# Patient Record
Sex: FEMALE | Race: WHITE | NOT HISPANIC OR LATINO
[De-identification: names, ages, dates, MRNs, and addresses within clinical notes are randomized per-mention and may not be internally consistent; named-entity substitution may affect disease eponyms.]

---

## 2017-08-16 ENCOUNTER — APPOINTMENT (OUTPATIENT)
Dept: MAMMOGRAPHY | Facility: HOSPITAL | Age: 72
End: 2017-08-16

## 2017-10-13 ENCOUNTER — APPOINTMENT (OUTPATIENT)
Dept: MAMMOGRAPHY | Facility: HOSPITAL | Age: 72
End: 2017-10-13

## 2018-06-08 ENCOUNTER — APPOINTMENT (OUTPATIENT)
Dept: SURGERY | Facility: CLINIC | Age: 73
End: 2018-06-08
Payer: MEDICARE

## 2018-06-08 VITALS — BODY MASS INDEX: 27.32 KG/M2 | HEIGHT: 66 IN | WEIGHT: 170 LBS

## 2018-06-08 DIAGNOSIS — Z80.9 FAMILY HISTORY OF MALIGNANT NEOPLASM, UNSPECIFIED: ICD-10-CM

## 2018-06-08 DIAGNOSIS — R22.2 LOCALIZED SWELLING, MASS AND LUMP, TRUNK: ICD-10-CM

## 2018-06-08 PROCEDURE — 99202 OFFICE O/P NEW SF 15 MIN: CPT

## 2018-06-08 RX ORDER — PREDNISONE 20 MG/1
20 TABLET ORAL
Qty: 10 | Refills: 0 | Status: ACTIVE | COMMUNITY
Start: 2018-03-26

## 2018-06-08 RX ORDER — OXYCODONE AND ACETAMINOPHEN 7.5; 325 MG/1; MG/1
7.5-325 TABLET ORAL
Qty: 90 | Refills: 0 | Status: ACTIVE | COMMUNITY
Start: 2018-06-01

## 2018-06-08 RX ORDER — LEVOFLOXACIN 500 MG/1
500 TABLET, FILM COATED ORAL
Qty: 7 | Refills: 0 | Status: ACTIVE | COMMUNITY
Start: 2018-03-26

## 2018-06-08 RX ORDER — FLUTICASONE FUROATE AND VILANTEROL TRIFENATATE 200; 25 UG/1; UG/1
200-25 POWDER RESPIRATORY (INHALATION)
Qty: 60 | Refills: 0 | Status: ACTIVE | COMMUNITY
Start: 2018-05-31

## 2018-06-15 ENCOUNTER — APPOINTMENT (OUTPATIENT)
Dept: SURGERY | Facility: CLINIC | Age: 73
End: 2018-06-15

## 2018-09-21 ENCOUNTER — OUTPATIENT (OUTPATIENT)
Dept: OUTPATIENT SERVICES | Facility: HOSPITAL | Age: 73
LOS: 1 days | End: 2018-09-21
Payer: MEDICARE

## 2018-09-21 ENCOUNTER — APPOINTMENT (OUTPATIENT)
Dept: RADIOLOGY | Facility: HOSPITAL | Age: 73
End: 2018-09-21

## 2018-09-21 DIAGNOSIS — Z00.8 ENCOUNTER FOR OTHER GENERAL EXAMINATION: ICD-10-CM

## 2018-09-21 PROCEDURE — 71046 X-RAY EXAM CHEST 2 VIEWS: CPT

## 2018-09-21 PROCEDURE — 71046 X-RAY EXAM CHEST 2 VIEWS: CPT | Mod: 26

## 2018-09-28 ENCOUNTER — OUTPATIENT (OUTPATIENT)
Dept: OUTPATIENT SERVICES | Facility: HOSPITAL | Age: 73
LOS: 1 days | End: 2018-09-28
Payer: MEDICARE

## 2018-09-28 DIAGNOSIS — R91.8 OTHER NONSPECIFIC ABNORMAL FINDING OF LUNG FIELD: ICD-10-CM

## 2018-09-28 PROCEDURE — 71250 CT THORAX DX C-: CPT

## 2018-09-28 PROCEDURE — 71250 CT THORAX DX C-: CPT | Mod: 26

## 2018-10-08 ENCOUNTER — OUTPATIENT (OUTPATIENT)
Dept: OUTPATIENT SERVICES | Facility: HOSPITAL | Age: 73
LOS: 1 days | End: 2018-10-08
Payer: MEDICARE

## 2018-10-08 DIAGNOSIS — J44.9 CHRONIC OBSTRUCTIVE PULMONARY DISEASE, UNSPECIFIED: ICD-10-CM

## 2018-10-08 PROCEDURE — 94729 DIFFUSING CAPACITY: CPT

## 2018-10-08 PROCEDURE — 94726 PLETHYSMOGRAPHY LUNG VOLUMES: CPT

## 2018-10-08 PROCEDURE — 94060 EVALUATION OF WHEEZING: CPT | Mod: 26

## 2018-10-08 PROCEDURE — 94729 DIFFUSING CAPACITY: CPT | Mod: 26

## 2018-10-08 PROCEDURE — 94726 PLETHYSMOGRAPHY LUNG VOLUMES: CPT | Mod: 26

## 2018-10-08 PROCEDURE — 94060 EVALUATION OF WHEEZING: CPT

## 2018-10-16 ENCOUNTER — APPOINTMENT (OUTPATIENT)
Dept: THORACIC SURGERY | Facility: CLINIC | Age: 73
End: 2018-10-16
Payer: MEDICARE

## 2018-10-16 VITALS
HEIGHT: 65 IN | RESPIRATION RATE: 16 BRPM | SYSTOLIC BLOOD PRESSURE: 141 MMHG | HEART RATE: 87 BPM | BODY MASS INDEX: 27.49 KG/M2 | TEMPERATURE: 98.1 F | OXYGEN SATURATION: 97 % | WEIGHT: 165 LBS | DIASTOLIC BLOOD PRESSURE: 83 MMHG

## 2018-10-16 DIAGNOSIS — R91.1 SOLITARY PULMONARY NODULE: ICD-10-CM

## 2018-10-16 DIAGNOSIS — Z82.49 FAMILY HISTORY OF ISCHEMIC HEART DISEASE AND OTHER DISEASES OF THE CIRCULATORY SYSTEM: ICD-10-CM

## 2018-10-16 PROCEDURE — 99205 OFFICE O/P NEW HI 60 MIN: CPT

## 2018-10-19 ENCOUNTER — EMERGENCY (EMERGENCY)
Facility: HOSPITAL | Age: 73
LOS: 1 days | Discharge: HOME | End: 2018-10-19
Attending: INTERNAL MEDICINE | Admitting: INTERNAL MEDICINE
Payer: MEDICARE

## 2018-10-19 ENCOUNTER — OUTPATIENT (OUTPATIENT)
Dept: OUTPATIENT SERVICES | Facility: HOSPITAL | Age: 73
LOS: 1 days | End: 2018-10-19
Payer: MEDICARE

## 2018-10-19 VITALS
HEART RATE: 77 BPM | SYSTOLIC BLOOD PRESSURE: 142 MMHG | OXYGEN SATURATION: 98 % | TEMPERATURE: 98 F | DIASTOLIC BLOOD PRESSURE: 89 MMHG | RESPIRATION RATE: 18 BRPM

## 2018-10-19 VITALS
OXYGEN SATURATION: 98 % | RESPIRATION RATE: 16 BRPM | DIASTOLIC BLOOD PRESSURE: 86 MMHG | HEIGHT: 64.5 IN | TEMPERATURE: 98 F | HEART RATE: 71 BPM | WEIGHT: 158.95 LBS | SYSTOLIC BLOOD PRESSURE: 150 MMHG

## 2018-10-19 VITALS
WEIGHT: 160.94 LBS | SYSTOLIC BLOOD PRESSURE: 116 MMHG | RESPIRATION RATE: 16 BRPM | OXYGEN SATURATION: 97 % | DIASTOLIC BLOOD PRESSURE: 77 MMHG | TEMPERATURE: 98 F | HEIGHT: 65 IN | HEART RATE: 93 BPM

## 2018-10-19 DIAGNOSIS — J44.9 CHRONIC OBSTRUCTIVE PULMONARY DISEASE, UNSPECIFIED: ICD-10-CM

## 2018-10-19 DIAGNOSIS — R91.1 SOLITARY PULMONARY NODULE: ICD-10-CM

## 2018-10-19 DIAGNOSIS — R59.0 LOCALIZED ENLARGED LYMPH NODES: ICD-10-CM

## 2018-10-19 DIAGNOSIS — N81.10 CYSTOCELE, UNSPECIFIED: Chronic | ICD-10-CM

## 2018-10-19 DIAGNOSIS — I10 ESSENTIAL (PRIMARY) HYPERTENSION: ICD-10-CM

## 2018-10-19 LAB
ALBUMIN SERPL ELPH-MCNC: 3.4 G/DL — SIGNIFICANT CHANGE UP (ref 3.3–5)
ALP SERPL-CCNC: 88 U/L — SIGNIFICANT CHANGE UP (ref 40–120)
ALT FLD-CCNC: 18 U/L DA — SIGNIFICANT CHANGE UP (ref 10–45)
ANION GAP SERPL CALC-SCNC: 10 MMOL/L — SIGNIFICANT CHANGE UP (ref 5–17)
ANION GAP SERPL CALC-SCNC: 9 MMOL/L — SIGNIFICANT CHANGE UP (ref 5–17)
APTT BLD: 28.9 SEC — SIGNIFICANT CHANGE UP (ref 27.5–37.4)
AST SERPL-CCNC: 19 U/L — SIGNIFICANT CHANGE UP (ref 10–40)
BASOPHILS # BLD AUTO: 0.1 K/UL — SIGNIFICANT CHANGE UP (ref 0–0.2)
BASOPHILS NFR BLD AUTO: 0.8 % — SIGNIFICANT CHANGE UP (ref 0–2)
BILIRUB SERPL-MCNC: 0.3 MG/DL — SIGNIFICANT CHANGE UP (ref 0.2–1.2)
BUN SERPL-MCNC: 17 MG/DL — SIGNIFICANT CHANGE UP (ref 7–23)
BUN SERPL-MCNC: 18 MG/DL — SIGNIFICANT CHANGE UP (ref 7–23)
BUN SERPL-MCNC: 19 MG/DL — SIGNIFICANT CHANGE UP (ref 7–23)
CALCIUM SERPL-MCNC: 10 MG/DL — SIGNIFICANT CHANGE UP (ref 8.4–10.5)
CALCIUM SERPL-MCNC: 9.3 MG/DL — SIGNIFICANT CHANGE UP (ref 8.4–10.5)
CALCIUM SERPL-MCNC: 9.9 MG/DL — SIGNIFICANT CHANGE UP (ref 8.4–10.5)
CHLORIDE SERPL-SCNC: 94 MMOL/L — LOW (ref 98–107)
CHLORIDE SERPL-SCNC: 96 MMOL/L — SIGNIFICANT CHANGE UP (ref 96–108)
CHLORIDE SERPL-SCNC: 96 MMOL/L — SIGNIFICANT CHANGE UP (ref 96–108)
CO2 SERPL-SCNC: 27 MMOL/L — SIGNIFICANT CHANGE UP (ref 22–31)
CO2 SERPL-SCNC: 30 MMOL/L — SIGNIFICANT CHANGE UP (ref 22–31)
CO2 SERPL-SCNC: 32 MMOL/L — HIGH (ref 22–31)
CREAT SERPL-MCNC: 1.21 MG/DL — SIGNIFICANT CHANGE UP (ref 0.5–1.3)
CREAT SERPL-MCNC: 1.25 MG/DL — SIGNIFICANT CHANGE UP (ref 0.5–1.3)
CREAT SERPL-MCNC: 1.27 MG/DL — SIGNIFICANT CHANGE UP (ref 0.5–1.3)
EOSINOPHIL # BLD AUTO: 0.4 K/UL — SIGNIFICANT CHANGE UP (ref 0–0.5)
EOSINOPHIL NFR BLD AUTO: 5.3 % — SIGNIFICANT CHANGE UP (ref 0–6)
GLUCOSE SERPL-MCNC: 104 MG/DL — HIGH (ref 70–99)
GLUCOSE SERPL-MCNC: 80 MG/DL — SIGNIFICANT CHANGE UP (ref 70–99)
GLUCOSE SERPL-MCNC: 83 MG/DL — SIGNIFICANT CHANGE UP (ref 70–99)
HCT VFR BLD CALC: 34.6 % — SIGNIFICANT CHANGE UP (ref 34.5–45)
HCT VFR BLD CALC: 35 % — SIGNIFICANT CHANGE UP (ref 34.5–45)
HGB BLD-MCNC: 11.7 G/DL — SIGNIFICANT CHANGE UP (ref 11.5–15.5)
HGB BLD-MCNC: 12 G/DL — SIGNIFICANT CHANGE UP (ref 11.5–15.5)
INR BLD: 1.03 RATIO — SIGNIFICANT CHANGE UP (ref 0.88–1.16)
LYMPHOCYTES # BLD AUTO: 1.7 K/UL — SIGNIFICANT CHANGE UP (ref 1–3.3)
LYMPHOCYTES # BLD AUTO: 20.4 % — SIGNIFICANT CHANGE UP (ref 13–44)
MAGNESIUM SERPL-MCNC: 1.6 MG/DL — SIGNIFICANT CHANGE UP (ref 1.6–2.6)
MCHC RBC-ENTMCNC: 29.3 PG — SIGNIFICANT CHANGE UP (ref 27–34)
MCHC RBC-ENTMCNC: 30.6 PG — SIGNIFICANT CHANGE UP (ref 27–34)
MCHC RBC-ENTMCNC: 33.4 % — SIGNIFICANT CHANGE UP (ref 32–36)
MCHC RBC-ENTMCNC: 34.6 GM/DL — SIGNIFICANT CHANGE UP (ref 32–36)
MCV RBC AUTO: 87.5 FL — SIGNIFICANT CHANGE UP (ref 80–100)
MCV RBC AUTO: 88.5 FL — SIGNIFICANT CHANGE UP (ref 80–100)
MONOCYTES # BLD AUTO: 0.5 K/UL — SIGNIFICANT CHANGE UP (ref 0–0.9)
MONOCYTES NFR BLD AUTO: 5.5 % — SIGNIFICANT CHANGE UP (ref 1–9)
NEUTROPHILS # BLD AUTO: 5.6 K/UL — SIGNIFICANT CHANGE UP (ref 1.8–7.4)
NEUTROPHILS NFR BLD AUTO: 68 % — SIGNIFICANT CHANGE UP (ref 43–77)
NRBC # FLD: 0 — SIGNIFICANT CHANGE UP
PLATELET # BLD AUTO: 300 K/UL — SIGNIFICANT CHANGE UP (ref 150–400)
PLATELET # BLD AUTO: 318 K/UL — SIGNIFICANT CHANGE UP (ref 150–400)
PMV BLD: 10.7 FL — SIGNIFICANT CHANGE UP (ref 7–13)
POTASSIUM SERPL-MCNC: 2.5 MMOL/L — CRITICAL LOW (ref 3.5–5.3)
POTASSIUM SERPL-MCNC: 2.8 MMOL/L — CRITICAL LOW (ref 3.5–5.3)
POTASSIUM SERPL-MCNC: 3.1 MMOL/L — LOW (ref 3.5–5.3)
POTASSIUM SERPL-SCNC: 2.5 MMOL/L — CRITICAL LOW (ref 3.5–5.3)
POTASSIUM SERPL-SCNC: 2.8 MMOL/L — CRITICAL LOW (ref 3.5–5.3)
POTASSIUM SERPL-SCNC: 3.1 MMOL/L — LOW (ref 3.5–5.3)
PROT SERPL-MCNC: 7.1 G/DL — SIGNIFICANT CHANGE UP (ref 6–8.3)
PROTHROM AB SERPL-ACNC: 11.4 SEC — SIGNIFICANT CHANGE UP (ref 9.8–12.7)
RBC # BLD: 3.91 M/UL — SIGNIFICANT CHANGE UP (ref 3.8–5.2)
RBC # BLD: 4 M/UL — SIGNIFICANT CHANGE UP (ref 3.8–5.2)
RBC # FLD: 12.3 % — SIGNIFICANT CHANGE UP (ref 10.3–14.5)
RBC # FLD: 13.3 % — SIGNIFICANT CHANGE UP (ref 10.3–14.5)
SODIUM SERPL-SCNC: 136 MMOL/L — SIGNIFICANT CHANGE UP (ref 135–145)
SODIUM SERPL-SCNC: 136 MMOL/L — SIGNIFICANT CHANGE UP (ref 135–145)
SODIUM SERPL-SCNC: 137 MMOL/L — SIGNIFICANT CHANGE UP (ref 135–145)
WBC # BLD: 8.2 K/UL — SIGNIFICANT CHANGE UP (ref 3.8–10.5)
WBC # BLD: 8.47 K/UL — SIGNIFICANT CHANGE UP (ref 3.8–10.5)
WBC # FLD AUTO: 8.2 K/UL — SIGNIFICANT CHANGE UP (ref 3.8–10.5)
WBC # FLD AUTO: 8.47 K/UL — SIGNIFICANT CHANGE UP (ref 3.8–10.5)

## 2018-10-19 PROCEDURE — 93010 ELECTROCARDIOGRAM REPORT: CPT

## 2018-10-19 PROCEDURE — 85730 THROMBOPLASTIN TIME PARTIAL: CPT

## 2018-10-19 PROCEDURE — 96365 THER/PROPH/DIAG IV INF INIT: CPT

## 2018-10-19 PROCEDURE — 93005 ELECTROCARDIOGRAM TRACING: CPT

## 2018-10-19 PROCEDURE — 99284 EMERGENCY DEPT VISIT MOD MDM: CPT | Mod: 25

## 2018-10-19 PROCEDURE — 96376 TX/PRO/DX INJ SAME DRUG ADON: CPT

## 2018-10-19 PROCEDURE — 85027 COMPLETE CBC AUTOMATED: CPT

## 2018-10-19 PROCEDURE — 80048 BASIC METABOLIC PNL TOTAL CA: CPT

## 2018-10-19 PROCEDURE — 83735 ASSAY OF MAGNESIUM: CPT

## 2018-10-19 PROCEDURE — 80053 COMPREHEN METABOLIC PANEL: CPT

## 2018-10-19 PROCEDURE — 85610 PROTHROMBIN TIME: CPT

## 2018-10-19 PROCEDURE — 99284 EMERGENCY DEPT VISIT MOD MDM: CPT

## 2018-10-19 RX ORDER — POTASSIUM CHLORIDE 20 MEQ
10 PACKET (EA) ORAL
Qty: 0 | Refills: 0 | Status: DISCONTINUED | OUTPATIENT
Start: 2018-10-19 | End: 2018-10-19

## 2018-10-19 RX ORDER — SODIUM CHLORIDE 9 MG/ML
1000 INJECTION, SOLUTION INTRAVENOUS
Qty: 0 | Refills: 0 | Status: DISCONTINUED | OUTPATIENT
Start: 2018-10-25 | End: 2018-11-09

## 2018-10-19 RX ORDER — POTASSIUM CHLORIDE 20 MEQ
40 PACKET (EA) ORAL ONCE
Qty: 0 | Refills: 0 | Status: COMPLETED | OUTPATIENT
Start: 2018-10-19 | End: 2018-10-19

## 2018-10-19 RX ORDER — POTASSIUM CHLORIDE 20 MEQ
10 PACKET (EA) ORAL
Qty: 0 | Refills: 0 | Status: COMPLETED | OUTPATIENT
Start: 2018-10-19 | End: 2018-10-19

## 2018-10-19 RX ORDER — SODIUM CHLORIDE 9 MG/ML
3 INJECTION INTRAMUSCULAR; INTRAVENOUS; SUBCUTANEOUS ONCE
Qty: 0 | Refills: 0 | Status: COMPLETED | OUTPATIENT
Start: 2018-10-19 | End: 2018-10-19

## 2018-10-19 RX ORDER — ACETAMINOPHEN 500 MG
650 TABLET ORAL ONCE
Qty: 0 | Refills: 0 | Status: COMPLETED | OUTPATIENT
Start: 2018-10-19 | End: 2018-10-19

## 2018-10-19 RX ADMIN — Medication 100 MILLIEQUIVALENT(S): at 20:35

## 2018-10-19 RX ADMIN — Medication 650 MILLIGRAM(S): at 18:33

## 2018-10-19 RX ADMIN — SODIUM CHLORIDE 3 MILLILITER(S): 9 INJECTION INTRAMUSCULAR; INTRAVENOUS; SUBCUTANEOUS at 18:32

## 2018-10-19 RX ADMIN — Medication 100 MILLIEQUIVALENT(S): at 19:30

## 2018-10-19 RX ADMIN — Medication 10 MILLIEQUIVALENT(S): at 20:30

## 2018-10-19 RX ADMIN — Medication 40 MILLIEQUIVALENT(S): at 19:05

## 2018-10-19 RX ADMIN — Medication 650 MILLIGRAM(S): at 20:05

## 2018-10-19 RX ADMIN — Medication 100 MILLIEQUIVALENT(S): at 21:35

## 2018-10-19 RX ADMIN — Medication 40 MILLIEQUIVALENT(S): at 23:44

## 2018-10-19 NOTE — H&P PST ADULT - REASON FOR ADMISSION
"I was sick about a month ago and it never got better so they sent me for a cxr and it showed something"

## 2018-10-19 NOTE — H&P PST ADULT - HISTORY OF PRESENT ILLNESS
73yr old female with medical hx htn, depression COPD/Asthma and osteoarthritis presents for preop evaluation with c/o coughing and malaise x 1 month.  Pt was evaluated by pmd and was txted with Levaquin x 7 days and steroids x 5 days with no improvement.  Pt was subsequently txted with Biaxin x 7 days with steroids x 3 days and then sent for cxr which was abnormal then followed up with ct scan.  Pt is now scheduled for Flexible Bronchoscopy 73yr old female with medical hx htn, depression COPD/Asthma and osteoarthritis presents for preop evaluation with c/o coughing and malaise x 1 month.  Pt was evaluated by pmd and was txted with Levaquin x 7 days and steroids x 5 days with no improvement.  Pt was subsequently txted with Biaxin x 7 days with steroids x 3 days and then sent for cxr which was abnormal then followed up with ct scan.  Pt is now scheduled for Flexible Bronchoscopy.

## 2018-10-19 NOTE — ED ADULT NURSE NOTE - NSIMPLEMENTINTERV_GEN_ALL_ED
Implemented All Universal Safety Interventions:  [place] to call system. Call bell, personal items and telephone within reach. Instruct patient to call for assistance. Room bathroom lighting operational. Non-slip footwear when patient is off stretcher. Physically safe environment: no spills, clutter or unnecessary equipment. Stretcher in lowest position, wheels locked, appropriate side rails in place.

## 2018-10-19 NOTE — H&P PST ADULT - PROBLEM SELECTOR PLAN 1
Scheduled for Flexible Bronchoscopy, Endobronchial Ultrasound and Biopsy on 10/25/18.  Lab results pending.  Preop and famotidine instructions provided and questions addressed.  Pt has appt for cardiac evaluation with Dr. Castañeda on 10/23/18 will call for results.

## 2018-10-19 NOTE — ED PROVIDER NOTE - OBJECTIVE STATEMENT
73 yr old female with hx of COPD, HTN, depression presents from surgical pre op due to low potassium. Pt has scheduled lung biopsy for next week, pt went for  pre op and potassium was 2.7. Pt reports generalized fatigue and mild headache. Denies any sob, chest pain, dizziness or any other symptoms at this time. Hx of hypokalemia 73 yr old female with hx of COPD, HTN, depression presents from surgical pre op due to low potassium. Pt has scheduled lung biopsy for next week, pt went for  pre op and potassium was 2.8. Pt reports generalized fatigue and mild headache. Denies any sob, chest pain, dizziness or any other symptoms at this time. Hx of hypokalemia

## 2018-10-19 NOTE — ED PROVIDER NOTE - PSH
Female bladder prolapse  bladder sling - 2013  S/P appendectomy  in   S/P breast biopsy  bilateral  breast  - benign  S/P D&C  for missed  about 25 years ago

## 2018-10-19 NOTE — ED PROVIDER NOTE - PROGRESS NOTE DETAILS
pt asymptomatic in ED. well appearing.  K improved p supplementation.  pt states she is prescribed KCl 10meq bid but did not take it for last 2 weeks about.  told pt to restart as directed.  Dr Kiran

## 2018-10-19 NOTE — H&P PST ADULT - PROBLEM/PLAN-2
Medication: norvasc  normodyne  Last visit: 10/04/17  Next visit: 10/27/2017  Last refill: 02/01/17  w/refills  [facility]
DISPLAY PLAN FREE TEXT

## 2018-10-19 NOTE — ED PROVIDER NOTE - MEDICAL DECISION MAKING DETAILS
73 yr old female with hx of COPD, HTN, depression presents from surgical pre op due to low potassium. Pt has scheduled lung biopsy for next week, pt went for  pre op and potassium was 2.7. Pt reports generalized fatigue and mild headache. Denies any sob, chest pain, dizziness or any other symptoms at this time. Hx of hypokalemia: will repeat labs, EKG, and likely 3 runs KCL and PO kcl - repeat K

## 2018-10-19 NOTE — ED PROVIDER NOTE - PMH
Arthritis  left knee recently, right knee arthritis for several years  Asthma    Bronchitis  last episode 1 year ago  COPD (chronic obstructive pulmonary disease)    Depression    HTN (hypertension)  controlled with meds for several years  Kidney calculi  spontaneously passed 10 years ago  Localized enlarged lymph nodes    Migraine  headaches, resolved for several years  Osteoarthritis of multiple joints, unspecified osteoarthritis type    Regurgitation  tricuspid valve  Solitary pulmonary nodule    Spinal stenosis, unspecified spinal region

## 2018-10-19 NOTE — PROVIDER CONTACT NOTE (CRITICAL VALUE NOTIFICATION) - ACTION/TREATMENT ORDERED:
Unable to reach Dr. Cavazos (message left to emergency service line, waited over an hr), unable to reach Dr. Bang Castañeda (cardiologist) - office is closed.   Discuss with Dr. Christensen (anesthesiologist)- Advised to send patient to ER for an evaluation & intervention. Patient was called - pt agreed to go to [facility] for further evaluation & treatment. Serum potassium ordered stat on admit.

## 2018-10-19 NOTE — H&P PST ADULT - PMH
Kidney calculi  spontaneously passed 10 years ago  Migraine  headaches, resolved for several years  Bronchitis  last episode 1 year ago  Regurgitation  tricuspid valve  Depression    HTN (hypertension)  controlled with meds for several years  Arthritis  left knee recently, right knee arthritis for several years Arthritis  left knee recently, right knee arthritis for several years  Asthma    Bronchitis  last episode 1 year ago  COPD (chronic obstructive pulmonary disease)    Depression    HTN (hypertension)  controlled with meds for several years  Kidney calculi  spontaneously passed 10 years ago  Migraine  headaches, resolved for several years  Osteoarthritis of multiple joints, unspecified osteoarthritis type    Regurgitation  tricuspid valve  Spinal stenosis, unspecified spinal region Arthritis  left knee recently, right knee arthritis for several years  Asthma    Bronchitis  last episode 1 year ago  COPD (chronic obstructive pulmonary disease)    Depression    HTN (hypertension)  controlled with meds for several years  Kidney calculi  spontaneously passed 10 years ago  Localized enlarged lymph nodes    Migraine  headaches, resolved for several years  Osteoarthritis of multiple joints, unspecified osteoarthritis type    Regurgitation  tricuspid valve  Solitary pulmonary nodule    Spinal stenosis, unspecified spinal region

## 2018-10-19 NOTE — PROVIDER CONTACT NOTE (CRITICAL VALUE NOTIFICATION) - BACKGROUND
74 yo female with hx of HTN, depression, COPD/asthma and OA presents to have PST for flexible bronchoscopy on 10/25/2018.

## 2018-10-19 NOTE — H&P PST ADULT - FAMILY HISTORY
Father  Still living? No  Family history of pancreatic cancer, Age at diagnosis: Age Unknown     Mother  Still living? No  Family history of COPD (chronic obstructive pulmonary disease), Age at diagnosis: Age Unknown  Family history of heart disease, Age at diagnosis: Age Unknown     Sibling  Still living? No  Family history of leukemia, Age at diagnosis: Age Unknown  Family history of hepatitis C, Age at diagnosis: Age Unknown  Family history of rheumatic fever, Age at diagnosis: Age Unknown

## 2018-10-19 NOTE — ED PROVIDER NOTE - ATTENDING CONTRIBUTION TO CARE
73 y f cc hypokalemia sent from preop   pe General:     NAD, well-nourished, well-appearing  Head:     NC/AT, EOMI, oral mucosa moist  Neck:     trachea midline  Lungs:     CTA b/l, no w/r/r  CVS:     S1S2, RRR, no m/g/r  Abd:     +BS, s/nt/nd, no organomegaly  Ext:    2+ radial and pedal pulses, no c/c/e  Neuro: AAOx3, no sensory/motor deficits  Dr. Saha:  I have reviewed and discussed with the PA/ resident the case specifics, including the history, physical assessment, evaluation, conclusion, laboratory results, and medical plan. I agree with the contents, and conclusions. I have personally examined, and interviewed the patient.

## 2018-10-19 NOTE — H&P PST ADULT - GASTROINTESTINAL DETAILS
no guarding/no bruit/no rebound tenderness/no rigidity/no distention/no masses palpable/bowel sounds normal/soft/nontender/no organomegaly

## 2018-10-19 NOTE — ED PROVIDER NOTE - NS ED ATTENDING STATEMENT MOD
[facility] Brief Operative Note    Pre-operative diagnosis: LUNG MASS   Post-operative diagnosis right lung mass     Procedure: Procedure(s):  FLEXIBLE BRONCHOSCOPY WITH BIOPSIES. - Wound Class: II-Clean Contaminated   Surgeon(s): Surgeon(s) and Role:     * Robbie Linda MD - Primary     * Ana Sun PA-C - Assisting   Estimated blood loss: 2 cc   Specimens:   ID Type Source Tests Collected by Time Destination   A : RIGHT LOWER LOBE BRONCHUS BIOPSY Tissue Bronchial SURGICAL PATHOLOGY EXAM Robbie Linda MD 11/14/2017 11:19 AM       Findings: Await final pathology      I have personally performed a face to face diagnostic evaluation on this patient. I have reviewed the ACP note and agree with the history, exam and plan of care, except as noted.

## 2018-10-23 ENCOUNTER — NON-APPOINTMENT (OUTPATIENT)
Age: 73
End: 2018-10-23

## 2018-10-23 ENCOUNTER — APPOINTMENT (OUTPATIENT)
Dept: CARDIOLOGY | Facility: CLINIC | Age: 73
End: 2018-10-23
Payer: MEDICARE

## 2018-10-23 VITALS
BODY MASS INDEX: 27.16 KG/M2 | HEART RATE: 83 BPM | SYSTOLIC BLOOD PRESSURE: 128 MMHG | DIASTOLIC BLOOD PRESSURE: 71 MMHG | WEIGHT: 163 LBS | RESPIRATION RATE: 17 BRPM | HEIGHT: 65 IN | OXYGEN SATURATION: 96 %

## 2018-10-23 DIAGNOSIS — Z82.49 FAMILY HISTORY OF ISCHEMIC HEART DISEASE AND OTHER DISEASES OF THE CIRCULATORY SYSTEM: ICD-10-CM

## 2018-10-23 PROBLEM — M15.9 POLYOSTEOARTHRITIS, UNSPECIFIED: Chronic | Status: ACTIVE | Noted: 2018-10-19

## 2018-10-23 PROBLEM — J44.9 CHRONIC OBSTRUCTIVE PULMONARY DISEASE, UNSPECIFIED: Chronic | Status: ACTIVE | Noted: 2018-10-19

## 2018-10-23 PROCEDURE — 93306 TTE W/DOPPLER COMPLETE: CPT

## 2018-10-23 PROCEDURE — 99205 OFFICE O/P NEW HI 60 MIN: CPT

## 2018-10-23 PROCEDURE — 93000 ELECTROCARDIOGRAM COMPLETE: CPT

## 2018-10-25 ENCOUNTER — APPOINTMENT (OUTPATIENT)
Dept: THORACIC SURGERY | Facility: HOSPITAL | Age: 73
End: 2018-10-25
Payer: MEDICARE

## 2018-10-25 ENCOUNTER — RESULT REVIEW (OUTPATIENT)
Age: 73
End: 2018-10-25

## 2018-10-25 ENCOUNTER — OUTPATIENT (OUTPATIENT)
Dept: OUTPATIENT SERVICES | Facility: HOSPITAL | Age: 73
LOS: 1 days | Discharge: HOME | End: 2018-10-25
Payer: MEDICARE

## 2018-10-25 VITALS
HEART RATE: 71 BPM | DIASTOLIC BLOOD PRESSURE: 78 MMHG | RESPIRATION RATE: 16 BRPM | OXYGEN SATURATION: 97 % | HEIGHT: 64.5 IN | SYSTOLIC BLOOD PRESSURE: 133 MMHG | WEIGHT: 158.95 LBS | TEMPERATURE: 98 F

## 2018-10-25 VITALS
OXYGEN SATURATION: 98 % | SYSTOLIC BLOOD PRESSURE: 139 MMHG | DIASTOLIC BLOOD PRESSURE: 86 MMHG | RESPIRATION RATE: 16 BRPM | HEART RATE: 78 BPM

## 2018-10-25 DIAGNOSIS — R91.1 SOLITARY PULMONARY NODULE: ICD-10-CM

## 2018-10-25 DIAGNOSIS — N81.10 CYSTOCELE, UNSPECIFIED: Chronic | ICD-10-CM

## 2018-10-25 LAB — POTASSIUM BLDV-SCNC: 2.8 MMOL/L — CRITICAL LOW (ref 3.4–4.5)

## 2018-10-25 PROCEDURE — 88341 IMHCHEM/IMCYTCHM EA ADD ANTB: CPT | Mod: 26

## 2018-10-25 PROCEDURE — 71045 X-RAY EXAM CHEST 1 VIEW: CPT | Mod: 26

## 2018-10-25 PROCEDURE — 88172 CYTP DX EVAL FNA 1ST EA SITE: CPT | Mod: 26

## 2018-10-25 PROCEDURE — 88305 TISSUE EXAM BY PATHOLOGIST: CPT | Mod: 26

## 2018-10-25 PROCEDURE — 88173 CYTOPATH EVAL FNA REPORT: CPT | Mod: 26

## 2018-10-25 PROCEDURE — 31652 BRONCH EBUS SAMPLNG 1/2 NODE: CPT

## 2018-10-25 PROCEDURE — 88342 IMHCHEM/IMCYTCHM 1ST ANTB: CPT | Mod: 26

## 2018-10-25 RX ORDER — POTASSIUM CHLORIDE 20 MEQ
20 PACKET (EA) ORAL ONCE
Qty: 0 | Refills: 0 | Status: COMPLETED | OUTPATIENT
Start: 2018-10-25 | End: 2018-10-25

## 2018-10-25 RX ORDER — POTASSIUM CHLORIDE 20 MEQ
10 PACKET (EA) ORAL ONCE
Qty: 0 | Refills: 0 | Status: COMPLETED | OUTPATIENT
Start: 2018-10-25 | End: 2018-10-25

## 2018-10-25 RX ORDER — FENTANYL CITRATE 50 UG/ML
25 INJECTION INTRAVENOUS
Qty: 0 | Refills: 0 | Status: DISCONTINUED | OUTPATIENT
Start: 2018-10-25 | End: 2018-10-25

## 2018-10-25 RX ORDER — POTASSIUM CHLORIDE 20 MEQ
40 PACKET (EA) ORAL ONCE
Qty: 0 | Refills: 0 | Status: COMPLETED | OUTPATIENT
Start: 2018-10-25 | End: 2018-10-25

## 2018-10-25 RX ORDER — OXYCODONE HYDROCHLORIDE 5 MG/1
10 TABLET ORAL ONCE
Qty: 0 | Refills: 0 | Status: DISCONTINUED | OUTPATIENT
Start: 2018-10-25 | End: 2018-10-25

## 2018-10-25 RX ORDER — OXYCODONE HYDROCHLORIDE 5 MG/1
5 TABLET ORAL ONCE
Qty: 0 | Refills: 0 | Status: DISCONTINUED | OUTPATIENT
Start: 2018-10-25 | End: 2018-10-25

## 2018-10-25 RX ORDER — METOCLOPRAMIDE HCL 10 MG
10 TABLET ORAL ONCE
Qty: 0 | Refills: 0 | Status: DISCONTINUED | OUTPATIENT
Start: 2018-10-25 | End: 2018-10-25

## 2018-10-25 RX ORDER — ONDANSETRON 8 MG/1
4 TABLET, FILM COATED ORAL ONCE
Qty: 0 | Refills: 0 | Status: DISCONTINUED | OUTPATIENT
Start: 2018-10-25 | End: 2018-10-25

## 2018-10-25 RX ORDER — FENTANYL CITRATE 50 UG/ML
50 INJECTION INTRAVENOUS
Qty: 0 | Refills: 0 | Status: DISCONTINUED | OUTPATIENT
Start: 2018-10-25 | End: 2018-10-25

## 2018-10-25 RX ADMIN — Medication 50 MILLIEQUIVALENT(S): at 10:14

## 2018-10-25 NOTE — ASU DISCHARGE PLAN (ADULT/PEDIATRIC). - NOTIFY
Fever greater than 101/Bleeding that does not stop Fever greater than 101/Bleeding that does not stop/Increased Irritability or Sluggishness/Pain not relieved by Medications/Persistent Nausea and Vomiting/shortness of breat/Unable to Urinate/Inability to Tolerate Liquids or Foods

## 2018-10-25 NOTE — ASU DISCHARGE PLAN (ADULT/PEDIATRIC). - MEDICATION SUMMARY - MEDICATIONS TO TAKE
I will START or STAY ON the medications listed below when I get home from the hospital:    oxyCODONE-acetaminophen 7.5 mg-325 mg oral tablet  -- 1 tab(s) by mouth every 6 hours, As Needed  -- Indication: For pain    Zoloft 100 mg oral tablet  -- 1 tab(s) by mouth once a day (at bedtime)  -- Indication: For depression    ZyrTEC 10 mg oral tablet  -- 1 tab(s) by mouth 2 times a day  -- Indication: For allergy    valsartan-hydrochlorothiazide 160mg-25mg oral tablet  -- 1 tab(s) by mouth once a day  -- Indication: For htn    Breo Ellipta 200 mcg-25 mcg/inh inhalation powder  -- 1 puff(s) inhaled once a day  -- Indication: For copd    Ventolin HFA 90 mcg/inh inhalation aerosol  -- 2 puff(s) inhaled 4 times a day, As Needed  -- Indication: For copd    Klor-Con 10 mEq oral tablet, extended release  -- 1 tab(s) by mouth 2 times a day  -- Indication: For Supplement    Cod Liver Oil oral capsule  -- 1 cap(s) by mouth once a day - last dsoe 10/19/18  -- Indication: For Supplement    Vitamin B-12 100 mcg oral tablet  -- 1 tab(s) by mouth once a day  -- Indication: For Supplement    Vitamin D3 1000 intl units oral tablet  -- 1 tab(s) by mouth once a day  -- Indication: For Supplement

## 2018-10-25 NOTE — BRIEF OPERATIVE NOTE - PROCEDURE POST
How Severe Is Your Burn?: mild Is This A New Presentation, Or A Follow-Up?: Burn Additional History: Patient applied Cortisone 10. <<-----Click on this checkbox to enter Post-Op Dx

## 2018-10-25 NOTE — ASU DISCHARGE PLAN (ADULT/PEDIATRIC). - SPECIAL INSTRUCTIONS
Asper Dr Hale, call the office of Dr. Fredis Solomon (Oncologist) [phone] to schedule an appointment within 1 week

## 2018-10-25 NOTE — BRIEF OPERATIVE NOTE - PROCEDURE
<<-----Click on this checkbox to enter Procedure EBUS, with biopsy of mediastinal lymph node  10/25/2018    Active  LINDY

## 2018-10-25 NOTE — ASU DISCHARGE PLAN (ADULT/PEDIATRIC). - POST OP PHONE #
[phone] pt. granted permission to leave message /and or speak with whoever answers the phone.

## 2018-10-25 NOTE — ASU DISCHARGE PLAN (ADULT/PEDIATRIC). - ASU FOLLOWUP
911 or go to the nearest Emergency Room SHIV ASU (Adult):/or call 911 or go to the nearest emergency room

## 2018-10-25 NOTE — ASU PREOP CHECKLIST - 2.
k=2.8 anesthesia and  to be notified k=2.8 anesthesia and  to be notified, Dr. Spears giving 20 meq IV stat k=2.8 anesthesia and  to be notified, Dr. Spears giving 20 meq IV stat and taken to OR

## 2018-11-04 ENCOUNTER — OUTPATIENT (OUTPATIENT)
Dept: OUTPATIENT SERVICES | Facility: HOSPITAL | Age: 73
LOS: 1 days | Discharge: HOME | End: 2018-11-04

## 2018-11-04 DIAGNOSIS — C34.90 MALIGNANT NEOPLASM OF UNSPECIFIED PART OF UNSPECIFIED BRONCHUS OR LUNG: ICD-10-CM

## 2018-11-04 DIAGNOSIS — N81.10 CYSTOCELE, UNSPECIFIED: Chronic | ICD-10-CM

## 2018-11-05 LAB — NON-GYNECOLOGICAL CYTOLOGY STUDY: SIGNIFICANT CHANGE UP

## 2018-11-13 ENCOUNTER — APPOINTMENT (OUTPATIENT)
Dept: HEMATOLOGY ONCOLOGY | Facility: CLINIC | Age: 73
End: 2018-11-13
Payer: MEDICARE

## 2018-11-13 VITALS
DIASTOLIC BLOOD PRESSURE: 88 MMHG | OXYGEN SATURATION: 97 % | SYSTOLIC BLOOD PRESSURE: 146 MMHG | HEART RATE: 77 BPM | TEMPERATURE: 97.4 F | BODY MASS INDEX: 26.82 KG/M2 | HEIGHT: 65 IN | RESPIRATION RATE: 16 BRPM | WEIGHT: 161 LBS

## 2018-11-13 PROCEDURE — 99205 OFFICE O/P NEW HI 60 MIN: CPT

## 2018-11-13 NOTE — PHYSICAL EXAM
[Restricted in physically strenuous activity but ambulatory and able to carry out work of a light or sedentary nature] : Status 1- Restricted in physically strenuous activity but ambulatory and able to carry out work of a light or sedentary nature, e.g., light house work, office work [Normal] : affect appropriate [de-identified] : decreased BS b/l [de-identified] : left LE ecchymosis

## 2018-11-13 NOTE — REVIEW OF SYSTEMS
[Fatigue] : fatigue [Cough] : cough [Negative] : Allergic/Immunologic [FreeTextEntry2] : decreased appetite [FreeTextEntry9] : as above

## 2018-11-13 NOTE — ASSESSMENT
[FreeTextEntry1] : 72 yo w with recently diagnosed lung adeno ca. \par PET/CT and Brain MRI pending\par Molecular/PDL1 pending ETA 10/26\par Discussed stage based on available imaging and need for additional work up before tx decision. \par OV on 11/27.

## 2018-11-13 NOTE — HISTORY OF PRESENT ILLNESS
[Disease: _____________________] : Disease: [unfilled] [T: ___] : T[unfilled] [N: ___] : N[unfilled] [M: ___] : M[unfilled] [AJCC Stage: ____] : AJCC Stage: [unfilled] [de-identified] : Ms. Neely is a pleasant 73 year old female, current smoker with past medical history of HTN, Asthma, Osteoarthritis, was treated for URI in August 2018 with Levaquin and then Biaxin with Prednisone for what was thought to be COPD exacerbation with minimal improvement. She was then found to have an opacity on CXR and CT scan of the Chest (she never had a screening CT) which are detailed below. She was referred to Dr. Hale for further work up. She underwent EBUS and bx on 10/25/18 which was pos for adeno ca. She was referred to med onc. She is here for a consultation visit. \par CT Scan of the Chest 9/28/2018 revealed:\par -Subcarinal lymphadenopathy, 2.3 x 1.6 cm\par -Right upper paratracheal lymphadenopathy, 1.7 x 1.5 cm\par -Right lower paratracheal lymphadenopathy, 2.9 x 2.6 cm\par -Anterior mediastinal lymphadenopathy, 1.6 x 1.1.cm \par -Right hilar prominence is identified\par -2.3 x 2.2 cm spiculated mass with a small region of cavitation identified within the RLL lung parenchyma abutting the right major fissure. \par -Irregular soft tissue density tracks along the right major fissure medially\par -8 mm irregular mass medial to the index lesion\par -3 mm nodule is identified inferior/lateral to the index lesion\par -9 x 9 mm nodule identifier within the medial aspect of the RML\par -4 mm nodule is identified within the LLL\par -Faint 2-3 mm nodule identified within the FRANSISCA\par -Reticulonodular opacity within the bilateral upper lobes, right greater than left\par -1.6 x 1.5 cm left adrenal mass, unchanged\par -Multiple space occupying lesions identified within the bilateral renal parenchyma, up to 2.4 cm on the right and 2.3 cm on the left\par \par The patient has some persistent cough with yellow-white expectoration. She denies chest pain, shortness of breath, hemoptysis, fever, or weight loss. She has decreased appetite. She sustained a fall a few days ago after missing a step and hurt her left LE. She notes a bruise in the gluteal area. She has pain in the left gluteal area and left thigh. \par  [de-identified] : adeno ca

## 2018-11-13 NOTE — CONSULT LETTER
[Dear  ___] : Dear  [unfilled], [Consult Letter:] : I had the pleasure of evaluating your patient, [unfilled]. [Please see my note below.] : Please see my note below. [Consult Closing:] : Thank you very much for allowing me to participate in the care of this patient.  If you have any questions, please do not hesitate to contact me. [Sincerely,] : Sincerely, [FreeTextEntry2] : Dr. Junior Hale [FreeTextEntry3] : Art Land MD\par \par

## 2018-11-16 ENCOUNTER — OTHER (OUTPATIENT)
Age: 73
End: 2018-11-16

## 2018-11-19 ENCOUNTER — APPOINTMENT (OUTPATIENT)
Dept: NUCLEAR MEDICINE | Facility: CLINIC | Age: 73
End: 2018-11-19
Payer: MEDICARE

## 2018-11-19 ENCOUNTER — OUTPATIENT (OUTPATIENT)
Dept: OUTPATIENT SERVICES | Facility: HOSPITAL | Age: 73
LOS: 1 days | End: 2018-11-19
Payer: COMMERCIAL

## 2018-11-19 ENCOUNTER — APPOINTMENT (OUTPATIENT)
Dept: MRI IMAGING | Facility: CLINIC | Age: 73
End: 2018-11-19
Payer: MEDICARE

## 2018-11-19 DIAGNOSIS — R91.1 SOLITARY PULMONARY NODULE: ICD-10-CM

## 2018-11-19 DIAGNOSIS — N81.10 CYSTOCELE, UNSPECIFIED: Chronic | ICD-10-CM

## 2018-11-19 PROCEDURE — 78815 PET IMAGE W/CT SKULL-THIGH: CPT | Mod: 26,PI

## 2018-11-19 PROCEDURE — A9552: CPT

## 2018-11-19 PROCEDURE — 70553 MRI BRAIN STEM W/O & W/DYE: CPT | Mod: 26

## 2018-11-19 PROCEDURE — 82565 ASSAY OF CREATININE: CPT

## 2018-11-19 PROCEDURE — A9585: CPT

## 2018-11-19 PROCEDURE — 70553 MRI BRAIN STEM W/O & W/DYE: CPT

## 2018-11-19 PROCEDURE — 78815 PET IMAGE W/CT SKULL-THIGH: CPT

## 2018-11-28 ENCOUNTER — APPOINTMENT (OUTPATIENT)
Dept: HEMATOLOGY ONCOLOGY | Facility: CLINIC | Age: 73
End: 2018-11-28
Payer: MEDICARE

## 2018-11-28 VITALS
SYSTOLIC BLOOD PRESSURE: 167 MMHG | TEMPERATURE: 97.8 F | HEART RATE: 89 BPM | BODY MASS INDEX: 26.41 KG/M2 | DIASTOLIC BLOOD PRESSURE: 105 MMHG | RESPIRATION RATE: 16 BRPM | WEIGHT: 158.73 LBS | OXYGEN SATURATION: 99 %

## 2018-11-28 DIAGNOSIS — Z85.118 PERSONAL HISTORY OF OTHER MALIGNANT NEOPLASM OF BRONCHUS AND LUNG: ICD-10-CM

## 2018-11-28 DIAGNOSIS — M62.838 OTHER MUSCLE SPASM: ICD-10-CM

## 2018-11-28 PROCEDURE — 99215 OFFICE O/P EST HI 40 MIN: CPT

## 2018-11-28 RX ORDER — METOCLOPRAMIDE 10 MG/1
10 TABLET ORAL EVERY 6 HOURS
Qty: 40 | Refills: 3 | Status: ACTIVE | COMMUNITY
Start: 2018-11-28 | End: 1900-01-01

## 2018-11-28 RX ORDER — FOLIC ACID 1 MG/1
1 TABLET ORAL DAILY
Qty: 30 | Refills: 2 | Status: ACTIVE | COMMUNITY
Start: 2018-11-28 | End: 1900-01-01

## 2018-11-28 NOTE — PHYSICAL EXAM
[Restricted in physically strenuous activity but ambulatory and able to carry out work of a light or sedentary nature] : Status 1- Restricted in physically strenuous activity but ambulatory and able to carry out work of a light or sedentary nature, e.g., light house work, office work [Normal] : affect appropriate [de-identified] : decreased BS b/l

## 2018-11-28 NOTE — REVIEW OF SYSTEMS
[Fatigue] : fatigue [Recent Change In Weight] : ~T recent weight change [Cough] : cough [SOB on Exertion] : shortness of breath during exertion [Negative] : Allergic/Immunologic [FreeTextEntry2] : decreased appetite [FreeTextEntry9] : as above

## 2018-11-28 NOTE — ASSESSMENT
[FreeTextEntry1] : 74 yo w with recently diagnosed lung adeno ca. \par PET/CT and Brain MRI reviewed with pt. Mets to left axilla and BM. \par Discussed stage IV disease and incurability. \par Discussed PDL1 80% and its predictive value with immunotherapy\par Recommended chemo+keytruda based on Keynote-189 which showed improved outcomes even in pts with high PDL1. \par Discussed tx regimen in detail. Obtained consent. \par Pt takes Percocet for body pains. Has been on it for a couple of years. \par Pt would be a candidate for GAP 70 as well as Naloxigel study. Will provide pt with all the materials.

## 2018-11-28 NOTE — HISTORY OF PRESENT ILLNESS
[Disease: _____________________] : Disease: [unfilled] [T: ___] : T[unfilled] [N: ___] : N[unfilled] [M: ___] : M[unfilled] [AJCC Stage: ____] : AJCC Stage: [unfilled] [de-identified] : Ms. Neely is a pleasant 73 year old female, current smoker with past medical history of HTN, Asthma, Osteoarthritis, was treated for URI in August 2018 with Levaquin and then Biaxin with Prednisone for what was thought to be COPD exacerbation with minimal improvement. She was then found to have an opacity on CXR and CT scan of the Chest (she never had a screening CT) which showed extensive adenopathy in addition to rt lung mass. She was referred to Dr. Hale for further work up. She underwent EBUS and bx on 10/25/18 which was pos for adeno ca. LAst visit, she was referred for PET/CT which showed cavitary lung mass, mediastinal, hilar, b/l supraclav, left axillary and BM mets. Brain MRI showed microvascular changes but no mets. NGS revealed KRAS mut and PDL1 was 80%. \par Pt notes left lung lump and has generalized body aches (that is chronic for years). She was diagnosed with osteoarthritis but not RA.  [de-identified] : adeno ca [Date: ____________] : Patient's last distress assessment performed on [unfilled]. [8 - Distress Level] : Distress Level: 8 [Patient given social work contact information and resource sheet] : Patient was given social work contact information and resource sheet

## 2018-12-03 ENCOUNTER — OUTPATIENT (OUTPATIENT)
Dept: OUTPATIENT SERVICES | Facility: HOSPITAL | Age: 73
LOS: 1 days | Discharge: HOME | End: 2018-12-03

## 2018-12-03 DIAGNOSIS — C34.90 MALIGNANT NEOPLASM OF UNSPECIFIED PART OF UNSPECIFIED BRONCHUS OR LUNG: ICD-10-CM

## 2018-12-03 DIAGNOSIS — N81.10 CYSTOCELE, UNSPECIFIED: Chronic | ICD-10-CM

## 2018-12-04 ENCOUNTER — APPOINTMENT (OUTPATIENT)
Dept: HEMATOLOGY ONCOLOGY | Facility: CLINIC | Age: 73
End: 2018-12-04

## 2018-12-05 ENCOUNTER — LABORATORY RESULT (OUTPATIENT)
Age: 73
End: 2018-12-05

## 2018-12-05 ENCOUNTER — APPOINTMENT (OUTPATIENT)
Dept: INFUSION THERAPY | Facility: HOSPITAL | Age: 73
End: 2018-12-05

## 2018-12-05 ENCOUNTER — RESULT REVIEW (OUTPATIENT)
Age: 73
End: 2018-12-05

## 2018-12-05 ENCOUNTER — OTHER (OUTPATIENT)
Age: 73
End: 2018-12-05

## 2018-12-05 LAB
BASOPHILS # BLD AUTO: 0.1 K/UL — SIGNIFICANT CHANGE UP (ref 0–0.2)
BASOPHILS NFR BLD AUTO: 0.7 % — SIGNIFICANT CHANGE UP (ref 0–2)
EOSINOPHIL # BLD AUTO: 0.6 K/UL — HIGH (ref 0–0.5)
EOSINOPHIL NFR BLD AUTO: 6.1 % — HIGH (ref 0–6)
HCT VFR BLD CALC: 35.8 % — SIGNIFICANT CHANGE UP (ref 34.5–45)
HGB BLD-MCNC: 12.6 G/DL — SIGNIFICANT CHANGE UP (ref 11.5–15.5)
LYMPHOCYTES # BLD AUTO: 1.6 K/UL — SIGNIFICANT CHANGE UP (ref 1–3.3)
LYMPHOCYTES # BLD AUTO: 15.7 % — SIGNIFICANT CHANGE UP (ref 13–44)
MCHC RBC-ENTMCNC: 30.3 PG — SIGNIFICANT CHANGE UP (ref 27–34)
MCHC RBC-ENTMCNC: 35.2 G/DL — SIGNIFICANT CHANGE UP (ref 32–36)
MCV RBC AUTO: 86 FL — SIGNIFICANT CHANGE UP (ref 80–100)
MONOCYTES # BLD AUTO: 0.8 K/UL — SIGNIFICANT CHANGE UP (ref 0–0.9)
MONOCYTES NFR BLD AUTO: 7.5 % — SIGNIFICANT CHANGE UP (ref 2–14)
NEUTROPHILS # BLD AUTO: 7.4 K/UL — SIGNIFICANT CHANGE UP (ref 1.8–7.4)
NEUTROPHILS NFR BLD AUTO: 70.1 % — SIGNIFICANT CHANGE UP (ref 43–77)
PLATELET # BLD AUTO: 307 K/UL — SIGNIFICANT CHANGE UP (ref 150–400)
RBC # BLD: 4.16 M/UL — SIGNIFICANT CHANGE UP (ref 3.8–5.2)
RBC # FLD: 11.6 % — SIGNIFICANT CHANGE UP (ref 10.3–14.5)
WBC # BLD: 10.5 K/UL — SIGNIFICANT CHANGE UP (ref 3.8–10.5)
WBC # FLD AUTO: 10.5 K/UL — SIGNIFICANT CHANGE UP (ref 3.8–10.5)

## 2018-12-05 RX ORDER — SERTRALINE 25 MG/1
1 TABLET, FILM COATED ORAL
Qty: 0 | Refills: 0 | COMMUNITY

## 2018-12-06 DIAGNOSIS — R11.2 NAUSEA WITH VOMITING, UNSPECIFIED: ICD-10-CM

## 2018-12-06 DIAGNOSIS — E86.0 DEHYDRATION: ICD-10-CM

## 2018-12-06 DIAGNOSIS — Z51.11 ENCOUNTER FOR ANTINEOPLASTIC CHEMOTHERAPY: ICD-10-CM

## 2018-12-13 ENCOUNTER — LABORATORY RESULT (OUTPATIENT)
Age: 73
End: 2018-12-13

## 2018-12-13 ENCOUNTER — APPOINTMENT (OUTPATIENT)
Dept: INFUSION THERAPY | Facility: HOSPITAL | Age: 73
End: 2018-12-13

## 2018-12-13 ENCOUNTER — RESULT REVIEW (OUTPATIENT)
Age: 73
End: 2018-12-13

## 2018-12-13 ENCOUNTER — APPOINTMENT (OUTPATIENT)
Dept: HEMATOLOGY ONCOLOGY | Facility: CLINIC | Age: 73
End: 2018-12-13
Payer: MEDICARE

## 2018-12-13 VITALS
SYSTOLIC BLOOD PRESSURE: 134 MMHG | DIASTOLIC BLOOD PRESSURE: 87 MMHG | HEART RATE: 89 BPM | RESPIRATION RATE: 16 BRPM | BODY MASS INDEX: 26.12 KG/M2 | WEIGHT: 156.97 LBS | TEMPERATURE: 97.9 F | OXYGEN SATURATION: 100 %

## 2018-12-13 DIAGNOSIS — C34.90 MALIGNANT NEOPLASM OF UNSPECIFIED PART OF UNSPECIFIED BRONCHUS OR LUNG: ICD-10-CM

## 2018-12-13 DIAGNOSIS — R59.1 GENERALIZED ENLARGED LYMPH NODES: ICD-10-CM

## 2018-12-13 DIAGNOSIS — R06.00 DYSPNEA, UNSPECIFIED: ICD-10-CM

## 2018-12-13 LAB
BASOPHILS # BLD AUTO: 0 K/UL — SIGNIFICANT CHANGE UP (ref 0–0.2)
BASOPHILS NFR BLD AUTO: 0.3 % — SIGNIFICANT CHANGE UP (ref 0–2)
BUN SERPL-MCNC: 21 MG/DL — SIGNIFICANT CHANGE UP (ref 7–23)
CA-I BLDA-SCNC: 1.2 MMOL/L — SIGNIFICANT CHANGE UP (ref 1.12–1.3)
CHLORIDE SERPL-SCNC: 98 MMOL/L — SIGNIFICANT CHANGE UP (ref 96–108)
CO2 SERPL-SCNC: 30 MMOL/L — SIGNIFICANT CHANGE UP (ref 22–31)
CREAT SERPL-MCNC: 1 MG/DL — SIGNIFICANT CHANGE UP (ref 0.5–1.3)
EOSINOPHIL # BLD AUTO: 0.3 K/UL — SIGNIFICANT CHANGE UP (ref 0–0.5)
EOSINOPHIL NFR BLD AUTO: 6.9 % — HIGH (ref 0–6)
GLUCOSE SERPL-MCNC: 79 MG/DL — SIGNIFICANT CHANGE UP (ref 70–99)
HCT VFR BLD CALC: 35.7 % — SIGNIFICANT CHANGE UP (ref 34.5–45)
HGB BLD-MCNC: 12.2 G/DL — SIGNIFICANT CHANGE UP (ref 11.5–15.5)
LYMPHOCYTES # BLD AUTO: 1.3 K/UL — SIGNIFICANT CHANGE UP (ref 1–3.3)
LYMPHOCYTES # BLD AUTO: 28.6 % — SIGNIFICANT CHANGE UP (ref 13–44)
MCHC RBC-ENTMCNC: 29.7 PG — SIGNIFICANT CHANGE UP (ref 27–34)
MCHC RBC-ENTMCNC: 34.2 G/DL — SIGNIFICANT CHANGE UP (ref 32–36)
MCV RBC AUTO: 86.7 FL — SIGNIFICANT CHANGE UP (ref 80–100)
MONOCYTES # BLD AUTO: 0.2 K/UL — SIGNIFICANT CHANGE UP (ref 0–0.9)
MONOCYTES NFR BLD AUTO: 3.9 % — SIGNIFICANT CHANGE UP (ref 2–14)
NEUTROPHILS # BLD AUTO: 2.7 K/UL — SIGNIFICANT CHANGE UP (ref 1.8–7.4)
NEUTROPHILS NFR BLD AUTO: 60.3 % — SIGNIFICANT CHANGE UP (ref 43–77)
PLATELET # BLD AUTO: 205 K/UL — SIGNIFICANT CHANGE UP (ref 150–400)
POTASSIUM SERPL-MCNC: 3.3 MMOL/L — LOW (ref 3.5–5.3)
POTASSIUM SERPL-SCNC: 3.3 MMOL/L — LOW (ref 3.5–5.3)
RBC # BLD: 4.12 M/UL — SIGNIFICANT CHANGE UP (ref 3.8–5.2)
RBC # FLD: 11.5 % — SIGNIFICANT CHANGE UP (ref 10.3–14.5)
SODIUM SERPL-SCNC: 137 MMOL/L — SIGNIFICANT CHANGE UP (ref 135–145)
WBC # BLD: 4.4 K/UL — SIGNIFICANT CHANGE UP (ref 3.8–10.5)
WBC # FLD AUTO: 4.4 K/UL — SIGNIFICANT CHANGE UP (ref 3.8–10.5)

## 2018-12-13 PROCEDURE — 99214 OFFICE O/P EST MOD 30 MIN: CPT

## 2018-12-13 NOTE — PHYSICAL EXAM
[Restricted in physically strenuous activity but ambulatory and able to carry out work of a light or sedentary nature] : Status 1- Restricted in physically strenuous activity but ambulatory and able to carry out work of a light or sedentary nature, e.g., light house work, office work [Normal] : affect appropriate [de-identified] : nasal congestion [de-identified] : decreased BS b/l

## 2018-12-13 NOTE — CONSULT LETTER
[Dear  ___] : Dear  [unfilled], [Consult Letter:] : I had the pleasure of evaluating your patient, [unfilled]. [Please see my note below.] : Please see my note below. [Consult Closing:] : Thank you very much for allowing me to participate in the care of this patient.  If you have any questions, please do not hesitate to contact me. [Sincerely,] : Sincerely, [FreeTextEntry2] : Dr. Junior Hale [FreeTextEntry3] : Atr Land MD\par \par

## 2018-12-13 NOTE — ASSESSMENT
[FreeTextEntry1] : 74 yo w with recently diagnosed lung adeno ca. \par PET/CT and Brain MRI reviewed with pt. Mets to left axilla and BM. \par PDL1 80%, KRAS mut\par Started chemo+keytruda based on Keynote-189 which showed improved outcomes even in pts with high PDL1. \par Pt signed signed consent for the [facility] naloxigel study but EGFR 55 and hence, ineligible. \par Labs today\par IV hydration'OTC cough meds\par NAsal swab\par OV in 2 weeks as scheduled

## 2018-12-13 NOTE — REVIEW OF SYSTEMS
[Fatigue] : fatigue [Recent Change In Weight] : ~T recent weight change [Cough] : cough [SOB on Exertion] : shortness of breath during exertion [Negative] : Allergic/Immunologic [FreeTextEntry2] : decreased appetite [FreeTextEntry4] : URI symptoms  [FreeTextEntry9] : as above

## 2018-12-13 NOTE — HISTORY OF PRESENT ILLNESS
[Disease: _____________________] : Disease: [unfilled] [T: ___] : T[unfilled] [N: ___] : N[unfilled] [M: ___] : M[unfilled] [AJCC Stage: ____] : AJCC Stage: [unfilled] [de-identified] : Ms. Neely is a pleasant 73 year old female, current smoker with past medical history of HTN, Asthma, Osteoarthritis, was treated for URI in August 2018 with Levaquin and then Biaxin with Prednisone for what was thought to be COPD exacerbation with minimal improvement. She was then found to have an opacity on CXR and CT scan of the Chest (she never had a screening CT) which showed extensive adenopathy in addition to rt lung mass. She was referred to Dr. Hale for further work up. She underwent EBUS and bx on 10/25/18 which was pos for adeno ca. LAst visit, she was referred for PET/CT which showed cavitary lung mass, mediastinal, hilar, b/l supraclav, left axillary and BM mets. Brain MRI showed microvascular changes but no mets. NGS revealed KRAS mut and PDL1 was 80%. \par Last visit, She was started on carbo/alimta/keytruda last week. She tolerated this well but unfortunately, has a URI today and feels sick with sore throat, runny nose, congested sinuses. She denies any fever, diarrhea, chills.  [de-identified] : adeno ca [Date: ____________] : Patient's last distress assessment performed on [unfilled]. [8 - Distress Level] : Distress Level: 8 [Patient given social work contact information and resource sheet] : Patient was given social work contact information and resource sheet

## 2018-12-14 PROBLEM — J18.9 PNEUMONIA: Status: ACTIVE | Noted: 2018-12-14

## 2018-12-20 ENCOUNTER — APPOINTMENT (OUTPATIENT)
Dept: HEMATOLOGY ONCOLOGY | Facility: CLINIC | Age: 73
End: 2018-12-20

## 2018-12-20 DIAGNOSIS — J18.9 PNEUMONIA, UNSPECIFIED ORGANISM: ICD-10-CM

## 2018-12-25 NOTE — ED PROVIDER NOTE - CROS ED [facility] ALL NEG
"Occupational Therapy Evaluation completed.   Functional Status:  Pt presents to skilled OT services following GLF at home resulting in R sacral fx s/p transsacral screw fixation, TTWB RLE, concern for pathological fx, pending biopsy, h/o OA, breast Ca ~10yrs ago w/ L mastectomy and reconstruction. Pt performed supine to sit with cga from a raised hob and use of bed rail, sba to don slippers sitting eob, min/mod a for LB dressing due to decreased dynamic balance in standing, seated h/g tasks with supervision, STS eob with cga/min a post training in techniques to maintain TTWB, was able to demonstrate hopping; however fatigues quickly due to LLE baseline being weaker leg and BUE strength to offload full body weight during hopping with FWW, eob->chair with min a with vc's to maintain TTWB. Pt will benefit from acute skilled OT services while in house and will likely benefit from post acute therapy prior to d/c home.   Plan of Care: Will benefit from Occupational Therapy 3 times per week  Discharge Recommendations:  Equipment: Will Continue to Assess for Equipment Needs. Post-acute therapy Discharge to a transitional care facility for continued skilled therapy services.    See \"Rehab Therapy-Acute\" Patient Summary Report for complete documentation.    "
"Occupational Therapy Treatment completed with focus on ADLs, ADL transfers and patient education.  Functional Status: Mod A LB dressing using AE, stand-pivot transfers using FWW CGA  Plan of Care: Will benefit from Occupational Therapy 3 times per week  Discharge Recommendations:  Equipment Will Continue to Assess for Equipment Needs. Post-acute therapy: Recommend inpatient transitional care services for continued occupational therapy services.     See \"Rehab Therapy-Acute\" Patient Summary Report for complete documentation.     Pt seen for OT tx. Received in recliner. Trained pt on use of reacher and sock aid for LB dressing. Fair demo; would benefit from reinforcement. Pt completed stand-pivot transfers using FWW. Good adherence to TTWB RLE. Declined mobilization to bathroom for toileting. Pt is progressing as expected with modified ADL and functional mobility. Continues to be limited by pain, balance impairment, WB restriction, activity intolerance. Acute OT to continue following with recommendation for post-acute transitional care setting.   "
"Physical Therapy Evaluation completed.   Bed Mobility:  Supine to Sit: Contact Guard Assist  Transfers: Sit to Stand: Contact Guard Assist  Gait: Level Of Assist: Contact Guard Assist with Front-Wheel Walker       Plan of Care: Will benefit from Physical Therapy 3 times per week and Plan to complete next treatment by Thursday 12/20  Discharge Recommendations: Equipment: Will Continue to Assess for Equipment Needs. Post-acute therapy Discharge to a transitional care facility for continued skilled therapy services.    Pt is a 72 year old female admitted to the hospital following fall resulting in sacral fracture. Pt underwent transsacral screw fixation of S1/S2 and is now TTWB R LE. At time of initial evaluation, pt required CGA for supine to sit and sit to stand transitions. Pt only able to ambulate approximately 5 feet total with FWW, CGA. Pt with poor UE strength to assist with offloading L LE and fatigue quickly. Pt also notes pain in L LE from prior surgery. Pt required minimal assist for sit to supine. Pt would benefit from skilled PT intervention while in the acute care setting to address the listed deficits and improve mobility prior to DC. Based on current level of function, pt would benefit from post acute placement upon DC    See \"Rehab Therapy-Acute\" Patient Summary Report for complete documentation.     "
negative...

## 2018-12-26 ENCOUNTER — RESULT REVIEW (OUTPATIENT)
Age: 73
End: 2018-12-26

## 2018-12-26 ENCOUNTER — APPOINTMENT (OUTPATIENT)
Dept: INFUSION THERAPY | Facility: HOSPITAL | Age: 73
End: 2018-12-26

## 2018-12-26 ENCOUNTER — LABORATORY RESULT (OUTPATIENT)
Age: 73
End: 2018-12-26

## 2018-12-26 LAB
BASOPHILS # BLD AUTO: 0 K/UL — SIGNIFICANT CHANGE UP (ref 0–0.2)
EOSINOPHIL # BLD AUTO: 0 K/UL — SIGNIFICANT CHANGE UP (ref 0–0.5)
HCT VFR BLD CALC: 35.7 % — SIGNIFICANT CHANGE UP (ref 34.5–45)
HGB BLD-MCNC: 12 G/DL — SIGNIFICANT CHANGE UP (ref 11.5–15.5)
LYMPHOCYTES # BLD AUTO: 2.2 K/UL — SIGNIFICANT CHANGE UP (ref 1–3.3)
LYMPHOCYTES # BLD AUTO: 27 % — SIGNIFICANT CHANGE UP (ref 13–44)
MCHC RBC-ENTMCNC: 29.1 PG — SIGNIFICANT CHANGE UP (ref 27–34)
MCHC RBC-ENTMCNC: 33.5 G/DL — SIGNIFICANT CHANGE UP (ref 32–36)
MCV RBC AUTO: 86.9 FL — SIGNIFICANT CHANGE UP (ref 80–100)
MONOCYTES # BLD AUTO: 0.9 K/UL — SIGNIFICANT CHANGE UP (ref 0–0.9)
MONOCYTES NFR BLD AUTO: 10 % — SIGNIFICANT CHANGE UP (ref 2–14)
NEUTROPHILS # BLD AUTO: 5.9 K/UL — SIGNIFICANT CHANGE UP (ref 1.8–7.4)
NEUTROPHILS NFR BLD AUTO: 63 % — SIGNIFICANT CHANGE UP (ref 43–77)
PLAT MORPH BLD: NORMAL — SIGNIFICANT CHANGE UP
PLATELET # BLD AUTO: 325 K/UL — SIGNIFICANT CHANGE UP (ref 150–400)
RBC # BLD: 4.11 M/UL — SIGNIFICANT CHANGE UP (ref 3.8–5.2)
RBC # FLD: 12.9 % — SIGNIFICANT CHANGE UP (ref 10.3–14.5)
RBC BLD AUTO: NORMAL — SIGNIFICANT CHANGE UP
WBC # BLD: 9.1 K/UL — SIGNIFICANT CHANGE UP (ref 3.8–10.5)
WBC # FLD AUTO: 9.1 K/UL — SIGNIFICANT CHANGE UP (ref 3.8–10.5)

## 2019-01-03 ENCOUNTER — OUTPATIENT (OUTPATIENT)
Dept: OUTPATIENT SERVICES | Facility: HOSPITAL | Age: 74
LOS: 1 days | Discharge: HOME | End: 2019-01-03

## 2019-01-03 ENCOUNTER — RESULT REVIEW (OUTPATIENT)
Age: 74
End: 2019-01-03

## 2019-01-03 ENCOUNTER — LABORATORY RESULT (OUTPATIENT)
Age: 74
End: 2019-01-03

## 2019-01-03 ENCOUNTER — APPOINTMENT (OUTPATIENT)
Dept: INFUSION THERAPY | Facility: HOSPITAL | Age: 74
End: 2019-01-03

## 2019-01-03 DIAGNOSIS — N81.10 CYSTOCELE, UNSPECIFIED: Chronic | ICD-10-CM

## 2019-01-03 DIAGNOSIS — C34.90 MALIGNANT NEOPLASM OF UNSPECIFIED PART OF UNSPECIFIED BRONCHUS OR LUNG: ICD-10-CM

## 2019-01-03 LAB
BUN SERPL-MCNC: 21 MG/DL — SIGNIFICANT CHANGE UP (ref 7–23)
CA-I BLDA-SCNC: 1.33 MMOL/L — HIGH (ref 1.12–1.3)
CHLORIDE SERPL-SCNC: 98 MMOL/L — SIGNIFICANT CHANGE UP (ref 96–108)
CO2 SERPL-SCNC: 26 MMOL/L — SIGNIFICANT CHANGE UP (ref 22–31)
CREAT SERPL-MCNC: 0.9 MG/DL — SIGNIFICANT CHANGE UP (ref 0.5–1.3)
EOSINOPHIL # BLD AUTO: 0.2 K/UL — SIGNIFICANT CHANGE UP (ref 0–0.5)
GLUCOSE SERPL-MCNC: 112 MG/DL — HIGH (ref 70–99)
HCT VFR BLD CALC: 30.7 % — LOW (ref 34.5–45)
HGB BLD-MCNC: 10.7 G/DL — LOW (ref 11.5–15.5)
LYMPHOCYTES # BLD AUTO: 1.2 K/UL — SIGNIFICANT CHANGE UP (ref 1–3.3)
LYMPHOCYTES # BLD AUTO: 45 % — HIGH (ref 13–44)
MCHC RBC-ENTMCNC: 30.3 PG — SIGNIFICANT CHANGE UP (ref 27–34)
MCHC RBC-ENTMCNC: 34.9 G/DL — SIGNIFICANT CHANGE UP (ref 32–36)
MCV RBC AUTO: 86.6 FL — SIGNIFICANT CHANGE UP (ref 80–100)
MONOCYTES # BLD AUTO: 0.2 K/UL — SIGNIFICANT CHANGE UP (ref 0–0.9)
MONOCYTES NFR BLD AUTO: 6 % — SIGNIFICANT CHANGE UP (ref 2–14)
NEUTROPHILS # BLD AUTO: 2 K/UL — SIGNIFICANT CHANGE UP (ref 1.8–7.4)
NEUTROPHILS NFR BLD AUTO: 49 % — SIGNIFICANT CHANGE UP (ref 43–77)
PLAT MORPH BLD: NORMAL — SIGNIFICANT CHANGE UP
PLATELET # BLD AUTO: 148 K/UL — LOW (ref 150–400)
POTASSIUM SERPL-MCNC: 3.4 MMOL/L — LOW (ref 3.5–5.3)
POTASSIUM SERPL-SCNC: 3.4 MMOL/L — LOW (ref 3.5–5.3)
RBC # BLD: 3.54 M/UL — LOW (ref 3.8–5.2)
RBC # FLD: 12.8 % — SIGNIFICANT CHANGE UP (ref 10.3–14.5)
RBC BLD AUTO: SIGNIFICANT CHANGE UP
SODIUM SERPL-SCNC: 138 MMOL/L — SIGNIFICANT CHANGE UP (ref 135–145)
WBC # BLD: 3.7 K/UL — LOW (ref 3.8–10.5)
WBC # FLD AUTO: 3.7 K/UL — LOW (ref 3.8–10.5)

## 2019-01-04 DIAGNOSIS — E86.0 DEHYDRATION: ICD-10-CM

## 2019-01-14 ENCOUNTER — LABORATORY RESULT (OUTPATIENT)
Age: 74
End: 2019-01-14

## 2019-01-14 ENCOUNTER — RESULT REVIEW (OUTPATIENT)
Age: 74
End: 2019-01-14

## 2019-01-14 ENCOUNTER — APPOINTMENT (OUTPATIENT)
Dept: INFUSION THERAPY | Facility: HOSPITAL | Age: 74
End: 2019-01-14

## 2019-01-14 LAB
BASOPHILS # BLD AUTO: 0 K/UL — SIGNIFICANT CHANGE UP (ref 0–0.2)
BASOPHILS NFR BLD AUTO: 0.4 % — SIGNIFICANT CHANGE UP (ref 0–2)
EOSINOPHIL # BLD AUTO: 0.2 K/UL — SIGNIFICANT CHANGE UP (ref 0–0.5)
EOSINOPHIL NFR BLD AUTO: 7 % — HIGH (ref 0–6)
HCT VFR BLD CALC: 27.7 % — LOW (ref 34.5–45)
HGB BLD-MCNC: 9.8 G/DL — LOW (ref 11.5–15.5)
LYMPHOCYTES # BLD AUTO: 1.4 K/UL — SIGNIFICANT CHANGE UP (ref 1–3.3)
LYMPHOCYTES # BLD AUTO: 27 % — SIGNIFICANT CHANGE UP (ref 13–44)
MCHC RBC-ENTMCNC: 31 PG — SIGNIFICANT CHANGE UP (ref 27–34)
MCHC RBC-ENTMCNC: 35.4 G/DL — SIGNIFICANT CHANGE UP (ref 32–36)
MCV RBC AUTO: 87.7 FL — SIGNIFICANT CHANGE UP (ref 80–100)
MONOCYTES # BLD AUTO: 0.8 K/UL — SIGNIFICANT CHANGE UP (ref 0–0.9)
MONOCYTES NFR BLD AUTO: 17 % — HIGH (ref 2–14)
NEUTROPHILS # BLD AUTO: 1.9 K/UL — SIGNIFICANT CHANGE UP (ref 1.8–7.4)
NEUTROPHILS NFR BLD AUTO: 48 % — SIGNIFICANT CHANGE UP (ref 43–77)
NEUTS BAND # BLD: 1 % — SIGNIFICANT CHANGE UP (ref 0–8)
PLAT MORPH BLD: NORMAL — SIGNIFICANT CHANGE UP
PLATELET # BLD AUTO: 281 K/UL — SIGNIFICANT CHANGE UP (ref 150–400)
RBC # BLD: 3.16 M/UL — LOW (ref 3.8–5.2)
RBC # FLD: 14.6 % — HIGH (ref 10.3–14.5)
RBC BLD AUTO: SIGNIFICANT CHANGE UP
WBC # BLD: 4.4 K/UL — SIGNIFICANT CHANGE UP (ref 3.8–10.5)
WBC # FLD AUTO: 4.4 K/UL — SIGNIFICANT CHANGE UP (ref 3.8–10.5)

## 2019-01-15 DIAGNOSIS — R11.2 NAUSEA WITH VOMITING, UNSPECIFIED: ICD-10-CM

## 2019-01-15 DIAGNOSIS — Z51.11 ENCOUNTER FOR ANTINEOPLASTIC CHEMOTHERAPY: ICD-10-CM

## 2019-01-23 ENCOUNTER — APPOINTMENT (OUTPATIENT)
Dept: INFUSION THERAPY | Facility: HOSPITAL | Age: 74
End: 2019-01-23

## 2019-01-25 ENCOUNTER — OUTPATIENT (OUTPATIENT)
Dept: OUTPATIENT SERVICES | Facility: HOSPITAL | Age: 74
LOS: 1 days | Discharge: HOME | End: 2019-01-25

## 2019-01-25 DIAGNOSIS — C34.90 MALIGNANT NEOPLASM OF UNSPECIFIED PART OF UNSPECIFIED BRONCHUS OR LUNG: ICD-10-CM

## 2019-01-25 DIAGNOSIS — N81.10 CYSTOCELE, UNSPECIFIED: Chronic | ICD-10-CM

## 2019-02-04 ENCOUNTER — RESULT REVIEW (OUTPATIENT)
Age: 74
End: 2019-02-04

## 2019-02-04 ENCOUNTER — LABORATORY RESULT (OUTPATIENT)
Age: 74
End: 2019-02-04

## 2019-02-04 ENCOUNTER — APPOINTMENT (OUTPATIENT)
Dept: INFUSION THERAPY | Facility: HOSPITAL | Age: 74
End: 2019-02-04

## 2019-02-04 LAB
BASOPHILS # BLD AUTO: 0.1 K/UL — SIGNIFICANT CHANGE UP (ref 0–0.2)
BASOPHILS NFR BLD AUTO: 1 % — SIGNIFICANT CHANGE UP (ref 0–2)
EOSINOPHIL # BLD AUTO: 0.2 K/UL — SIGNIFICANT CHANGE UP (ref 0–0.5)
EOSINOPHIL NFR BLD AUTO: 3.6 % — SIGNIFICANT CHANGE UP (ref 0–6)
HCT VFR BLD CALC: 27.4 % — LOW (ref 34.5–45)
HGB BLD-MCNC: 9.6 G/DL — LOW (ref 11.5–15.5)
LYMPHOCYTES # BLD AUTO: 1.9 K/UL — SIGNIFICANT CHANGE UP (ref 1–3.3)
LYMPHOCYTES # BLD AUTO: 29.2 % — SIGNIFICANT CHANGE UP (ref 13–44)
MCHC RBC-ENTMCNC: 32.1 PG — SIGNIFICANT CHANGE UP (ref 27–34)
MCHC RBC-ENTMCNC: 35.1 G/DL — SIGNIFICANT CHANGE UP (ref 32–36)
MCV RBC AUTO: 91.5 FL — SIGNIFICANT CHANGE UP (ref 80–100)
MONOCYTES # BLD AUTO: 1.1 K/UL — HIGH (ref 0–0.9)
MONOCYTES NFR BLD AUTO: 16.8 % — HIGH (ref 2–14)
NEUTROPHILS # BLD AUTO: 3.2 K/UL — SIGNIFICANT CHANGE UP (ref 1.8–7.4)
NEUTROPHILS NFR BLD AUTO: 49.3 % — SIGNIFICANT CHANGE UP (ref 43–77)
PLATELET # BLD AUTO: 353 K/UL — SIGNIFICANT CHANGE UP (ref 150–400)
RBC # BLD: 2.99 M/UL — LOW (ref 3.8–5.2)
RBC # FLD: 18.4 % — HIGH (ref 10.3–14.5)
WBC # BLD: 6.5 K/UL — SIGNIFICANT CHANGE UP (ref 3.8–10.5)
WBC # FLD AUTO: 6.5 K/UL — SIGNIFICANT CHANGE UP (ref 3.8–10.5)

## 2019-02-05 DIAGNOSIS — Z51.11 ENCOUNTER FOR ANTINEOPLASTIC CHEMOTHERAPY: ICD-10-CM

## 2019-02-05 DIAGNOSIS — R11.2 NAUSEA WITH VOMITING, UNSPECIFIED: ICD-10-CM

## 2019-02-05 DIAGNOSIS — E86.0 DEHYDRATION: ICD-10-CM

## 2019-02-06 ENCOUNTER — APPOINTMENT (OUTPATIENT)
Dept: HEMATOLOGY ONCOLOGY | Facility: CLINIC | Age: 74
End: 2019-02-06

## 2019-02-12 ENCOUNTER — APPOINTMENT (OUTPATIENT)
Dept: HEMATOLOGY ONCOLOGY | Facility: CLINIC | Age: 74
End: 2019-02-12

## 2019-02-12 ENCOUNTER — APPOINTMENT (OUTPATIENT)
Dept: INFUSION THERAPY | Facility: HOSPITAL | Age: 74
End: 2019-02-12

## 2019-02-14 ENCOUNTER — APPOINTMENT (OUTPATIENT)
Dept: INFUSION THERAPY | Facility: HOSPITAL | Age: 74
End: 2019-02-14

## 2019-02-14 ENCOUNTER — APPOINTMENT (OUTPATIENT)
Dept: HEMATOLOGY ONCOLOGY | Facility: CLINIC | Age: 74
End: 2019-02-14
Payer: MEDICARE

## 2019-02-14 PROCEDURE — 99214 OFFICE O/P EST MOD 30 MIN: CPT

## 2019-02-14 NOTE — HISTORY OF PRESENT ILLNESS
[Disease: _____________________] : Disease: [unfilled] [T: ___] : T[unfilled] [N: ___] : N[unfilled] [M: ___] : M[unfilled] [AJCC Stage: ____] : AJCC Stage: [unfilled] [de-identified] : Ms. Neely is a pleasant 73 year old female, current smoker with past medical history of HTN, Asthma, Osteoarthritis, was treated for URI in August 2018 with Levaquin and then Biaxin with Prednisone for what was thought to be COPD exacerbation with minimal improvement. She was then found to have an opacity on CXR and CT scan of the Chest (she never had a screening CT) which showed extensive adenopathy in addition to rt lung mass. She was referred to Dr. Hale for further work up. She underwent EBUS and bx on 10/25/18 which was pos for adeno ca. LAst visit, she was referred for PET/CT which showed cavitary lung mass, mediastinal, hilar, b/l supraclav, left axillary and BM mets. Brain MRI showed microvascular changes but no mets. NGS revealed KRAS mut and PDL1 was 80%. \par Last visit, She was started on carbo/alimta/keytruda last week. She tolerated this well but unfortunately, has a URI today and feels sick with sore throat, runny nose, congested sinuses. She denies any fever, diarrhea, chills. \par \par 2/14/19: Pt has completed 4 cycles of Carbo/Alimta/Keytruda. She has tolerated very well to date. She reports that her PMD recently started her back on anti-hypertensive/diuretic. She states she has been experiencing some LE edema and b/l joint pain. She finds it difficult to walk with the edema and feels slightly winded when climbing stairs. She continues taking folic acid daily. No worsening cough or CP. Denies dizziness. Occasional HA (chronic) [de-identified] : adeno ca [Date: ____________] : Patient's last distress assessment performed on [unfilled]. [8 - Distress Level] : Distress Level: 8 [Patient given social work contact information and resource sheet] : Patient was given social work contact information and resource sheet

## 2019-02-14 NOTE — ASSESSMENT
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 \par -Monitor CBC. Hgb trickled down\par -Continue Folic Acid/ B12\par -Can likely d/c IVF but if continue then would change IVF to 1/2 normal to minimize edema and risk for increasing BP\par -OV following scans\par \par \par

## 2019-02-14 NOTE — PHYSICAL EXAM
[Restricted in physically strenuous activity but ambulatory and able to carry out work of a light or sedentary nature] : Status 1- Restricted in physically strenuous activity but ambulatory and able to carry out work of a light or sedentary nature, e.g., light house work, office work [Normal] : affect appropriate [de-identified] : decreased BS b/l [de-identified] : trace-1(+) pedal edema

## 2019-02-14 NOTE — REVIEW OF SYSTEMS
[Fatigue] : fatigue [Recent Change In Weight] : ~T recent weight change [Cough] : cough [SOB on Exertion] : shortness of breath during exertion [Joint Pain] : joint pain [Joint Stiffness] : joint stiffness [Negative] : Allergic/Immunologic [FreeTextEntry9] : as above

## 2019-02-27 ENCOUNTER — OUTPATIENT (OUTPATIENT)
Dept: OUTPATIENT SERVICES | Facility: HOSPITAL | Age: 74
LOS: 1 days | Discharge: HOME | End: 2019-02-27

## 2019-02-27 ENCOUNTER — FORM ENCOUNTER (OUTPATIENT)
Age: 74
End: 2019-02-27

## 2019-02-27 DIAGNOSIS — C34.90 MALIGNANT NEOPLASM OF UNSPECIFIED PART OF UNSPECIFIED BRONCHUS OR LUNG: ICD-10-CM

## 2019-02-27 DIAGNOSIS — N81.10 CYSTOCELE, UNSPECIFIED: Chronic | ICD-10-CM

## 2019-02-28 ENCOUNTER — LABORATORY RESULT (OUTPATIENT)
Age: 74
End: 2019-02-28

## 2019-02-28 ENCOUNTER — APPOINTMENT (OUTPATIENT)
Dept: CT IMAGING | Facility: IMAGING CENTER | Age: 74
End: 2019-02-28
Payer: MEDICARE

## 2019-02-28 ENCOUNTER — RESULT REVIEW (OUTPATIENT)
Age: 74
End: 2019-02-28

## 2019-02-28 ENCOUNTER — APPOINTMENT (OUTPATIENT)
Dept: INFUSION THERAPY | Facility: HOSPITAL | Age: 74
End: 2019-02-28

## 2019-02-28 ENCOUNTER — OUTPATIENT (OUTPATIENT)
Dept: OUTPATIENT SERVICES | Facility: HOSPITAL | Age: 74
LOS: 1 days | End: 2019-02-28
Payer: MEDICARE

## 2019-02-28 DIAGNOSIS — C34.90 MALIGNANT NEOPLASM OF UNSPECIFIED PART OF UNSPECIFIED BRONCHUS OR LUNG: ICD-10-CM

## 2019-02-28 DIAGNOSIS — N81.10 CYSTOCELE, UNSPECIFIED: Chronic | ICD-10-CM

## 2019-02-28 LAB
BASOPHILS # BLD AUTO: 0.1 K/UL — SIGNIFICANT CHANGE UP (ref 0–0.2)
BASOPHILS NFR BLD AUTO: 1 % — SIGNIFICANT CHANGE UP (ref 0–2)
EOSINOPHIL # BLD AUTO: 0.3 K/UL — SIGNIFICANT CHANGE UP (ref 0–0.5)
EOSINOPHIL NFR BLD AUTO: 3.8 % — SIGNIFICANT CHANGE UP (ref 0–6)
HCT VFR BLD CALC: 30.7 % — LOW (ref 34.5–45)
HGB BLD-MCNC: 10.8 G/DL — LOW (ref 11.5–15.5)
LYMPHOCYTES # BLD AUTO: 1.5 K/UL — SIGNIFICANT CHANGE UP (ref 1–3.3)
LYMPHOCYTES # BLD AUTO: 19.3 % — SIGNIFICANT CHANGE UP (ref 13–44)
MCHC RBC-ENTMCNC: 33.8 PG — SIGNIFICANT CHANGE UP (ref 27–34)
MCHC RBC-ENTMCNC: 35 G/DL — SIGNIFICANT CHANGE UP (ref 32–36)
MCV RBC AUTO: 96.5 FL — SIGNIFICANT CHANGE UP (ref 80–100)
MONOCYTES # BLD AUTO: 1.1 K/UL — HIGH (ref 0–0.9)
MONOCYTES NFR BLD AUTO: 13.7 % — SIGNIFICANT CHANGE UP (ref 2–14)
NEUTROPHILS # BLD AUTO: 5 K/UL — SIGNIFICANT CHANGE UP (ref 1.8–7.4)
NEUTROPHILS NFR BLD AUTO: 62.2 % — SIGNIFICANT CHANGE UP (ref 43–77)
PLATELET # BLD AUTO: 370 K/UL — SIGNIFICANT CHANGE UP (ref 150–400)
RBC # BLD: 3.19 M/UL — LOW (ref 3.8–5.2)
RBC # FLD: 18.2 % — HIGH (ref 10.3–14.5)
WBC # BLD: 8 K/UL — SIGNIFICANT CHANGE UP (ref 3.8–10.5)
WBC # FLD AUTO: 8 K/UL — SIGNIFICANT CHANGE UP (ref 3.8–10.5)

## 2019-02-28 PROCEDURE — 74160 CT ABDOMEN W/CONTRAST: CPT | Mod: 26

## 2019-02-28 PROCEDURE — 74160 CT ABDOMEN W/CONTRAST: CPT

## 2019-02-28 PROCEDURE — 71260 CT THORAX DX C+: CPT | Mod: 26

## 2019-02-28 PROCEDURE — 71260 CT THORAX DX C+: CPT

## 2019-03-01 DIAGNOSIS — R11.2 NAUSEA WITH VOMITING, UNSPECIFIED: ICD-10-CM

## 2019-03-01 DIAGNOSIS — Z51.11 ENCOUNTER FOR ANTINEOPLASTIC CHEMOTHERAPY: ICD-10-CM

## 2019-03-01 DIAGNOSIS — E86.0 DEHYDRATION: ICD-10-CM

## 2019-03-07 ENCOUNTER — APPOINTMENT (OUTPATIENT)
Dept: HEMATOLOGY ONCOLOGY | Facility: CLINIC | Age: 74
End: 2019-03-07
Payer: MEDICARE

## 2019-03-07 VITALS
DIASTOLIC BLOOD PRESSURE: 83 MMHG | TEMPERATURE: 97.8 F | HEART RATE: 123 BPM | BODY MASS INDEX: 25.31 KG/M2 | OXYGEN SATURATION: 100 % | RESPIRATION RATE: 16 BRPM | SYSTOLIC BLOOD PRESSURE: 127 MMHG | WEIGHT: 152.12 LBS

## 2019-03-07 PROCEDURE — 99214 OFFICE O/P EST MOD 30 MIN: CPT

## 2019-03-21 ENCOUNTER — APPOINTMENT (OUTPATIENT)
Dept: INFUSION THERAPY | Facility: HOSPITAL | Age: 74
End: 2019-03-21

## 2019-03-21 ENCOUNTER — LABORATORY RESULT (OUTPATIENT)
Age: 74
End: 2019-03-21

## 2019-03-21 ENCOUNTER — RESULT REVIEW (OUTPATIENT)
Age: 74
End: 2019-03-21

## 2019-03-21 LAB
BASOPHILS # BLD AUTO: 0.1 K/UL — SIGNIFICANT CHANGE UP (ref 0–0.2)
BASOPHILS NFR BLD AUTO: 1.1 % — SIGNIFICANT CHANGE UP (ref 0–2)
EOSINOPHIL # BLD AUTO: 0.8 K/UL — HIGH (ref 0–0.5)
EOSINOPHIL NFR BLD AUTO: 11.2 % — HIGH (ref 0–6)
HCT VFR BLD CALC: 27.3 % — LOW (ref 34.5–45)
HGB BLD-MCNC: 9.6 G/DL — LOW (ref 11.5–15.5)
LYMPHOCYTES # BLD AUTO: 1.6 K/UL — SIGNIFICANT CHANGE UP (ref 1–3.3)
LYMPHOCYTES # BLD AUTO: 22.3 % — SIGNIFICANT CHANGE UP (ref 13–44)
MCHC RBC-ENTMCNC: 35.3 G/DL — SIGNIFICANT CHANGE UP (ref 32–36)
MCHC RBC-ENTMCNC: 35.8 PG — HIGH (ref 27–34)
MCV RBC AUTO: 101 FL — HIGH (ref 80–100)
MONOCYTES # BLD AUTO: 1.1 K/UL — HIGH (ref 0–0.9)
MONOCYTES NFR BLD AUTO: 14.5 % — HIGH (ref 2–14)
NEUTROPHILS # BLD AUTO: 3.7 K/UL — SIGNIFICANT CHANGE UP (ref 1.8–7.4)
NEUTROPHILS NFR BLD AUTO: 50.9 % — SIGNIFICANT CHANGE UP (ref 43–77)
PLATELET # BLD AUTO: 410 K/UL — HIGH (ref 150–400)
RBC # BLD: 2.69 M/UL — LOW (ref 3.8–5.2)
RBC # FLD: 16.8 % — HIGH (ref 10.3–14.5)
WBC # BLD: 7.3 K/UL — SIGNIFICANT CHANGE UP (ref 3.8–10.5)
WBC # FLD AUTO: 7.3 K/UL — SIGNIFICANT CHANGE UP (ref 3.8–10.5)

## 2019-03-22 ENCOUNTER — OTHER (OUTPATIENT)
Age: 74
End: 2019-03-22

## 2019-04-01 ENCOUNTER — OUTPATIENT (OUTPATIENT)
Dept: OUTPATIENT SERVICES | Facility: HOSPITAL | Age: 74
LOS: 1 days | Discharge: HOME | End: 2019-04-01

## 2019-04-01 DIAGNOSIS — C34.90 MALIGNANT NEOPLASM OF UNSPECIFIED PART OF UNSPECIFIED BRONCHUS OR LUNG: ICD-10-CM

## 2019-04-01 DIAGNOSIS — N81.10 CYSTOCELE, UNSPECIFIED: Chronic | ICD-10-CM

## 2019-04-11 ENCOUNTER — LABORATORY RESULT (OUTPATIENT)
Age: 74
End: 2019-04-11

## 2019-04-11 ENCOUNTER — APPOINTMENT (OUTPATIENT)
Dept: INFUSION THERAPY | Facility: HOSPITAL | Age: 74
End: 2019-04-11

## 2019-04-11 ENCOUNTER — RESULT REVIEW (OUTPATIENT)
Age: 74
End: 2019-04-11

## 2019-04-11 LAB
BASOPHILS # BLD AUTO: 0.1 K/UL — SIGNIFICANT CHANGE UP (ref 0–0.2)
BASOPHILS NFR BLD AUTO: 0.6 % — SIGNIFICANT CHANGE UP (ref 0–2)
EOSINOPHIL # BLD AUTO: 1.7 K/UL — HIGH (ref 0–0.5)
EOSINOPHIL NFR BLD AUTO: 14.1 % — HIGH (ref 0–6)
HCT VFR BLD CALC: 33.4 % — LOW (ref 34.5–45)
HGB BLD-MCNC: 11.8 G/DL — SIGNIFICANT CHANGE UP (ref 11.5–15.5)
LYMPHOCYTES # BLD AUTO: 1.8 K/UL — SIGNIFICANT CHANGE UP (ref 1–3.3)
LYMPHOCYTES # BLD AUTO: 15.3 % — SIGNIFICANT CHANGE UP (ref 13–44)
MCHC RBC-ENTMCNC: 35.4 G/DL — SIGNIFICANT CHANGE UP (ref 32–36)
MCHC RBC-ENTMCNC: 35.8 PG — HIGH (ref 27–34)
MCV RBC AUTO: 101 FL — HIGH (ref 80–100)
MONOCYTES # BLD AUTO: 0.9 K/UL — SIGNIFICANT CHANGE UP (ref 0–0.9)
MONOCYTES NFR BLD AUTO: 7.8 % — SIGNIFICANT CHANGE UP (ref 2–14)
NEUTROPHILS # BLD AUTO: 7.5 K/UL — HIGH (ref 1.8–7.4)
NEUTROPHILS NFR BLD AUTO: 62.1 % — SIGNIFICANT CHANGE UP (ref 43–77)
PLATELET # BLD AUTO: 293 K/UL — SIGNIFICANT CHANGE UP (ref 150–400)
RBC # BLD: 3.31 M/UL — LOW (ref 3.8–5.2)
RBC # FLD: 12.5 % — SIGNIFICANT CHANGE UP (ref 10.3–14.5)
WBC # BLD: 12 K/UL — HIGH (ref 3.8–10.5)
WBC # FLD AUTO: 12 K/UL — HIGH (ref 3.8–10.5)

## 2019-04-12 DIAGNOSIS — Z51.11 ENCOUNTER FOR ANTINEOPLASTIC CHEMOTHERAPY: ICD-10-CM

## 2019-04-12 DIAGNOSIS — R11.2 NAUSEA WITH VOMITING, UNSPECIFIED: ICD-10-CM

## 2019-04-18 ENCOUNTER — APPOINTMENT (OUTPATIENT)
Dept: HEMATOLOGY ONCOLOGY | Facility: CLINIC | Age: 74
End: 2019-04-18

## 2019-04-22 ENCOUNTER — OTHER (OUTPATIENT)
Age: 74
End: 2019-04-22

## 2019-04-29 ENCOUNTER — OUTPATIENT (OUTPATIENT)
Dept: OUTPATIENT SERVICES | Facility: HOSPITAL | Age: 74
LOS: 1 days | Discharge: HOME | End: 2019-04-29

## 2019-04-29 DIAGNOSIS — C34.90 MALIGNANT NEOPLASM OF UNSPECIFIED PART OF UNSPECIFIED BRONCHUS OR LUNG: ICD-10-CM

## 2019-04-29 DIAGNOSIS — N81.10 CYSTOCELE, UNSPECIFIED: Chronic | ICD-10-CM

## 2019-05-02 ENCOUNTER — APPOINTMENT (OUTPATIENT)
Dept: INFUSION THERAPY | Facility: HOSPITAL | Age: 74
End: 2019-05-02

## 2019-05-02 ENCOUNTER — LABORATORY RESULT (OUTPATIENT)
Age: 74
End: 2019-05-02

## 2019-05-02 ENCOUNTER — RESULT REVIEW (OUTPATIENT)
Age: 74
End: 2019-05-02

## 2019-05-02 ENCOUNTER — APPOINTMENT (OUTPATIENT)
Dept: HEMATOLOGY ONCOLOGY | Facility: CLINIC | Age: 74
End: 2019-05-02
Payer: MEDICARE

## 2019-05-02 LAB
BASOPHILS # BLD AUTO: 0.1 K/UL — SIGNIFICANT CHANGE UP (ref 0–0.2)
EOSINOPHIL # BLD AUTO: 1 K/UL — HIGH (ref 0–0.5)
EOSINOPHIL NFR BLD AUTO: 11 % — HIGH (ref 0–6)
HCT VFR BLD CALC: 29.7 % — LOW (ref 34.5–45)
HGB BLD-MCNC: 10.3 G/DL — LOW (ref 11.5–15.5)
LYMPHOCYTES # BLD AUTO: 1.2 K/UL — SIGNIFICANT CHANGE UP (ref 1–3.3)
LYMPHOCYTES # BLD AUTO: 12 % — LOW (ref 13–44)
MCHC RBC-ENTMCNC: 34.1 PG — HIGH (ref 27–34)
MCHC RBC-ENTMCNC: 34.7 G/DL — SIGNIFICANT CHANGE UP (ref 32–36)
MCV RBC AUTO: 98.2 FL — SIGNIFICANT CHANGE UP (ref 80–100)
MONOCYTES # BLD AUTO: 1.6 K/UL — HIGH (ref 0–0.9)
MONOCYTES NFR BLD AUTO: 10 % — SIGNIFICANT CHANGE UP (ref 2–14)
NEUTROPHILS # BLD AUTO: 5.6 K/UL — SIGNIFICANT CHANGE UP (ref 1.8–7.4)
NEUTROPHILS NFR BLD AUTO: 67 % — SIGNIFICANT CHANGE UP (ref 43–77)
PLAT MORPH BLD: NORMAL — SIGNIFICANT CHANGE UP
PLATELET # BLD AUTO: 400 K/UL — SIGNIFICANT CHANGE UP (ref 150–400)
RBC # BLD: 3.02 M/UL — LOW (ref 3.8–5.2)
RBC # FLD: 12.7 % — SIGNIFICANT CHANGE UP (ref 10.3–14.5)
RBC BLD AUTO: SIGNIFICANT CHANGE UP
WBC # BLD: 9.5 K/UL — SIGNIFICANT CHANGE UP (ref 3.8–10.5)
WBC # FLD AUTO: 9.5 K/UL — SIGNIFICANT CHANGE UP (ref 3.8–10.5)

## 2019-05-02 PROCEDURE — 99214 OFFICE O/P EST MOD 30 MIN: CPT

## 2019-05-02 RX ORDER — PREDNISONE 20 MG/1
20 TABLET ORAL DAILY
Qty: 5 | Refills: 3 | Status: ACTIVE | COMMUNITY
Start: 2019-05-02 | End: 1900-01-01

## 2019-05-03 DIAGNOSIS — R11.2 NAUSEA WITH VOMITING, UNSPECIFIED: ICD-10-CM

## 2019-05-03 DIAGNOSIS — Z51.11 ENCOUNTER FOR ANTINEOPLASTIC CHEMOTHERAPY: ICD-10-CM

## 2019-05-06 NOTE — PHYSICAL EXAM
[Restricted in physically strenuous activity but ambulatory and able to carry out work of a light or sedentary nature] : Status 1- Restricted in physically strenuous activity but ambulatory and able to carry out work of a light or sedentary nature, e.g., light house work, office work [Normal] : grossly intact [de-identified] : decreased BS b/l [de-identified] : pedal edema resolved [de-identified] : diffuse eraser sized erythematous lesions on extremities/trunk

## 2019-05-06 NOTE — HISTORY OF PRESENT ILLNESS
[T: ___] : T[unfilled] [Disease: _____________________] : Disease: [unfilled] [N: ___] : N[unfilled] [AJCC Stage: ____] : AJCC Stage: [unfilled] [M: ___] : M[unfilled] [de-identified] : Ms. Neely is a pleasant 73 year old female, current smoker with past medical history of HTN, Asthma, Osteoarthritis, was treated for URI in August 2018 with Levaquin and then Biaxin with Prednisone for what was thought to be COPD exacerbation with minimal improvement. She was then found to have an opacity on CXR and CT scan of the Chest (she never had a screening CT) which showed extensive adenopathy in addition to rt lung mass. She was referred to Dr. Hale for further work up. She underwent EBUS and bx on 10/25/18 which was pos for adeno ca. LAst visit, she was referred for PET/CT which showed cavitary lung mass, mediastinal, hilar, b/l supraclav, left axillary and BM mets. Brain MRI showed microvascular changes but no mets. NGS revealed KRAS mut and PDL1 was 80%. \par Last visit, She was started on carbo/alimta/keytruda last week. She tolerated this well but unfortunately, has a URI today and feels sick with sore throat, runny nose, congested sinuses. She denies any fever, diarrhea, chills. \par \par 2/14/19: Pt has completed 4 cycles of Carbo/Alimta/Keytruda. She has tolerated very well to date. She reports that her PMD recently started her back on anti-hypertensive/diuretic. She states she has been experiencing some LE edema and b/l joint pain. She finds it difficult to walk with the edema and feels slightly winded when climbing stairs. She continues taking folic acid daily. No worsening cough or CP. Denies dizziness. Occasional HA (chronic)\par \par 3/7/19: Pt is continuing on Alimta and Keytruda maintenance and is tolerating well. Continues smoking ~1/2 ppd. Reports occasional increased SOB with exertion. Continues taking folic acid. SOB with exertional. No worsening of cough. Denies fevers.; Occasional mild palpitations....usually occurs in morning. Cough actually much improved. Appetite fair. Reports recent scans. Presents for results\par \par 5/2/19: Pt presents for follow up. She is currently on maintenance Keytruda/Alimta. She has been tolerating well though she states ~1 week after her last infusion she developed severe itch and diffuse rash. She used Benadryl and caladryl with only minimal relief. She states that it has subsided over the last day or so. Appetite fair. No diarrhea. sleep poor recently due to rash/itch. No other new complaijnts [de-identified] : adeno ca [1] : 1, Mild [C] : possible [B] : unlikely [FreeTextEntry3] : pruritis/rash [FreeTextEntry5] : Keytruda [FreeTextEntry6] : alimta [Date: ____________] : Patient's last distress assessment performed on [unfilled]. [8 - Distress Level] : Distress Level: 8 [Patient given social work contact information and resource sheet] : Patient was given social work contact information and resource sheet

## 2019-05-06 NOTE — ASSESSMENT
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 for Thyroid management.\par -OV 6 weeks.\par \par \par

## 2019-05-06 NOTE — REVIEW OF SYSTEMS
[Recent Change In Weight] : ~T recent weight change [Fatigue] : fatigue [Cough] : no cough [SOB on Exertion] : shortness of breath during exertion [Joint Stiffness] : no joint stiffness [Joint Pain] : no joint pain [FreeTextEntry9] : Joint pain improved [Negative] : Allergic/Immunologic [FreeTextEntry6] : Cough improved

## 2019-05-23 ENCOUNTER — LABORATORY RESULT (OUTPATIENT)
Age: 74
End: 2019-05-23

## 2019-05-23 ENCOUNTER — APPOINTMENT (OUTPATIENT)
Dept: INFUSION THERAPY | Facility: HOSPITAL | Age: 74
End: 2019-05-23

## 2019-05-23 ENCOUNTER — RESULT REVIEW (OUTPATIENT)
Age: 74
End: 2019-05-23

## 2019-05-23 LAB
BASOPHILS # BLD AUTO: 0.1 K/UL — SIGNIFICANT CHANGE UP (ref 0–0.2)
BASOPHILS NFR BLD AUTO: 0.4 % — SIGNIFICANT CHANGE UP (ref 0–2)
EOSINOPHIL # BLD AUTO: 0.5 K/UL — SIGNIFICANT CHANGE UP (ref 0–0.5)
EOSINOPHIL NFR BLD AUTO: 4 % — SIGNIFICANT CHANGE UP (ref 0–6)
HCT VFR BLD CALC: 25.8 % — LOW (ref 34.5–45)
HGB BLD-MCNC: 9.2 G/DL — LOW (ref 11.5–15.5)
LYMPHOCYTES # BLD AUTO: 1.7 K/UL — SIGNIFICANT CHANGE UP (ref 1–3.3)
LYMPHOCYTES # BLD AUTO: 12.5 % — LOW (ref 13–44)
MCHC RBC-ENTMCNC: 35 PG — HIGH (ref 27–34)
MCHC RBC-ENTMCNC: 35.8 G/DL — SIGNIFICANT CHANGE UP (ref 32–36)
MCV RBC AUTO: 97.8 FL — SIGNIFICANT CHANGE UP (ref 80–100)
MONOCYTES # BLD AUTO: 1.3 K/UL — HIGH (ref 0–0.9)
MONOCYTES NFR BLD AUTO: 9.9 % — SIGNIFICANT CHANGE UP (ref 2–14)
NEUTROPHILS # BLD AUTO: 9.8 K/UL — HIGH (ref 1.8–7.4)
NEUTROPHILS NFR BLD AUTO: 73.2 % — SIGNIFICANT CHANGE UP (ref 43–77)
PLATELET # BLD AUTO: 504 K/UL — HIGH (ref 150–400)
RBC # BLD: 2.64 M/UL — LOW (ref 3.8–5.2)
RBC # FLD: 13.7 % — SIGNIFICANT CHANGE UP (ref 10.3–14.5)
WBC # BLD: 13.4 K/UL — HIGH (ref 3.8–10.5)
WBC # FLD AUTO: 13.4 K/UL — HIGH (ref 3.8–10.5)

## 2019-07-25 ENCOUNTER — OUTPATIENT (OUTPATIENT)
Dept: OUTPATIENT SERVICES | Facility: HOSPITAL | Age: 74
LOS: 1 days | End: 2019-07-25
Payer: MEDICARE

## 2019-07-25 VITALS
HEART RATE: 92 BPM | WEIGHT: 149.91 LBS | OXYGEN SATURATION: 100 % | TEMPERATURE: 98 F | HEIGHT: 64 IN | SYSTOLIC BLOOD PRESSURE: 130 MMHG | DIASTOLIC BLOOD PRESSURE: 86 MMHG

## 2019-07-25 DIAGNOSIS — Z01.818 ENCOUNTER FOR OTHER PREPROCEDURAL EXAMINATION: ICD-10-CM

## 2019-07-25 DIAGNOSIS — H65.21 CHRONIC SEROUS OTITIS MEDIA, RIGHT EAR: ICD-10-CM

## 2019-07-25 DIAGNOSIS — N81.10 CYSTOCELE, UNSPECIFIED: Chronic | ICD-10-CM

## 2019-07-25 LAB
ANION GAP SERPL CALC-SCNC: 10 MMOL/L — SIGNIFICANT CHANGE UP (ref 5–17)
BUN SERPL-MCNC: 36 MG/DL — HIGH (ref 7–23)
CALCIUM SERPL-MCNC: 10.4 MG/DL — SIGNIFICANT CHANGE UP (ref 8.4–10.5)
CHLORIDE SERPL-SCNC: 97 MMOL/L — SIGNIFICANT CHANGE UP (ref 96–108)
CO2 SERPL-SCNC: 31 MMOL/L — SIGNIFICANT CHANGE UP (ref 22–31)
CREAT SERPL-MCNC: 1.63 MG/DL — HIGH (ref 0.5–1.3)
GLUCOSE SERPL-MCNC: 93 MG/DL — SIGNIFICANT CHANGE UP (ref 70–99)
HCT VFR BLD CALC: 35.1 % — SIGNIFICANT CHANGE UP (ref 34.5–45)
HGB BLD-MCNC: 11.7 G/DL — SIGNIFICANT CHANGE UP (ref 11.5–15.5)
MCHC RBC-ENTMCNC: 32.6 PG — SIGNIFICANT CHANGE UP (ref 27–34)
MCHC RBC-ENTMCNC: 33.3 GM/DL — SIGNIFICANT CHANGE UP (ref 32–36)
MCV RBC AUTO: 97.8 FL — SIGNIFICANT CHANGE UP (ref 80–100)
NRBC # BLD: 0 /100 WBCS — SIGNIFICANT CHANGE UP (ref 0–0)
PLATELET # BLD AUTO: 296 K/UL — SIGNIFICANT CHANGE UP (ref 150–400)
POTASSIUM SERPL-MCNC: 2.9 MMOL/L — CRITICAL LOW (ref 3.5–5.3)
POTASSIUM SERPL-SCNC: 2.9 MMOL/L — CRITICAL LOW (ref 3.5–5.3)
RBC # BLD: 3.59 M/UL — LOW (ref 3.8–5.2)
RBC # FLD: 13.3 % — SIGNIFICANT CHANGE UP (ref 10.3–14.5)
SODIUM SERPL-SCNC: 138 MMOL/L — SIGNIFICANT CHANGE UP (ref 135–145)
WBC # BLD: 9.44 K/UL — SIGNIFICANT CHANGE UP (ref 3.8–10.5)
WBC # FLD AUTO: 9.44 K/UL — SIGNIFICANT CHANGE UP (ref 3.8–10.5)

## 2019-07-25 PROCEDURE — 93005 ELECTROCARDIOGRAM TRACING: CPT

## 2019-07-25 PROCEDURE — 85027 COMPLETE CBC AUTOMATED: CPT

## 2019-07-25 PROCEDURE — 36415 COLL VENOUS BLD VENIPUNCTURE: CPT

## 2019-07-25 PROCEDURE — 80048 BASIC METABOLIC PNL TOTAL CA: CPT

## 2019-07-25 PROCEDURE — 93010 ELECTROCARDIOGRAM REPORT: CPT | Mod: NC

## 2019-07-25 PROCEDURE — G0463: CPT

## 2019-07-25 NOTE — H&P PST ADULT - RS GEN PE MLT RESP DETAILS PC
breath sounds equal/clear to auscultation bilaterally/respirations non-labored/airway patent/no chest wall tenderness/good air movement/no intercostal retractions/normal

## 2019-07-25 NOTE — H&P PST ADULT - HISTORY OF PRESENT ILLNESS
75 y/o female presents for PST. As per patient due to chronic serous otitis media of the right ear she has been experiencing difficulty with hearing.

## 2019-07-25 NOTE — H&P PST ADULT - NSICDXPROBLEM_GEN_ALL_CORE_FT
PROBLEM DIAGNOSES  Problem: Chronic serous otitis media, right ear  Assessment and Plan: Patient schedule for right myringotomy and tube. Labs, ekg and medical clearance pending.

## 2019-07-25 NOTE — H&P PST ADULT - NSICDXFAMILYHX_GEN_ALL_CORE_FT
FAMILY HISTORY:  Father  Still living? No  Family history of pancreatic cancer, Age at diagnosis: Age Unknown    Mother  Still living? No  Family history of COPD (chronic obstructive pulmonary disease), Age at diagnosis: Age Unknown  Family history of heart disease, Age at diagnosis: Age Unknown    Sibling  Still living? No  Family history of hepatitis C, Age at diagnosis: Age Unknown  Family history of leukemia, Age at diagnosis: Age Unknown  Family history of rheumatic fever, Age at diagnosis: Age Unknown

## 2019-07-25 NOTE — H&P PST ADULT - NSICDXPASTSURGICALHX_GEN_ALL_CORE_FT
PAST SURGICAL HISTORY:  Female bladder prolapse bladder sling - 2013    S/P appendectomy in     S/P breast biopsy bilateral  breast  - benign    S/P D&C for missed  about 25 years ago

## 2019-07-25 NOTE — H&P PST ADULT - NSICDXPASTMEDICALHX_GEN_ALL_CORE_FT
PAST MEDICAL HISTORY:  Arthritis left knee recently, right knee arthritis for several years    Asthma     Bronchitis last episode 1 year ago    COPD (chronic obstructive pulmonary disease)     Depression     HTN (hypertension) controlled with meds for several years    Kidney calculi spontaneously passed 10 years ago    Localized enlarged lymph nodes     Migraine headaches, resolved for several years    Osteoarthritis of multiple joints, unspecified osteoarthritis type     Regurgitation tricuspid valve    Solitary pulmonary nodule Received last dose of chemo 5/2019    Spinal stenosis, unspecified spinal region

## 2019-07-30 ENCOUNTER — APPOINTMENT (OUTPATIENT)
Dept: CARDIOLOGY | Facility: CLINIC | Age: 74
End: 2019-07-30
Payer: MEDICARE

## 2019-07-30 VITALS
BODY MASS INDEX: 25.33 KG/M2 | WEIGHT: 152 LBS | OXYGEN SATURATION: 99 % | RESPIRATION RATE: 17 BRPM | HEIGHT: 65 IN | HEART RATE: 92 BPM | DIASTOLIC BLOOD PRESSURE: 78 MMHG | SYSTOLIC BLOOD PRESSURE: 117 MMHG

## 2019-07-30 DIAGNOSIS — E87.6 HYPOKALEMIA: ICD-10-CM

## 2019-07-30 PROBLEM — R91.1 SOLITARY PULMONARY NODULE: Chronic | Status: ACTIVE | Noted: 2018-10-19

## 2019-07-30 PROCEDURE — 99215 OFFICE O/P EST HI 40 MIN: CPT

## 2019-07-30 PROCEDURE — 93000 ELECTROCARDIOGRAM COMPLETE: CPT

## 2019-07-30 RX ORDER — AZITHROMYCIN 250 MG/1
250 TABLET, FILM COATED ORAL
Qty: 6 | Refills: 0 | Status: DISCONTINUED | COMMUNITY
Start: 2018-12-14 | End: 2019-07-30

## 2019-07-30 RX ORDER — TRIAMCINOLONE ACETONIDE 1 MG/G
0.1 CREAM TOPICAL
Qty: 454 | Refills: 0 | Status: ACTIVE | COMMUNITY
Start: 2019-07-19

## 2019-07-30 RX ORDER — CHLORHEXIDINE GLUCONATE, 0.12% ORAL RINSE 1.2 MG/ML
0.12 SOLUTION DENTAL
Qty: 473 | Refills: 0 | Status: ACTIVE | COMMUNITY
Start: 2019-06-26

## 2019-07-30 RX ORDER — HYDROCORTISONE 10 MG/G
1 CREAM TOPICAL
Qty: 60 | Refills: 2 | Status: DISCONTINUED | COMMUNITY
Start: 2019-05-02 | End: 2019-07-30

## 2019-07-30 RX ORDER — CLINDAMYCIN HYDROCHLORIDE 150 MG/1
150 CAPSULE ORAL
Qty: 12 | Refills: 0 | Status: DISCONTINUED | COMMUNITY
Start: 2018-03-19 | End: 2019-07-30

## 2019-07-30 RX ORDER — VALSARTAN AND HYDROCHLOROTHIAZIDE 160; 25 MG/1; MG/1
160-25 TABLET, FILM COATED ORAL
Qty: 90 | Refills: 0 | Status: DISCONTINUED | COMMUNITY
Start: 2018-02-22 | End: 2019-07-30

## 2019-07-31 ENCOUNTER — TRANSCRIPTION ENCOUNTER (OUTPATIENT)
Age: 74
End: 2019-07-31

## 2019-08-01 ENCOUNTER — OUTPATIENT (OUTPATIENT)
Dept: OUTPATIENT SERVICES | Facility: HOSPITAL | Age: 74
LOS: 1 days | End: 2019-08-01
Payer: MEDICARE

## 2019-08-01 VITALS
SYSTOLIC BLOOD PRESSURE: 120 MMHG | DIASTOLIC BLOOD PRESSURE: 70 MMHG | OXYGEN SATURATION: 98 % | TEMPERATURE: 97 F | HEART RATE: 80 BPM | RESPIRATION RATE: 16 BRPM

## 2019-08-01 DIAGNOSIS — H65.21 CHRONIC SEROUS OTITIS MEDIA, RIGHT EAR: ICD-10-CM

## 2019-08-01 DIAGNOSIS — N81.10 CYSTOCELE, UNSPECIFIED: Chronic | ICD-10-CM

## 2019-08-01 PROCEDURE — C1889: CPT

## 2019-08-01 PROCEDURE — 69436 CREATE EARDRUM OPENING: CPT | Mod: RT

## 2019-08-01 RX ORDER — SODIUM CHLORIDE 9 MG/ML
1000 INJECTION, SOLUTION INTRAVENOUS
Refills: 0 | Status: DISCONTINUED | OUTPATIENT
Start: 2019-08-01 | End: 2019-08-01

## 2019-08-01 RX ORDER — ACETAMINOPHEN 500 MG
2 TABLET ORAL
Qty: 0 | Refills: 0 | DISCHARGE

## 2019-08-01 RX ORDER — POTASSIUM CHLORIDE 20 MEQ
1 PACKET (EA) ORAL
Qty: 0 | Refills: 0 | DISCHARGE

## 2019-08-01 RX ORDER — ACETAMINOPHEN 500 MG
650 TABLET ORAL EVERY 6 HOURS
Refills: 0 | Status: DISCONTINUED | OUTPATIENT
Start: 2019-08-01 | End: 2019-08-16

## 2019-08-01 RX ADMIN — SODIUM CHLORIDE 50 MILLILITER(S): 9 INJECTION, SOLUTION INTRAVENOUS at 11:58

## 2019-08-01 RX ADMIN — Medication 650 MILLIGRAM(S): at 15:51

## 2019-08-01 RX ADMIN — Medication 650 MILLIGRAM(S): at 16:21

## 2019-08-01 NOTE — PRE-ANESTHESIA EVALUATION ADULT - NSANTHOSAYNRD_GEN_A_CORE
No. KATIE screening performed.  STOP BANG Legend: 0-2 = LOW Risk; 3-4 = INTERMEDIATE Risk; 5-8 = HIGH Risk
diminished breath sounds, L/breath sounds equal/rales/airway patent/diminished breath sounds, R

## 2019-08-01 NOTE — ASU DISCHARGE PLAN (ADULT/PEDIATRIC) - ASU DC SPECIAL INSTRUCTIONSFT
Cortisporin Ear Solution 4 drops into right ear canal daily x 5 days.  Tylenol 325mg 2 tabs by mouth every 6 hours if needed for pain  Follow-up Dr. San in 2 weeks; call office for appointment.

## 2019-08-01 NOTE — ASU PATIENT PROFILE, ADULT - PMH
Arthritis  left knee recently, right knee arthritis for several years  Asthma    Bronchitis  last episode 1 year ago  COPD (chronic obstructive pulmonary disease)    Depression    HTN (hypertension)  controlled with meds for several years  Kidney calculi  spontaneously passed 10 years ago  Localized enlarged lymph nodes    Migraine  headaches, resolved for several years  Osteoarthritis of multiple joints, unspecified osteoarthritis type    Regurgitation  tricuspid valve  Solitary pulmonary nodule  Received last dose of chemo 5/2019  Spinal stenosis, unspecified spinal region

## 2019-08-01 NOTE — PRE-ANESTHESIA EVALUATION ADULT - NSANTHADDINFOFT_GEN_ALL_CORE
Discussed GA in detail with patient and all questions sought and answered. Pt. agrees to all plans and wishes to proceed with surgical care.    Medical evaluation/optimization and clearance noted and appreciated. Discussed GA in detail with patient and all questions sought and answered. Pt. agrees to all plans and wishes to proceed with surgical care.    Medical/ Cardiac/ Oncology evaluation/optimization and clearance noted and appreciated.

## 2020-01-03 ENCOUNTER — APPOINTMENT (OUTPATIENT)
Dept: CARDIOLOGY | Facility: CLINIC | Age: 75
End: 2020-01-03

## 2020-04-02 PROBLEM — R22.2 CHEST WALL MASS: Status: ACTIVE | Noted: 2018-06-09

## 2020-05-12 NOTE — H&P PST ADULT - EXTREMITIES COMMENTS
Patient: Bambi Zhou 74 year old female     Location: [facility] MAIN OR     Surgeon(s): Ryan De Jesus MD  Phone Number: [phone]                       Surgeon(s) and Role:     * Ryan De Jesus MD - Primary     * Alden Lee MD - Assisting     Surgery DT/TM: No surgery found    Surgical tasks preform by assistant: Removing tissue     Procedure: Procedures:    * L4-5 LAMINECTOMY WITH FUSION AND INSTRUMENTATION AND BONE GRAFTING    Anesthesia Type: General                                 General    Specimens Removed:   Order Name Source Comment Collection Info Order Time   SARS-COV-2 (2019-NCOV) BY PCR Nasopharyngeal   5/12/2020  5:41 AM   TYPE/SCREEN Blood, Venous   5/12/2020  5:50 AM        Estimated Blood Loss: * No values recorded between 5/12/2020  7:23 AM and 5/12/2020  9:10 AM *     Complications: * No complications entered in OR log *     Indications for Procedure: L4-5 spinal stenosis and low back pain     Pre-Op Diagnosis: L4-5 SEVERE SPINAL STENOSIS AND DISC PROTRUSION WITH DEGENERATION     Post-Op Diagnosis: * No Diagnosis Codes entered *     Blood Products Administered: N/A     Implants: N/A                     Implants     Filler    Putty Bioactive Bone Graft 7.5 - [id number] - Implanted  Back    Inventory item: PUTTY BIOACTIVE BONE GRAFT 7.5 Model/Cat number: [id number]    : [employer] Lot number: [id number]    Device identifier: [id number] Device identifier type: GS1    GUDID Information     Request status Successful      Brand name: SIGNAFUSE Version/Model: 7.5g    Company name: [employer] MRI safety info as of 5/12/20: Labeling does not contain MRI Safety Information    Contains dry or latex rubber: No      GMDN P.T. name: Bone matrix implant, synthetic            As of 5/12/2020     Status: Implanted                  Putty Bioactive Bone Graft 7.5 - [id number] - Implanted  Back    Inventory item: PUTTY BIOACTIVE BONE GRAFT 7.5 Model/Cat number: [id number]    :  [employer] Lot number: [id number]    Device identifier: [id number] Device identifier type: GS1    GUDID Information     Request status Successful      Brand name: SIGNAFUSE Version/Model: 7.5g    Company name: [employer] MRI safety info as of 5/12/20: Labeling does not contain MRI Safety Information    Contains dry or latex rubber: No      GMDN P.T. name: Bone matrix implant, synthetic            As of 5/12/2020     Status: Implanted                  Gale    Gale Prelord W/Line 35mm - [id number] - Implanted  Back    Inventory item: GALE PRELORD W/LINE 35MM Model/Cat number: [id number]    : [employer] Lot number: NA    As of 5/12/2020     Status: Implanted                  Screw    Screw Expedium Pa Ti 6x40mm - [id number] - Implanted  Back    Inventory item: SCREW EXPEDIUM PA TI 6X40MM Model/Cat number: [id number]    : [employer] Lot number: NA    As of 5/12/2020     Status: Implanted                  Screw Set    Set Screw Sngl Innie - [id number] - Implanted  Back    Inventory item: SET SCREW SNGL INNIE Model/Cat number: [id number]    : [employer] Lot number: NA    As of 5/12/2020     Status: Implanted                        Findings:        I was present for the key portions of the procedure and was immediately available for the non-key portions   b/l b/l plus 0ne edema noted

## 2021-01-06 ENCOUNTER — OUTPATIENT (OUTPATIENT)
Dept: OUTPATIENT SERVICES | Facility: HOSPITAL | Age: 76
LOS: 1 days | End: 2021-01-06
Payer: MEDICARE

## 2021-01-06 ENCOUNTER — APPOINTMENT (OUTPATIENT)
Dept: MAMMOGRAPHY | Facility: HOSPITAL | Age: 76
End: 2021-01-06
Payer: MEDICARE

## 2021-01-06 ENCOUNTER — APPOINTMENT (OUTPATIENT)
Dept: ULTRASOUND IMAGING | Facility: HOSPITAL | Age: 76
End: 2021-01-06
Payer: MEDICARE

## 2021-01-06 DIAGNOSIS — N81.10 CYSTOCELE, UNSPECIFIED: Chronic | ICD-10-CM

## 2021-01-06 DIAGNOSIS — Z00.8 ENCOUNTER FOR OTHER GENERAL EXAMINATION: ICD-10-CM

## 2021-01-06 PROCEDURE — 76641 ULTRASOUND BREAST COMPLETE: CPT | Mod: 26,50

## 2021-01-06 PROCEDURE — 77067 SCR MAMMO BI INCL CAD: CPT | Mod: 26

## 2021-01-06 PROCEDURE — 77063 BREAST TOMOSYNTHESIS BI: CPT | Mod: 26

## 2021-01-06 PROCEDURE — 77063 BREAST TOMOSYNTHESIS BI: CPT

## 2021-01-06 PROCEDURE — 76641 ULTRASOUND BREAST COMPLETE: CPT

## 2021-01-06 PROCEDURE — 77067 SCR MAMMO BI INCL CAD: CPT

## 2021-01-09 NOTE — ED ADULT NURSE NOTE - NSHISCREENINGQ1_ED_A_ED
=================================================================



=================================================================

Datetime Report Generated by CPN: 2021 06:07

   

   

=================================================================

CURRENT ADMISSION

=================================================================

   

Chief Complaint:  Uterine Contractions

Indication for Induction:  Not Applicable

Admit Impression :  Term, Intrauterine Pregnancy

Admit Plan:  Admit to Unit; Initiate Labor Protocol

   

=================================================================

ALLERGIES

=================================================================

   

Medication Allergies:  No

Latex:  No Latex Allergies

   

=================================================================

OBSTETRICAL HISTORY

=================================================================

   

EDC:  2021 00:00

:  2

Para:  0

   

=================================================================

***SEE PRENATAL RECORDS***

=================================================================

   

Alcohol:  No

Marijuana :  No

Cocaine:  No

Other Illicit Drugs:  No

Cigarettes:  Never Smoker. [id number]

   

=================================================================

PHYSICAL EXAM

=================================================================

   

General:  Normal

HEENT:  Normal

Neurologic:  Normal

Thyroid:  Normal

Heart:  Normal

Lungs:  Normal

Breast:  Deferred

Back:  Normal

Abdomen:  Normal

Genitourinary Exam:  Normal

Extremities:  Normal

DTRs:  Normal

Pelvic Type:  Adequate

   

=================================================================

FETUS A

=================================================================

   

EGA:  38.4

   

=================================================================

PLANS FOR LABOR AND DELIVERY

=================================================================

   

Labor and Delivery:  None

Pain Management:  None

Feeding Preference:  Breast

Benefit of Breast Feed Discussed:  Yes

Circumcision:  Yes

   

=================================================================

INFORMED CONSENT

=================================================================

   

Signature:  Electronically signed by Bruec Miguel MD ([place]) on

   2021 at 06:07  with User ID: [email or web address]
No

## 2021-02-19 NOTE — ED ADULT TRIAGE NOTE - AS TEMP SITE
oral Doxepin Pregnancy And Lactation Text: This medication is Pregnancy Category C and it isn't known if it is safe during pregnancy. It is also excreted in breast milk and breast feeding isn't recommended.

## 2021-03-16 ENCOUNTER — NON-APPOINTMENT (OUTPATIENT)
Age: 76
End: 2021-03-16

## 2021-03-16 ENCOUNTER — APPOINTMENT (OUTPATIENT)
Dept: CARDIOLOGY | Facility: CLINIC | Age: 76
End: 2021-03-16
Payer: MEDICARE

## 2021-03-16 VITALS
WEIGHT: 176 LBS | DIASTOLIC BLOOD PRESSURE: 82 MMHG | SYSTOLIC BLOOD PRESSURE: 122 MMHG | HEIGHT: 65 IN | HEART RATE: 111 BPM | OXYGEN SATURATION: 98 % | RESPIRATION RATE: 20 BRPM | BODY MASS INDEX: 29.32 KG/M2 | TEMPERATURE: 97.3 F

## 2021-03-16 DIAGNOSIS — R06.00 DYSPNEA, UNSPECIFIED: ICD-10-CM

## 2021-03-16 DIAGNOSIS — R94.31 ABNORMAL ELECTROCARDIOGRAM [ECG] [EKG]: ICD-10-CM

## 2021-03-16 PROCEDURE — 93000 ELECTROCARDIOGRAM COMPLETE: CPT

## 2021-03-16 PROCEDURE — 99072 ADDL SUPL MATRL&STAF TM PHE: CPT

## 2021-03-16 PROCEDURE — 99214 OFFICE O/P EST MOD 30 MIN: CPT

## 2021-03-16 RX ORDER — AMLODIPINE BESYLATE 5 MG/1
5 TABLET ORAL
Qty: 90 | Refills: 0 | Status: DISCONTINUED | COMMUNITY
Start: 2019-04-05 | End: 2021-03-16

## 2021-03-16 RX ORDER — TRIAMTERENE AND HYDROCHLOROTHIAZIDE 37.5; 25 MG/1; MG/1
37.5-25 CAPSULE ORAL
Qty: 30 | Refills: 0 | Status: DISCONTINUED | COMMUNITY
Start: 2019-03-11 | End: 2021-03-16

## 2021-03-23 ENCOUNTER — APPOINTMENT (OUTPATIENT)
Dept: CARDIOLOGY | Facility: CLINIC | Age: 76
End: 2021-03-23
Payer: MEDICARE

## 2021-03-23 PROCEDURE — A9500: CPT

## 2021-03-23 PROCEDURE — 99072 ADDL SUPL MATRL&STAF TM PHE: CPT

## 2021-03-23 PROCEDURE — 93015 CV STRESS TEST SUPVJ I&R: CPT

## 2021-03-23 PROCEDURE — 78452 HT MUSCLE IMAGE SPECT MULT: CPT

## 2021-04-02 ENCOUNTER — APPOINTMENT (OUTPATIENT)
Dept: CARDIOLOGY | Facility: CLINIC | Age: 76
End: 2021-04-02
Payer: MEDICARE

## 2021-04-02 PROCEDURE — 93306 TTE W/DOPPLER COMPLETE: CPT

## 2021-04-02 PROCEDURE — 99072 ADDL SUPL MATRL&STAF TM PHE: CPT

## 2021-08-13 ENCOUNTER — OUTPATIENT (OUTPATIENT)
Dept: OUTPATIENT SERVICES | Facility: HOSPITAL | Age: 76
LOS: 1 days | End: 2021-08-13
Payer: MEDICARE

## 2021-08-13 ENCOUNTER — APPOINTMENT (OUTPATIENT)
Dept: MRI IMAGING | Facility: HOSPITAL | Age: 76
End: 2021-08-13
Payer: MEDICARE

## 2021-08-13 DIAGNOSIS — N28.89 OTHER SPECIFIED DISORDERS OF KIDNEY AND URETER: ICD-10-CM

## 2021-08-13 DIAGNOSIS — N81.10 CYSTOCELE, UNSPECIFIED: Chronic | ICD-10-CM

## 2021-08-13 PROCEDURE — 74183 MRI ABD W/O CNTR FLWD CNTR: CPT

## 2021-08-13 PROCEDURE — 74183 MRI ABD W/O CNTR FLWD CNTR: CPT | Mod: 26

## 2021-08-13 PROCEDURE — A9579: CPT

## 2022-02-11 NOTE — H&P PST ADULT - SKIN
OUTPATIENT PSYCHIATRY RETURN VISIT    ENCOUNTER DATE:  2/18/2022  SITE:  [facility], [facility]  detailed exam warm and dry

## 2022-07-14 ENCOUNTER — APPOINTMENT (OUTPATIENT)
Dept: RADIOLOGY | Facility: HOSPITAL | Age: 77
End: 2022-07-14

## 2022-10-06 ENCOUNTER — APPOINTMENT (OUTPATIENT)
Dept: ULTRASOUND IMAGING | Facility: CLINIC | Age: 77
End: 2022-10-06

## 2022-10-06 ENCOUNTER — APPOINTMENT (OUTPATIENT)
Dept: MAMMOGRAPHY | Facility: CLINIC | Age: 77
End: 2022-10-06

## 2022-10-06 PROCEDURE — 77066 DX MAMMO INCL CAD BI: CPT

## 2022-10-06 PROCEDURE — 76641 ULTRASOUND BREAST COMPLETE: CPT | Mod: 50

## 2022-10-06 PROCEDURE — G0279: CPT

## 2023-06-13 ENCOUNTER — APPOINTMENT (OUTPATIENT)
Dept: ORTHOPEDIC SURGERY | Facility: CLINIC | Age: 78
End: 2023-06-13

## 2023-08-03 ENCOUNTER — NON-APPOINTMENT (OUTPATIENT)
Age: 78
End: 2023-08-03

## 2023-08-03 ENCOUNTER — APPOINTMENT (OUTPATIENT)
Dept: FAMILY MEDICINE | Facility: CLINIC | Age: 78
End: 2023-08-03
Payer: MEDICARE

## 2023-08-03 VITALS
DIASTOLIC BLOOD PRESSURE: 75 MMHG | HEART RATE: 76 BPM | RESPIRATION RATE: 20 BRPM | SYSTOLIC BLOOD PRESSURE: 125 MMHG | WEIGHT: 156 LBS | HEIGHT: 64 IN | BODY MASS INDEX: 26.63 KG/M2 | OXYGEN SATURATION: 98 %

## 2023-08-03 DIAGNOSIS — F17.200 NICOTINE DEPENDENCE, UNSPECIFIED, UNCOMPLICATED: ICD-10-CM

## 2023-08-03 DIAGNOSIS — Z00.00 ENCOUNTER FOR GENERAL ADULT MEDICAL EXAMINATION W/OUT ABNORMAL FINDINGS: ICD-10-CM

## 2023-08-03 DIAGNOSIS — M12.9 ARTHROPATHY, UNSPECIFIED: ICD-10-CM

## 2023-08-03 DIAGNOSIS — K76.0 FATTY (CHANGE OF) LIVER, NOT ELSEWHERE CLASSIFIED: ICD-10-CM

## 2023-08-03 DIAGNOSIS — Z23 ENCOUNTER FOR IMMUNIZATION: ICD-10-CM

## 2023-08-03 PROCEDURE — G0009: CPT

## 2023-08-03 PROCEDURE — 36415 COLL VENOUS BLD VENIPUNCTURE: CPT

## 2023-08-03 PROCEDURE — 99205 OFFICE O/P NEW HI 60 MIN: CPT | Mod: 25

## 2023-08-03 PROCEDURE — 90677 PCV20 VACCINE IM: CPT

## 2023-08-03 NOTE — ASSESSMENT
[FreeTextEntry1] : Hemodynamically stable with acceptable BP Pulmonary status stable at present - recommended establishing with new Pulmonologist Lab profiles drawn in office and sent Prevnar 20 give R deltoid

## 2023-08-03 NOTE — COUNSELING
[Yes] : Risk of tobacco use and health benefits of smoking cessation discussed: Yes [Cessation strategies including cessation program discussed] : Cessation strategies including cessation program discussed [Use of nicotine replacement therapies and other medications discussed] : Use of nicotine replacement therapies and other medications discussed [No] : Not willing to quit smoking [de-identified] : Healthy eating and activities [None] : None [Good understanding] : Patient has a good understanding of lifestyle changes and steps needed to achieve self management goal

## 2023-08-03 NOTE — HEALTH RISK ASSESSMENT
[No] : In the past 12 months have you used drugs other than those required for medical reasons? No [No falls in past year] : Patient reported no falls in the past year [0] : 2) Feeling down, depressed, or hopeless: Not at all (0) [Audit-CScore] : 0 [SUP4Tanhw] : 0 [Patient declined Low Dose CT Scan] : Patient declined Low Dose CT Scan [Patient reported mammogram was normal] : Patient reported mammogram was normal [Fully functional (bathing, dressing, toileting, transferring, walking, feeding)] : Fully functional (bathing, dressing, toileting, transferring, walking, feeding) [Fully functional (using the telephone, shopping, preparing meals, housekeeping, doing laundry, using] : Fully functional and needs no help or supervision to perform IADLs (using the telephone, shopping, preparing meals, housekeeping, doing laundry, using transportation, managing medications and managing finances) [MammogramDate] : 10/22 [MammogramComments] : see this EMR [With Patient/Caregiver] : , with patient/caregiver [Reviewed no changes] : Reviewed, no changes [AdvancecareDate] : 08/23 [Current] : Current [20 or more] : 20 or more

## 2023-08-03 NOTE — HISTORY OF PRESENT ILLNESS
[FreeTextEntry1] : Requests initial visit  [de-identified] : Presents for initial visit to establish this office as PCP.  Prior PCP has retired.  Reviewed extensive history and medication - note following with Oncology at [facility] and recently had a PET - "in remission."  Unfortunately pt continues to smoke and does not appear motivated to stop.  Reviewed screening and immunizations - has never had a colonoscopy - placed order and staff to assist; also due for Prevnar 20 - pt in agreement.  Note pt has had extensive imaging and lung cancer screenings are not appropriate. Also has arthritic complaints mainly elbows.

## 2023-08-03 NOTE — PHYSICAL EXAM
[No Acute Distress] : no acute distress [Normal Posterior Cervical Nodes] : no posterior cervical lymphadenopathy [Normal Anterior Cervical Nodes] : no anterior cervical lymphadenopathy [Normal] : no rash [Coordination Grossly Intact] : coordination grossly intact [No Focal Deficits] : no focal deficits [Normal Gait] : normal gait [Speech Grossly Normal] : speech grossly normal [Memory Grossly Normal] : memory grossly normal [Normal Affect] : the affect was normal [Alert and Oriented x3] : oriented to person, place, and time [Normal Mood] : the mood was normal [Normal Insight/Judgement] : insight and judgment were intact [de-identified] : mild induration elbows

## 2023-08-05 LAB
ALBUMIN SERPL ELPH-MCNC: 4.3 G/DL
ALP BLD-CCNC: 90 U/L
ALT SERPL-CCNC: 13 U/L
ANION GAP SERPL CALC-SCNC: 15 MMOL/L
AST SERPL-CCNC: 17 U/L
BILIRUB SERPL-MCNC: 0.2 MG/DL
BUN SERPL-MCNC: 26 MG/DL
CALCIUM SERPL-MCNC: 11.1 MG/DL
CHLORIDE SERPL-SCNC: 97 MMOL/L
CHOLEST SERPL-MCNC: 260 MG/DL
CO2 SERPL-SCNC: 25 MMOL/L
CREAT SERPL-MCNC: 1.71 MG/DL
CRP SERPL-MCNC: 3 MG/L
EGFR: 31 ML/MIN/1.73M2
ERYTHROCYTE [SEDIMENTATION RATE] IN BLOOD BY WESTERGREN METHOD: 51 MM/HR
ESTIMATED AVERAGE GLUCOSE: 111 MG/DL
FOLATE SERPL-MCNC: >20 NG/ML
GLUCOSE SERPL-MCNC: 115 MG/DL
HBA1C MFR BLD HPLC: 5.5 %
HDLC SERPL-MCNC: 51 MG/DL
LDLC SERPL CALC-MCNC: 165 MG/DL
NONHDLC SERPL-MCNC: 209 MG/DL
POTASSIUM SERPL-SCNC: 3.1 MMOL/L
PROT SERPL-MCNC: 7.4 G/DL
SODIUM SERPL-SCNC: 137 MMOL/L
T4 FREE SERPL-MCNC: 1.4 NG/DL
TRIGL SERPL-MCNC: 233 MG/DL
TSH SERPL-ACNC: 0.87 UIU/ML
VIT B12 SERPL-MCNC: 1129 PG/ML

## 2023-08-07 ENCOUNTER — NON-APPOINTMENT (OUTPATIENT)
Age: 78
End: 2023-08-07

## 2023-08-07 RX ORDER — MELOXICAM 15 MG/1
15 TABLET ORAL
Qty: 30 | Refills: 3 | Status: ACTIVE | COMMUNITY
Start: 2023-08-07 | End: 1900-01-01

## 2023-10-06 NOTE — H&P PST ADULT - CIGARETTES, PACKS/DAY
FOLLOW UP NOTE    Subjective   Patient ID: Paloma is a 84 year old female.    Chief Complaint   Patient presents with   • Follow-up     Paloma is here for a check up , She is having increase SOB and tight chest .  Patient denies any other cardiac symptoms.    STOP BANG :2     HISTORY OF PRESENT ILLNESS:  Regular follow up, having more sob with walking.  Lower extremity mild no significant chest pain no palpitation      Current Outpatient Medications   Medication Sig Dispense Refill   • furosemide (Lasix) 20 MG tablet Take 0.5 tablets by mouth daily. 60 tablet 1   • cyanocobalamin 1000 MCG/ML injection Inject 1 mL into the muscle every 30 days. 3 mL 0   • losartan (COZAAR) 100 MG tablet Take 1 tablet by mouth daily. 90 tablet 0   • atorvastatin (LIPITOR) 20 MG tablet Take 1 tablet by mouth nightly. 90 tablet 0   • levothyroxine 88 MCG tablet TAKE ONE TABLET BY MOUTH ONE TIME DAILY 90 tablet 0   • metoPROLOL succinate (TOPROL-XL) 50 MG 24 hr tablet TAKE ONE TABLET BY MOUTH TWICE DAILY 180 tablet 0   • Eliquis 2.5 MG Tab TAKE ONE TABLET BY MOUTH EVERY TWELVE HOURS 180 tablet 0   • acetaminophen (TYLENOL) 325 MG tablet Take 650 mg by mouth every 6 hours as needed. Do not start before May 19, 2022. Do not exceed 3 gram apap/24 hours  for mild to mod pain     • travoprost, benzalkonium free, (TRAVATAN Z) 0.004 % ophthalmic solution Place 1 drop into both eyes nightly.      • SIRISHA 0.0375 MG/24HR twice weekly patch Apply 1 patch topically 2 days a week. On Sunday and Thursday     • Multiple Vitamins-Minerals (OCUVITE PO) Take 1 tablet by mouth daily. Do not start before May 19, 2022.     • Propylene Glycol (SYSTANE BALANCE OP) Apply 1 drop to eye as needed. Do not start before May 19, 2022.     • brimonidine (ALPHAGAN P) 0.1 % ophthalmic solution Place 1 drop into both eyes 2 times daily. Do not start before May 19, 2022.       No current facility-administered medications for this visit.     ALLERGIES:   Allergen Reactions   •  Codeine Other (See Comments)     Burns stomach   • Sulfa Antibiotics HIVES     Past Medical History:   Diagnosis Date   • Anxiety    • Atrial fibrillation (CMD)    • Chest tightness 2021   • Congestive cardiac failure (CMD)    • Coronary artery disease    • Head contusion 10/8/2020   • Hospital discharge follow-up    • Localized edema 2022   • Migraine    • Open ankle wound    • Osteoarthritis    • PAD (peripheral artery disease) (CMD)    • Rheumatic fever without heart involvement    • Skin cancer    • Weakness 2022     Past Surgical History:   Procedure Laterality Date   • Appendectomy     • Breast surgery     • Cataract extraction, bilateral     • Hysterectomy     • Lower extremity angiogram/possible pta - cv  2022   • Removal gallbladder     • Skin cancer excision     • Stomach surgery      lysis of adhesions x2   • Tonsillectomy       Family History   Problem Relation Age of Onset   • Heart disease Mother    • Hypertension Mother    • Kidney disease Mother    • Patient is unaware of any medical problems Father      Social History     Tobacco Use   Smoking Status Former   • Current packs/day: 0.00   • Types: Cigarettes   • Start date: 1960   • Quit date: 1970   • Years since quittin.1   Smokeless Tobacco Never     Social History     Substance and Sexual Activity   Drug Use No     Social History     Substance and Sexual Activity   Alcohol Use Yes   • Alcohol/week: 4.0 standard drinks of alcohol   • Types: 4 Glasses of wine per week    Comment: social       Patient's medications, allergies, past medical, surgical, social and family histories were reviewed and updated as appropriate.    Review of Systems   Constitutional: Negative for chills, fever, weight gain and weight loss.   HENT: Negative for hearing loss.    Cardiovascular: Positive for dyspnea on exertion. Negative for chest pain.   Respiratory: Negative for cough and hemoptysis.    Hematologic/Lymphatic: Does not  bruise/bleed easily.   Skin: Negative for rash and suspicious lesions.   Musculoskeletal: Positive for arthritis.   Gastrointestinal: Negative for hematochezia and melena.   Genitourinary: Negative for hematuria.   Allergic/Immunologic: Negative for environmental allergies.        Objective   Visit Vitals  /74 (BP Location: LUE - Left upper extremity, Patient Position: Sitting, Cuff Size: Regular)   Pulse 75     Physical Exam  Vitals reviewed.   HENT:      Head: Normocephalic and atraumatic.   Eyes:      Pupils: Pupils are equal, round, and reactive to light.   Neck:      Thyroid: No thyromegaly.   Cardiovascular:      Rate and Rhythm: Rhythm irregular.      Heart sounds:      Gallop present. S4 sounds present.   Pulmonary:      Effort: Pulmonary effort is normal.      Breath sounds: No wheezing.   Abdominal:      Palpations: There is no hepatomegaly.   Musculoskeletal:      Cervical back: Normal range of motion.      Comments: Trace edema   Skin:     General: Skin is warm and dry.   Neurological:      Mental Status: She is oriented to person, place, and time.   Psychiatric:         Mood and Affect: Mood and affect normal.         Results for orders placed or performed during the hospital encounter of 12/17/21   Electrocardiogram 12-Lead   Result Value Ref Range    Ventricular Rate EKG/Min (BPM) 94     Atrial Rate (BPM) 104     QRS-Interval (MSEC) 84     QT-Interval (MSEC) 360     QTc 450     R Axis (Degrees) 66     T Axis (Degrees) 81     REPORT TEXT       Atrial fibrillation  Nonspecific ST abnormality  Abnormal ECG  When compared with ECG of  11-AUG-2020 19:12,  Atrial fibrillation  has replaced  Sinus rhythm  Confirmed by MINESH TOUSSAINT, [facility] ([id number]) on 12/20/2021 7:59:42 AM         Assessment   (R06.02) SOB (shortness of breath)  (primary encounter diagnosis)  Plan: TRANSTHORACIC ECHO(TTE) COMPLETE W/ W/O IMAGING        AGENT    (I48.21) Permanent atrial fibrillation (CMD)  Plan: TRANSTHORACIC ECHO(TTE)  COMPLETE W/ W/O IMAGING        AGENT    (I73.9) PVD (peripheral vascular disease) (CMD)  Comment: s/p intervention in 7/22     Unclear whether short of breath.  Not clinical heart failure.  She does have underlying atrial fibrillation.  Walkign with oxygen 90%, HR 82  Checking echo to assess the cardiac function  3 day of lasix low dose, avoiding higher dose due to ckd.  Patient will call us Monday with update.  Patient has anemia, followingPCP, she is going to have b12 injection  Stress MPI negative  Ok to stop plavix, LE angioplasty 7/22  Regarding permanent fibrillation continue anticoagulation    Further plan after echo test.     Discussed treatment options, plan with patient and all questions answered.  Also discussed her current diet and exercise requirements and encouraged healthy lifestyle to reduce potential for serious health complications    Follow up: No follow-ups on file.    GAVIN RUIZ M.D., FACC   0.5

## 2023-11-23 ENCOUNTER — RX RENEWAL (OUTPATIENT)
Age: 78
End: 2023-11-23

## 2023-11-23 RX ORDER — POTASSIUM CHLORIDE 750 MG/1
10 TABLET, EXTENDED RELEASE ORAL DAILY
Qty: 90 | Refills: 0 | Status: ACTIVE | COMMUNITY
Start: 2018-02-08 | End: 1900-01-01

## 2023-11-30 ENCOUNTER — APPOINTMENT (OUTPATIENT)
Dept: ORTHOPEDIC SURGERY | Facility: CLINIC | Age: 78
End: 2023-11-30

## 2023-12-03 ENCOUNTER — EMERGENCY (EMERGENCY)
Facility: HOSPITAL | Age: 78
LOS: 1 days | Discharge: ANOTHER HOSPITAL | End: 2023-12-03
Attending: EMERGENCY MEDICINE | Admitting: EMERGENCY MEDICINE
Payer: MEDICARE

## 2023-12-03 VITALS
DIASTOLIC BLOOD PRESSURE: 82 MMHG | HEART RATE: 74 BPM | OXYGEN SATURATION: 97 % | WEIGHT: 169.98 LBS | RESPIRATION RATE: 18 BRPM | TEMPERATURE: 97 F | SYSTOLIC BLOOD PRESSURE: 131 MMHG

## 2023-12-03 DIAGNOSIS — N81.10 CYSTOCELE, UNSPECIFIED: Chronic | ICD-10-CM

## 2023-12-03 LAB
ALBUMIN SERPL ELPH-MCNC: 3.6 G/DL — SIGNIFICANT CHANGE UP (ref 3.3–5)
ALBUMIN SERPL ELPH-MCNC: 3.6 G/DL — SIGNIFICANT CHANGE UP (ref 3.3–5)
ALP SERPL-CCNC: 75 U/L — SIGNIFICANT CHANGE UP (ref 40–120)
ALP SERPL-CCNC: 75 U/L — SIGNIFICANT CHANGE UP (ref 40–120)
ALT FLD-CCNC: 21 U/L — SIGNIFICANT CHANGE UP (ref 10–45)
ALT FLD-CCNC: 21 U/L — SIGNIFICANT CHANGE UP (ref 10–45)
ANION GAP SERPL CALC-SCNC: 7 MMOL/L — SIGNIFICANT CHANGE UP (ref 5–17)
ANION GAP SERPL CALC-SCNC: 7 MMOL/L — SIGNIFICANT CHANGE UP (ref 5–17)
APPEARANCE UR: CLEAR — SIGNIFICANT CHANGE UP
APPEARANCE UR: CLEAR — SIGNIFICANT CHANGE UP
APTT BLD: 17.4 SEC — LOW (ref 24.5–35.6)
APTT BLD: 17.4 SEC — LOW (ref 24.5–35.6)
AST SERPL-CCNC: 34 U/L — SIGNIFICANT CHANGE UP (ref 10–40)
AST SERPL-CCNC: 34 U/L — SIGNIFICANT CHANGE UP (ref 10–40)
BASOPHILS # BLD AUTO: 0.07 K/UL — SIGNIFICANT CHANGE UP (ref 0–0.2)
BASOPHILS # BLD AUTO: 0.07 K/UL — SIGNIFICANT CHANGE UP (ref 0–0.2)
BASOPHILS NFR BLD AUTO: 0.8 % — SIGNIFICANT CHANGE UP (ref 0–2)
BASOPHILS NFR BLD AUTO: 0.8 % — SIGNIFICANT CHANGE UP (ref 0–2)
BILIRUB SERPL-MCNC: 0.6 MG/DL — SIGNIFICANT CHANGE UP (ref 0.2–1.2)
BILIRUB SERPL-MCNC: 0.6 MG/DL — SIGNIFICANT CHANGE UP (ref 0.2–1.2)
BILIRUB UR-MCNC: NEGATIVE — SIGNIFICANT CHANGE UP
BILIRUB UR-MCNC: NEGATIVE — SIGNIFICANT CHANGE UP
BUN SERPL-MCNC: 30 MG/DL — HIGH (ref 7–23)
BUN SERPL-MCNC: 30 MG/DL — HIGH (ref 7–23)
CALCIUM SERPL-MCNC: 10.5 MG/DL — SIGNIFICANT CHANGE UP (ref 8.4–10.5)
CALCIUM SERPL-MCNC: 10.5 MG/DL — SIGNIFICANT CHANGE UP (ref 8.4–10.5)
CHLORIDE SERPL-SCNC: 100 MMOL/L — SIGNIFICANT CHANGE UP (ref 96–108)
CHLORIDE SERPL-SCNC: 100 MMOL/L — SIGNIFICANT CHANGE UP (ref 96–108)
CO2 SERPL-SCNC: 32 MMOL/L — HIGH (ref 22–31)
CO2 SERPL-SCNC: 32 MMOL/L — HIGH (ref 22–31)
COLOR SPEC: YELLOW — SIGNIFICANT CHANGE UP
COLOR SPEC: YELLOW — SIGNIFICANT CHANGE UP
CREAT SERPL-MCNC: 1.74 MG/DL — HIGH (ref 0.5–1.3)
CREAT SERPL-MCNC: 1.74 MG/DL — HIGH (ref 0.5–1.3)
DIFF PNL FLD: NEGATIVE — SIGNIFICANT CHANGE UP
DIFF PNL FLD: NEGATIVE — SIGNIFICANT CHANGE UP
EGFR: 30 ML/MIN/1.73M2 — LOW
EGFR: 30 ML/MIN/1.73M2 — LOW
EOSINOPHIL # BLD AUTO: 0.34 K/UL — SIGNIFICANT CHANGE UP (ref 0–0.5)
EOSINOPHIL # BLD AUTO: 0.34 K/UL — SIGNIFICANT CHANGE UP (ref 0–0.5)
EOSINOPHIL NFR BLD AUTO: 3.7 % — SIGNIFICANT CHANGE UP (ref 0–6)
EOSINOPHIL NFR BLD AUTO: 3.7 % — SIGNIFICANT CHANGE UP (ref 0–6)
GLUCOSE SERPL-MCNC: 105 MG/DL — HIGH (ref 70–99)
GLUCOSE SERPL-MCNC: 105 MG/DL — HIGH (ref 70–99)
GLUCOSE UR QL: NEGATIVE MG/DL — SIGNIFICANT CHANGE UP
GLUCOSE UR QL: NEGATIVE MG/DL — SIGNIFICANT CHANGE UP
HCT VFR BLD CALC: 36.2 % — SIGNIFICANT CHANGE UP (ref 34.5–45)
HCT VFR BLD CALC: 36.2 % — SIGNIFICANT CHANGE UP (ref 34.5–45)
HGB BLD-MCNC: 12.7 G/DL — SIGNIFICANT CHANGE UP (ref 11.5–15.5)
HGB BLD-MCNC: 12.7 G/DL — SIGNIFICANT CHANGE UP (ref 11.5–15.5)
IMM GRANULOCYTES NFR BLD AUTO: 0.4 % — SIGNIFICANT CHANGE UP (ref 0–0.9)
IMM GRANULOCYTES NFR BLD AUTO: 0.4 % — SIGNIFICANT CHANGE UP (ref 0–0.9)
INR BLD: 0.97 RATIO — SIGNIFICANT CHANGE UP (ref 0.85–1.18)
INR BLD: 0.97 RATIO — SIGNIFICANT CHANGE UP (ref 0.85–1.18)
KETONES UR-MCNC: NEGATIVE MG/DL — SIGNIFICANT CHANGE UP
KETONES UR-MCNC: NEGATIVE MG/DL — SIGNIFICANT CHANGE UP
LEUKOCYTE ESTERASE UR-ACNC: NEGATIVE — SIGNIFICANT CHANGE UP
LEUKOCYTE ESTERASE UR-ACNC: NEGATIVE — SIGNIFICANT CHANGE UP
LYMPHOCYTES # BLD AUTO: 1.48 K/UL — SIGNIFICANT CHANGE UP (ref 1–3.3)
LYMPHOCYTES # BLD AUTO: 1.48 K/UL — SIGNIFICANT CHANGE UP (ref 1–3.3)
LYMPHOCYTES # BLD AUTO: 15.9 % — SIGNIFICANT CHANGE UP (ref 13–44)
LYMPHOCYTES # BLD AUTO: 15.9 % — SIGNIFICANT CHANGE UP (ref 13–44)
MAGNESIUM SERPL-MCNC: 1.7 MG/DL — SIGNIFICANT CHANGE UP (ref 1.6–2.6)
MAGNESIUM SERPL-MCNC: 1.7 MG/DL — SIGNIFICANT CHANGE UP (ref 1.6–2.6)
MCHC RBC-ENTMCNC: 30.6 PG — SIGNIFICANT CHANGE UP (ref 27–34)
MCHC RBC-ENTMCNC: 30.6 PG — SIGNIFICANT CHANGE UP (ref 27–34)
MCHC RBC-ENTMCNC: 35.1 GM/DL — SIGNIFICANT CHANGE UP (ref 32–36)
MCHC RBC-ENTMCNC: 35.1 GM/DL — SIGNIFICANT CHANGE UP (ref 32–36)
MCV RBC AUTO: 87.2 FL — SIGNIFICANT CHANGE UP (ref 80–100)
MCV RBC AUTO: 87.2 FL — SIGNIFICANT CHANGE UP (ref 80–100)
MONOCYTES # BLD AUTO: 0.61 K/UL — SIGNIFICANT CHANGE UP (ref 0–0.9)
MONOCYTES # BLD AUTO: 0.61 K/UL — SIGNIFICANT CHANGE UP (ref 0–0.9)
MONOCYTES NFR BLD AUTO: 6.6 % — SIGNIFICANT CHANGE UP (ref 2–14)
MONOCYTES NFR BLD AUTO: 6.6 % — SIGNIFICANT CHANGE UP (ref 2–14)
NEUTROPHILS # BLD AUTO: 6.77 K/UL — SIGNIFICANT CHANGE UP (ref 1.8–7.4)
NEUTROPHILS # BLD AUTO: 6.77 K/UL — SIGNIFICANT CHANGE UP (ref 1.8–7.4)
NEUTROPHILS NFR BLD AUTO: 72.6 % — SIGNIFICANT CHANGE UP (ref 43–77)
NEUTROPHILS NFR BLD AUTO: 72.6 % — SIGNIFICANT CHANGE UP (ref 43–77)
NITRITE UR-MCNC: NEGATIVE — SIGNIFICANT CHANGE UP
NITRITE UR-MCNC: NEGATIVE — SIGNIFICANT CHANGE UP
NRBC # BLD: 0 /100 WBCS — SIGNIFICANT CHANGE UP (ref 0–0)
NRBC # BLD: 0 /100 WBCS — SIGNIFICANT CHANGE UP (ref 0–0)
PH UR: 6 — SIGNIFICANT CHANGE UP (ref 5–8)
PH UR: 6 — SIGNIFICANT CHANGE UP (ref 5–8)
PLATELET # BLD AUTO: 280 K/UL — SIGNIFICANT CHANGE UP (ref 150–400)
PLATELET # BLD AUTO: 280 K/UL — SIGNIFICANT CHANGE UP (ref 150–400)
POTASSIUM SERPL-MCNC: 3 MMOL/L — LOW (ref 3.5–5.3)
POTASSIUM SERPL-MCNC: 3 MMOL/L — LOW (ref 3.5–5.3)
POTASSIUM SERPL-SCNC: 3 MMOL/L — LOW (ref 3.5–5.3)
POTASSIUM SERPL-SCNC: 3 MMOL/L — LOW (ref 3.5–5.3)
PROT SERPL-MCNC: 7.7 G/DL — SIGNIFICANT CHANGE UP (ref 6–8.3)
PROT SERPL-MCNC: 7.7 G/DL — SIGNIFICANT CHANGE UP (ref 6–8.3)
PROT UR-MCNC: SIGNIFICANT CHANGE UP MG/DL
PROT UR-MCNC: SIGNIFICANT CHANGE UP MG/DL
PROTHROM AB SERPL-ACNC: 11.4 SEC — SIGNIFICANT CHANGE UP (ref 9.5–13)
PROTHROM AB SERPL-ACNC: 11.4 SEC — SIGNIFICANT CHANGE UP (ref 9.5–13)
RAPID RVP RESULT: SIGNIFICANT CHANGE UP
RAPID RVP RESULT: SIGNIFICANT CHANGE UP
RBC # BLD: 4.15 M/UL — SIGNIFICANT CHANGE UP (ref 3.8–5.2)
RBC # BLD: 4.15 M/UL — SIGNIFICANT CHANGE UP (ref 3.8–5.2)
RBC # FLD: 12.9 % — SIGNIFICANT CHANGE UP (ref 10.3–14.5)
RBC # FLD: 12.9 % — SIGNIFICANT CHANGE UP (ref 10.3–14.5)
SARS-COV-2 RNA SPEC QL NAA+PROBE: SIGNIFICANT CHANGE UP
SARS-COV-2 RNA SPEC QL NAA+PROBE: SIGNIFICANT CHANGE UP
SODIUM SERPL-SCNC: 139 MMOL/L — SIGNIFICANT CHANGE UP (ref 135–145)
SODIUM SERPL-SCNC: 139 MMOL/L — SIGNIFICANT CHANGE UP (ref 135–145)
SP GR SPEC: 1.01 — SIGNIFICANT CHANGE UP (ref 1–1.03)
SP GR SPEC: 1.01 — SIGNIFICANT CHANGE UP (ref 1–1.03)
TROPONIN I, HIGH SENSITIVITY RESULT: 31.9 NG/L — SIGNIFICANT CHANGE UP
TROPONIN I, HIGH SENSITIVITY RESULT: 31.9 NG/L — SIGNIFICANT CHANGE UP
UROBILINOGEN FLD QL: 0.2 MG/DL — SIGNIFICANT CHANGE UP (ref 0.2–1)
UROBILINOGEN FLD QL: 0.2 MG/DL — SIGNIFICANT CHANGE UP (ref 0.2–1)
WBC # BLD: 9.31 K/UL — SIGNIFICANT CHANGE UP (ref 3.8–10.5)
WBC # BLD: 9.31 K/UL — SIGNIFICANT CHANGE UP (ref 3.8–10.5)
WBC # FLD AUTO: 9.31 K/UL — SIGNIFICANT CHANGE UP (ref 3.8–10.5)
WBC # FLD AUTO: 9.31 K/UL — SIGNIFICANT CHANGE UP (ref 3.8–10.5)

## 2023-12-03 PROCEDURE — 71045 X-RAY EXAM CHEST 1 VIEW: CPT | Mod: 26

## 2023-12-03 PROCEDURE — 93010 ELECTROCARDIOGRAM REPORT: CPT

## 2023-12-03 PROCEDURE — 72125 CT NECK SPINE W/O DYE: CPT | Mod: 26,MA

## 2023-12-03 PROCEDURE — 70450 CT HEAD/BRAIN W/O DYE: CPT | Mod: 26,MA

## 2023-12-03 PROCEDURE — 99285 EMERGENCY DEPT VISIT HI MDM: CPT

## 2023-12-03 RX ORDER — SODIUM CHLORIDE 9 MG/ML
500 INJECTION INTRAMUSCULAR; INTRAVENOUS; SUBCUTANEOUS ONCE
Refills: 0 | Status: COMPLETED | OUTPATIENT
Start: 2023-12-03 | End: 2023-12-03

## 2023-12-03 RX ORDER — POTASSIUM CHLORIDE 20 MEQ
10 PACKET (EA) ORAL
Refills: 0 | Status: COMPLETED | OUTPATIENT
Start: 2023-12-03 | End: 2023-12-04

## 2023-12-03 RX ADMIN — Medication 10 MILLIEQUIVALENT(S): at 23:14

## 2023-12-03 RX ADMIN — Medication 100 MILLIEQUIVALENT(S): at 23:15

## 2023-12-03 RX ADMIN — Medication 100 MILLIEQUIVALENT(S): at 22:14

## 2023-12-03 RX ADMIN — SODIUM CHLORIDE 1000 MILLILITER(S): 9 INJECTION INTRAMUSCULAR; INTRAVENOUS; SUBCUTANEOUS at 21:08

## 2023-12-03 NOTE — ED ADULT NURSE NOTE - NSFALLHARMRISKINTERV_ED_ALL_ED
Assistance OOB with selected safe patient handling equipment if applicable/Assistance with ambulation/Communicate risk of Fall with Harm to all staff, patient, and family/Monitor gait and stability/Provide patient with walking aids/Provide visual cue: red socks, yellow wristband, yellow gown, etc/Reinforce activity limits and safety measures with patient and family/Bed in lowest position, wheels locked, appropriate side rails in place/Call bell, personal items and telephone in reach/Instruct patient to call for assistance before getting out of bed/chair/stretcher/Non-slip footwear applied when patient is off stretcher/[place] to call system/Physically safe environment - no spills, clutter or unnecessary equipment/Purposeful Proactive Rounding/Room/bathroom lighting operational, light cord in reach Assistance OOB with selected safe patient handling equipment if applicable/Assistance with ambulation/Communicate risk of Fall with Harm to all staff, patient, and family/Monitor gait and stability/Provide patient with walking aids/Provide visual cue: red socks, yellow wristband, yellow gown, etc/Reinforce activity limits and safety measures with patient and family/Bed in lowest position, wheels locked, appropriate side rails in place/Call bell, personal items and telephone in reach/Instruct patient to call for assistance before getting out of bed/chair/stretcher/Non-slip footwear applied when patient is off stretcher/[place] to call system/Physically safe environment - no spills, clutter or unnecessary equipment/Purposeful Proactive Rounding/Room/bathroom lighting operational, light cord in reach

## 2023-12-03 NOTE — ED ADULT NURSE REASSESSMENT NOTE - NS ED NURSE REASSESS COMMENT FT1
Received report from day shift RN. Patient received A&Ox4 with 20G IV. Pt offering no complaints and is in no acute distress at this time.  Respirations even and unlabored.  Placed on cardiac monitor at this time. Medication in progress per eMAR. Stretcher in lowest position, wheels locked, side rails up and call light in reach. Vital signs as per flowsheet.

## 2023-12-03 NOTE — ED ADULT TRIAGE NOTE - NS ED NURSE AMBULANCES
[place] [facility]

## 2023-12-03 NOTE — ED ADULT NURSE NOTE - OBJECTIVE STATEMENT
Pt BIB EMS from home s/p multiple falls. Pt BIB EMS from home s/p fall this evening. Pt with (+) head injury, denies LOC. Pt admits to feeling weak and falling multiple times throughout the day. States that she lives home alone, with her boyfriend occasionally coming over to help her. PMH HTN, COPD, asthma and arthritis. Pt denies fevers/chills, chest pain, sob, or any other complaints.

## 2023-12-03 NOTE — ED ADULT NURSE NOTE - NSICDXPASTSURGICALHX_GEN_ALL_CORE_FT
Exclude Pending Lab and Radiology orders from printing on the Patient's Discharge Instructions, due to Privacy Concerns. PAST SURGICAL HISTORY:  Female bladder prolapse bladder sling - 2013    S/P appendectomy in     S/P breast biopsy bilateral  breast  - benign    S/P D&C for missed  about 25 years ago

## 2023-12-04 ENCOUNTER — INPATIENT (INPATIENT)
Facility: HOSPITAL | Age: 78
LOS: 10 days | Discharge: NURSING, REHAB OR LONG-TERM CARE | DRG: 25 | End: 2023-12-15
Attending: NEUROLOGICAL SURGERY | Admitting: STUDENT IN AN ORGANIZED HEALTH CARE EDUCATION/TRAINING PROGRAM
Payer: MEDICARE

## 2023-12-04 VITALS
SYSTOLIC BLOOD PRESSURE: 130 MMHG | TEMPERATURE: 97 F | HEART RATE: 78 BPM | OXYGEN SATURATION: 98 % | RESPIRATION RATE: 18 BRPM | DIASTOLIC BLOOD PRESSURE: 80 MMHG

## 2023-12-04 VITALS
HEIGHT: 65 IN | HEART RATE: 70 BPM | WEIGHT: 160.06 LBS | OXYGEN SATURATION: 98 % | DIASTOLIC BLOOD PRESSURE: 86 MMHG | RESPIRATION RATE: 18 BRPM | SYSTOLIC BLOOD PRESSURE: 164 MMHG | TEMPERATURE: 98 F
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
## 2023-12-04 DIAGNOSIS — R91.8 OTHER NONSPECIFIC ABNORMAL FINDING OF LUNG FIELD: ICD-10-CM

## 2023-12-04 DIAGNOSIS — N81.10 CYSTOCELE, UNSPECIFIED: Chronic | ICD-10-CM

## 2023-12-04 DIAGNOSIS — Z29.9 ENCOUNTER FOR PROPHYLACTIC MEASURES, UNSPECIFIED: ICD-10-CM

## 2023-12-04 DIAGNOSIS — Z87.09 PERSONAL HISTORY OF OTHER DISEASES OF THE RESPIRATORY SYSTEM: ICD-10-CM

## 2023-12-04 DIAGNOSIS — I10 ESSENTIAL (PRIMARY) HYPERTENSION: ICD-10-CM

## 2023-12-04 DIAGNOSIS — G93.89 OTHER SPECIFIED DISORDERS OF BRAIN: ICD-10-CM

## 2023-12-04 LAB
ANION GAP SERPL CALC-SCNC: 11 MMOL/L — SIGNIFICANT CHANGE UP (ref 5–17)
ANION GAP SERPL CALC-SCNC: 11 MMOL/L — SIGNIFICANT CHANGE UP (ref 5–17)
APPEARANCE UR: CLEAR — SIGNIFICANT CHANGE UP
APPEARANCE UR: CLEAR — SIGNIFICANT CHANGE UP
BILIRUB UR-MCNC: NEGATIVE — SIGNIFICANT CHANGE UP
BILIRUB UR-MCNC: NEGATIVE — SIGNIFICANT CHANGE UP
BUN SERPL-MCNC: 26 MG/DL — HIGH (ref 7–23)
BUN SERPL-MCNC: 26 MG/DL — HIGH (ref 7–23)
CALCIUM SERPL-MCNC: 9.6 MG/DL — SIGNIFICANT CHANGE UP (ref 8.4–10.5)
CALCIUM SERPL-MCNC: 9.6 MG/DL — SIGNIFICANT CHANGE UP (ref 8.4–10.5)
CHLORIDE SERPL-SCNC: 105 MMOL/L — SIGNIFICANT CHANGE UP (ref 96–108)
CHLORIDE SERPL-SCNC: 105 MMOL/L — SIGNIFICANT CHANGE UP (ref 96–108)
CO2 SERPL-SCNC: 27 MMOL/L — SIGNIFICANT CHANGE UP (ref 22–31)
CO2 SERPL-SCNC: 27 MMOL/L — SIGNIFICANT CHANGE UP (ref 22–31)
COLOR SPEC: YELLOW — SIGNIFICANT CHANGE UP
COLOR SPEC: YELLOW — SIGNIFICANT CHANGE UP
CREAT ?TM UR-MCNC: 92 MG/DL — SIGNIFICANT CHANGE UP
CREAT ?TM UR-MCNC: 92 MG/DL — SIGNIFICANT CHANGE UP
CREAT SERPL-MCNC: 1.6 MG/DL — HIGH (ref 0.5–1.3)
CREAT SERPL-MCNC: 1.6 MG/DL — HIGH (ref 0.5–1.3)
DIFF PNL FLD: NEGATIVE — SIGNIFICANT CHANGE UP
DIFF PNL FLD: NEGATIVE — SIGNIFICANT CHANGE UP
EGFR: 33 ML/MIN/1.73M2 — LOW
EGFR: 33 ML/MIN/1.73M2 — LOW
GLUCOSE SERPL-MCNC: 103 MG/DL — HIGH (ref 70–99)
GLUCOSE SERPL-MCNC: 103 MG/DL — HIGH (ref 70–99)
GLUCOSE UR QL: NEGATIVE MG/DL — SIGNIFICANT CHANGE UP
GLUCOSE UR QL: NEGATIVE MG/DL — SIGNIFICANT CHANGE UP
KETONES UR-MCNC: NEGATIVE MG/DL — SIGNIFICANT CHANGE UP
KETONES UR-MCNC: NEGATIVE MG/DL — SIGNIFICANT CHANGE UP
LEUKOCYTE ESTERASE UR-ACNC: NEGATIVE — SIGNIFICANT CHANGE UP
LEUKOCYTE ESTERASE UR-ACNC: NEGATIVE — SIGNIFICANT CHANGE UP
NITRITE UR-MCNC: NEGATIVE — SIGNIFICANT CHANGE UP
NITRITE UR-MCNC: NEGATIVE — SIGNIFICANT CHANGE UP
OSMOLALITY UR: 520 MOS/KG — SIGNIFICANT CHANGE UP (ref 300–900)
OSMOLALITY UR: 520 MOS/KG — SIGNIFICANT CHANGE UP (ref 300–900)
PH UR: 6.5 — SIGNIFICANT CHANGE UP (ref 5–8)
PH UR: 6.5 — SIGNIFICANT CHANGE UP (ref 5–8)
POTASSIUM SERPL-MCNC: 3.4 MMOL/L — LOW (ref 3.5–5.3)
POTASSIUM SERPL-MCNC: 3.4 MMOL/L — LOW (ref 3.5–5.3)
POTASSIUM SERPL-SCNC: 3.4 MMOL/L — LOW (ref 3.5–5.3)
POTASSIUM SERPL-SCNC: 3.4 MMOL/L — LOW (ref 3.5–5.3)
POTASSIUM UR-SCNC: 32 MMOL/L — SIGNIFICANT CHANGE UP
POTASSIUM UR-SCNC: 32 MMOL/L — SIGNIFICANT CHANGE UP
PROT ?TM UR-MCNC: 9 MG/DL — SIGNIFICANT CHANGE UP (ref 0–12)
PROT ?TM UR-MCNC: 9 MG/DL — SIGNIFICANT CHANGE UP (ref 0–12)
PROT UR-MCNC: NEGATIVE MG/DL — SIGNIFICANT CHANGE UP
PROT UR-MCNC: NEGATIVE MG/DL — SIGNIFICANT CHANGE UP
PROT/CREAT UR-RTO: 0.1 RATIO — SIGNIFICANT CHANGE UP (ref 0–0.2)
PROT/CREAT UR-RTO: 0.1 RATIO — SIGNIFICANT CHANGE UP (ref 0–0.2)
SODIUM SERPL-SCNC: 143 MMOL/L — SIGNIFICANT CHANGE UP (ref 135–145)
SODIUM SERPL-SCNC: 143 MMOL/L — SIGNIFICANT CHANGE UP (ref 135–145)
SODIUM UR-SCNC: 99 MMOL/L — SIGNIFICANT CHANGE UP
SODIUM UR-SCNC: 99 MMOL/L — SIGNIFICANT CHANGE UP
SP GR SPEC: 1.02 — SIGNIFICANT CHANGE UP (ref 1–1.03)
SP GR SPEC: 1.02 — SIGNIFICANT CHANGE UP (ref 1–1.03)
UROBILINOGEN FLD QL: 0.2 MG/DL — SIGNIFICANT CHANGE UP (ref 0.2–1)
UROBILINOGEN FLD QL: 0.2 MG/DL — SIGNIFICANT CHANGE UP (ref 0.2–1)
UUN UR-MCNC: 557 MG/DL — SIGNIFICANT CHANGE UP
UUN UR-MCNC: 557 MG/DL — SIGNIFICANT CHANGE UP

## 2023-12-04 PROCEDURE — 80053 COMPREHEN METABOLIC PANEL: CPT

## 2023-12-04 PROCEDURE — 99223 1ST HOSP IP/OBS HIGH 75: CPT | Mod: GC

## 2023-12-04 PROCEDURE — 85730 THROMBOPLASTIN TIME PARTIAL: CPT

## 2023-12-04 PROCEDURE — 96365 THER/PROPH/DIAG IV INF INIT: CPT

## 2023-12-04 PROCEDURE — 74177 CT ABD & PELVIS W/CONTRAST: CPT | Mod: 26,MA

## 2023-12-04 PROCEDURE — 84484 ASSAY OF TROPONIN QUANT: CPT

## 2023-12-04 PROCEDURE — 83735 ASSAY OF MAGNESIUM: CPT

## 2023-12-04 PROCEDURE — 85610 PROTHROMBIN TIME: CPT

## 2023-12-04 PROCEDURE — 36415 COLL VENOUS BLD VENIPUNCTURE: CPT

## 2023-12-04 PROCEDURE — 99053 MED SERV 10PM-8AM 24 HR FAC: CPT

## 2023-12-04 PROCEDURE — 99285 EMERGENCY DEPT VISIT HI MDM: CPT

## 2023-12-04 PROCEDURE — 96366 THER/PROPH/DIAG IV INF ADDON: CPT

## 2023-12-04 PROCEDURE — 71260 CT THORAX DX C+: CPT | Mod: 26,MA

## 2023-12-04 PROCEDURE — 96361 HYDRATE IV INFUSION ADD-ON: CPT

## 2023-12-04 PROCEDURE — 87086 URINE CULTURE/COLONY COUNT: CPT

## 2023-12-04 PROCEDURE — 99285 EMERGENCY DEPT VISIT HI MDM: CPT | Mod: 25

## 2023-12-04 PROCEDURE — 70553 MRI BRAIN STEM W/O & W/DYE: CPT | Mod: 26

## 2023-12-04 PROCEDURE — 0225U NFCT DS DNA&RNA 21 SARSCOV2: CPT

## 2023-12-04 PROCEDURE — 85025 COMPLETE CBC W/AUTO DIFF WBC: CPT

## 2023-12-04 PROCEDURE — 93005 ELECTROCARDIOGRAM TRACING: CPT

## 2023-12-04 PROCEDURE — 72125 CT NECK SPINE W/O DYE: CPT | Mod: MA

## 2023-12-04 PROCEDURE — 71045 X-RAY EXAM CHEST 1 VIEW: CPT

## 2023-12-04 PROCEDURE — 99222 1ST HOSP IP/OBS MODERATE 55: CPT | Mod: GC

## 2023-12-04 PROCEDURE — 70450 CT HEAD/BRAIN W/O DYE: CPT | Mod: MA

## 2023-12-04 RX ORDER — BUDESONIDE AND FORMOTEROL FUMARATE DIHYDRATE 160; 4.5 UG/1; UG/1
2 AEROSOL RESPIRATORY (INHALATION)
Refills: 0 | Status: DISCONTINUED | OUTPATIENT
Start: 2023-12-04 | End: 2023-12-08

## 2023-12-04 RX ORDER — DEXAMETHASONE 0.5 MG/5ML
8 ELIXIR ORAL ONCE
Refills: 0 | Status: COMPLETED | OUTPATIENT
Start: 2023-12-04 | End: 2023-12-04

## 2023-12-04 RX ORDER — FLUTICASONE PROPIONATE 50 MCG
1 SPRAY, SUSPENSION NASAL
Qty: 0 | Refills: 0 | DISCHARGE

## 2023-12-04 RX ORDER — POTASSIUM CHLORIDE 20 MEQ
1 PACKET (EA) ORAL
Qty: 0 | Refills: 0 | DISCHARGE

## 2023-12-04 RX ORDER — DEXAMETHASONE 0.5 MG/5ML
4 ELIXIR ORAL EVERY 6 HOURS
Refills: 0 | Status: DISCONTINUED | OUTPATIENT
Start: 2023-12-04 | End: 2023-12-09

## 2023-12-04 RX ORDER — HEPARIN SODIUM 5000 [USP'U]/ML
5000 INJECTION INTRAVENOUS; SUBCUTANEOUS EVERY 8 HOURS
Refills: 0 | Status: DISCONTINUED | OUTPATIENT
Start: 2023-12-04 | End: 2023-12-08

## 2023-12-04 RX ORDER — OXYCODONE AND ACETAMINOPHEN 5; 325 MG/1; MG/1
1 TABLET ORAL
Qty: 0 | Refills: 0 | DISCHARGE

## 2023-12-04 RX ORDER — TIOTROPIUM BROMIDE 18 UG/1
2 CAPSULE ORAL; RESPIRATORY (INHALATION) DAILY
Refills: 0 | Status: DISCONTINUED | OUTPATIENT
Start: 2023-12-04 | End: 2023-12-09

## 2023-12-04 RX ORDER — FOLIC ACID 0.8 MG
1 TABLET ORAL DAILY
Refills: 0 | Status: DISCONTINUED | OUTPATIENT
Start: 2023-12-04 | End: 2023-12-09

## 2023-12-04 RX ORDER — PREGABALIN 225 MG/1
1 CAPSULE ORAL
Qty: 0 | Refills: 0 | DISCHARGE

## 2023-12-04 RX ORDER — POTASSIUM CHLORIDE 20 MEQ
20 PACKET (EA) ORAL
Refills: 0 | Status: COMPLETED | OUTPATIENT
Start: 2023-12-04 | End: 2023-12-04

## 2023-12-04 RX ORDER — LEVETIRACETAM 250 MG/1
500 TABLET, FILM COATED ORAL
Refills: 0 | Status: DISCONTINUED | OUTPATIENT
Start: 2023-12-04 | End: 2023-12-09

## 2023-12-04 RX ORDER — HYDROXYZINE HCL 10 MG
1 TABLET ORAL
Qty: 0 | Refills: 0 | DISCHARGE

## 2023-12-04 RX ORDER — ESCITALOPRAM OXALATE 10 MG/1
10 TABLET, FILM COATED ORAL DAILY
Refills: 0 | Status: DISCONTINUED | OUTPATIENT
Start: 2023-12-04 | End: 2023-12-09

## 2023-12-04 RX ORDER — ACETAMINOPHEN 500 MG
1000 TABLET ORAL ONCE
Refills: 0 | Status: COMPLETED | OUTPATIENT
Start: 2023-12-04 | End: 2023-12-04

## 2023-12-04 RX ORDER — LEVETIRACETAM 250 MG/1
1000 TABLET, FILM COATED ORAL ONCE
Refills: 0 | Status: COMPLETED | OUTPATIENT
Start: 2023-12-04 | End: 2023-12-04

## 2023-12-04 RX ORDER — CETIRIZINE HYDROCHLORIDE 10 MG/1
1 TABLET ORAL
Qty: 0 | Refills: 0 | DISCHARGE

## 2023-12-04 RX ORDER — ACETAMINOPHEN 500 MG
650 TABLET ORAL EVERY 6 HOURS
Refills: 0 | Status: DISCONTINUED | OUTPATIENT
Start: 2023-12-04 | End: 2023-12-09

## 2023-12-04 RX ORDER — CHOLECALCIFEROL (VITAMIN D3) 125 MCG
1 CAPSULE ORAL
Qty: 0 | Refills: 0 | DISCHARGE

## 2023-12-04 RX ORDER — TRIAMTERENE/HYDROCHLOROTHIAZID 75 MG-50MG
1 TABLET ORAL DAILY
Refills: 0 | Status: DISCONTINUED | OUTPATIENT
Start: 2023-12-04 | End: 2023-12-06

## 2023-12-04 RX ORDER — ALBUTEROL 90 UG/1
2 AEROSOL, METERED ORAL
Qty: 0 | Refills: 0 | DISCHARGE

## 2023-12-04 RX ADMIN — Medication 400 MILLIGRAM(S): at 09:54

## 2023-12-04 RX ADMIN — Medication 101.6 MILLIGRAM(S): at 05:35

## 2023-12-04 RX ADMIN — ESCITALOPRAM OXALATE 10 MILLIGRAM(S): 10 TABLET, FILM COATED ORAL at 17:16

## 2023-12-04 RX ADMIN — Medication 1 TABLET(S): at 17:16

## 2023-12-04 RX ADMIN — SODIUM CHLORIDE 500 MILLILITER(S): 9 INJECTION INTRAMUSCULAR; INTRAVENOUS; SUBCUTANEOUS at 00:15

## 2023-12-04 RX ADMIN — BUDESONIDE AND FORMOTEROL FUMARATE DIHYDRATE 2 PUFF(S): 160; 4.5 AEROSOL RESPIRATORY (INHALATION) at 17:16

## 2023-12-04 RX ADMIN — Medication 4 MILLIGRAM(S): at 17:15

## 2023-12-04 RX ADMIN — Medication 100 MILLIEQUIVALENT(S): at 00:42

## 2023-12-04 RX ADMIN — Medication 1000 MILLIGRAM(S): at 19:27

## 2023-12-04 RX ADMIN — LEVETIRACETAM 500 MILLIGRAM(S): 250 TABLET, FILM COATED ORAL at 17:15

## 2023-12-04 RX ADMIN — Medication 20 MILLIEQUIVALENT(S): at 17:15

## 2023-12-04 RX ADMIN — Medication 10 MILLIEQUIVALENT(S): at 00:15

## 2023-12-04 RX ADMIN — Medication 20 MILLIEQUIVALENT(S): at 16:23

## 2023-12-04 RX ADMIN — Medication 20 MILLIEQUIVALENT(S): at 21:36

## 2023-12-04 RX ADMIN — Medication 650 MILLIGRAM(S): at 19:27

## 2023-12-04 RX ADMIN — LEVETIRACETAM 400 MILLIGRAM(S): 250 TABLET, FILM COATED ORAL at 02:21

## 2023-12-04 RX ADMIN — TIOTROPIUM BROMIDE 2 PUFF(S): 18 CAPSULE ORAL; RESPIRATORY (INHALATION) at 17:16

## 2023-12-04 RX ADMIN — Medication 1 MILLIGRAM(S): at 17:15

## 2023-12-04 RX ADMIN — Medication 650 MILLIGRAM(S): at 16:42

## 2023-12-04 NOTE — ED PROVIDER NOTE - CLINICAL SUMMARY MEDICAL DECISION MAKING FREE TEXT BOX
78-year-old female with history of asthma, COPD, hypertension, lung cancer, status post chemo, last 5 years ago, complaining of unsteadiness and frequent falls over the past week.  States she has been falling almost daily this week.  No reported LOC or headache.  No focal weakness numbness speech or vision changes.  No chest pain palpitation shortness of breath.  No nausea vomiting diarrhea or abdominal pain.  Reports some increased urinary frequency.  No hematuria or dysuria.    Patient appears in no distress.  Vitals normal.  No signs of significant trauma.  Will check CT head C-spine rule out trauma.  Check labs, chest x-ray, rule out anemia, dehydration, infection, electrolyte abnormality.  Give IV fluids.  Reassess    update: ct's show no acute trauma. R parietal lesion noted with edema and mass effect. case d/w neurosurg via CTC, accepting xfer to [facility] ED for evaluation. neuro intact. vss. pt also noted to be hypokalemic. iv KCL ordered. 78-year-old female with history of asthma, COPD, hypertension, lung cancer, status post chemo, last 5 years ago, complaining of unsteadiness and frequent falls over the past week.  States she has been falling almost daily this week.  No reported LOC or headache.  No focal weakness numbness speech or vision changes.  No chest pain palpitation shortness of breath.  No nausea vomiting diarrhea or abdominal pain.  Reports some increased urinary frequency.  No hematuria or dysuria.    Patient appears in no distress.  Vitals normal.  No signs of significant trauma.  Will check CT head C-spine rule out trauma.  Check labs, chest x-ray, rule out anemia, dehydration, infection, electrolyte abnormality.  Give IV fluids.  Reassess    update: ct's show no acute trauma. R parietal lesion noted with edema and mass effect. case d/w neurosurg via CTC, accepting xfer to [facility] ED for evaluation. neuro intact. vss. pt also noted to be hypokalemic. iv KCL ordered.

## 2023-12-04 NOTE — ED PROVIDER NOTE - PHYSICAL EXAMINATION
exam:   General: well appearing, NAD.   HEENT: eyes perrl, nose normal, head without swelling/tenderness/ discoloration or other signs of trauma  cor: RRR, s1s2, 2+rad pulses.   lungs: ctabl, no resp distress.   abd: soft, ntnd.   neuro: a&ox3, cn2-12 intact, PELLETIER, 5/5 strength c nl sensation all extremities, nl coordination.   MSK: no c/t/L spine tenderness. nontender pelvis/hips, FROM hips without pain  Skin: normal, no rash

## 2023-12-04 NOTE — H&P ADULT - NSICDXPASTMEDICALHX_GEN_ALL_CORE_FT
PAST MEDICAL HISTORY:  Arthritis left knee recently, right knee arthritis for several years    COPD (chronic obstructive pulmonary disease)     Depression     History of lung cancer     HTN (hypertension) controlled with meds for several years    Osteoarthritis of multiple joints, unspecified osteoarthritis type     Regurgitation tricuspid valve    Solitary pulmonary nodule Received last dose of chemo 5/2019

## 2023-12-04 NOTE — ED PROVIDER NOTE - PROGRESS NOTE DETAILS
Silvina Avendaño PGY2: I received sign out on this patient. I have reassessed patient and agree with the current plan. CT a/p shows 18mm nodule on LLL -concern for primary lesion. neurosurg was reconsulted at 730 am and no plan for intervention, so adm to medicine for oncologic workup and pending MRI for brain lesion. patient evaluated at bedside, stable and understands the plan.

## 2023-12-04 NOTE — ED PROVIDER NOTE - CARE PLAN
Principal Discharge DX:	Brain mass  Secondary Diagnosis:	Frequent falls  Secondary Diagnosis:	Hypokalemia   1

## 2023-12-04 NOTE — H&P ADULT - PROBLEM SELECTOR PLAN 1
- CT head showing y 2.3 x 1.5 x 1.7 cm cyst versus centrally necrotic mass in the right parietal-temporal region.   Diffuse vasogenic edema throughout the right frontal, parietal and temporal lobes.  Mass effect in the right cerebral hemisphere with 4.5 cm of midline shift to the left.  - MRI brain pending  - Neurosurgery following  - Keppra 500 mg BID and Dexamethasone 4 mg q6 hours.  - PT and OT

## 2023-12-04 NOTE — ED PROVIDER NOTE - OBJECTIVE STATEMENT
78-year-old female with history of asthma, COPD, hypertension, lung cancer, status post chemo, last 5 years ago, complaining of unsteadiness and frequent falls over the past week.  States she has been falling almost daily this week.  No reported LOC or headache.  No focal weakness numbness speech or vision changes.  No chest pain palpitation shortness of breath.  No nausea vomiting diarrhea or abdominal pain.  Reports some increased urinary frequency.  No hematuria or dysuria.

## 2023-12-04 NOTE — H&P ADULT - PROBLEM SELECTOR PLAN 2
- Previous history of lungf cancer, last chemotherapy 5 years ago, in remission  - Patient continued to smoke pack per day for 30-40 years.   - CT chest with findings of 18 mm groundglass nodule in the superior segment of the left lower lobe, which may reflect malignancy. Additional nodule along the right oblique fissure not well assessed secondary to motion.  - Pulm consult for possible biopsy

## 2023-12-04 NOTE — H&P ADULT - HISTORY OF PRESENT ILLNESS
74 year old female with history of breast cancer s/p chemotherapy 5 years ago previously in remission (oncologist Dr. Fredis Solomon at [facility]), COPD, HN presented to [place] with unsteady gait for a few weeks. Patient's  found her on he floor prompting him to call 911. Went to [place] and CT brain was remarkable for brain mass with midline shift, cyst vs necrotic mass. Transferred to [facility] for neurosurgery evaluation. Neurosurgery was consulted and reccomended MRI brain, CTAP and CT chest to look for possible malignancies, Keppra 500 mg BID, and Decadron 8 mg once in the ED and then Decadron 4mg q 6 hours.  74 year old female with history of breast cancer s/p chemotherapy 5 years ago previously in remission (oncologist Dr. Fredis Solomon at [facility]), COPD, HN presented to [place] with unsteady gait for a few weeks. Patient's  found her on he floor prompting him to call 911. Went to [place] and CT brain was remarkable for brain mass with midline shift, cyst vs necrotic mass. Transferred to [facility] for neurosurgery evaluation. Neurosurgery was consulted and reccomended MRI brain, CTAP and CT chest to look for possible malignancies, Keppra 500 mg BID, and Decadron 8 mg once in the ED and then Decadron 4mg q 6 hours.  74 year old female with history of lung cancer s/p chemotherapy 5 years ago previously in remission (oncologist Dr. Fredis Solomon at [facility]), COPD, HN presented to [place] with unsteady gait for a few weeks. Patient's  found her on he floor prompting him to call 911. Went to [place] and CT brain was remarkable for brain mass with midline shift, cyst vs necrotic mass. Transferred to [facility] for neurosurgery evaluation. Neurosurgery was consulted and reccomended MRI brain, CTAP and CT chest to look for possible malignancies, Keppra 500 mg BID, and Decadron 8 mg once in the ED and then Decadron 4mg q 6 hours. Reports shortness of breath when going up staurs, No fevers, chest pain, palpitations, vomiting, abdominal pain, diarrhea, constipation, dysuria, hematuria, melena, hematochezia.  74 year old female with history of lung cancer s/p chemotherapy 5 years ago previously in remission (oncologist Dr. Fredis Solomon at [facility]), COPD, HN presented to [place] with unsteady gait for a few weeks. Patient's  found her on he floor prompting him to call 911. Went to [place] and CT brain was remarkable for brain mass with midline shift, cyst vs necrotic mass. Transferred to [facility] for neurosurgery evaluation. Neurosurgery was consulted and reccomended MRI brain, CTAP and CT chest to look for possible malignancies, Keppra 500 mg BID, and Decadron 8 mg once in the ED and then Decadron 4mg q 6 hours. Reports shortness of breath when going up staurs, No fevers, chest pain, palpitations, vomiting, abdominal pain, diarrhea, constipation, dysuria, hematuria, melena, hematochezia.  74 year old female with history of lung cancer s/p chemotherapy 5 years ago previously in remission (oncologist Dr. Fredis Solomon at [facility]), COPD, HTN presented to [place] with unsteady gait for a few weeks. Patient's  found her on he floor prompting him to call 911. Went to [place] and CT brain was remarkable for brain mass with midline shift, cyst vs necrotic mass. Transferred to [facility] for neurosurgery evaluation. Neurosurgery was consulted and reccomended MRI brain, CTAP and CT chest to look for possible malignancies, Keppra 500 mg BID, and Decadron 8 mg once in the ED and then Decadron 4mg q 6 hours. Reports shortness of breath when going up staurs, No fevers, chest pain, palpitations, vomiting, abdominal pain, diarrhea, constipation, dysuria, hematuria, melena, hematochezia.  74 year old female with history of lung cancer s/p chemotherapy 5 years ago previously in remission (oncologist Dr. Fredis Solomon at [facility]), COPD, HTN presented to [place] with unsteady gait for a few weeks. Patient's  found her on he floor prompting him to call 911. Went to [place] and CT brain was remarkable for brain mass with midline shift, cyst vs necrotic mass. Transferred to [facility] for neurosurgery evaluation. Neurosurgery was consulted and reccomended MRI brain, CTAP and CT chest to look for possible malignancies, Keppra 500 mg BID, and Decadron 8 mg once in the ED and then Decadron 4mg q 6 hours. Reports shortness of breath when going up staurs, No fevers, chest pain, palpitations, vomiting, abdominal pain, diarrhea, constipation, dysuria, hematuria, melena, hematochezia.

## 2023-12-04 NOTE — H&P ADULT - NSHPSOCIALHISTORY_GEN_ALL_CORE
Smoking one pack a day for 30-40 years. Lives at home with boyfriend. Difficulty going up stairs and completing tasks.

## 2023-12-04 NOTE — ED ADULT NURSE NOTE - NSFALLRISKINTERV_ED_ALL_ED
Assistance OOB with selected safe patient handling equipment if applicable/Assistance with ambulation/Communicate fall risk and risk factors to all staff, patient, and family/Provide visual cue: yellow wristband, yellow gown, etc/Reinforce activity limits and safety measures with patient and family/Call bell, personal items and telephone in reach/Instruct patient to call for assistance before getting out of bed/chair/stretcher/Non-slip footwear applied when patient is off stretcher/[place] to call system/Physically safe environment - no spills, clutter or unnecessary equipment/Purposeful Proactive Rounding/Room/bathroom lighting operational, light cord in reach Assistance OOB with selected safe patient handling equipment if applicable/Assistance with ambulation/Communicate fall risk and risk factors to all staff, patient, and family/Provide visual cue: yellow wristband, yellow gown, etc/Reinforce activity limits and safety measures with patient and family/Call bell, personal items and telephone in reach/Instruct patient to call for assistance before getting out of bed/chair/stretcher/Non-slip footwear applied when patient is off stretcher/[place] to call system/Physically safe environment - no spills, clutter or unnecessary equipment/Purposeful Proactive Rounding/Room/bathroom lighting operational, light cord in reach

## 2023-12-04 NOTE — ED ADULT NURSE REASSESSMENT NOTE - NS ED NURSE REASSESS COMMENT FT1
MRI papers filled and faxed
Pt placed on primafit, and instructed to not climb out of bed. pts door left open in front of nursing station.
Report taken from CHERELLE Otero. Pt introduced to oncoming RN and updated on plan of care. A&Ox4, gross neuro intact, breath sounds clear b/l, breathing unlabored, abd soft nontender, skin warm dry and intact, diffuse bruising noted to arms and legs from recent frequent falls. Call bell in reach, pt educated on use. Bed locked and in lowest position. Denies any needs or complaints at this time. Pt repositioned in bed.

## 2023-12-04 NOTE — ED PROVIDER NOTE - CLINICAL SUMMARY MEDICAL DECISION MAKING FREE TEXT BOX
Curtis Noguera MD, PGY2  78-year-old female with past medical history of breast cancer status postchemotherapy 5 years ago previously in remission following with oncologist Dr. Fredis Solomon at [facility], COPD, hypertension presenting to emergency department as transfer from [place] for brain mass.  Patient states over the past week she has had increasing falls, unsteady gait.  EMS was called by patient's .  Patient found to have a cyst versus necrotic mass on CT head with midline shift.  In the emergency department patient is hemodynamically stable.  No acute distress.  Alert and oriented x 3.  Cranial nerves II through XII grossly intact.  Dysmetric on finger-to-nose worse on the left side.  Strength 5 out of 5 bilateral upper and lower extremities.  Discussed with neurosurgery who are requesting Decadron 8 mg and Keppra 1000 mg.  Also requesting CT abdomen pelvis and chest to evaluate for whether or not masses primary or secondary lesion.  Dispo pending workup, possible neurosurgery admission if no other lesion noted on CT abdomen pelvis chest. Curtis Noguera MD, PGY2  78-year-old female with past medical history of breast cancer status postchemotherapy 5 years ago previously in remission following with oncologist Dr. Fredis Solomon at [facility], COPD, hypertension presenting to emergency department as transfer from [place] for brain mass.  Patient states over the past week she has had increasing falls, unsteady gait.  EMS was called by patient's .  Patient found to have a cyst versus necrotic mass on CT head with midline shift.  In the emergency department patient is hemodynamically stable.  No acute distress.  Alert and oriented x 3.  Cranial nerves II through XII grossly intact.  Dysmetric on finger-to-nose worse on the left side.  Strength 5 out of 5 bilateral upper and lower extremities.  Discussed with neurosurgery who are requesting Decadron 8 mg and Keppra 1000 mg.  Also requesting CT abdomen pelvis and chest to evaluate for whether or not masses primary or secondary lesion.  Dispo pending workup, possible neurosurgery admission if no other lesion noted on CT abdomen pelvis chest.

## 2023-12-04 NOTE — ED ADULT NURSE NOTE - OBJECTIVE STATEMENT
Pt 78 year ol female, A/O x4. Pt transferred from [facility] for multiple falls and brain mass found on head CT. PMH- asthma, currently smokes, COPD, lung cancer five years ago. As per pt, she has been having multiple falls from unsteady gait and "bumping into everything" resulting in multiple bruises. At [place], found mass to right region of brain "with shift'. Upon entry to [facility], pt complaining of right sided HA. Follows commands, no signs of distress. Skin- warm, dry, intact. Abd. soft, nontender, nondistended. Denies chest pain, sob, fever, chills, n/v/d. Pt 78 year ol female, A/O x4. Pt transferred from [facility] for multiple falls and brain mass found on head CT. PMH- asthma, currently smokes, COPD, lung cancer five years ago. As per pt, she has been having multiple falls from unsteady gait and "bumping into everything" resulting in multiple bruises. At [place], found mass to right region of brain "with shift'. Upon entry to [facility], pt complaining of right sided HA. Follows commands, no signs of distress. Skin- warm, dry, intact. Abd. soft, nontender, nondistended. Denies chest pain, sob, fever, chills, n/v/d.

## 2023-12-04 NOTE — ED ADULT TRIAGE NOTE - PAIN: PRESENCE, MLM
complains of pain/discomfort follow up appt with Sihkha De Souza NP on WEndesday, February 28 @ 1:45pm   follow up appt with your Cardiologist in 2-3 weeks  shower daily - wash your incision with mild soap and water  weigh yourself daily - report weight gain > 3 pounds overnight to your MD

## 2023-12-04 NOTE — H&P ADULT - NSHPLABSRESULTS_GEN_ALL_CORE
LABS:                         12.7   9.31  )-----------( 280      ( 03 Dec 2023 20:57 )             36.2     12    143  |  105  |  26<H>  ----------------------------<  103<H>  3.4<L>   |  27  |  1.60<H>    Ca    9.6      04 Dec 2023 02:54  Mg     1.7         TPro  7.7  /  Alb  3.6  /  TBili  0.6  /  DBili  x   /  AST  34  /  ALT  21  /  AlkPhos  75  12    PT/INR - ( 03 Dec 2023 21:15 )   PT: 11.4 sec;   INR: 0.97 ratio         PTT - ( 03 Dec 2023 21:15 )  PTT:17.4 sec  Urinalysis Basic - ( 04 Dec 2023 15:08 )    Color: Yellow / Appearance: Clear / S.018 / pH: x  Gluc: x / Ketone: Negative mg/dL  / Bili: Negative / Urobili: 0.2 mg/dL   Blood: x / Protein: Negative mg/dL / Nitrite: Negative   Leuk Esterase: Negative / RBC: x / WBC x   Sq Epi: x / Non Sq Epi: x / Bacteria: x            RADIOLOGY, EKG & ADDITIONAL TESTS:     CT Head:  CT BRAIN   ORDERED BY: LUIZ LOPEZ     PROCEDURE DATE:  2023          INTERPRETATION:  CLINICAL INFORMATION: Off balance.    COMPARISON STUDY: MRI brain from 2018.    TECHNIQUE:  Noncontrast axial CT images were acquired through the head.  Sagittal and coronal reformats were performed.    FINDINGS:  There is severe confluent periventricular white matter hypoattenuation in   both cerebral hemispheres, which is difficult to directly compare to   prior MRI 2018 but may be progressed since 2018.  There is   superimposed vasogenic edema pattern throughout the right frontal,   parietal and temporal lobes.  There appears to be a 2.3 x 1.5 x 1.7 cm   cystic or centrally necrotic mass in the right parietal-temporal region   (2:46 and 601:53).  Additional brain masses are not excluded.  There is   patchy hypoattenuation in the basal ganglionic regions bilaterally,   thalami bilaterally and in the tejal likely reflecting chronic ischemic   changes.    There is mass effect in the right cerebral hemisphere with effacement of   the right lateral ventricle and approximately 4.5 mm midline shift to the   left (2:36).    There is no CT evidence of acute intracranial hemorrhage, central   herniation, or hydrocephalus.  No acute loss of gray matter-white matter   differentiation is identified.    The paranasal sinuses and mastoids are grossly clear.  There is a small   amount of cerumen in the right external auditory canal.    The calvarium, skull base and orbits appear within normal limits.    IMPRESSION:  Approximately 2.3 x 1.5 x 1.7 cm cyst versus centrally necrotic mass in   the right parietal-temporal region.  Diffuse vasogenic edema throughout   the right frontal, parietal and temporal lobes.  Mass effect in the right   cerebral hemisphere with 4.5 cm of midline shift to the left.  Follow-up   contrast enhanced brain MRI is recommended for further evaluation.    Chronic and nonemergent findings as discussed above.

## 2023-12-04 NOTE — ED ADULT TRIAGE NOTE - NS ED NURSE DIRECT TO ROOM YN
Infectious Disease Consult    Today's Date: 11/20/2021   Admit Date: 11/16/2021    Impression:   · Hematoma after trauma about 5 weeks ago  · Polymicrobial halle from culture--not sure what to make of a culture from a chronic wound  · Status post debridement and wound vac placement  · No osteo in imaging--no cellulitis on exam    Plan:   · Favor changing to oral Bactrim  · Continue with aggressive wound care    Anti-infectives:   · Vancomycin  · Cefepime     Subjective:   Date of Consultation:  November 20, 2021  Referring Physician: Dr Jody Ortiz    Patient is a 69 y.o. female admitted for management of a fib and a large, chronic hematoma of the RLE that occurred as the result of trauma from a door about 5 weeks ago. She has been doing wound care at home and reports no fevers, chills, etc. She has had the hematoma debrided and a wound vac placed--cultures were done and polymicrobial halle is growing. She is on IV antibiotics and we are asked to see her in consultation.       Patient Active Problem List   Diagnosis Code    HTN (hypertension) I10    Atrial fibrillation ([address] I48.91    Acquired hypothyroidism E03.9    Chest pain R07.9    Hyponatremia E87.1    Chronic diastolic congestive heart failure ([address] I50.32    Encounter for long-term (current) drug use Z79.899    Mild depression ([address] F32.0    Open wound of right lower leg S81.801A     Past Medical History:   Diagnosis Date    Atrial fibrillation ([address] 7/15/2011    Depression     HTN (hypertension) 7/14/2011    Paroxysmal atrial fibrillation ([address]     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 rep 10 yrs.  HI THYROIDECTOMY        Prior to Admission medications    Medication Sig Start Date End Date Taking? Authorizing Provider   metoprolol tartrate (LOPRESSOR) 50 mg tablet Take 50 mg by mouth two (2) times a day. Yes Provider, Historical   butalbital-acetaminophen-caffeine (FIORICET, ESGIC) -40 mg per tablet TAKE 1 TO 2 TABLETS BY MOUTH EVERY 6 HOURS AS NEEDED FOR HEADACHE 21   Daxa Reed, MD   Entresto 49-51 mg tab tablet Take 1 Tablet by mouth two (2) times a day. 21   Provider, Historical   ondansetron (ZOFRAN ODT) 4 mg disintegrating tablet Take 1 Tablet by mouth four (4) times daily as needed for Nausea or Vomiting. 9/10/21   Daxa Reed, MD   levothyroxine (SYNTHROID) 175 mcg tablet TAKE 1 TABLET BY MOUTH EVERY DAY BEFORE BREAKFAST 21   Daxa Beaulieuing, MD   Xarelto 20 mg tab tablet TAKE 1 TABLET BY MOUTH EVERY DAY 21   [facility], [facility]   diazePAM (VALIUM) 2 mg tablet TAKE 1 TABLET BY MOUTH EVERY DAY AS NEEDED FOR ANXIETY 21   Daxa Beaulieuing, MD   sertraline (ZOLOFT) 50 mg tablet Take 50 mg by mouth daily. Provider, Historical   spironolactone (ALDACTONE) 25 mg tablet Take  by mouth daily. Provider, Historical       Allergies   Allergen Reactions    Penicillins Unknown (comments)     Patient reports allergy to penicillin with unknown reaction, but states she has tolerated amoxicillin    Opioids - Morphine Analogues Itching and Nausea and Vomiting        Review of Systems:  A comprehensive review of systems was negative except for that written in the History of Present Illness.     Objective:     Visit Vitals  BP (!) 144/93 (BP 1 Location: Left upper arm, BP Patient Position: At rest)   Pulse 80   Temp 97.8 °F (36.6 °C)   Resp 16   Ht 5' 7\" (1.702 m)   Wt 101.8 kg (224 lb 6.9 oz)   SpO2 98%   BMI 35.15 kg/m²     Temp (24hrs), Av.8 °F (36.6 °C), Min:97.6 °F (36.4 °C), Max:98.2 °F (36.8 °C)       Lines:  Peripheral IV:       Physical Exam:  Lungs:  clear to auscultation bilaterally  Heart:  irregularly irregular rhythm  Abdomen:  soft, non-tender. Bowel sounds normal. No masses,  no organomegaly  Wound vac in place, non-tender. No cellulitis. Data Review:     CBC:  Recent Labs     11/20/21 0516 11/19/21 0356 11/18/21 0210 11/18/21 0210   WBC 7.7 5.8  --  9.5   GRANS 75 71   < > 80*   MONOS 10 12   < > 9   EOS 1 1   < > 0   ANEU 5.7 4.1   < > 7.6   ABL 1.0 0.8   < > 1.0   HGB 11.2* 10.0*  --  10.8*   HCT 33.2* 28.9*  --  30.9*    310  --  312    < > = values in this interval not displayed. BMP:  Recent Labs     11/20/21 0516 11/19/21 0355 11/18/21 0210   CREA 0.76 0.64  0.56 0.73   BUN 12 10  10 10   * 123*  124* 123*   K 4.4 3.8  3.6 4.1   CL 92* 90*  90* 91*   CO2 22 26  26 24   AGAP 13 7  8 8   GLU 91 97  104* 109*       LFTS:  Recent Labs     11/19/21 0355   TBILI 0.5   ALT 23      TP 6.4   ALB 3.4*       Microbiology:     All Micro Results     Procedure Component Value Units Date/Time    CULTURE, BLOOD, PAIRED [[id number]] Collected: 11/16/21 1249    Order Status: Completed Specimen: Blood Updated: 11/20/21 0450     Special Requests: NO SPECIAL REQUESTS        Culture result: NO GROWTH 4 DAYS       CULTURE, WOUND Bruce Tito STAIN [[id number]]  (Abnormal)  (Susceptibility) Collected: 11/16/21 1626    Order Status: Completed Specimen: Wound from Leg Updated: 11/19/21 1327     Special Requests: NO SPECIAL REQUESTS        GRAM STAIN RARE WBCS SEEN         4+ GRAM NEGATIVE RODS         2+ GRAM POSITIVE RODS               1+ GRAM POSITIVE COCCI IN PAIRS           Culture result:       HEAVY ENTEROBACTER CLOACAE COMPLEX            HEAVY Serratia fonticola               HEAVY PROVIDENCIA RETTGERI                  HEAVY STENOTROPHOMONAS (Bibiana Seamus.) MALTOPHILIA CLSI GUIDELINES DO NOT RECOMMEND REPORTING SUSCEPTIBILITIES FOR S. MALTOPHILIA. TRIMETHOPRIM/SULFAMETHOXAZOLE IS THE DRUG OF CHOICE. HEAVY MIXED SKIN MAURILIO ISOLATED          COVID-19 RAPID TEST [[id number]] Collected: 11/18/21 0747    Order Status: Completed Specimen: Nasopharyngeal Updated: 11/18/21 0814     Specimen source Nasopharyngeal        COVID-19 rapid test Not detected        Comment: Rapid Abbott ID Now       Rapid NAAT:  The specimen is NEGATIVE for SARS-CoV-2, the novel coronavirus associated with COVID-19. Negative results should be treated as presumptive and, if inconsistent with clinical signs and symptoms or necessary for patient management, should be tested with an alternative molecular assay. Negative results do not preclude SARS-CoV-2 infection and should not be used as the sole basis for patient management decisions. This test has been authorized by the FDA under an Emergency Use Authorization (EUA) for use by authorized laboratories.    Fact sheet for Healthcare Providers: ConventionUpdate.co.nz  Fact sheet for Patients: ConventionUpdate.co.nz       Methodology: Isothermal Nucleic Acid Amplification         CULTURE, BLOOD, PAIRED [[id number]]     Order Status: Canceled Specimen: Blood           Imaging:   Reviewed     Signed By: Francisca Broussard MD     November 20, 2021 Yes

## 2023-12-04 NOTE — ED PROVIDER NOTE - NSICDXPASTMEDICALHX_GEN_ALL_CORE_FT
PAST MEDICAL HISTORY:  Arthritis left knee recently, right knee arthritis for several years    Asthma     Bronchitis last episode 1 year ago    COPD (chronic obstructive pulmonary disease)     Depression     HTN (hypertension) controlled with meds for several years    Kidney calculi spontaneously passed 10 years ago    Localized enlarged lymph nodes     Migraine headaches, resolved for several years    Osteoarthritis of multiple joints, unspecified osteoarthritis type     Regurgitation tricuspid valve    Solitary pulmonary nodule Received last dose of chemo 5/2019    Spinal stenosis, unspecified spinal region      36.3

## 2023-12-04 NOTE — H&P ADULT - ATTENDING COMMENTS
78F Hx bronchitis/COPD, HTN, OA, tricuspid valve regurg, solitary pulm nodule s/p chemo 2019 p/f mech fall. CTH w/R parietal hypodensity  necrotic vs cystic 1.5 x 2.3 w/vasogenic edema    patient feels well. denies headache, visual changes  on exam, non focal, numerous cat stratches    CTH Diffuse vasogenic edema throughout the right frontal, parietal and temporal lobes.  Mass effect in the right cerebral hemisphere with 4.5 cm of midline shift to the left    # Brain Mass  # RLL nodule  # COPD not on home 02  # solitary pulm nodule s/p chemotherapy 2019  # FAlls  # renal cyst    falls likely in 2/2 to brain mass concerning for a primary v secondary malignancy less likely cystic mass, MRI w wo pending  appreciate NSG recs, decadron 4 q6hr. keppra 500mg BID  asp precautions, seizure precautions  pulm c/s re biopsy  right renal lesion- MRI opt follow up

## 2023-12-04 NOTE — CONSULT NOTE ADULT - ASSESSMENT
[place], [place]  78F Hx bronchitis/COPD, HTN, OA, tricuspid valve regurg, solitary pulm nodule s/p chemo 2019 p/f mech fall. CTH w/R parietal hypodensity 1.5 x 2.3 w/vasogenic edema, c/f cystic/necrotic lesion. CT CAP p  Exam: neurointact  -dispo pending CT CAP  -keppra 500bid, dex 8mg followed by 4q6  -MR brain ww/o [place], [place]  78F Hx bronchitis/COPD, HTN, OA, tricuspid valve regurg, solitary pulm nodule s/p chemo 2019 p/f mech fall. CTH w/R parietal hypodensity 1.5 x 2.3 w/vasogenic edema, c/f cystic/necrotic lesion. CT CAP p  Exam: neurointact  -dispo pending CT CAP  -keppra 500bid, dex 8mg followed by 4q6  -MR brain ww/o

## 2023-12-04 NOTE — H&P ADULT - NSHPPHYSICALEXAM_GEN_ALL_CORE
Gen: NAD  HEENT: EOMI, PERRLA, no nasal discharge, mucous membranes moist  CV: RRR, +S1/S2, no M/R/G, 2+ radial pulses b/l  Resp: CTAB, no W/R/R, no accessory muscle use, no increased work of breathing  GI: Abdomen soft non-distended, NTTP  MSK: no LE edema  Neuro: CN II-XII grossly intact. dysmetria on finger to nose L>R. No pronator drift. A&Ox4, following commands, moving all four extremities spontaneously. AOx3.   Psych: appropriate mood

## 2023-12-04 NOTE — ED PROVIDER NOTE - ATTENDING CONTRIBUTION TO CARE
This is a 78-year-old female sent from the [facility].  I reviewed the documentation from the other hospital and she has brain edema and a 4.5 cm though in my independent interpretation of the CT it appears to be 4.5 mm shift of the brain.  On exam the patient is awake alert talking moving 4 extremities.  I discussed the case with neurosurgery and they are recommending Decadron and Keppra whole body CAT scan and admission either to medicine or neurosurgery depending on the results of the whole body CAT scan.  This is either Denovo brain tumor or a metastatic lesion. This is a 78-year-old female sent from the [facility].  I reviewed the documentation from the other hospital and she has brain edema and a 4.5 cm though in my independent interpretation of the CT it appears to be 4.5 mm shift of the brain.  On exam the patient is awake alert talking moving 4 extremities.  I discussed the case with neurosurgery and they are recommending Decadron and Keppra whole body CAT scan and admission either to medicine or neurosurgery depending on the results of the whole body CAT scan.  This is either Denovo brain tumor or a metastatic lesion.

## 2023-12-04 NOTE — CONSULT NOTE ADULT - ATTENDING COMMENTS
Pt seen and examined.  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    spoke to pt about MRI findings.  would recommend resection given amount of surrounding vasogenic edema.  I spoke with Dr. Barcenas who will be taking over her neurosurgical care.

## 2023-12-04 NOTE — ED PROVIDER NOTE - PHYSICAL EXAMINATION
Gen: NAD  HEENT: EOMI, PERRLA, no nasal discharge, mucous membranes moist  CV: RRR, +S1/S2, no M/R/G, 2+ radial pulses b/l  Resp: CTAB, no W/R/R, no accessory muscle use, no increased work of breathing  GI: Abdomen soft non-distended, NTTP  MSK: no LE edema  Neuro: CN II-XII grossly intact. dymetria on finger to nose L>R. No pronator drift. A&Ox4, following commands, moving all four extremities spontaneously  Psych: appropriate mood

## 2023-12-04 NOTE — H&P ADULT - ASSESSMENT
74 year old female with history of lung cancer s/p chemotherapy 5 years ago previously in remission (oncologist Dr. Fredis Solomon at [facility]), COPD, HTN presents with unstable gait with CT head findings of y 2.3 x 1.5 x 1.7 cm cyst versus centrally necrotic mass in   the right parietal-temporal region.

## 2023-12-04 NOTE — CONSULT NOTE ADULT - SUBJECTIVE AND OBJECTIVE BOX
p (1480)     HPI: 78F Hx bronchitis/COPD, HTN, OA, tricuspid valve regurg, solitary pulm nodule s/p chemo 2019 p/f mech fall. CTH w/R parietal hypodensity 1.5 x 2.3 w/vasogenic edema, c/f cystic/necrotic lesion. CT CAP p  Exam: AOx3, PERRL, EOMI, no facial, no drift, PELLETIER 5/5    =====================  PAST MEDICAL HISTORY   Arthritis    HTN (hypertension)    Depression    Regurgitation    Bronchitis    Migraine    Kidney calculi    Spinal stenosis, unspecified spinal region    Osteoarthritis of multiple joints, unspecified osteoarthritis type    COPD (chronic obstructive pulmonary disease)    Asthma    Localized enlarged lymph nodes    Solitary pulmonary nodule      PAST SURGICAL HISTORY   S/P appendectomy    S/P breast biopsy    S/P D&C    Female bladder prolapse      penicillin (Short breath; Hives)      MEDICATIONS:  Antibiotics:    Neuro:    Other:  dexAMETHasone  IVPB 8 milliGRAM(s) IV Intermittent Once      SOCIAL HISTORY:   Occupation:   Marital Status:     FAMILY HISTORY:  Family history of pancreatic cancer (Father)    Family history of COPD (chronic obstructive pulmonary disease) (Mother)    Family history of heart disease (Mother)    Family history of leukemia (Sibling)    Family history of hepatitis C (Sibling)    Family history of rheumatic fever (Sibling)        ROS: Negative except per HPI    LABS:  PT/INR - ( 03 Dec 2023 21:15 )   PT: 11.4 sec;   INR: 0.97 ratio         PTT - ( 03 Dec 2023 21:15 )  PTT:17.4 sec                        12.7   9.31  )-----------( 280      ( 03 Dec 2023 20:57 )             36.2     12-04    143  |  105  |  26<H>  ----------------------------<  103<H>  3.4<L>   |  27  |  1.60<H>    Ca    9.6      04 Dec 2023 02:54  Mg     1.7     12-03    TPro  7.7  /  Alb  3.6  /  TBili  0.6  /  DBili  x   /  AST  34  /  ALT  21  /  AlkPhos  75  12-03

## 2023-12-05 LAB
ALBUMIN SERPL ELPH-MCNC: 3.9 G/DL — SIGNIFICANT CHANGE UP (ref 3.3–5)
ALBUMIN SERPL ELPH-MCNC: 3.9 G/DL — SIGNIFICANT CHANGE UP (ref 3.3–5)
ALP SERPL-CCNC: 66 U/L — SIGNIFICANT CHANGE UP (ref 40–120)
ALP SERPL-CCNC: 66 U/L — SIGNIFICANT CHANGE UP (ref 40–120)
ALT FLD-CCNC: 15 U/L — SIGNIFICANT CHANGE UP (ref 10–45)
ALT FLD-CCNC: 15 U/L — SIGNIFICANT CHANGE UP (ref 10–45)
ANION GAP SERPL CALC-SCNC: 14 MMOL/L — SIGNIFICANT CHANGE UP (ref 5–17)
ANION GAP SERPL CALC-SCNC: 14 MMOL/L — SIGNIFICANT CHANGE UP (ref 5–17)
APTT BLD: 26.2 SEC — SIGNIFICANT CHANGE UP (ref 24.5–35.6)
APTT BLD: 26.2 SEC — SIGNIFICANT CHANGE UP (ref 24.5–35.6)
AST SERPL-CCNC: 23 U/L — SIGNIFICANT CHANGE UP (ref 10–40)
AST SERPL-CCNC: 23 U/L — SIGNIFICANT CHANGE UP (ref 10–40)
BASOPHILS # BLD AUTO: 0.01 K/UL — SIGNIFICANT CHANGE UP (ref 0–0.2)
BASOPHILS # BLD AUTO: 0.01 K/UL — SIGNIFICANT CHANGE UP (ref 0–0.2)
BASOPHILS NFR BLD AUTO: 0.1 % — SIGNIFICANT CHANGE UP (ref 0–2)
BASOPHILS NFR BLD AUTO: 0.1 % — SIGNIFICANT CHANGE UP (ref 0–2)
BILIRUB SERPL-MCNC: 0.5 MG/DL — SIGNIFICANT CHANGE UP (ref 0.2–1.2)
BILIRUB SERPL-MCNC: 0.5 MG/DL — SIGNIFICANT CHANGE UP (ref 0.2–1.2)
BUN SERPL-MCNC: 26 MG/DL — HIGH (ref 7–23)
BUN SERPL-MCNC: 26 MG/DL — HIGH (ref 7–23)
CALCIUM SERPL-MCNC: 9.9 MG/DL — SIGNIFICANT CHANGE UP (ref 8.4–10.5)
CALCIUM SERPL-MCNC: 9.9 MG/DL — SIGNIFICANT CHANGE UP (ref 8.4–10.5)
CHLORIDE SERPL-SCNC: 102 MMOL/L — SIGNIFICANT CHANGE UP (ref 96–108)
CHLORIDE SERPL-SCNC: 102 MMOL/L — SIGNIFICANT CHANGE UP (ref 96–108)
CO2 SERPL-SCNC: 24 MMOL/L — SIGNIFICANT CHANGE UP (ref 22–31)
CO2 SERPL-SCNC: 24 MMOL/L — SIGNIFICANT CHANGE UP (ref 22–31)
CREAT SERPL-MCNC: 1.42 MG/DL — HIGH (ref 0.5–1.3)
CREAT SERPL-MCNC: 1.42 MG/DL — HIGH (ref 0.5–1.3)
CULTURE RESULTS: SIGNIFICANT CHANGE UP
CULTURE RESULTS: SIGNIFICANT CHANGE UP
EGFR: 38 ML/MIN/1.73M2 — LOW
EGFR: 38 ML/MIN/1.73M2 — LOW
EOSINOPHIL # BLD AUTO: 0 K/UL — SIGNIFICANT CHANGE UP (ref 0–0.5)
EOSINOPHIL # BLD AUTO: 0 K/UL — SIGNIFICANT CHANGE UP (ref 0–0.5)
EOSINOPHIL NFR BLD AUTO: 0 % — SIGNIFICANT CHANGE UP (ref 0–6)
EOSINOPHIL NFR BLD AUTO: 0 % — SIGNIFICANT CHANGE UP (ref 0–6)
GLUCOSE SERPL-MCNC: 139 MG/DL — HIGH (ref 70–99)
GLUCOSE SERPL-MCNC: 139 MG/DL — HIGH (ref 70–99)
HCT VFR BLD CALC: 32.4 % — LOW (ref 34.5–45)
HCT VFR BLD CALC: 32.4 % — LOW (ref 34.5–45)
HCV AB S/CO SERPL IA: 0.05 S/CO — SIGNIFICANT CHANGE UP (ref 0–0.99)
HCV AB S/CO SERPL IA: 0.05 S/CO — SIGNIFICANT CHANGE UP (ref 0–0.99)
HCV AB SERPL-IMP: SIGNIFICANT CHANGE UP
HCV AB SERPL-IMP: SIGNIFICANT CHANGE UP
HGB BLD-MCNC: 11.4 G/DL — LOW (ref 11.5–15.5)
HGB BLD-MCNC: 11.4 G/DL — LOW (ref 11.5–15.5)
IMM GRANULOCYTES NFR BLD AUTO: 0.8 % — SIGNIFICANT CHANGE UP (ref 0–0.9)
IMM GRANULOCYTES NFR BLD AUTO: 0.8 % — SIGNIFICANT CHANGE UP (ref 0–0.9)
INR BLD: 1.07 RATIO — SIGNIFICANT CHANGE UP (ref 0.85–1.18)
INR BLD: 1.07 RATIO — SIGNIFICANT CHANGE UP (ref 0.85–1.18)
LYMPHOCYTES # BLD AUTO: 0.87 K/UL — LOW (ref 1–3.3)
LYMPHOCYTES # BLD AUTO: 0.87 K/UL — LOW (ref 1–3.3)
LYMPHOCYTES # BLD AUTO: 8.3 % — LOW (ref 13–44)
LYMPHOCYTES # BLD AUTO: 8.3 % — LOW (ref 13–44)
MCHC RBC-ENTMCNC: 30.4 PG — SIGNIFICANT CHANGE UP (ref 27–34)
MCHC RBC-ENTMCNC: 30.4 PG — SIGNIFICANT CHANGE UP (ref 27–34)
MCHC RBC-ENTMCNC: 35.2 GM/DL — SIGNIFICANT CHANGE UP (ref 32–36)
MCHC RBC-ENTMCNC: 35.2 GM/DL — SIGNIFICANT CHANGE UP (ref 32–36)
MCV RBC AUTO: 86.4 FL — SIGNIFICANT CHANGE UP (ref 80–100)
MCV RBC AUTO: 86.4 FL — SIGNIFICANT CHANGE UP (ref 80–100)
MONOCYTES # BLD AUTO: 0.22 K/UL — SIGNIFICANT CHANGE UP (ref 0–0.9)
MONOCYTES # BLD AUTO: 0.22 K/UL — SIGNIFICANT CHANGE UP (ref 0–0.9)
MONOCYTES NFR BLD AUTO: 2.1 % — SIGNIFICANT CHANGE UP (ref 2–14)
MONOCYTES NFR BLD AUTO: 2.1 % — SIGNIFICANT CHANGE UP (ref 2–14)
NEUTROPHILS # BLD AUTO: 9.32 K/UL — HIGH (ref 1.8–7.4)
NEUTROPHILS # BLD AUTO: 9.32 K/UL — HIGH (ref 1.8–7.4)
NEUTROPHILS NFR BLD AUTO: 88.7 % — HIGH (ref 43–77)
NEUTROPHILS NFR BLD AUTO: 88.7 % — HIGH (ref 43–77)
NRBC # BLD: 0 /100 WBCS — SIGNIFICANT CHANGE UP (ref 0–0)
NRBC # BLD: 0 /100 WBCS — SIGNIFICANT CHANGE UP (ref 0–0)
PLATELET # BLD AUTO: 224 K/UL — SIGNIFICANT CHANGE UP (ref 150–400)
PLATELET # BLD AUTO: 224 K/UL — SIGNIFICANT CHANGE UP (ref 150–400)
POTASSIUM SERPL-MCNC: 3.2 MMOL/L — LOW (ref 3.5–5.3)
POTASSIUM SERPL-MCNC: 3.2 MMOL/L — LOW (ref 3.5–5.3)
POTASSIUM SERPL-SCNC: 3.2 MMOL/L — LOW (ref 3.5–5.3)
POTASSIUM SERPL-SCNC: 3.2 MMOL/L — LOW (ref 3.5–5.3)
PROT SERPL-MCNC: 6.8 G/DL — SIGNIFICANT CHANGE UP (ref 6–8.3)
PROT SERPL-MCNC: 6.8 G/DL — SIGNIFICANT CHANGE UP (ref 6–8.3)
PROTHROM AB SERPL-ACNC: 11.7 SEC — SIGNIFICANT CHANGE UP (ref 9.5–13)
PROTHROM AB SERPL-ACNC: 11.7 SEC — SIGNIFICANT CHANGE UP (ref 9.5–13)
RBC # BLD: 3.75 M/UL — LOW (ref 3.8–5.2)
RBC # BLD: 3.75 M/UL — LOW (ref 3.8–5.2)
RBC # FLD: 12.9 % — SIGNIFICANT CHANGE UP (ref 10.3–14.5)
RBC # FLD: 12.9 % — SIGNIFICANT CHANGE UP (ref 10.3–14.5)
SODIUM SERPL-SCNC: 140 MMOL/L — SIGNIFICANT CHANGE UP (ref 135–145)
SODIUM SERPL-SCNC: 140 MMOL/L — SIGNIFICANT CHANGE UP (ref 135–145)
SPECIMEN SOURCE: SIGNIFICANT CHANGE UP
SPECIMEN SOURCE: SIGNIFICANT CHANGE UP
WBC # BLD: 10.5 K/UL — SIGNIFICANT CHANGE UP (ref 3.8–10.5)
WBC # BLD: 10.5 K/UL — SIGNIFICANT CHANGE UP (ref 3.8–10.5)
WBC # FLD AUTO: 10.5 K/UL — SIGNIFICANT CHANGE UP (ref 3.8–10.5)
WBC # FLD AUTO: 10.5 K/UL — SIGNIFICANT CHANGE UP (ref 3.8–10.5)

## 2023-12-05 PROCEDURE — 99233 SBSQ HOSP IP/OBS HIGH 50: CPT | Mod: GC

## 2023-12-05 RX ORDER — POTASSIUM CHLORIDE 20 MEQ
40 PACKET (EA) ORAL ONCE
Refills: 0 | Status: COMPLETED | OUTPATIENT
Start: 2023-12-05 | End: 2023-12-05

## 2023-12-05 RX ORDER — POTASSIUM CHLORIDE 20 MEQ
40 PACKET (EA) ORAL EVERY 4 HOURS
Refills: 0 | Status: DISCONTINUED | OUTPATIENT
Start: 2023-12-05 | End: 2023-12-05

## 2023-12-05 RX ORDER — INFLUENZA VIRUS VACCINE 15; 15; 15; 15 UG/.5ML; UG/.5ML; UG/.5ML; UG/.5ML
0.7 SUSPENSION INTRAMUSCULAR ONCE
Refills: 0 | Status: COMPLETED | OUTPATIENT
Start: 2023-12-05 | End: 2023-12-05

## 2023-12-05 RX ADMIN — BUDESONIDE AND FORMOTEROL FUMARATE DIHYDRATE 2 PUFF(S): 160; 4.5 AEROSOL RESPIRATORY (INHALATION) at 06:15

## 2023-12-05 RX ADMIN — LEVETIRACETAM 500 MILLIGRAM(S): 250 TABLET, FILM COATED ORAL at 06:16

## 2023-12-05 RX ADMIN — Medication 4 MILLIGRAM(S): at 00:39

## 2023-12-05 RX ADMIN — Medication 650 MILLIGRAM(S): at 06:18

## 2023-12-05 RX ADMIN — HEPARIN SODIUM 5000 UNIT(S): 5000 INJECTION INTRAVENOUS; SUBCUTANEOUS at 13:20

## 2023-12-05 RX ADMIN — Medication 4 MILLIGRAM(S): at 23:10

## 2023-12-05 RX ADMIN — Medication 4 MILLIGRAM(S): at 17:57

## 2023-12-05 RX ADMIN — TIOTROPIUM BROMIDE 2 PUFF(S): 18 CAPSULE ORAL; RESPIRATORY (INHALATION) at 12:12

## 2023-12-05 RX ADMIN — Medication 4 MILLIGRAM(S): at 06:16

## 2023-12-05 RX ADMIN — HEPARIN SODIUM 5000 UNIT(S): 5000 INJECTION INTRAVENOUS; SUBCUTANEOUS at 06:15

## 2023-12-05 RX ADMIN — Medication 650 MILLIGRAM(S): at 22:33

## 2023-12-05 RX ADMIN — ESCITALOPRAM OXALATE 10 MILLIGRAM(S): 10 TABLET, FILM COATED ORAL at 12:12

## 2023-12-05 RX ADMIN — Medication 4 MILLIGRAM(S): at 12:12

## 2023-12-05 RX ADMIN — HEPARIN SODIUM 5000 UNIT(S): 5000 INJECTION INTRAVENOUS; SUBCUTANEOUS at 21:29

## 2023-12-05 RX ADMIN — Medication 1 MILLIGRAM(S): at 12:11

## 2023-12-05 RX ADMIN — Medication 1 TABLET(S): at 06:15

## 2023-12-05 RX ADMIN — BUDESONIDE AND FORMOTEROL FUMARATE DIHYDRATE 2 PUFF(S): 160; 4.5 AEROSOL RESPIRATORY (INHALATION) at 17:57

## 2023-12-05 RX ADMIN — Medication 40 MILLIEQUIVALENT(S): at 09:37

## 2023-12-05 RX ADMIN — Medication 650 MILLIGRAM(S): at 21:33

## 2023-12-05 RX ADMIN — LEVETIRACETAM 500 MILLIGRAM(S): 250 TABLET, FILM COATED ORAL at 17:57

## 2023-12-05 NOTE — PATIENT PROFILE ADULT - FUNCTIONAL ASSESSMENT - BASIC MOBILITY 1.
[facility] [email or web address]] CARDIOLOGY PROGRESS NOTE           6/27/2017 8:00 AM    Admit Date: 6/20/2017      Subjective:   He has no sob at rest and is more active. No chest pain. ROS:  Cardiovascular:  As noted above    Objective:      Vitals:    06/26/17 2200 06/27/17 0013 06/27/17 0126 06/27/17 0434   BP:   119/77 104/64   Pulse:   75 76   Resp:   20 18   Temp: 98.3 °F (36.8 °C)  97.6 °F (36.4 °C) 98.1 °F (36.7 °C)   SpO2:  97% 94% 92%   Weight:    (!) 164.7 kg (363 lb)   Height:           Physical Exam:  General-No Acute Distress  Neck- supple, no JVD  CV- regular rate and rhythm no MRG  Lung- clear bilaterally  Abd- obese  Ext- 2+ edema bilaterally. Skin- warm and dry    Data Review:   Recent Labs      06/27/17   0435  06/26/17   0440   NA  138  138   K  3.9  4.0   MG  1.8  1.9   BUN  60*  59*   CREA  3.31*  3.31*   GLU  114*  136*       Assessment/Plan:     Active Problems:    Diabetes mellitus type II, uncontrolled ([address] (3/18/2017) - improved. Abdominal fluid collection (8/18/2014)      Overview: Perisplenic and splenic, likely related to pancreatitis      AICD (automatic cardioverter/defibrillator) present (3/18/2017)      Cardiomyopathy ([address] (3/18/2017) - severe      Hypertension (3/18/2017)      Acute on chronic systolic (congestive) heart failure ([address] (10/18/2016) - improved. Obesity hypoventilation syndrome ([address] (3/18/2017)      Morbid obesity with BMI of 60.0-69.9, adult ([address] (3/18/2017)      Acute renal failure (ARF) ([address] (3/18/2017) -      Comment:  Creatinine is unchanged from yesterday. Will continue Bumex dose at current level. If stable tomorrow, consider discharge home.          Adela Lindsey MD  6/27/2017 8:00 AM 3 = A little assistance

## 2023-12-05 NOTE — CONSULT NOTE ADULT - TIME BILLING
Review of medical records, labs, imaging and coordination of care and did not include time spent teaching.

## 2023-12-05 NOTE — PROGRESS NOTE ADULT - SUBJECTIVE AND OBJECTIVE BOX
PROGRESS NOTE:   Authored by Parag Joseph MD  Internal Medicine      Patient is a 78y old  Female who presents with a chief complaint of Unsteady gai, mass noted on CT head (04 Dec 2023 13:50)      SUBJECTIVE / OVERNIGHT EVENTS: NAEO, patient seen and examined at bedside    MEDICATIONS  (STANDING):  budesonide  80 MICROgram(s)/formoterol 4.5 MICROgram(s) Inhaler 2 Puff(s) Inhalation two times a day  dexAMETHasone     Tablet 4 milliGRAM(s) Oral every 6 hours  escitalopram 10 milliGRAM(s) Oral daily  folic acid 1 milliGRAM(s) Oral daily  heparin   Injectable 5000 Unit(s) SubCutaneous every 8 hours  influenza  Vaccine (HIGH DOSE) 0.7 milliLiter(s) IntraMuscular once  levETIRAcetam 500 milliGRAM(s) Oral two times a day  tiotropium 2.5 MICROgram(s) Inhaler 2 Puff(s) Inhalation daily  triamterene 37.5 mG/hydrochlorothiazide 25 mG Tablet 1 Tablet(s) Oral daily    MEDICATIONS  (PRN):  acetaminophen     Tablet .. 650 milliGRAM(s) Oral every 6 hours PRN Temp greater or equal to 38C (100.4F), Mild Pain (1 - 3)      CAPILLARY BLOOD GLUCOSE        I&O's Summary      PHYSICAL EXAM:  Vital Signs Last 24 Hrs  T(C): 36.4 (05 Dec 2023 04:13), Max: 36.9 (04 Dec 2023 08:53)  T(F): 97.5 (05 Dec 2023 04:13), Max: 98.4 (04 Dec 2023 08:53)  HR: 75 (05 Dec 2023 04:13) (73 - 78)  BP: 135/70 (05 Dec 2023 04:13) (122/78 - 174/89)  BP(mean): --  RR: 18 (05 Dec 2023 04:13) (16 - 18)  SpO2: 97% (05 Dec 2023 04:13) (95% - 100%)    Parameters below as of 05 Dec 2023 04:13  Patient On (Oxygen Delivery Method): room air      CONSTITUTIONAL: Well-groomed, in no apparent distress  EYES: No conjunctival or scleral injection, non-icteric;   ENMT: No external nasal lesions; MMM  NECK: Trachea midline without palpable neck mass; thyroid not enlarged and non-tender  RESPIRATORY: Breathing comfortably; no dullness to percussion; lungs CTA without wheeze/rhonchi/rales  CARDIOVASCULAR: +S1S2, RRR, no M/G/R; pedal pulses full and symmetric; no lower extremity edema  GASTROINTESTINAL: No palpable masses or tenderness, +BS throughout, no rebound/guarding; no hepatosplenomegaly; no hernia palpated  LYMPHATIC: No cervical LAD or tenderness  SKIN: No rashes or ulcers noted  NEUROLOGIC: CN II-XII intact; sensation intact in LEs b/l to light touch  PSYCHIATRIC: A+O x 3; mood and affect appropriate; appropriate insight and judgment    LABS:                        12.7   9.31  )-----------( 280      ( 03 Dec 2023 20:57 )             36.2     12    143  |  105  |  26<H>  ----------------------------<  103<H>  3.4<L>   |  27  |  1.60<H>    Ca    9.6      04 Dec 2023 02:54  Mg     1.7     12-    TPro  7.7  /  Alb  3.6  /  TBili  0.6  /  DBili  x   /  AST  34  /  ALT  21  /  AlkPhos  75  12-03    PT/INR - ( 03 Dec 2023 21:15 )   PT: 11.4 sec;   INR: 0.97 ratio         PTT - ( 03 Dec 2023 21:15 )  PTT:17.4 sec      Urinalysis Basic - ( 04 Dec 2023 15:08 )    Color: Yellow / Appearance: Clear / S.018 / pH: x  Gluc: x / Ketone: Negative mg/dL  / Bili: Negative / Urobili: 0.2 mg/dL   Blood: x / Protein: Negative mg/dL / Nitrite: Negative   Leuk Esterase: Negative / RBC: x / WBC x   Sq Epi: x / Non Sq Epi: x / Bacteria: x          RADIOLOGY & ADDITIONAL TESTS:  MR BRAIN WAW IC   ORDERED BY:  ANTHONY RAYMOND     PROCEDURE DATE:  2023          INTERPRETATION:  INDICATIONS:  Follow-up, evaluate brain lesion    TECHNIQUE:  Multiplanar imaging was performed using T1 weighted, T2   weighted and FLAIR sequences.  Diffusion weighted and susceptibility   sensitive images were also obtained.  Following intravenous gadolinium,   multiplanar T1 weighted images were performed. 7.5 cc Gadavist were   administered. 0 cc were discarded.    COMPARISON EXAMINATION:  CT head 12/3/2023, MR brain2018    FINDINGS:  VENTRICLES AND SULCI:  No hydrocephalus. No sulcal effacement.    INTRA-AXIAL: A 2.9 x 2.0 x 1.4 cm ( [id number]) peripherally enhancing   lesion without associated diffusion restriction.. Surrounding vasogenic   edema is appreciated. Extensive microvascular ischemic-type changes   involving the periventricular and subcortical white matter identified.   Old appearing right paramedian cerebellar infarct is seen.    Diffuse microvascular ischemic changes including the deep gray matter is   identified A leftward midline shift of 5 mm is redemonstrated.    EXTRA-AXIAL:  No mass or collection.  VISUALIZED SINUSES:  Normal.  VISUALIZED MASTOIDS:  Clear.  CALVARIUM:  Normal.  CAROTID FLOW VOIDS:  Present.  MISCELLANEOUS:  None.      IMPRESSION:    *  Redemonstrated 2.9 x 2.0 x 1.4 cm peripheral rim enhancing lesion not   seen on the patient's prior MR study with a large amount of surrounding   vasogenic edema. Appearance favors a primary malignant neoplasm though   metastatic disease not entirely excluded.    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    *  A leftward midline shift of 5 mm is redemonstrated.    COORDINATION OF CARE:  Care Discussed with Consultants/Other Providers [Y/N]: Yes  Prior or Outpatient Records Reviewed [Y/N]: Yes

## 2023-12-05 NOTE — CONSULT NOTE ADULT - ASSESSMENT
pulm consulted fo lung nodules    R fissue nodule PET avid in 2019  she ahs adenocarcinoma, MS stable, no mutations, biopsy done here Oct 2019 by thoracic; was on Keytruda    please call oncology - follows at [place]  ?need for repeat biopsy POD vs new primary?  Thoracic biopsied previously - seen by Dr. Hale - would consult them if need for repeat tissue diagnosis pulm consulted fo lung nodules    R fissue nodule PET avid in 2019  she ahs adenocarcinoma, MS stable, no mutations, biopsy done here Oct 2019 by thoracic; was on Keytruda    please call oncology - follows at [place]  ?need for repeat biopsy POD vs new primary?  Thoracic biopsied previously - seen by Dr. Hale - would consult them if need for repeat tissue diagnosis pulm consulted fo lung nodules    R fissue nodule PET avid in 2019  she ahs adenocarcinoma, MS stable, no mutations, biopsy done here Oct 2019 by thoracic; was on Keytruda    please call oncology - follows at [place]  ?need for repeat biopsy POD vs new primary?  Thoracic biopsied previously - seen by Dr. Hale - would consult them so they are aware  rec IR for transthoracic biopsy  pulm consulted fo lung nodules    R fissue nodule PET avid in 2019  she ahs adenocarcinoma, MS stable, no mutations, biopsy done here Oct 2019 by thoracic; was on Keytruda    please call oncology - follows at [place]  ?need for repeat biopsy POD vs new primary?  Thoracic biopsied previously - seen by Dr. Hale - would consult them so they are aware  rec IR for transthoracic biopsy  74 year old female with history of lung cancer s/p chemotherapy 5 years ago previously in remission (oncologist Dr. Fredis Solomon at [facility]), COPD, HTN presents with unstable gait with CT head findings of y 2.3 x 1.5 x 1.7 cm cyst versus centrally necrotic mass in the right parietal-temporal region.    Brain mass  Recent dental surgery and infection - ?could this be infectious  she auto-discontinued her immunotherapy ~ 4 years ago due to how it made her feel and was following at [place] with Dr. Solomon (oncology) with q6 month PET scans - last one was base of skull to pelvis in Oct 2023 as per pt was clean    Stage 4 lung adenocarcinoma  R fissure nodule PET avid in 2019  she has adenocarcinoma, MS stable, no mutations, biopsy done here Oct 2019 by thoracic; was on Keytruda  was on immunotherapy  now with ground glass accumulation L lung, R fissure (right was there previously and is smaller)  - rec hold off on biopsy for now of lung lesion and obtain records from [place]  - please call oncology - follows at [place]    COPD   Not on home O2  - continue home Breo Ellipta or Symbicort + albuterol PRN - not in exacerbation currently    Josh Salinas MD  Pulmonary/Critical Care  [phone] [facility]  [address] [facility]    Patient will follow up with her pulmonologist upon discharge 74 year old female with history of lung cancer s/p chemotherapy 5 years ago previously in remission (oncologist Dr. Fredis Solomon at [facility]), COPD, HTN presents with unstable gait with CT head findings of y 2.3 x 1.5 x 1.7 cm cyst versus centrally necrotic mass in the right parietal-temporal region.    Brain mass  Recent dental surgery and infection - ?could this be infectious  she auto-discontinued her immunotherapy ~ 4 years ago due to how it made her feel and was following at [place] with Dr. Solomon (oncology) with q6 month PET scans - last one was base of skull to pelvis in Oct 2023 as per pt was clean    Stage 4 lung adenocarcinoma  R fissure nodule PET avid in 2019  she has adenocarcinoma, MS stable, no mutations, biopsy done here Oct 2019 by thoracic; was on Keytruda  was on immunotherapy  now with ground glass accumulation L lung, R fissure (right was there previously and is smaller)  - rec hold off on biopsy for now of lung lesion and obtain records from [place]  - please call oncology - follows at [place]    COPD   Not on home O2  - continue home Breo Ellipta or Symbicort + albuterol PRN - not in exacerbation currently    Josh Salinas MD  Pulmonary/Critical Care  [phone] [facility]  [address] [facility]    Patient will follow up with her pulmonologist upon discharge

## 2023-12-05 NOTE — OCCUPATIONAL THERAPY INITIAL EVALUATION ADULT - PERTINENT HX OF CURRENT PROBLEM, REHAB EVAL
74 year old female with history of lung cancer s/p chemotherapy 5 years ago previously in remission , COPD, HTN presented to [place] with unsteady gait for a few weeks. Patient's  found her on the floor prompting him to call 911. Went to [place] and CT brain was remarkable for brain mass with midline shift, cyst vs necrotic mass. Transferred to [facility] for neurosurgery evaluation. Neurosurgery was consulted and reccomended MRI brain, CTAP and CT chest to look for possible malignancies. Reports shortness of breath when going up staurs,  MRI Head Redemonstrated 2.9 x 2.0 x 1.4 cm peripheral rim enhancing lesion not seen on the patient's prior MR study with a large amount of surrounding vasogenic edema. Appearance favors a primary malignant neoplasm though metastatic disease not entirely excluded. 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  CT Chest/Abd/pelvis 18 mm groundglass nodule in the superior segment of the left lower lobe, which may reflect malignancy.Additional nodule along the right oblique fissure not well assessed   secondary to motion.No definite metastatic lesions are seen in the abdomen/pelvis. 74 year old female with history of lung cancer s/p chemotherapy 5 years ago previously in remission , COPD, HTN presented to [place] with unsteady gait for a few weeks. Patient's  found her on the floor prompting him to call 911. Went to [place] and CT brain was remarkable for brain mass with midline shift, cyst vs necrotic mass. Transferred to [facility] for neurosurgery evaluation. Neurosurgery was consulted and reccomended MRI brain, CTAP and CT chest to look for possible malignancies. Reports shortness of breath when going up staurs,  MRI Head Redemonstrated 2.9 x 2.0 x 1.4 cm peripheral rim enhancing lesion not seen on the patient's prior MR study with a large amount of surrounding vasogenic edema. Appearance favors a primary malignant neoplasm though metastatic disease not entirely excluded. 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  CT Chest/Abd/pelvis 18 mm groundglass nodule in the superior segment of the left lower lobe, which may reflect malignancy.Additional nodule along the right oblique fissure not well assessed   secondary to motion.No definite metastatic lesions are seen in the abdomen/pelvis.

## 2023-12-05 NOTE — CONSULT NOTE ADULT - ASSESSMENT
74 year old female with history of lung cancer s/p chemotherapy 5 years ago previously in remission (oncologist Dr. Fredis Solomon at [facility]), COPD, HTN presented to [place] with unsteady gait for a few weeks. Patient's  found her on he floor prompting him to call 911. Went to [place] and CT brain was remarkable for brain mass with midline shift, cyst vs necrotic mass. Transferred to [facility] for neurosurgery evaluation. Neurosurgery was consulted and reccomended MRI brain, CTAP and CT chest to look for possible malignancies, Keppra 500 mg BID, and Decadron 8 mg once in the ED and then Decadron 4mg q 6 hours. Reports shortness of breath when going up staurs, No fevers, chest pain, palpitations, vomiting, abdominal pain, diarrhea, constipation, dysuria, hematuria, melena, hematochezia. Neurosurgery consulted- head CT showed brain mass. Subsequent chest Ct showing 18 mm ground glass nodule. Pulm rec IR bx. Thoracic consulted for lung mass  - recommend brain mass bx by neurosurgery  - appreciate pulm recs  - no acute surgical intervention for thoracic  - please reconsult prn    Plan discussed with attending 74 year old female with history of lung cancer s/p chemotherapy 5 years ago previously in remission (oncologist Dr. Fredis Solomon at [facility]), COPD, HTN presented to [place] with unsteady gait for a few weeks. Patient's  found her on he floor prompting him to call 911. Went to [place] and CT brain was remarkable for brain mass with midline shift, cyst vs necrotic mass. Transferred to [facility] for neurosurgery evaluation. Neurosurgery was consulted and reccomended MRI brain, CTAP and CT chest to look for possible malignancies, Keppra 500 mg BID, and Decadron 8 mg once in the ED and then Decadron 4mg q 6 hours. Reports shortness of breath when going up staurs, No fevers, chest pain, palpitations, vomiting, abdominal pain, diarrhea, constipation, dysuria, hematuria, melena, hematochezia. Neurosurgery consulted- head CT showed brain mass. Subsequent chest Ct showing 18 mm ground glass nodule. Pulm rec IR bx. Thoracic consulted for lung mass  - recommend brain mass bx by neurosurgery  - appreciate pulm recs  - no acute surgical intervention for thoracic  - please reconsult prn    Plan discussed with attending

## 2023-12-05 NOTE — PHYSICAL THERAPY INITIAL EVALUATION ADULT - PERTINENT HX OF CURRENT PROBLEM, REHAB EVAL
74 year old female with history of lung cancer s/p chemotherapy 5 years ago previously in remission , COPD, HTN presented to [place] with unsteady gait for a few weeks. Patient's  found her on the floor prompting him to call 911. Went to [place] and CT brain was remarkable for brain mass with midline shift, cyst vs necrotic mass. Transferred to [facility] for neurosurgery evaluation. Neurosurgery was consulted and reccomended MRI brain, CTAP and CT chest to look for possible malignancies. Reports shortness of breath when going up staurs,  MRI Head Redemonstrated 2.9 x 2.0 x 1.4 cm peripheral rim enhancing lesion not seen on the patient's prior MR study with a large amount of surrounding vasogenic edema. Appearance favors a primary malignant neoplasm though metastatic disease not entirely excluded. 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  CT Chest/Abd/pelvis 18 mm groundglass nodule in the superior segment of the left lower lobe, which may reflect malignancy.Additional nodule along the right oblique fissure not well assessed   secondary to motion.No definite metastatic lesions are seen in the abdomen/pelvis. 74 year old female with history of lung cancer s/p chemotherapy 5 years ago previously in remission , COPD, HTN presented to [place] with unsteady gait for a few weeks. Patient's  found her on the floor prompting him to call 911. Went to [place] and CT brain was remarkable for brain mass with midline shift, cyst vs necrotic mass. Transferred to [facility] for neurosurgery evaluation. Neurosurgery was consulted and reccomended MRI brain, CTAP and CT chest to look for possible malignancies. Reports shortness of breath when going up staurs,  MRI Head Redemonstrated 2.9 x 2.0 x 1.4 cm peripheral rim enhancing lesion not seen on the patient's prior MR study with a large amount of surrounding vasogenic edema. Appearance favors a primary malignant neoplasm though metastatic disease not entirely excluded. 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  CT Chest/Abd/pelvis 18 mm groundglass nodule in the superior segment of the left lower lobe, which may reflect malignancy.Additional nodule along the right oblique fissure not well assessed   secondary to motion.No definite metastatic lesions are seen in the abdomen/pelvis.

## 2023-12-05 NOTE — CONSULT NOTE ADULT - SUBJECTIVE AND OBJECTIVE BOX
HPI:   74 year old female with history of lung cancer s/p chemotherapy 5 years ago previously in remission (oncologist Dr. Fredis Solomon at [facility]), COPD, HTN presented to [place] with unsteady gait for a few weeks. Patient's  found her on he floor prompting him to call 911. Went to [place] and CT brain was remarkable for brain mass with midline shift, cyst vs necrotic mass. Transferred to [facility] for neurosurgery evaluation. Neurosurgery was consulted and reccomended MRI brain, CTAP and CT chest to look for possible malignancies, Keppra 500 mg BID, and Decadron 8 mg once in the ED and then Decadron 4mg q 6 hours. Reports shortness of breath when going up staurs, No fevers, chest pain, palpitations, vomiting, abdominal pain, diarrhea, constipation, dysuria, hematuria, melena, hematochezia. Neurosurgery consulted- head CT showed brain mass. Subsequent chest Ct showing 18 mm ground glass nodule. Pulm rec IR bx. Thoracic consulted for lung mass      PAST MEDICAL & SURGICAL HISTORY:  Arthritis  left knee recently, right knee arthritis for several years      HTN (hypertension)  controlled with meds for several years      Depression      Regurgitation  tricuspid valve      Osteoarthritis of multiple joints, unspecified osteoarthritis type      COPD (chronic obstructive pulmonary disease)      Solitary pulmonary nodule  Received last dose of chemo 2019      History of lung cancer      S/P appendectomy  in       S/P breast biopsy  bilateral  breast  - benign      S/P D&C  for missed  about 25 years ago      Female bladder prolapse  bladder sling -           MEDICATIONS  (STANDING):  budesonide  80 MICROgram(s)/formoterol 4.5 MICROgram(s) Inhaler 2 Puff(s) Inhalation two times a day  dexAMETHasone     Tablet 4 milliGRAM(s) Oral every 6 hours  escitalopram 10 milliGRAM(s) Oral daily  folic acid 1 milliGRAM(s) Oral daily  heparin   Injectable 5000 Unit(s) SubCutaneous every 8 hours  influenza  Vaccine (HIGH DOSE) 0.7 milliLiter(s) IntraMuscular once  levETIRAcetam 500 milliGRAM(s) Oral two times a day  tiotropium 2.5 MICROgram(s) Inhaler 2 Puff(s) Inhalation daily  triamterene 37.5 mG/hydrochlorothiazide 25 mG Tablet 1 Tablet(s) Oral daily    MEDICATIONS  (PRN):  acetaminophen     Tablet .. 650 milliGRAM(s) Oral every 6 hours PRN Temp greater or equal to 38C (100.4F), Mild Pain (1 - 3)      Allergies    penicillin (Short breath; Hives)    Intolerances        SOCIAL HISTORY:    FAMILY HISTORY:  Family history of pancreatic cancer (Father)    Family history of COPD (chronic obstructive pulmonary disease) (Mother)    Family history of heart disease (Mother)    Family history of leukemia (Sibling)  brother    Family history of hepatitis C (Sibling)  brother    Family history of rheumatic fever (Sibling)            Physical Exam:  General: NAD, resting comfortably  HEENT: NC/AT, EOMI, normal hearing, no oral lesions, no LAD, neck supple  Pulmonary: normal resp effort, CTA-B  Cardiovascular: NSR, no murmurs  Abdominal: soft, ND/NT, no organomegaly  Extremities: WWP, normal strength, no clubbing/cyanosis/edema  Neuro: A/O x 3, CNs II-XII grossly intact, normal sensation, no focal deficits  Pulses: palpable distal pulses    Vital Signs Last 24 Hrs  T(C): 36.7 (05 Dec 2023 11:58), Max: 36.7 (05 Dec 2023 11:58)  T(F): 98 (05 Dec 2023 11:58), Max: 98 (05 Dec 2023 11:58)  HR: 82 (05 Dec 2023 11:58) (73 - 82)  BP: 146/77 (05 Dec 2023 11:58) (124/81 - 146/77)  BP(mean): --  RR: 18 (05 Dec 2023 11:58) (16 - 18)  SpO2: 97% (05 Dec 2023 11:58) (95% - 98%)    Parameters below as of 05 Dec 2023 11:58  Patient On (Oxygen Delivery Method): room air        I&O's Summary          LABS:                        11.4   10.50 )-----------( 224      ( 05 Dec 2023 07:13 )             32.4     12-05    140  |  102  |  26<H>  ----------------------------<  139<H>  3.2<L>   |  24  |  1.42<H>    Ca    9.9      05 Dec 2023 07:13  Mg     1.7     12-03    TPro  6.8  /  Alb  3.9  /  TBili  0.5  /  DBili  x   /  AST  23  /  ALT  15  /  AlkPhos  66  12-05    PT/INR - ( 05 Dec 2023 07:13 )   PT: 11.7 sec;   INR: 1.07 ratio         PTT - ( 05 Dec 2023 07:13 )  PTT:26.2 sec  Urinalysis Basic - ( 05 Dec 2023 07:13 )    Color: x / Appearance: x / SG: x / pH: x  Gluc: 139 mg/dL / Ketone: x  / Bili: x / Urobili: x   Blood: x / Protein: x / Nitrite: x   Leuk Esterase: x / RBC: x / WBC x   Sq Epi: x / Non Sq Epi: x / Bacteria: x      CAPILLARY BLOOD GLUCOSE        LIVER FUNCTIONS - ( 05 Dec 2023 07:13 )  Alb: 3.9 g/dL / Pro: 6.8 g/dL / ALK PHOS: 66 U/L / ALT: 15 U/L / AST: 23 U/L / GGT: x             Cultures:  Culture Results:   <10,000 CFU/mL Normal Urogenital Isabella ( @ 23:10)      RADIOLOGY & ADDITIONAL STUDIES:      < from: CT Chest w/ IV Cont (23 @ 06:25) >  PROCEDURE:  CT of the Chest, Abdomen and Pelvis was performed.  Sagittal and coronal reformats were performed.    FINDINGS:  CHEST:  Respiratory motion artifact.  LUNGS AND LARGE AIRWAYS: Patent central airways. Emphysema. 18 mm   groundglass nodule of the superior segment left lower lobe (3:47). Nodule   along the right oblique fissure in the right lower lobe (6:115) is not   well assessed secondary to motion artifact.  PLEURA: No pleural effusion.  VESSELS: Atherosclerotic calcified patient's of the aorta and coronary   arteries.  HEART: Heart size is normal. No pericardial effusion.  MEDIASTINUM AND FLORECITA: No lymphadenopathy. Trace mediastinal fluid.  CHEST WALL AND LOWER NECK: Within normal limits.    ABDOMEN AND PELVIS:  LIVER: Within normal limits.  BILE DUCTS: Normal caliber.  GALLBLADDER: Within normal limits.  SPLEEN: Within normal limits.  PANCREAS: A cystic lesion seen on prior MRI is not well assessed on the   current study likely secondary to motion artifact/modality.  ADRENALS: 15 mm left adrenal nodule unchanged.  KIDNEYS/URETERS: Bilateral cysts and subcentimeter hypodensities too   small to characterize. A 2.6 cm right upper pole exophytic lesion   slightly higher in attenuation than simple fluid, though trace and change   in size compared with MRI of , where it did not demonstrate   enhancement. No hydronephrosis.    BLADDER: Within normallimits.  REPRODUCTIVE ORGANS: Uterus and adnexa within normal limits.    BOWEL: No bowel obstruction. Colonic diverticula. Appendix is absent.  PERITONEUM: No ascites.  VESSELS: Atherosclerotic changes. Aneurysmal dilatation of the infrarenal   abdominal aorta measuring up to 4 cm is mildly enlarged compared with   2021.  RETROPERITONEUM/LYMPH NODES: No lymphadenopathy.  ABDOMINAL WALL: Within normal limits.  BONES: Degenerative changes. Stable minimal grade 1 anterolisthesis at   L3-4 and L4-L5 likely degenerative. No definite focal aggressive osseous   lesions are seen.    IMPRESSION:  Motion artifact.    18 mm groundglass nodule in the superior segment of the left lower lobe,   which may reflect malignancy.    Additional nodule along the right oblique fissure not well assessed   secondary to motion.    No definite metastatic lesions are seen in the abdomen/pelvis.    Aneurysmal dilatation of the infrarenal abdominal aorta is slightly   increased compared with MRI of 2021.        --- End of Report ---    < end of copied text >   HPI:   74 year old female with history of lung cancer s/p chemotherapy 5 years ago previously in remission (oncologist Dr. Fredis Solomon at [facility]), COPD, HTN presented to [place] with unsteady gait for a few weeks. Patient's  found her on he floor prompting him to call 911. Went to [place] and CT brain was remarkable for brain mass with midline shift, cyst vs necrotic mass. Transferred to [facility] for neurosurgery evaluation. Neurosurgery was consulted and reccomended MRI brain, CTAP and CT chest to look for possible malignancies, Keppra 500 mg BID, and Decadron 8 mg once in the ED and then Decadron 4mg q 6 hours. Reports shortness of breath when going up staurs, No fevers, chest pain, palpitations, vomiting, abdominal pain, diarrhea, constipation, dysuria, hematuria, melena, hematochezia. Neurosurgery consulted- head CT showed brain mass. Subsequent chest Ct showing 18 mm ground glass nodule. Pulm rec IR bx. Thoracic consulted for lung mass      PAST MEDICAL & SURGICAL HISTORY:  Arthritis  left knee recently, right knee arthritis for several years      HTN (hypertension)  controlled with meds for several years      Depression      Regurgitation  tricuspid valve      Osteoarthritis of multiple joints, unspecified osteoarthritis type      COPD (chronic obstructive pulmonary disease)      Solitary pulmonary nodule  Received last dose of chemo 2019      History of lung cancer      S/P appendectomy  in       S/P breast biopsy  bilateral  breast  - benign      S/P D&C  for missed  about 25 years ago      Female bladder prolapse  bladder sling -           MEDICATIONS  (STANDING):  budesonide  80 MICROgram(s)/formoterol 4.5 MICROgram(s) Inhaler 2 Puff(s) Inhalation two times a day  dexAMETHasone     Tablet 4 milliGRAM(s) Oral every 6 hours  escitalopram 10 milliGRAM(s) Oral daily  folic acid 1 milliGRAM(s) Oral daily  heparin   Injectable 5000 Unit(s) SubCutaneous every 8 hours  influenza  Vaccine (HIGH DOSE) 0.7 milliLiter(s) IntraMuscular once  levETIRAcetam 500 milliGRAM(s) Oral two times a day  tiotropium 2.5 MICROgram(s) Inhaler 2 Puff(s) Inhalation daily  triamterene 37.5 mG/hydrochlorothiazide 25 mG Tablet 1 Tablet(s) Oral daily    MEDICATIONS  (PRN):  acetaminophen     Tablet .. 650 milliGRAM(s) Oral every 6 hours PRN Temp greater or equal to 38C (100.4F), Mild Pain (1 - 3)      Allergies    penicillin (Short breath; Hives)    Intolerances        SOCIAL HISTORY:    FAMILY HISTORY:  Family history of pancreatic cancer (Father)    Family history of COPD (chronic obstructive pulmonary disease) (Mother)    Family history of heart disease (Mother)    Family history of leukemia (Sibling)  brother    Family history of hepatitis C (Sibling)  brother    Family history of rheumatic fever (Sibling)            Physical Exam:  General: NAD, resting comfortably  HEENT: NC/AT, EOMI, normal hearing, no oral lesions, no LAD, neck supple  Pulmonary: normal resp effort, CTA-B  Cardiovascular: NSR, no murmurs  Abdominal: soft, ND/NT, no organomegaly  Extremities: WWP, normal strength, no clubbing/cyanosis/edema  Neuro: A/O x 3, CNs II-XII grossly intact, normal sensation, no focal deficits  Pulses: palpable distal pulses    Vital Signs Last 24 Hrs  T(C): 36.7 (05 Dec 2023 11:58), Max: 36.7 (05 Dec 2023 11:58)  T(F): 98 (05 Dec 2023 11:58), Max: 98 (05 Dec 2023 11:58)  HR: 82 (05 Dec 2023 11:58) (73 - 82)  BP: 146/77 (05 Dec 2023 11:58) (124/81 - 146/77)  BP(mean): --  RR: 18 (05 Dec 2023 11:58) (16 - 18)  SpO2: 97% (05 Dec 2023 11:58) (95% - 98%)    Parameters below as of 05 Dec 2023 11:58  Patient On (Oxygen Delivery Method): room air        I&O's Summary          LABS:                        11.4   10.50 )-----------( 224      ( 05 Dec 2023 07:13 )             32.4     12-05    140  |  102  |  26<H>  ----------------------------<  139<H>  3.2<L>   |  24  |  1.42<H>    Ca    9.9      05 Dec 2023 07:13  Mg     1.7     12-03    TPro  6.8  /  Alb  3.9  /  TBili  0.5  /  DBili  x   /  AST  23  /  ALT  15  /  AlkPhos  66  12-05    PT/INR - ( 05 Dec 2023 07:13 )   PT: 11.7 sec;   INR: 1.07 ratio         PTT - ( 05 Dec 2023 07:13 )  PTT:26.2 sec  Urinalysis Basic - ( 05 Dec 2023 07:13 )    Color: x / Appearance: x / SG: x / pH: x  Gluc: 139 mg/dL / Ketone: x  / Bili: x / Urobili: x   Blood: x / Protein: x / Nitrite: x   Leuk Esterase: x / RBC: x / WBC x   Sq Epi: x / Non Sq Epi: x / Bacteria: x      CAPILLARY BLOOD GLUCOSE        LIVER FUNCTIONS - ( 05 Dec 2023 07:13 )  Alb: 3.9 g/dL / Pro: 6.8 g/dL / ALK PHOS: 66 U/L / ALT: 15 U/L / AST: 23 U/L / GGT: x             Cultures:  Culture Results:   <10,000 CFU/mL Normal Urogenital Isabella ( @ 23:10)      RADIOLOGY & ADDITIONAL STUDIES:      < from: CT Chest w/ IV Cont (23 @ 06:25) >  PROCEDURE:  CT of the Chest, Abdomen and Pelvis was performed.  Sagittal and coronal reformats were performed.    FINDINGS:  CHEST:  Respiratory motion artifact.  LUNGS AND LARGE AIRWAYS: Patent central airways. Emphysema. 18 mm   groundglass nodule of the superior segment left lower lobe (3:47). Nodule   along the right oblique fissure in the right lower lobe (6:115) is not   well assessed secondary to motion artifact.  PLEURA: No pleural effusion.  VESSELS: Atherosclerotic calcified patient's of the aorta and coronary   arteries.  HEART: Heart size is normal. No pericardial effusion.  MEDIASTINUM AND FLORECITA: No lymphadenopathy. Trace mediastinal fluid.  CHEST WALL AND LOWER NECK: Within normal limits.    ABDOMEN AND PELVIS:  LIVER: Within normal limits.  BILE DUCTS: Normal caliber.  GALLBLADDER: Within normal limits.  SPLEEN: Within normal limits.  PANCREAS: A cystic lesion seen on prior MRI is not well assessed on the   current study likely secondary to motion artifact/modality.  ADRENALS: 15 mm left adrenal nodule unchanged.  KIDNEYS/URETERS: Bilateral cysts and subcentimeter hypodensities too   small to characterize. A 2.6 cm right upper pole exophytic lesion   slightly higher in attenuation than simple fluid, though trace and change   in size compared with MRI of , where it did not demonstrate   enhancement. No hydronephrosis.    BLADDER: Within normallimits.  REPRODUCTIVE ORGANS: Uterus and adnexa within normal limits.    BOWEL: No bowel obstruction. Colonic diverticula. Appendix is absent.  PERITONEUM: No ascites.  VESSELS: Atherosclerotic changes. Aneurysmal dilatation of the infrarenal   abdominal aorta measuring up to 4 cm is mildly enlarged compared with   2021.  RETROPERITONEUM/LYMPH NODES: No lymphadenopathy.  ABDOMINAL WALL: Within normal limits.  BONES: Degenerative changes. Stable minimal grade 1 anterolisthesis at   L3-4 and L4-L5 likely degenerative. No definite focal aggressive osseous   lesions are seen.    IMPRESSION:  Motion artifact.    18 mm groundglass nodule in the superior segment of the left lower lobe,   which may reflect malignancy.    Additional nodule along the right oblique fissure not well assessed   secondary to motion.    No definite metastatic lesions are seen in the abdomen/pelvis.    Aneurysmal dilatation of the infrarenal abdominal aorta is slightly   increased compared with MRI of 2021.        --- End of Report ---    < end of copied text >

## 2023-12-05 NOTE — PROGRESS NOTE ADULT - ATTENDING COMMENTS
78F Hx bronchitis/COPD, HTN, OA, tricuspid valve regurg, solitary pulm nodule s/p [facility] 2019 [facility] CTH w/R parietal hypodensity  necrotic vs cystic 1.5 x 2.3 w/vasogenic edema    patient feels well. denies headache, visual changes    CTH Diffuse vasogenic edema throughout the right frontal, parietal and temporal lobes.  Mass effect in the right cerebral hemisphere with 4.5 cm of midline shift to the left  MRI w wo  2.9 x 2.0 x 1.4 cm peripheral rim enhancing lesion not seen on the patient's prior MR study with a large amount of surrounding vasogenic edema. Appearance favors a primary malignant neoplasm though metastatic disease not entirely excluded.    nonfocal neuro exam     # Brain Mass  # RLL nodule  # COPD not on home 02  # solitary pulm nodule s/p chemotherapy 2019  # FAlls  # renal cyst    appreciate NSG recs, plan for resection  oncology c/s  appreciate NSG recs, decadron 4 q6hr. keppra 500mg BID  asp precautions, seizure precautions  pulm c/s re biopsy   right renal lesion- MRI opt follow up. 78F Hx bronchitis/COPD, HTN, OA, tricuspid valve regurg, solitary pulm nodule [facility] w/R parietal hypodensity  necrotic vs cystic 1.5 x 2.3 w/vasogenic edema    patient feels well. denies headache, visual changes    CTH Diffuse vasogenic edema throughout the right frontal, parietal and temporal lobes.  Mass effect in the right cerebral hemisphere with 4.5 cm of midline shift to the left  MRI w wo  2.9 x 2.0 x 1.4 cm peripheral rim enhancing lesion not seen on the patient's prior MR study with a large amount of surrounding vasogenic edema. Appearance favors a primary malignant neoplasm though metastatic disease not entirely excluded.    nonfocal neuro exam     # Brain Mass  # RLL nodule  # COPD not on home 02  # solitary pulm nodule s/p chemotherapy 2019  # FAlls  # renal cyst    appreciate NSG recs, plan for resection  oncology c/s  appreciate NSG recs, decadron 4 q6hr. keppra 500mg BID  asp precautions, seizure precautions  pulm c/s re biopsy   right renal lesion- MRI opt follow up.

## 2023-12-05 NOTE — CONSULT NOTE ADULT - SUBJECTIVE AND OBJECTIVE BOX
CCU Accept Note    ULISES JORGITO  78y  Patient is a 78y old  Female who presents with a chief complaint of Unsteady gai, mass noted on CT head (05 Dec 2023 06:55)      ====================  HPI & Hospital Course:       Past Medical History:     Past Surgical History:     Home Medications:  Afrin 0.05% nasal spray: 1 spray(s) in each nostril once a day (04 Dec 2023 09:11)  Breo Ellipta 200 mcg-25 mcg/inh inhalation powder: 1 puff(s) inhaled once a day (04 Dec 2023 09:12)  escitalopram 10 mg oral tablet: 1 tab(s) orally once a day (04 Dec 2023 09:12)  folic acid 1 mg oral tablet: 1 tab(s) orally once a day (04 Dec 2023 09:06)  Gemtesa 75 mg oral tablet: 1 tab(s) orally once a day (04 Dec 2023 09:11)  triamterene-hydrochlorothiazide 37.5 mg-25 mg oral tablet: 1 tab(s) orally once a day (04 Dec 2023 09:12)  Vitamin B complex: 1 tablet orally once a day (04 Dec 2023 09:09)  Vitamin D tablet: 1 tablet orally once a day (04 Dec 2023 09:10)  ZyrTEC 10 mg oral tablet: 1 tab(s) orally once a day as needed (04 Dec 2023 09:10)      Current Medications:   MEDICATIONS  (STANDING):  budesonide  80 MICROgram(s)/formoterol 4.5 MICROgram(s) Inhaler 2 Puff(s) Inhalation two times a day  dexAMETHasone     Tablet 4 milliGRAM(s) Oral every 6 hours  escitalopram 10 milliGRAM(s) Oral daily  folic acid 1 milliGRAM(s) Oral daily  heparin   Injectable 5000 Unit(s) SubCutaneous every 8 hours  influenza  Vaccine (HIGH DOSE) 0.7 milliLiter(s) IntraMuscular once  levETIRAcetam 500 milliGRAM(s) Oral two times a day  tiotropium 2.5 MICROgram(s) Inhaler 2 Puff(s) Inhalation daily  triamterene 37.5 mG/hydrochlorothiazide 25 mG Tablet 1 Tablet(s) Oral daily    MEDICATIONS  (PRN):  acetaminophen     Tablet .. 650 milliGRAM(s) Oral every 6 hours PRN Temp greater or equal to 38C (100.4F), Mild Pain (1 - 3)      Allergies:     Family History:     Social History:     ====================  Vital Signs Last 24 Hrs  T(C): 36.4 (05 Dec 2023 04:13), Max: 36.7 (04 Dec 2023 17:03)  T(F): 97.5 (05 Dec 2023 04:13), Max: 98 (04 Dec 2023 17:03)  HR: 76 (05 Dec 2023 09:35) (73 - 78)  BP: 131/77 (05 Dec 2023 09:35) (122/78 - 135/88)  BP(mean): --  RR: 18 (05 Dec 2023 04:13) (16 - 18)  SpO2: 98% (05 Dec 2023 09:35) (95% - 98%)    Parameters below as of 05 Dec 2023 09:35  Patient On (Oxygen Delivery Method): room air        Adult Advanced Hemodynamics Last 24 Hrs  CVP(mm Hg): --  CVP(cm H2O): --  CO: --  CI: --  PA: --  PA(mean): --  PCWP: --  SVR: --  SVRI: --  PVR: --  PVRI: --    Physical Exam:   General: NAD  HEENT: PERRL, EOMI, normal sclera and conjunctiva, no oral lesions  Neck: Supple, no JVD  Lungs: CTA bilaterally  Heart: RRR, normal S1S2, no murmurs/rubs/gallops  Abdomen: Soft, ND/NT  Extremities: 2+ peripheral pulses, no cyanosis/clubbing/edema, full ROM  Skin: Warm, well-perfused  Neuro: A&O x3, no focal deficits      ====================  Labs & Imaging:   CBC Full  -  ( 05 Dec 2023 07:13 )  WBC Count : 10.50 K/uL  RBC Count : 3.75 M/uL  Hemoglobin : 11.4 g/dL  Hematocrit : 32.4 %  Platelet Count - Automated : 224 K/uL  Mean Cell Volume : 86.4 fl  Mean Cell Hemoglobin : 30.4 pg  Mean Cell Hemoglobin Concentration : 35.2 gm/dL  Auto Neutrophil # : 9.32 K/uL  Auto Lymphocyte # : 0.87 K/uL  Auto Monocyte # : 0.22 K/uL  Auto Eosinophil # : 0.00 K/uL  Auto Basophil # : 0.01 K/uL  Auto Neutrophil % : 88.7 %  Auto Lymphocyte % : 8.3 %  Auto Monocyte % : 2.1 %  Auto Eosinophil % : 0.0 %  Auto Basophil % : 0.1 %    12-05    140  |  102  |  26<H>  ----------------------------<  139<H>  3.2<L>   |  24  |  1.42<H>    Ca    9.9      05 Dec 2023 07:13  Mg     1.7     12-03    TPro  6.8  /  Alb  3.9  /  TBili  0.5  /  DBili  x   /  AST  23  /  ALT  15  /  AlkPhos  66  12-05    PT/INR - ( 05 Dec 2023 07:13 )   PT: 11.7 sec;   INR: 1.07 ratio         PTT - ( 05 Dec 2023 07:13 )  PTT:26.2 sec          Urinalysis Basic - ( 05 Dec 2023 07:13 )    Color: x / Appearance: x / SG: x / pH: x  Gluc: 139 mg/dL / Ketone: x  / Bili: x / Urobili: x   Blood: x / Protein: x / Nitrite: x   Leuk Esterase: x / RBC: x / WBC x   Sq Epi: x / Non Sq Epi: x / Bacteria: x          ====================  Assessment & Plan:     ====================    Josh Salinas MD  Internal Medicine Resident  [phone]   CCU Accept Note    ULISES JORGITO  78y  Patient is a 78y old  Female who presents with a chief complaint of Unsteady gai, mass noted on CT head (05 Dec 2023 06:55)      ====================  HPI & Hospital Course:       Past Medical History:     Past Surgical History:     Home Medications:  Afrin 0.05% nasal spray: 1 spray(s) in each nostril once a day (04 Dec 2023 09:11)  Breo Ellipta 200 mcg-25 mcg/inh inhalation powder: 1 puff(s) inhaled once a day (04 Dec 2023 09:12)  escitalopram 10 mg oral tablet: 1 tab(s) orally once a day (04 Dec 2023 09:12)  folic acid 1 mg oral tablet: 1 tab(s) orally once a day (04 Dec 2023 09:06)  Gemtesa 75 mg oral tablet: 1 tab(s) orally once a day (04 Dec 2023 09:11)  triamterene-hydrochlorothiazide 37.5 mg-25 mg oral tablet: 1 tab(s) orally once a day (04 Dec 2023 09:12)  Vitamin B complex: 1 tablet orally once a day (04 Dec 2023 09:09)  Vitamin D tablet: 1 tablet orally once a day (04 Dec 2023 09:10)  ZyrTEC 10 mg oral tablet: 1 tab(s) orally once a day as needed (04 Dec 2023 09:10)      Current Medications:   MEDICATIONS  (STANDING):  budesonide  80 MICROgram(s)/formoterol 4.5 MICROgram(s) Inhaler 2 Puff(s) Inhalation two times a day  dexAMETHasone     Tablet 4 milliGRAM(s) Oral every 6 hours  escitalopram 10 milliGRAM(s) Oral daily  folic acid 1 milliGRAM(s) Oral daily  heparin   Injectable 5000 Unit(s) SubCutaneous every 8 hours  influenza  Vaccine (HIGH DOSE) 0.7 milliLiter(s) IntraMuscular once  levETIRAcetam 500 milliGRAM(s) Oral two times a day  tiotropium 2.5 MICROgram(s) Inhaler 2 Puff(s) Inhalation daily  triamterene 37.5 mG/hydrochlorothiazide 25 mG Tablet 1 Tablet(s) Oral daily    MEDICATIONS  (PRN):  acetaminophen     Tablet .. 650 milliGRAM(s) Oral every 6 hours PRN Temp greater or equal to 38C (100.4F), Mild Pain (1 - 3)      Allergies:     Family History:     Social History:     ====================  Vital Signs Last 24 Hrs  T(C): 36.4 (05 Dec 2023 04:13), Max: 36.7 (04 Dec 2023 17:03)  T(F): 97.5 (05 Dec 2023 04:13), Max: 98 (04 Dec 2023 17:03)  HR: 76 (05 Dec 2023 09:35) (73 - 78)  BP: 131/77 (05 Dec 2023 09:35) (122/78 - 135/88)  BP(mean): --  RR: 18 (05 Dec 2023 04:13) (16 - 18)  SpO2: 98% (05 Dec 2023 09:35) (95% - 98%)    Parameters below as of 05 Dec 2023 09:35  Patient On (Oxygen Delivery Method): room air        Adult Advanced Hemodynamics Last 24 Hrs  CVP(mm Hg): --  CVP(cm H2O): --  CO: --  CI: --  PA: --  PA(mean): --  PCWP: --  SVR: --  SVRI: --  PVR: --  PVRI: --    Physical Exam:   General: NAD  HEENT: PERRL, EOMI, normal sclera and conjunctiva, no oral lesions  Neck: Supple, no JVD  Lungs: CTA bilaterally  Heart: RRR, normal S1S2, no murmurs/rubs/gallops  Abdomen: Soft, ND/NT  Extremities: 2+ peripheral pulses, no cyanosis/clubbing/edema, full ROM  Skin: Warm, well-perfused  Neuro: A&O x3, no focal deficits      ====================  Labs & Imaging:   CBC Full  -  ( 05 Dec 2023 07:13 )  WBC Count : 10.50 K/uL  RBC Count : 3.75 M/uL  Hemoglobin : 11.4 g/dL  Hematocrit : 32.4 %  Platelet Count - Automated : 224 K/uL  Mean Cell Volume : 86.4 fl  Mean Cell Hemoglobin : 30.4 pg  Mean Cell Hemoglobin Concentration : 35.2 gm/dL  Auto Neutrophil # : 9.32 K/uL  Auto Lymphocyte # : 0.87 K/uL  Auto Monocyte # : 0.22 K/uL  Auto Eosinophil # : 0.00 K/uL  Auto Basophil # : 0.01 K/uL  Auto Neutrophil % : 88.7 %  Auto Lymphocyte % : 8.3 %  Auto Monocyte % : 2.1 %  Auto Eosinophil % : 0.0 %  Auto Basophil % : 0.1 %    12-05    140  |  102  |  26<H>  ----------------------------<  139<H>  3.2<L>   |  24  |  1.42<H>    Ca    9.9      05 Dec 2023 07:13  Mg     1.7     12-03    TPro  6.8  /  Alb  3.9  /  TBili  0.5  /  DBili  x   /  AST  23  /  ALT  15  /  AlkPhos  66  12-05    PT/INR - ( 05 Dec 2023 07:13 )   PT: 11.7 sec;   INR: 1.07 ratio         PTT - ( 05 Dec 2023 07:13 )  PTT:26.2 sec          Urinalysis Basic - ( 05 Dec 2023 07:13 )    Color: x / Appearance: x / SG: x / pH: x  Gluc: 139 mg/dL / Ketone: x  / Bili: x / Urobili: x   Blood: x / Protein: x / Nitrite: x   Leuk Esterase: x / RBC: x / WBC x   Sq Epi: x / Non Sq Epi: x / Bacteria: x          ====================  Assessment & Plan:     ====================    Josh Salinas MD  Internal Medicine Resident  [phone]       MONTGOMERYJORGITO VASQUEZ  78y  Patient is a 78y old  Female who presents with a chief complaint of Unsteady gai, mass noted on CT head (05 Dec 2023 06:55)      ====================  HPI & Hospital Course:   74 year old female with history of lung cancer s/p chemotherapy 5 years ago previously in remission (oncologist Dr. Fredis Solomon at [facility]), COPD, HTN presented to [place] with unsteady gait for a few weeks. Patient's  found her on he floor prompting him to call 911. Went to [place] and CT brain was remarkable for brain mass with midline shift, cyst vs necrotic mass. Transferred to [facility] for neurosurgery evaluation. Neurosurgery was consulted and reccomended MRI brain, CTAP and CT chest to look for possible malignancies, Keppra 500 mg BID, and Decadron 8 mg once in the ED and then Decadron 4mg q 6 hours. Reports shortness of breath when going up staurs, No fevers, chest pain, palpitations, vomiting, abdominal pain, diarrhea, constipation, dysuria, hematuria, melena, hematochezia.     Past Medical History:     Past Surgical History:     Home Medications:  Afrin 0.05% nasal spray: 1 spray(s) in each nostril once a day (04 Dec 2023 09:11)  Breo Ellipta 200 mcg-25 mcg/inh inhalation powder: 1 puff(s) inhaled once a day (04 Dec 2023 09:12)  escitalopram 10 mg oral tablet: 1 tab(s) orally once a day (04 Dec 2023 09:12)  folic acid 1 mg oral tablet: 1 tab(s) orally once a day (04 Dec 2023 09:06)  Gemtesa 75 mg oral tablet: 1 tab(s) orally once a day (04 Dec 2023 09:11)  triamterene-hydrochlorothiazide 37.5 mg-25 mg oral tablet: 1 tab(s) orally once a day (04 Dec 2023 09:12)  Vitamin B complex: 1 tablet orally once a day (04 Dec 2023 09:09)  Vitamin D tablet: 1 tablet orally once a day (04 Dec 2023 09:10)  ZyrTEC 10 mg oral tablet: 1 tab(s) orally once a day as needed (04 Dec 2023 09:10)      Current Medications:   MEDICATIONS  (STANDING):  budesonide  80 MICROgram(s)/formoterol 4.5 MICROgram(s) Inhaler 2 Puff(s) Inhalation two times a day  dexAMETHasone     Tablet 4 milliGRAM(s) Oral every 6 hours  escitalopram 10 milliGRAM(s) Oral daily  folic acid 1 milliGRAM(s) Oral daily  heparin   Injectable 5000 Unit(s) SubCutaneous every 8 hours  influenza  Vaccine (HIGH DOSE) 0.7 milliLiter(s) IntraMuscular once  levETIRAcetam 500 milliGRAM(s) Oral two times a day  tiotropium 2.5 MICROgram(s) Inhaler 2 Puff(s) Inhalation daily  triamterene 37.5 mG/hydrochlorothiazide 25 mG Tablet 1 Tablet(s) Oral daily    MEDICATIONS  (PRN):  acetaminophen     Tablet .. 650 milliGRAM(s) Oral every 6 hours PRN Temp greater or equal to 38C (100.4F), Mild Pain (1 - 3)      Allergies:     Family History:     Social History:     ====================  Vital Signs Last 24 Hrs  T(C): 36.4 (05 Dec 2023 04:13), Max: 36.7 (04 Dec 2023 17:03)  T(F): 97.5 (05 Dec 2023 04:13), Max: 98 (04 Dec 2023 17:03)  HR: 76 (05 Dec 2023 09:35) (73 - 78)  BP: 131/77 (05 Dec 2023 09:35) (122/78 - 135/88)  BP(mean): --  RR: 18 (05 Dec 2023 04:13) (16 - 18)  SpO2: 98% (05 Dec 2023 09:35) (95% - 98%)    Parameters below as of 05 Dec 2023 09:35  Patient On (Oxygen Delivery Method): room air        Adult Advanced Hemodynamics Last 24 Hrs  CVP(mm Hg): --  CVP(cm H2O): --  CO: --  CI: --  PA: --  PA(mean): --  PCWP: --  SVR: --  SVRI: --  PVR: --  PVRI: --    Physical Exam:   General: NAD  HEENT: PERRL, EOMI, normal sclera and conjunctiva, no oral lesions  Neck: Supple, no JVD  Lungs: CTA bilaterally  Heart: RRR, normal S1S2, no murmurs/rubs/gallops  Abdomen: Soft, ND/NT  Extremities: 2+ peripheral pulses, no cyanosis/clubbing/edema, full ROM  Skin: Warm, well-perfused  Neuro: A&O x3, no focal deficits      ====================  Labs & Imaging:   CBC Full  -  ( 05 Dec 2023 07:13 )  WBC Count : 10.50 K/uL  RBC Count : 3.75 M/uL  Hemoglobin : 11.4 g/dL  Hematocrit : 32.4 %  Platelet Count - Automated : 224 K/uL  Mean Cell Volume : 86.4 fl  Mean Cell Hemoglobin : 30.4 pg  Mean Cell Hemoglobin Concentration : 35.2 gm/dL  Auto Neutrophil # : 9.32 K/uL  Auto Lymphocyte # : 0.87 K/uL  Auto Monocyte # : 0.22 K/uL  Auto Eosinophil # : 0.00 K/uL  Auto Basophil # : 0.01 K/uL  Auto Neutrophil % : 88.7 %  Auto Lymphocyte % : 8.3 %  Auto Monocyte % : 2.1 %  Auto Eosinophil % : 0.0 %  Auto Basophil % : 0.1 %    12-05    140  |  102  |  26<H>  ----------------------------<  139<H>  3.2<L>   |  24  |  1.42<H>    Ca    9.9      05 Dec 2023 07:13  Mg     1.7     12-03    TPro  6.8  /  Alb  3.9  /  TBili  0.5  /  DBili  x   /  AST  23  /  ALT  15  /  AlkPhos  66  12-05    PT/INR - ( 05 Dec 2023 07:13 )   PT: 11.7 sec;   INR: 1.07 ratio         PTT - ( 05 Dec 2023 07:13 )  PTT:26.2 sec          Urinalysis Basic - ( 05 Dec 2023 07:13 )    Color: x / Appearance: x / SG: x / pH: x  Gluc: 139 mg/dL / Ketone: x  / Bili: x / Urobili: x   Blood: x / Protein: x / Nitrite: x   Leuk Esterase: x / RBC: x / WBC x   Sq Epi: x / Non Sq Epi: x / Bacteria: x     MONTGOMERYJORGITO VASQUEZ  78y  Patient is a 78y old  Female who presents with a chief complaint of Unsteady gai, mass noted on CT head (05 Dec 2023 06:55)      ====================  HPI & Hospital Course:   74 year old female with history of lung cancer s/p chemotherapy 5 years ago previously in remission (oncologist Dr. Fredis Solomon at [facility]), COPD, HTN presented to [place] [facility] with unsteady gait for a few weeks. Patient's  found her on he floor prompting him to call 911. Went to [place] and CT brain was remarkable for brain mass with midline shift, cyst vs necrotic mass. Transferred to [facility] for neurosurgery evaluation. Neurosurgery was consulted and reccomended MRI brain, CTAP and CT chest to look for possible malignancies, Keppra 500 mg BID, and Decadron 8 mg once in the ED and then Decadron 4mg q 6 hours. Reports shortness of breath when going up staurs, No fevers, chest pain, palpitations, vomiting, abdominal pain, diarrhea, constipation, dysuria, hematuria, melena, hematochezia.     Past Medical History:     Past Surgical History:     Home Medications:  Afrin 0.05% nasal spray: 1 spray(s) in each nostril once a day (04 Dec 2023 09:11)  Breo Ellipta 200 mcg-25 mcg/inh inhalation powder: 1 puff(s) inhaled once a day (04 Dec 2023 09:12)  escitalopram 10 mg oral tablet: 1 tab(s) orally once a day (04 Dec 2023 09:12)  folic acid 1 mg oral tablet: 1 tab(s) orally once a day (04 Dec 2023 09:06)  Gemtesa 75 mg oral tablet: 1 tab(s) orally once a day (04 Dec 2023 09:11)  triamterene-hydrochlorothiazide 37.5 mg-25 mg oral tablet: 1 tab(s) orally once a day (04 Dec 2023 09:12)  Vitamin B complex: 1 tablet orally once a day (04 Dec 2023 09:09)  Vitamin D tablet: 1 tablet orally once a day (04 Dec 2023 09:10)  ZyrTEC 10 mg oral tablet: 1 tab(s) orally once a day as needed (04 Dec 2023 09:10)      Current Medications:   MEDICATIONS  (STANDING):  budesonide  80 MICROgram(s)/formoterol 4.5 MICROgram(s) Inhaler 2 Puff(s) Inhalation two times a day  dexAMETHasone     Tablet 4 milliGRAM(s) Oral every 6 hours  escitalopram 10 milliGRAM(s) Oral daily  folic acid 1 milliGRAM(s) Oral daily  heparin   Injectable 5000 Unit(s) SubCutaneous every 8 hours  influenza  Vaccine (HIGH DOSE) 0.7 milliLiter(s) IntraMuscular once  levETIRAcetam 500 milliGRAM(s) Oral two times a day  tiotropium 2.5 MICROgram(s) Inhaler 2 Puff(s) Inhalation daily  triamterene 37.5 mG/hydrochlorothiazide 25 mG Tablet 1 Tablet(s) Oral daily    MEDICATIONS  (PRN):  acetaminophen     Tablet .. 650 milliGRAM(s) Oral every 6 hours PRN Temp greater or equal to 38C (100.4F), Mild Pain (1 - 3)      Allergies:     Family History:     Social History:     ====================  Vital Signs Last 24 Hrs  T(C): 36.4 (05 Dec 2023 04:13), Max: 36.7 (04 Dec 2023 17:03)  T(F): 97.5 (05 Dec 2023 04:13), Max: 98 (04 Dec 2023 17:03)  HR: 76 (05 Dec 2023 09:35) (73 - 78)  BP: 131/77 (05 Dec 2023 09:35) (122/78 - 135/88)  BP(mean): --  RR: 18 (05 Dec 2023 04:13) (16 - 18)  SpO2: 98% (05 Dec 2023 09:35) (95% - 98%)    Parameters below as of 05 Dec 2023 09:35  Patient On (Oxygen Delivery Method): room air        Adult Advanced Hemodynamics Last 24 Hrs  CVP(mm Hg): --  CVP(cm H2O): --  CO: --  CI: --  PA: --  PA(mean): --  PCWP: --  SVR: --  SVRI: --  PVR: --  PVRI: --    Physical Exam:   General: NAD  HEENT: PERRL, EOMI, normal sclera and conjunctiva, no oral lesions  Neck: Supple, no JVD  Lungs: CTA bilaterally  Heart: RRR, normal S1S2, no murmurs/rubs/gallops  Abdomen: Soft, ND/NT  Extremities: 2+ peripheral pulses, no cyanosis/clubbing/edema, full ROM  Skin: Warm, well-perfused  Neuro: A&O x3, no focal deficits      ====================  Labs & Imaging:   CBC Full  -  ( 05 Dec 2023 07:13 )  WBC Count : 10.50 K/uL  RBC Count : 3.75 M/uL  Hemoglobin : 11.4 g/dL  Hematocrit : 32.4 %  Platelet Count - Automated : 224 K/uL  Mean Cell Volume : 86.4 fl  Mean Cell Hemoglobin : 30.4 pg  Mean Cell Hemoglobin Concentration : 35.2 gm/dL  Auto Neutrophil # : 9.32 K/uL  Auto Lymphocyte # : 0.87 K/uL  Auto Monocyte # : 0.22 K/uL  Auto Eosinophil # : 0.00 K/uL  Auto Basophil # : 0.01 K/uL  Auto Neutrophil % : 88.7 %  Auto Lymphocyte % : 8.3 %  Auto Monocyte % : 2.1 %  Auto Eosinophil % : 0.0 %  Auto Basophil % : 0.1 %    12-05    140  |  102  |  26<H>  ----------------------------<  139<H>  3.2<L>   |  24  |  1.42<H>    Ca    9.9      05 Dec 2023 07:13  Mg     1.7     12-03    TPro  6.8  /  Alb  3.9  /  TBili  0.5  /  DBili  x   /  AST  23  /  ALT  15  /  AlkPhos  66  12-05    PT/INR - ( 05 Dec 2023 07:13 )   PT: 11.7 sec;   INR: 1.07 ratio         PTT - ( 05 Dec 2023 07:13 )  PTT:26.2 sec          Urinalysis Basic - ( 05 Dec 2023 07:13 )    Color: x / Appearance: x / SG: x / pH: x  Gluc: 139 mg/dL / Ketone: x  / Bili: x / Urobili: x   Blood: x / Protein: x / Nitrite: x   Leuk Esterase: x / RBC: x / WBC x   Sq Epi: x / Non Sq Epi: x / Bacteria: x

## 2023-12-05 NOTE — CONSULT NOTE ADULT - ATTENDING COMMENTS
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 Fredis Solomon at [facility]), COPD, HTN presents with unstable gait with CT head findings of 2.3 x 1.5 x 1.7 cm cyst versus centrally necrotic mass, confirmed on brain MRI as ring enhancing lesion in the right parietal region with significant vasogenic edema and 5mm midline shift. She self discontinued her immunotherapy ~ 4 years ago due to how it made her feel and was following at [place] with Dr. Solomon (oncology) with q6 month PET scans - last one was base of skull to pelvis in Oct 2023 as per pt was clean.  Notes extensive dental tooth extractions over past month for which has been intermittently on short courses of abx.  Active smoker 1-2ppd x 40+ yrs.  Also breast MRI/US with suspicious axillary LN, went for biopsy but never completed as LN not visualized.  Most recent Chest CT this admit with no BHAKTI, demonstrating FRANSISCA peripheral GGO ~1.8cm as well a right perifissural opacity which has decreased in size compared to 2019 imaging.  Have no recent imaging from which to compare this FRANSISCA GGO.  At this juncture burden of pulmonary findings would be unlikely cause of a brain metastases.  Question whether brain lesion may also be an abscess?    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 Fredis Solomon at [facility]), COPD, HTN presents with unstable gait with CT head findings of 2.3 x 1.5 x 1.7 cm cyst versus centrally necrotic mass, confirmed on brain MRI as ring enhancing lesion in the right parietal region with significant vasogenic edema and 5mm midline shift. She self discontinued her immunotherapy ~ 4 years ago due to how it made her feel and was following at [place] with Dr. Solomon (oncology) with q6 month PET scans - last one was base of skull to pelvis in Oct 2023 as per pt was clean.  Notes extensive dental tooth extractions over past month for which has been intermittently on short courses of abx.  Active smoker 1-2ppd x 40+ yrs.  Also breast MRI/US with suspicious axillary LN, went for biopsy but never completed as LN not visualized.  Most recent Chest CT this admit with no BHAKTI, demonstrating FRANSISCA peripheral GGO ~1.8cm as well a right perifissural opacity which has decreased in size compared to 2019 imaging.  Have no recent imaging from which to compare this FRANSISCA GGO.  At this juncture burden of pulmonary findings would be unlikely cause of a brain metastases.  Question whether brain lesion may also be an abscess?    Recommend:  - rec hold off on biopsy for now of lung lesion and obtain records from [place]  - please call oncology - follows at [place] to obtain official report of PET CT from 10/2023 specifically  - Neurosurgery eval for intervention on brain lesion  - will f/u with Neuroradiology to determine if brain lesion could also be c/w abscess, in which case would benefit from abx with anaerobic coverage and Neurosurgery eval  -continue LABA/ICS (home inhaler is Breo)  - nicotine patch for nicotine dependence    Arely Ferrara MD

## 2023-12-05 NOTE — PATIENT PROFILE ADULT - FALL HARM RISK - HARM RISK INTERVENTIONS
Assistance with ambulation/Assistance OOB with selected safe patient handling equipment/Communicate Risk of Fall with Harm to all staff/Reinforce activity limits and safety measures with patient and family/Tailored Fall Risk Interventions/Visual Cue: Yellow wristband and red socks/Bed in lowest position, wheels locked, appropriate side rails in place/Call bell, personal items and telephone in reach/Instruct patient to call for assistance before getting out of bed or chair/Non-slip footwear when patient is out of bed/[place] to call system/Physically safe environment - no spills, clutter or unnecessary equipment/Purposeful Proactive Rounding/Room/bathroom lighting operational, light cord in reach Assistance with ambulation/Assistance OOB with selected safe patient handling equipment/Communicate Risk of Fall with Harm to all staff/Reinforce activity limits and safety measures with patient and family/Tailored Fall Risk Interventions/Visual Cue: Yellow wristband and red socks/Bed in lowest position, wheels locked, appropriate side rails in place/Call bell, personal items and telephone in reach/Instruct patient to call for assistance before getting out of bed or chair/Non-slip footwear when patient is out of bed/[place] to call system/Physically safe environment - no spills, clutter or unnecessary equipment/Purposeful Proactive Rounding/Room/bathroom lighting operational, light cord in reach

## 2023-12-06 DIAGNOSIS — R79.89 OTHER SPECIFIED ABNORMAL FINDINGS OF BLOOD CHEMISTRY: ICD-10-CM

## 2023-12-06 LAB
ALBUMIN SERPL ELPH-MCNC: 4.1 G/DL — SIGNIFICANT CHANGE UP (ref 3.3–5)
ALBUMIN SERPL ELPH-MCNC: 4.1 G/DL — SIGNIFICANT CHANGE UP (ref 3.3–5)
ALP SERPL-CCNC: 64 U/L — SIGNIFICANT CHANGE UP (ref 40–120)
ALP SERPL-CCNC: 64 U/L — SIGNIFICANT CHANGE UP (ref 40–120)
ALT FLD-CCNC: 20 U/L — SIGNIFICANT CHANGE UP (ref 10–45)
ALT FLD-CCNC: 20 U/L — SIGNIFICANT CHANGE UP (ref 10–45)
ANION GAP SERPL CALC-SCNC: 13 MMOL/L — SIGNIFICANT CHANGE UP (ref 5–17)
ANION GAP SERPL CALC-SCNC: 13 MMOL/L — SIGNIFICANT CHANGE UP (ref 5–17)
APPEARANCE UR: CLEAR — SIGNIFICANT CHANGE UP
APPEARANCE UR: CLEAR — SIGNIFICANT CHANGE UP
AST SERPL-CCNC: 30 U/L — SIGNIFICANT CHANGE UP (ref 10–40)
AST SERPL-CCNC: 30 U/L — SIGNIFICANT CHANGE UP (ref 10–40)
BASOPHILS # BLD AUTO: 0.01 K/UL — SIGNIFICANT CHANGE UP (ref 0–0.2)
BASOPHILS # BLD AUTO: 0.01 K/UL — SIGNIFICANT CHANGE UP (ref 0–0.2)
BASOPHILS NFR BLD AUTO: 0.1 % — SIGNIFICANT CHANGE UP (ref 0–2)
BASOPHILS NFR BLD AUTO: 0.1 % — SIGNIFICANT CHANGE UP (ref 0–2)
BILIRUB SERPL-MCNC: 0.4 MG/DL — SIGNIFICANT CHANGE UP (ref 0.2–1.2)
BILIRUB SERPL-MCNC: 0.4 MG/DL — SIGNIFICANT CHANGE UP (ref 0.2–1.2)
BILIRUB UR-MCNC: NEGATIVE — SIGNIFICANT CHANGE UP
BILIRUB UR-MCNC: NEGATIVE — SIGNIFICANT CHANGE UP
BUN SERPL-MCNC: 44 MG/DL — HIGH (ref 7–23)
BUN SERPL-MCNC: 44 MG/DL — HIGH (ref 7–23)
CALCIUM SERPL-MCNC: 9.8 MG/DL — SIGNIFICANT CHANGE UP (ref 8.4–10.5)
CALCIUM SERPL-MCNC: 9.8 MG/DL — SIGNIFICANT CHANGE UP (ref 8.4–10.5)
CHLORIDE SERPL-SCNC: 101 MMOL/L — SIGNIFICANT CHANGE UP (ref 96–108)
CHLORIDE SERPL-SCNC: 101 MMOL/L — SIGNIFICANT CHANGE UP (ref 96–108)
CO2 SERPL-SCNC: 24 MMOL/L — SIGNIFICANT CHANGE UP (ref 22–31)
CO2 SERPL-SCNC: 24 MMOL/L — SIGNIFICANT CHANGE UP (ref 22–31)
COLOR SPEC: YELLOW — SIGNIFICANT CHANGE UP
COLOR SPEC: YELLOW — SIGNIFICANT CHANGE UP
CREAT ?TM UR-MCNC: 55 MG/DL — SIGNIFICANT CHANGE UP
CREAT ?TM UR-MCNC: 55 MG/DL — SIGNIFICANT CHANGE UP
CREAT SERPL-MCNC: 2.08 MG/DL — HIGH (ref 0.5–1.3)
CREAT SERPL-MCNC: 2.08 MG/DL — HIGH (ref 0.5–1.3)
DIFF PNL FLD: NEGATIVE — SIGNIFICANT CHANGE UP
DIFF PNL FLD: NEGATIVE — SIGNIFICANT CHANGE UP
EGFR: 24 ML/MIN/1.73M2 — LOW
EGFR: 24 ML/MIN/1.73M2 — LOW
EOSINOPHIL # BLD AUTO: 0 K/UL — SIGNIFICANT CHANGE UP (ref 0–0.5)
EOSINOPHIL # BLD AUTO: 0 K/UL — SIGNIFICANT CHANGE UP (ref 0–0.5)
EOSINOPHIL NFR BLD AUTO: 0 % — SIGNIFICANT CHANGE UP (ref 0–6)
EOSINOPHIL NFR BLD AUTO: 0 % — SIGNIFICANT CHANGE UP (ref 0–6)
GLUCOSE SERPL-MCNC: 121 MG/DL — HIGH (ref 70–99)
GLUCOSE SERPL-MCNC: 121 MG/DL — HIGH (ref 70–99)
GLUCOSE UR QL: NEGATIVE MG/DL — SIGNIFICANT CHANGE UP
GLUCOSE UR QL: NEGATIVE MG/DL — SIGNIFICANT CHANGE UP
HCT VFR BLD CALC: 33 % — LOW (ref 34.5–45)
HCT VFR BLD CALC: 33 % — LOW (ref 34.5–45)
HGB BLD-MCNC: 11.3 G/DL — LOW (ref 11.5–15.5)
HGB BLD-MCNC: 11.3 G/DL — LOW (ref 11.5–15.5)
HIV 1+2 AB+HIV1 P24 AG SERPL QL IA: SIGNIFICANT CHANGE UP
HIV 1+2 AB+HIV1 P24 AG SERPL QL IA: SIGNIFICANT CHANGE UP
IMM GRANULOCYTES NFR BLD AUTO: 1 % — HIGH (ref 0–0.9)
IMM GRANULOCYTES NFR BLD AUTO: 1 % — HIGH (ref 0–0.9)
KETONES UR-MCNC: NEGATIVE MG/DL — SIGNIFICANT CHANGE UP
KETONES UR-MCNC: NEGATIVE MG/DL — SIGNIFICANT CHANGE UP
LEUKOCYTE ESTERASE UR-ACNC: NEGATIVE — SIGNIFICANT CHANGE UP
LEUKOCYTE ESTERASE UR-ACNC: NEGATIVE — SIGNIFICANT CHANGE UP
LYMPHOCYTES # BLD AUTO: 0.81 K/UL — LOW (ref 1–3.3)
LYMPHOCYTES # BLD AUTO: 0.81 K/UL — LOW (ref 1–3.3)
LYMPHOCYTES # BLD AUTO: 5.6 % — LOW (ref 13–44)
LYMPHOCYTES # BLD AUTO: 5.6 % — LOW (ref 13–44)
MAGNESIUM SERPL-MCNC: 1.6 MG/DL — SIGNIFICANT CHANGE UP (ref 1.6–2.6)
MAGNESIUM SERPL-MCNC: 1.6 MG/DL — SIGNIFICANT CHANGE UP (ref 1.6–2.6)
MCHC RBC-ENTMCNC: 29.8 PG — SIGNIFICANT CHANGE UP (ref 27–34)
MCHC RBC-ENTMCNC: 29.8 PG — SIGNIFICANT CHANGE UP (ref 27–34)
MCHC RBC-ENTMCNC: 34.2 GM/DL — SIGNIFICANT CHANGE UP (ref 32–36)
MCHC RBC-ENTMCNC: 34.2 GM/DL — SIGNIFICANT CHANGE UP (ref 32–36)
MCV RBC AUTO: 87.1 FL — SIGNIFICANT CHANGE UP (ref 80–100)
MCV RBC AUTO: 87.1 FL — SIGNIFICANT CHANGE UP (ref 80–100)
MONOCYTES # BLD AUTO: 0.4 K/UL — SIGNIFICANT CHANGE UP (ref 0–0.9)
MONOCYTES # BLD AUTO: 0.4 K/UL — SIGNIFICANT CHANGE UP (ref 0–0.9)
MONOCYTES NFR BLD AUTO: 2.8 % — SIGNIFICANT CHANGE UP (ref 2–14)
MONOCYTES NFR BLD AUTO: 2.8 % — SIGNIFICANT CHANGE UP (ref 2–14)
NEUTROPHILS # BLD AUTO: 13.08 K/UL — HIGH (ref 1.8–7.4)
NEUTROPHILS # BLD AUTO: 13.08 K/UL — HIGH (ref 1.8–7.4)
NEUTROPHILS NFR BLD AUTO: 90.5 % — HIGH (ref 43–77)
NEUTROPHILS NFR BLD AUTO: 90.5 % — HIGH (ref 43–77)
NITRITE UR-MCNC: NEGATIVE — SIGNIFICANT CHANGE UP
NITRITE UR-MCNC: NEGATIVE — SIGNIFICANT CHANGE UP
NRBC # BLD: 0 /100 WBCS — SIGNIFICANT CHANGE UP (ref 0–0)
NRBC # BLD: 0 /100 WBCS — SIGNIFICANT CHANGE UP (ref 0–0)
OSMOLALITY UR: 553 MOS/KG — SIGNIFICANT CHANGE UP (ref 300–900)
OSMOLALITY UR: 553 MOS/KG — SIGNIFICANT CHANGE UP (ref 300–900)
PH UR: 7 — SIGNIFICANT CHANGE UP (ref 5–8)
PH UR: 7 — SIGNIFICANT CHANGE UP (ref 5–8)
PHOSPHATE SERPL-MCNC: 2.4 MG/DL — LOW (ref 2.5–4.5)
PHOSPHATE SERPL-MCNC: 2.4 MG/DL — LOW (ref 2.5–4.5)
PLATELET # BLD AUTO: 242 K/UL — SIGNIFICANT CHANGE UP (ref 150–400)
PLATELET # BLD AUTO: 242 K/UL — SIGNIFICANT CHANGE UP (ref 150–400)
POTASSIUM SERPL-MCNC: 3.5 MMOL/L — SIGNIFICANT CHANGE UP (ref 3.5–5.3)
POTASSIUM SERPL-MCNC: 3.5 MMOL/L — SIGNIFICANT CHANGE UP (ref 3.5–5.3)
POTASSIUM SERPL-SCNC: 3.5 MMOL/L — SIGNIFICANT CHANGE UP (ref 3.5–5.3)
POTASSIUM SERPL-SCNC: 3.5 MMOL/L — SIGNIFICANT CHANGE UP (ref 3.5–5.3)
POTASSIUM UR-SCNC: 11 MMOL/L — SIGNIFICANT CHANGE UP
POTASSIUM UR-SCNC: 11 MMOL/L — SIGNIFICANT CHANGE UP
PROT ?TM UR-MCNC: <7 MG/DL — SIGNIFICANT CHANGE UP (ref 0–12)
PROT ?TM UR-MCNC: <7 MG/DL — SIGNIFICANT CHANGE UP (ref 0–12)
PROT SERPL-MCNC: 6.6 G/DL — SIGNIFICANT CHANGE UP (ref 6–8.3)
PROT SERPL-MCNC: 6.6 G/DL — SIGNIFICANT CHANGE UP (ref 6–8.3)
PROT UR-MCNC: NEGATIVE MG/DL — SIGNIFICANT CHANGE UP
PROT UR-MCNC: NEGATIVE MG/DL — SIGNIFICANT CHANGE UP
PROT/CREAT UR-RTO: SIGNIFICANT CHANGE UP (ref 0–0.2)
PROT/CREAT UR-RTO: SIGNIFICANT CHANGE UP (ref 0–0.2)
RBC # BLD: 3.79 M/UL — LOW (ref 3.8–5.2)
RBC # BLD: 3.79 M/UL — LOW (ref 3.8–5.2)
RBC # FLD: 13.2 % — SIGNIFICANT CHANGE UP (ref 10.3–14.5)
RBC # FLD: 13.2 % — SIGNIFICANT CHANGE UP (ref 10.3–14.5)
SODIUM SERPL-SCNC: 138 MMOL/L — SIGNIFICANT CHANGE UP (ref 135–145)
SODIUM SERPL-SCNC: 138 MMOL/L — SIGNIFICANT CHANGE UP (ref 135–145)
SODIUM UR-SCNC: 167 MMOL/L — SIGNIFICANT CHANGE UP
SODIUM UR-SCNC: 167 MMOL/L — SIGNIFICANT CHANGE UP
SP GR SPEC: 1.02 — SIGNIFICANT CHANGE UP (ref 1–1.03)
SP GR SPEC: 1.02 — SIGNIFICANT CHANGE UP (ref 1–1.03)
T GONDII IGG SER QL: 42.9 IU/ML — HIGH
T GONDII IGG SER QL: 42.9 IU/ML — HIGH
T GONDII IGG SER QL: POSITIVE
T GONDII IGG SER QL: POSITIVE
T GONDII IGM SER QL: <3 AU/ML — SIGNIFICANT CHANGE UP
T GONDII IGM SER QL: <3 AU/ML — SIGNIFICANT CHANGE UP
T GONDII IGM SER QL: NEGATIVE — SIGNIFICANT CHANGE UP
T GONDII IGM SER QL: NEGATIVE — SIGNIFICANT CHANGE UP
UROBILINOGEN FLD QL: 0.2 MG/DL — SIGNIFICANT CHANGE UP (ref 0.2–1)
UROBILINOGEN FLD QL: 0.2 MG/DL — SIGNIFICANT CHANGE UP (ref 0.2–1)
UUN UR-MCNC: 589 MG/DL — SIGNIFICANT CHANGE UP
UUN UR-MCNC: 589 MG/DL — SIGNIFICANT CHANGE UP
WBC # BLD: 14.44 K/UL — HIGH (ref 3.8–10.5)
WBC # BLD: 14.44 K/UL — HIGH (ref 3.8–10.5)
WBC # FLD AUTO: 14.44 K/UL — HIGH (ref 3.8–10.5)
WBC # FLD AUTO: 14.44 K/UL — HIGH (ref 3.8–10.5)

## 2023-12-06 PROCEDURE — 76770 US EXAM ABDO BACK WALL COMP: CPT | Mod: 26

## 2023-12-06 PROCEDURE — 99233 SBSQ HOSP IP/OBS HIGH 50: CPT | Mod: GC

## 2023-12-06 RX ORDER — NICOTINE POLACRILEX 2 MG
1 GUM BUCCAL DAILY
Refills: 0 | Status: DISCONTINUED | OUTPATIENT
Start: 2023-12-06 | End: 2023-12-09

## 2023-12-06 RX ADMIN — BUDESONIDE AND FORMOTEROL FUMARATE DIHYDRATE 2 PUFF(S): 160; 4.5 AEROSOL RESPIRATORY (INHALATION) at 05:31

## 2023-12-06 RX ADMIN — Medication 1 PATCH: at 19:20

## 2023-12-06 RX ADMIN — Medication 650 MILLIGRAM(S): at 11:44

## 2023-12-06 RX ADMIN — HEPARIN SODIUM 5000 UNIT(S): 5000 INJECTION INTRAVENOUS; SUBCUTANEOUS at 21:03

## 2023-12-06 RX ADMIN — BUDESONIDE AND FORMOTEROL FUMARATE DIHYDRATE 2 PUFF(S): 160; 4.5 AEROSOL RESPIRATORY (INHALATION) at 17:13

## 2023-12-06 RX ADMIN — HEPARIN SODIUM 5000 UNIT(S): 5000 INJECTION INTRAVENOUS; SUBCUTANEOUS at 05:31

## 2023-12-06 RX ADMIN — LEVETIRACETAM 500 MILLIGRAM(S): 250 TABLET, FILM COATED ORAL at 05:30

## 2023-12-06 RX ADMIN — Medication 1 MILLIGRAM(S): at 11:03

## 2023-12-06 RX ADMIN — ESCITALOPRAM OXALATE 10 MILLIGRAM(S): 10 TABLET, FILM COATED ORAL at 11:03

## 2023-12-06 RX ADMIN — Medication 63.75 MILLIMOLE(S): at 08:11

## 2023-12-06 RX ADMIN — Medication 650 MILLIGRAM(S): at 23:16

## 2023-12-06 RX ADMIN — Medication 650 MILLIGRAM(S): at 11:02

## 2023-12-06 RX ADMIN — Medication 1 TABLET(S): at 05:30

## 2023-12-06 RX ADMIN — TIOTROPIUM BROMIDE 2 PUFF(S): 18 CAPSULE ORAL; RESPIRATORY (INHALATION) at 11:03

## 2023-12-06 RX ADMIN — Medication 4 MILLIGRAM(S): at 23:15

## 2023-12-06 RX ADMIN — Medication 1 PATCH: at 11:05

## 2023-12-06 RX ADMIN — LEVETIRACETAM 500 MILLIGRAM(S): 250 TABLET, FILM COATED ORAL at 17:12

## 2023-12-06 RX ADMIN — Medication 4 MILLIGRAM(S): at 05:30

## 2023-12-06 RX ADMIN — Medication 4 MILLIGRAM(S): at 17:12

## 2023-12-06 NOTE — PROGRESS NOTE ADULT - PROBLEM SELECTOR PLAN 5
DVT: Heparin 5000 TID  Diet: DASH - Hold home Tiamterene-Hydrochlorothiazide 37.5-25 mg daily due to elevated Cr.

## 2023-12-06 NOTE — PROGRESS NOTE ADULT - PROBLEM SELECTOR PLAN 3
- Budesonide 80 mcg/Formoterol 4.5 mcg 2 puffs BID - Scr of 2.08 12/-6  - May be TEREZA on CKD, Scr of 1.7 back in 2019  - Urien studies, renal US  - Avoid nephrotoxic agents  - Holding HCTZ at this time

## 2023-12-06 NOTE — PROGRESS NOTE ADULT - ATTENDING COMMENTS
78F Hx bronchitis/COPD, HTN, OA, tricuspid valve regurg, solitary pulm nodule s/p chemo [facility] w/R parietal hypodensity  necrotic vs cystic 1.5 x 2.3 w/vasogenic edema    patient feels well. denies headache, visual changes    CTH Diffuse vasogenic edema throughout the right frontal, parietal and temporal lobes.  Mass effect in the right cerebral hemisphere with 4.5 cm of midline shift to the left  MRI w wo  2.9 x 2.0 x 1.4 cm peripheral rim enhancing lesion not seen on the patient's prior MR study with a large amount of surrounding vasogenic edema. Appearance favors a primary malignant neoplasm though metastatic disease not entirely excluded.    nonfocal neuro exam; feeling well today    # Brain Mass  # RLL nodule  # COPD not on home 02  # solitary pulm nodule s/p chemotherapy 2019  # FAlls  # renal cyst  #TEREZA  # leukocytosis    appreciate NSG recs, plan for resection concern for malignancy  leukocytosis today likely 2/2 to steroids  TEREZA rising; check urine lytes; PVR; hold hctz and triamterene; IVF if pre renal; dose medications renally   obtaining opt records from [facility]  appreciate pulm, thoracic surgery recs-  appreciate NSG recs, decadron 4 q6hr. keppra 500mg BID  asp precautions, seizure precautions  right renal lesion- MRI opt follow up. 78F Hx bronchitis/COPD, HTN, OA, tricuspid valve regurg, solitary pulm nodule s/p [facility] w/R parietal hypodensity  necrotic vs cystic 1.5 x 2.3 w/vasogenic edema    patient feels well. denies headache, visual changes    CTH Diffuse vasogenic edema throughout the right frontal, parietal and temporal lobes.  Mass effect in the right cerebral hemisphere with 4.5 cm of midline shift to the left  MRI w wo  2.9 x 2.0 x 1.4 cm peripheral rim enhancing lesion not seen on the patient's prior MR study with a large amount of surrounding vasogenic edema. Appearance favors a primary malignant neoplasm though metastatic disease not entirely excluded.    nonfocal neuro exam; feeling well today    # Brain Mass  # RLL nodule  # COPD not on home 02  # solitary pulm nodule s/p chemotherapy 2019  # FAlls  # renal cyst  #TEREZA  # leukocytosis    appreciate NSG recs, plan for resection concern for malignancy  leukocytosis today likely 2/2 to steroids  TEREZA rising; check urine lytes; PVR; hold hctz and triamterene; IVF if pre renal; dose medications renally   obtaining opt records from [facility]  appreciate pulm, thoracic surgery recs-  appreciate NSG recs, decadron 4 q6hr. keppra 500mg BID  asp precautions, seizure precautions  right renal lesion- MRI opt follow up.

## 2023-12-06 NOTE — DIETITIAN INITIAL EVALUATION ADULT - NSFNSGIIOFT_GEN_A_CORE
- Pt denies nausea, vomiting, diarrhea, or constipation.   - Last BM: 12/05; not currently ordered for bowel regimen

## 2023-12-06 NOTE — DIETITIAN INITIAL EVALUATION ADULT - OTHER INFO
-Endo: Ordered for Decadron.   -Micronutrient/Other supplementation: folic acid   -Renal: Noted hypophosphatemic; s/p repletion.

## 2023-12-06 NOTE — DIETITIAN INITIAL EVALUATION ADULT - PERTINENT MEDS FT
MEDICATIONS  (STANDING):  budesonide  80 MICROgram(s)/formoterol 4.5 MICROgram(s) Inhaler 2 Puff(s) Inhalation two times a day  dexAMETHasone     Tablet 4 milliGRAM(s) Oral every 6 hours  escitalopram 10 milliGRAM(s) Oral daily  folic acid 1 milliGRAM(s) Oral daily  heparin   Injectable 5000 Unit(s) SubCutaneous every 8 hours  influenza  Vaccine (HIGH DOSE) 0.7 milliLiter(s) IntraMuscular once  levETIRAcetam 500 milliGRAM(s) Oral two times a day  nicotine -  14 mG/24Hr(s) Patch 1 Patch Transdermal daily  tiotropium 2.5 MICROgram(s) Inhaler 2 Puff(s) Inhalation daily    MEDICATIONS  (PRN):  acetaminophen     Tablet .. 650 milliGRAM(s) Oral every 6 hours PRN Temp greater or equal to 38C (100.4F), Mild Pain (1 - 3)

## 2023-12-06 NOTE — DIETITIAN INITIAL EVALUATION ADULT - NS FNS DIET ORDER
Diet, Regular:   DASH/TLC {Sodium & Cholesterol Restricted} (DASH) (12-04-23 @ 16:09) [Active]

## 2023-12-06 NOTE — PROGRESS NOTE ADULT - PROBLEM SELECTOR PLAN 4
- Continue home Tiamterene-Hydrochlorothiazide 37.5-25 mg daily - Budesonide 80 mcg/Formoterol 4.5 mcg 2 puffs BID

## 2023-12-06 NOTE — PROGRESS NOTE ADULT - PROBLEM SELECTOR PLAN 1
- CT head showing y 2.3 x 1.5 x 1.7 cm cyst versus centrally necrotic mass in the right parietal-temporal region.   Diffuse vasogenic edema throughout the right frontal, parietal and temporal lobes.  Mass effect in the right cerebral hemisphere with 4.5 cm of midline shift to the left.  - MRI brain pending  - Neurosurgery following  - Keppra 500 mg BID and Dexamethasone 4 mg q6 hours.  - PT and OT - CT head showing y 2.3 x 1.5 x 1.7 cm cyst versus centrally necrotic mass in the right parietal-temporal region.   Diffuse vasogenic edema throughout the right frontal, parietal and temporal lobes.  Mass effect in the right cerebral hemisphere with 4.5 cm of midline shift to the left.  - MRI confirmed neoplasm.   - Neurosurgery following, plan for resection  - Keppra 500 mg BID and Dexamethasone 4 mg q6 hours.  - PT and OT

## 2023-12-06 NOTE — PROGRESS NOTE ADULT - PROBLEM SELECTOR PLAN 2
- Previous history of lungf cancer, last chemotherapy 5 years ago, in remission  - Patient continued to smoke pack per day for 30-40 years.   - CT chest with findings of 18 mm groundglass nodule in the superior segment of the left lower lobe, which may reflect malignancy. Additional nodule along the right oblique fissure not well assessed secondary to motion.  - Pulm consult for possible biopsy - Previous history of lungf cancer, last chemotherapy 5 years ago, in remission  - Patient continued to smoke pack per day for 30-40 years.   - CT chest with findings of 18 mm groundglass nodule in the superior segment of the left lower lobe, which may reflect malignancy. Additional nodule along the right oblique fissure not well assessed secondary to motion.  - Pulm following  - Thoracic surgery not recommending surgical intervention at this time.   - To obtain outpatient records from patient's oncologist.   - Patient self discontinued chemotherapy for lung cancer 5 years ago due to side effects.   - Brain mass to be addressed prior to any pulmonary intervention

## 2023-12-06 NOTE — DIETITIAN INITIAL EVALUATION ADULT - ORAL INTAKE PTA/DIET HISTORY
Pt reports having a fair appetite and PO intake PTA; consuming <75% of most meals; usually 2-3 small meals a day. Follows regular diet. Pt denies any known food allergies or intolerances. Pt confirms use of Vitamin D3, Vitamin B complex and Vitamin B12. Denies any difficulty chewing/swallowing at this time. Of note, confirms use of dentures however not currently wearing them in-patient.

## 2023-12-06 NOTE — DIETITIAN INITIAL EVALUATION ADULT - ADD RECOMMEND
1) New malnutrition notification sent.   2) Recommend discontinuing DASH/TLC diet, recommend liberalizing diet to regular diet.   3) Recommend Ensure shake 1x/day to help optimize PO intake.   4) Monitor PO intake/tolerance, weights, labs, hydration status, bowels, and skin integrity.

## 2023-12-06 NOTE — DIETITIAN INITIAL EVALUATION ADULT - PERTINENT LABORATORY DATA
12-06    138  |  101  |  44<H>  ----------------------------<  121<H>  3.5   |  24  |  2.08<H>    Ca    9.8      06 Dec 2023 06:55  Phos  2.4     12-06  Mg     1.6     12-06    TPro  6.6  /  Alb  4.1  /  TBili  0.4  /  DBili  x   /  AST  30  /  ALT  20  /  AlkPhos  64  12-06

## 2023-12-06 NOTE — DIETITIAN INITIAL EVALUATION ADULT - PHYSCIAL ASSESSMENT
Weight Hx Per:  - Source: patient   - UBW: 165 pounds   - Reported weight changes: Pt endorses weight loss however is unsure of time frame.     Weight Hx Per [facility]: no weights available to assess.     Current Admission Weights:  - Dosing weight: 150.1 pounds/68.4 kg (12/04)    Weight Change:  - 9% weight loss noted     **  Will continue to monitor weight trends as available/able.     IBW: 125 pounds   %IBW: 120% Weight Hx Per:  - Source: patient   - UBW: 165 pounds   - Reported weight changes: Pt endorses weight loss however is unsure of time frame.     Weight Hx Per [facility]: no weights available to assess.     Current Admission Weights:  - Dosing weight: 150.1 pounds/68.4 kg (12/04)    Weight Change:  - 9% weight loss noted     **  Will continue to monitor weight trends as available/able.     IBW: 125 pounds   %IBW: 120%

## 2023-12-06 NOTE — PROGRESS NOTE ADULT - SUBJECTIVE AND OBJECTIVE BOX
PROGRESS NOTE:   Authored by Parag Joseph MD  Internal Medicine      Patient is a 78y old  Female who presents with a chief complaint of Unsteady gai, mass noted on CT head (05 Dec 2023 17:39)      SUBJECTIVE / OVERNIGHT EVENTS: NAEO, patient seen and examined at bedside    MEDICATIONS  (STANDING):  budesonide  80 MICROgram(s)/formoterol 4.5 MICROgram(s) Inhaler 2 Puff(s) Inhalation two times a day  dexAMETHasone     Tablet 4 milliGRAM(s) Oral every 6 hours  escitalopram 10 milliGRAM(s) Oral daily  folic acid 1 milliGRAM(s) Oral daily  heparin   Injectable 5000 Unit(s) SubCutaneous every 8 hours  influenza  Vaccine (HIGH DOSE) 0.7 milliLiter(s) IntraMuscular once  levETIRAcetam 500 milliGRAM(s) Oral two times a day  tiotropium 2.5 MICROgram(s) Inhaler 2 Puff(s) Inhalation daily  triamterene 37.5 mG/hydrochlorothiazide 25 mG Tablet 1 Tablet(s) Oral daily    MEDICATIONS  (PRN):  acetaminophen     Tablet .. 650 milliGRAM(s) Oral every 6 hours PRN Temp greater or equal to 38C (100.4F), Mild Pain (1 - 3)      CAPILLARY BLOOD GLUCOSE        I&O's Summary    05 Dec 2023 07:01  -  06 Dec 2023 06:54  --------------------------------------------------------  IN: 240 mL / OUT: 0 mL / NET: 240 mL        PHYSICAL EXAM:  Vital Signs Last 24 Hrs  T(C): 36.8 (06 Dec 2023 04:45), Max: 36.8 (06 Dec 2023 04:45)  T(F): 98.3 (06 Dec 2023 04:45), Max: 98.3 (06 Dec 2023 04:45)  HR: 76 (06 Dec 2023 04:45) (76 - 82)  BP: 152/86 (06 Dec 2023 04:45) (131/77 - 156/84)  BP(mean): --  RR: 18 (06 Dec 2023 04:45) (18 - 18)  SpO2: 96% (06 Dec 2023 04:45) (96% - 98%)    Parameters below as of 06 Dec 2023 04:45  Patient On (Oxygen Delivery Method): room air      CONSTITUTIONAL: Well-groomed, in no apparent distress  EYES: No conjunctival or scleral injection, non-icteric;   ENMT: No external nasal lesions; MMM  NECK: Trachea midline without palpable neck mass; thyroid not enlarged and non-tender  RESPIRATORY: Breathing comfortably; no dullness to percussion; lungs CTA without wheeze/rhonchi/rales  CARDIOVASCULAR: +S1S2, RRR, no M/G/R; pedal pulses full and symmetric; no lower extremity edema  GASTROINTESTINAL: No palpable masses or tenderness, +BS throughout, no rebound/guarding; no hepatosplenomegaly; no hernia palpated  LYMPHATIC: No cervical LAD or tenderness  SKIN: No rashes or ulcers noted  NEUROLOGIC: CN II-XII intact; sensation intact in LEs b/l to light touch  PSYCHIATRIC: A+O x 3; mood and affect appropriate; appropriate insight and judgment    LABS:                        11.4   10.50 )-----------( 224      ( 05 Dec 2023 07:13 )             32.4     12-05    140  |  102  |  26<H>  ----------------------------<  139<H>  3.2<L>   |  24  |  1.42<H>    Ca    9.9      05 Dec 2023 07:13    TPro  6.8  /  Alb  3.9  /  TBili  0.5  /  DBili  x   /  AST  23  /  ALT  15  /  AlkPhos  66  12-05    PT/INR - ( 05 Dec 2023 07:13 )   PT: 11.7 sec;   INR: 1.07 ratio         PTT - ( 05 Dec 2023 07:13 )  PTT:26.2 sec      Urinalysis Basic - ( 05 Dec 2023 07:13 )    Color: x / Appearance: x / SG: x / pH: x  Gluc: 139 mg/dL / Ketone: x  / Bili: x / Urobili: x   Blood: x / Protein: x / Nitrite: x   Leuk Esterase: x / RBC: x / WBC x   Sq Epi: x / Non Sq Epi: x / Bacteria: x        Culture - Urine (collected 03 Dec 2023 23:10)  Source: Clean Catch Clean Catch (Midstream)  Final Report (05 Dec 2023 14:52):    <10,000 CFU/mL Normal Urogenital Isabella      COORDINATION OF CARE:  Care Discussed with Consultants/Other Providers [Y/N]: Yes  Prior or Outpatient Records Reviewed [Y/N]: Yes   PROGRESS NOTE:   Authored by Parag Joseph MD  Internal Medicine      Patient is a 78y old  Female who presents with a chief complaint of Unsteady gai, mass noted on CT head (05 Dec 2023 17:39)      SUBJECTIVE / OVERNIGHT EVENTS:     NAEO, patient seen and examined at bedside  No shortness of breath, chest pain, new weakness, numbness, vision changes.  Requesting to go outside to smoke cigarette.     MEDICATIONS  (STANDING):  budesonide  80 MICROgram(s)/formoterol 4.5 MICROgram(s) Inhaler 2 Puff(s) Inhalation two times a day  dexAMETHasone     Tablet 4 milliGRAM(s) Oral every 6 hours  escitalopram 10 milliGRAM(s) Oral daily  folic acid 1 milliGRAM(s) Oral daily  heparin   Injectable 5000 Unit(s) SubCutaneous every 8 hours  influenza  Vaccine (HIGH DOSE) 0.7 milliLiter(s) IntraMuscular once  levETIRAcetam 500 milliGRAM(s) Oral two times a day  tiotropium 2.5 MICROgram(s) Inhaler 2 Puff(s) Inhalation daily  triamterene 37.5 mG/hydrochlorothiazide 25 mG Tablet 1 Tablet(s) Oral daily    MEDICATIONS  (PRN):  acetaminophen     Tablet .. 650 milliGRAM(s) Oral every 6 hours PRN Temp greater or equal to 38C (100.4F), Mild Pain (1 - 3)      CAPILLARY BLOOD GLUCOSE        I&O's Summary    05 Dec 2023 07:01  -  06 Dec 2023 06:54  --------------------------------------------------------  IN: 240 mL / OUT: 0 mL / NET: 240 mL        PHYSICAL EXAM:  Vital Signs Last 24 Hrs  T(C): 36.8 (06 Dec 2023 04:45), Max: 36.8 (06 Dec 2023 04:45)  T(F): 98.3 (06 Dec 2023 04:45), Max: 98.3 (06 Dec 2023 04:45)  HR: 76 (06 Dec 2023 04:45) (76 - 82)  BP: 152/86 (06 Dec 2023 04:45) (131/77 - 156/84)  BP(mean): --  RR: 18 (06 Dec 2023 04:45) (18 - 18)  SpO2: 96% (06 Dec 2023 04:45) (96% - 98%)    Parameters below as of 06 Dec 2023 04:45  Patient On (Oxygen Delivery Method): room air    CONSTITUTIONAL: Well-groomed, in no apparent distress  EYES: No conjunctival or scleral injection, non-icteric;   ENMT: No external nasal lesions; MMM  NECK: Trachea midline without palpable neck mass; thyroid not enlarged and non-tender  RESPIRATORY: Breathing comfortably; no dullness to percussion; lungs CTA without wheeze/rhonchi/rales  CARDIOVASCULAR: +S1S2, RRR, no M/G/R; pedal pulses full and symmetric; no lower extremity edema  GASTROINTESTINAL: No palpable masses or tenderness, +BS throughout, no rebound/guarding; no hepatosplenomegaly; no hernia palpated  LYMPHATIC: No cervical LAD or tenderness  SKIN: No rashes or ulcers noted  NEUROLOGIC: CN II-XII intact; sensation intact in LEs b/l to light touch  PSYCHIATRIC: A+O x 3; mood and affect appropriate; appropriate insight and judgment        LABS:                        11.4   10.50 )-----------( 224      ( 05 Dec 2023 07:13 )             32.4     12-05    140  |  102  |  26<H>  ----------------------------<  139<H>  3.2<L>   |  24  |  1.42<H>    Ca    9.9      05 Dec 2023 07:13    TPro  6.8  /  Alb  3.9  /  TBili  0.5  /  DBili  x   /  AST  23  /  ALT  15  /  AlkPhos  66  12-05    PT/INR - ( 05 Dec 2023 07:13 )   PT: 11.7 sec;   INR: 1.07 ratio         PTT - ( 05 Dec 2023 07:13 )  PTT:26.2 sec      Urinalysis Basic - ( 05 Dec 2023 07:13 )    Color: x / Appearance: x / SG: x / pH: x  Gluc: 139 mg/dL / Ketone: x  / Bili: x / Urobili: x   Blood: x / Protein: x / Nitrite: x   Leuk Esterase: x / RBC: x / WBC x   Sq Epi: x / Non Sq Epi: x / Bacteria: x        Culture - Urine (collected 03 Dec 2023 23:10)  Source: Clean Catch Clean Catch (Midstream)  Final Report (05 Dec 2023 14:52):    <10,000 CFU/mL Normal Urogenital Isabella      COORDINATION OF CARE:  Care Discussed with Consultants/Other Providers [Y/N]: Yes  Prior or Outpatient Records Reviewed [Y/N]: Yes

## 2023-12-07 DIAGNOSIS — N18.9 CHRONIC KIDNEY DISEASE, UNSPECIFIED: ICD-10-CM

## 2023-12-07 LAB
ALBUMIN SERPL ELPH-MCNC: 4 G/DL — SIGNIFICANT CHANGE UP (ref 3.3–5)
ALBUMIN SERPL ELPH-MCNC: 4 G/DL — SIGNIFICANT CHANGE UP (ref 3.3–5)
ALP SERPL-CCNC: 67 U/L — SIGNIFICANT CHANGE UP (ref 40–120)
ALP SERPL-CCNC: 67 U/L — SIGNIFICANT CHANGE UP (ref 40–120)
ALT FLD-CCNC: 22 U/L — SIGNIFICANT CHANGE UP (ref 10–45)
ALT FLD-CCNC: 22 U/L — SIGNIFICANT CHANGE UP (ref 10–45)
ANION GAP SERPL CALC-SCNC: 13 MMOL/L — SIGNIFICANT CHANGE UP (ref 5–17)
ANION GAP SERPL CALC-SCNC: 13 MMOL/L — SIGNIFICANT CHANGE UP (ref 5–17)
APTT BLD: 25.9 SEC — SIGNIFICANT CHANGE UP (ref 24.5–35.6)
APTT BLD: 25.9 SEC — SIGNIFICANT CHANGE UP (ref 24.5–35.6)
AST SERPL-CCNC: 26 U/L — SIGNIFICANT CHANGE UP (ref 10–40)
AST SERPL-CCNC: 26 U/L — SIGNIFICANT CHANGE UP (ref 10–40)
BASOPHILS # BLD AUTO: 0.02 K/UL — SIGNIFICANT CHANGE UP (ref 0–0.2)
BASOPHILS # BLD AUTO: 0.02 K/UL — SIGNIFICANT CHANGE UP (ref 0–0.2)
BASOPHILS NFR BLD AUTO: 0.1 % — SIGNIFICANT CHANGE UP (ref 0–2)
BASOPHILS NFR BLD AUTO: 0.1 % — SIGNIFICANT CHANGE UP (ref 0–2)
BILIRUB SERPL-MCNC: 0.4 MG/DL — SIGNIFICANT CHANGE UP (ref 0.2–1.2)
BILIRUB SERPL-MCNC: 0.4 MG/DL — SIGNIFICANT CHANGE UP (ref 0.2–1.2)
BUN SERPL-MCNC: 51 MG/DL — HIGH (ref 7–23)
BUN SERPL-MCNC: 51 MG/DL — HIGH (ref 7–23)
CALCIUM SERPL-MCNC: 9.9 MG/DL — SIGNIFICANT CHANGE UP (ref 8.4–10.5)
CALCIUM SERPL-MCNC: 9.9 MG/DL — SIGNIFICANT CHANGE UP (ref 8.4–10.5)
CHLORIDE SERPL-SCNC: 100 MMOL/L — SIGNIFICANT CHANGE UP (ref 96–108)
CHLORIDE SERPL-SCNC: 100 MMOL/L — SIGNIFICANT CHANGE UP (ref 96–108)
CO2 SERPL-SCNC: 23 MMOL/L — SIGNIFICANT CHANGE UP (ref 22–31)
CO2 SERPL-SCNC: 23 MMOL/L — SIGNIFICANT CHANGE UP (ref 22–31)
CREAT SERPL-MCNC: 1.71 MG/DL — HIGH (ref 0.5–1.3)
CREAT SERPL-MCNC: 1.71 MG/DL — HIGH (ref 0.5–1.3)
EGFR: 31 ML/MIN/1.73M2 — LOW
EGFR: 31 ML/MIN/1.73M2 — LOW
EOSINOPHIL # BLD AUTO: 0 K/UL — SIGNIFICANT CHANGE UP (ref 0–0.5)
EOSINOPHIL # BLD AUTO: 0 K/UL — SIGNIFICANT CHANGE UP (ref 0–0.5)
EOSINOPHIL NFR BLD AUTO: 0 % — SIGNIFICANT CHANGE UP (ref 0–6)
EOSINOPHIL NFR BLD AUTO: 0 % — SIGNIFICANT CHANGE UP (ref 0–6)
GLUCOSE SERPL-MCNC: 100 MG/DL — HIGH (ref 70–99)
GLUCOSE SERPL-MCNC: 100 MG/DL — HIGH (ref 70–99)
HCT VFR BLD CALC: 34.9 % — SIGNIFICANT CHANGE UP (ref 34.5–45)
HCT VFR BLD CALC: 34.9 % — SIGNIFICANT CHANGE UP (ref 34.5–45)
HGB BLD-MCNC: 12.1 G/DL — SIGNIFICANT CHANGE UP (ref 11.5–15.5)
HGB BLD-MCNC: 12.1 G/DL — SIGNIFICANT CHANGE UP (ref 11.5–15.5)
IMM GRANULOCYTES NFR BLD AUTO: 0.9 % — SIGNIFICANT CHANGE UP (ref 0–0.9)
IMM GRANULOCYTES NFR BLD AUTO: 0.9 % — SIGNIFICANT CHANGE UP (ref 0–0.9)
INR BLD: 1.01 RATIO — SIGNIFICANT CHANGE UP (ref 0.85–1.18)
INR BLD: 1.01 RATIO — SIGNIFICANT CHANGE UP (ref 0.85–1.18)
LYMPHOCYTES # BLD AUTO: 0.76 K/UL — LOW (ref 1–3.3)
LYMPHOCYTES # BLD AUTO: 0.76 K/UL — LOW (ref 1–3.3)
LYMPHOCYTES # BLD AUTO: 4.7 % — LOW (ref 13–44)
LYMPHOCYTES # BLD AUTO: 4.7 % — LOW (ref 13–44)
MAGNESIUM SERPL-MCNC: 1.9 MG/DL — SIGNIFICANT CHANGE UP (ref 1.6–2.6)
MAGNESIUM SERPL-MCNC: 1.9 MG/DL — SIGNIFICANT CHANGE UP (ref 1.6–2.6)
MCHC RBC-ENTMCNC: 30 PG — SIGNIFICANT CHANGE UP (ref 27–34)
MCHC RBC-ENTMCNC: 30 PG — SIGNIFICANT CHANGE UP (ref 27–34)
MCHC RBC-ENTMCNC: 34.7 GM/DL — SIGNIFICANT CHANGE UP (ref 32–36)
MCHC RBC-ENTMCNC: 34.7 GM/DL — SIGNIFICANT CHANGE UP (ref 32–36)
MCV RBC AUTO: 86.4 FL — SIGNIFICANT CHANGE UP (ref 80–100)
MCV RBC AUTO: 86.4 FL — SIGNIFICANT CHANGE UP (ref 80–100)
MONOCYTES # BLD AUTO: 0.54 K/UL — SIGNIFICANT CHANGE UP (ref 0–0.9)
MONOCYTES # BLD AUTO: 0.54 K/UL — SIGNIFICANT CHANGE UP (ref 0–0.9)
MONOCYTES NFR BLD AUTO: 3.4 % — SIGNIFICANT CHANGE UP (ref 2–14)
MONOCYTES NFR BLD AUTO: 3.4 % — SIGNIFICANT CHANGE UP (ref 2–14)
MRSA PCR RESULT.: SIGNIFICANT CHANGE UP
MRSA PCR RESULT.: SIGNIFICANT CHANGE UP
NEUTROPHILS # BLD AUTO: 14.57 K/UL — HIGH (ref 1.8–7.4)
NEUTROPHILS # BLD AUTO: 14.57 K/UL — HIGH (ref 1.8–7.4)
NEUTROPHILS NFR BLD AUTO: 90.9 % — HIGH (ref 43–77)
NEUTROPHILS NFR BLD AUTO: 90.9 % — HIGH (ref 43–77)
NRBC # BLD: 0 /100 WBCS — SIGNIFICANT CHANGE UP (ref 0–0)
NRBC # BLD: 0 /100 WBCS — SIGNIFICANT CHANGE UP (ref 0–0)
PHOSPHATE SERPL-MCNC: 2.8 MG/DL — SIGNIFICANT CHANGE UP (ref 2.5–4.5)
PHOSPHATE SERPL-MCNC: 2.8 MG/DL — SIGNIFICANT CHANGE UP (ref 2.5–4.5)
PLATELET # BLD AUTO: 243 K/UL — SIGNIFICANT CHANGE UP (ref 150–400)
PLATELET # BLD AUTO: 243 K/UL — SIGNIFICANT CHANGE UP (ref 150–400)
POTASSIUM SERPL-MCNC: 3.4 MMOL/L — LOW (ref 3.5–5.3)
POTASSIUM SERPL-MCNC: 3.4 MMOL/L — LOW (ref 3.5–5.3)
POTASSIUM SERPL-SCNC: 3.4 MMOL/L — LOW (ref 3.5–5.3)
POTASSIUM SERPL-SCNC: 3.4 MMOL/L — LOW (ref 3.5–5.3)
PROT SERPL-MCNC: 6.9 G/DL — SIGNIFICANT CHANGE UP (ref 6–8.3)
PROT SERPL-MCNC: 6.9 G/DL — SIGNIFICANT CHANGE UP (ref 6–8.3)
PROTHROM AB SERPL-ACNC: 11.1 SEC — SIGNIFICANT CHANGE UP (ref 9.5–13)
PROTHROM AB SERPL-ACNC: 11.1 SEC — SIGNIFICANT CHANGE UP (ref 9.5–13)
RBC # BLD: 4.04 M/UL — SIGNIFICANT CHANGE UP (ref 3.8–5.2)
RBC # BLD: 4.04 M/UL — SIGNIFICANT CHANGE UP (ref 3.8–5.2)
RBC # FLD: 13.2 % — SIGNIFICANT CHANGE UP (ref 10.3–14.5)
RBC # FLD: 13.2 % — SIGNIFICANT CHANGE UP (ref 10.3–14.5)
S AUREUS DNA NOSE QL NAA+PROBE: SIGNIFICANT CHANGE UP
S AUREUS DNA NOSE QL NAA+PROBE: SIGNIFICANT CHANGE UP
SODIUM SERPL-SCNC: 136 MMOL/L — SIGNIFICANT CHANGE UP (ref 135–145)
SODIUM SERPL-SCNC: 136 MMOL/L — SIGNIFICANT CHANGE UP (ref 135–145)
WBC # BLD: 16.03 K/UL — HIGH (ref 3.8–10.5)
WBC # BLD: 16.03 K/UL — HIGH (ref 3.8–10.5)
WBC # FLD AUTO: 16.03 K/UL — HIGH (ref 3.8–10.5)
WBC # FLD AUTO: 16.03 K/UL — HIGH (ref 3.8–10.5)

## 2023-12-07 PROCEDURE — 99232 SBSQ HOSP IP/OBS MODERATE 35: CPT | Mod: GC

## 2023-12-07 RX ORDER — ACETAMINOPHEN 500 MG
650 TABLET ORAL ONCE
Refills: 0 | Status: COMPLETED | OUTPATIENT
Start: 2023-12-07 | End: 2023-12-07

## 2023-12-07 RX ORDER — SODIUM CHLORIDE 9 MG/ML
1000 INJECTION, SOLUTION INTRAVENOUS
Refills: 0 | Status: DISCONTINUED | OUTPATIENT
Start: 2023-12-07 | End: 2023-12-08

## 2023-12-07 RX ORDER — ACETAMINOPHEN 500 MG
1000 TABLET ORAL ONCE
Refills: 0 | Status: COMPLETED | OUTPATIENT
Start: 2023-12-07 | End: 2023-12-07

## 2023-12-07 RX ORDER — POTASSIUM CHLORIDE 20 MEQ
20 PACKET (EA) ORAL
Refills: 0 | Status: COMPLETED | OUTPATIENT
Start: 2023-12-07 | End: 2023-12-07

## 2023-12-07 RX ADMIN — Medication 4 MILLIGRAM(S): at 05:27

## 2023-12-07 RX ADMIN — Medication 650 MILLIGRAM(S): at 21:23

## 2023-12-07 RX ADMIN — Medication 650 MILLIGRAM(S): at 09:42

## 2023-12-07 RX ADMIN — BUDESONIDE AND FORMOTEROL FUMARATE DIHYDRATE 2 PUFF(S): 160; 4.5 AEROSOL RESPIRATORY (INHALATION) at 05:27

## 2023-12-07 RX ADMIN — Medication 650 MILLIGRAM(S): at 00:16

## 2023-12-07 RX ADMIN — Medication 650 MILLIGRAM(S): at 22:23

## 2023-12-07 RX ADMIN — HEPARIN SODIUM 5000 UNIT(S): 5000 INJECTION INTRAVENOUS; SUBCUTANEOUS at 05:27

## 2023-12-07 RX ADMIN — TIOTROPIUM BROMIDE 2 PUFF(S): 18 CAPSULE ORAL; RESPIRATORY (INHALATION) at 12:12

## 2023-12-07 RX ADMIN — Medication 20 MILLIEQUIVALENT(S): at 09:42

## 2023-12-07 RX ADMIN — SODIUM CHLORIDE 70 MILLILITER(S): 9 INJECTION, SOLUTION INTRAVENOUS at 09:17

## 2023-12-07 RX ADMIN — Medication 400 MILLIGRAM(S): at 09:17

## 2023-12-07 RX ADMIN — HEPARIN SODIUM 5000 UNIT(S): 5000 INJECTION INTRAVENOUS; SUBCUTANEOUS at 21:23

## 2023-12-07 RX ADMIN — Medication 20 MILLIEQUIVALENT(S): at 12:11

## 2023-12-07 RX ADMIN — LEVETIRACETAM 500 MILLIGRAM(S): 250 TABLET, FILM COATED ORAL at 18:15

## 2023-12-07 RX ADMIN — ESCITALOPRAM OXALATE 10 MILLIGRAM(S): 10 TABLET, FILM COATED ORAL at 12:12

## 2023-12-07 RX ADMIN — Medication 4 MILLIGRAM(S): at 18:15

## 2023-12-07 RX ADMIN — Medication 20 MILLIEQUIVALENT(S): at 18:53

## 2023-12-07 RX ADMIN — Medication 1 PATCH: at 12:11

## 2023-12-07 RX ADMIN — Medication 4 MILLIGRAM(S): at 12:12

## 2023-12-07 RX ADMIN — LEVETIRACETAM 500 MILLIGRAM(S): 250 TABLET, FILM COATED ORAL at 05:27

## 2023-12-07 RX ADMIN — HEPARIN SODIUM 5000 UNIT(S): 5000 INJECTION INTRAVENOUS; SUBCUTANEOUS at 15:25

## 2023-12-07 RX ADMIN — BUDESONIDE AND FORMOTEROL FUMARATE DIHYDRATE 2 PUFF(S): 160; 4.5 AEROSOL RESPIRATORY (INHALATION) at 18:54

## 2023-12-07 RX ADMIN — Medication 1 MILLIGRAM(S): at 12:12

## 2023-12-07 NOTE — CHART NOTE - NSCHARTNOTEFT_GEN_A_CORE
Patient to go for resection of brain mass with neurosurgery,  RCRI score of 2 points indicating 10.1% risk for 30 day death, MI, or cardiac arrest.  NPO at midnight prior to day of surgery  Will hold anticoagulation and insulin day of surgery.   Continue remaining medications day of surgery.  Patient is medically optimized for surgery.

## 2023-12-07 NOTE — PROGRESS NOTE ADULT - PROBLEM SELECTOR PLAN 4
- Budesonide 80 mcg/Formoterol 4.5 mcg 2 puffs BID - Hold home Tiamterene-Hydrochlorothiazide 37.5-25 mg daily due to elevated Cr.

## 2023-12-07 NOTE — PROGRESS NOTE ADULT - PROBLEM SELECTOR PLAN 1
- CT head showing y 2.3 x 1.5 x 1.7 cm cyst versus centrally necrotic mass in the right parietal-temporal region.   Diffuse vasogenic edema throughout the right frontal, parietal and temporal lobes.  Mass effect in the right cerebral hemisphere with 4.5 cm of midline shift to the left.  - MRI confirmed neoplasm.   - Neurosurgery following, plan for resection  - Keppra 500 mg BID and Dexamethasone 4 mg q6 hours.  - PT and OT  - Plan for resection by neurosurgery on 12/09/2023 - CT head showing y 2.3 x 1.5 x 1.7 cm cyst versus centrally necrotic mass in the right parietal-temporal region.   Diffuse vasogenic edema throughout the right frontal, parietal and temporal lobes.  Mass effect in the right cerebral hemisphere with 4.5 cm of midline shift to the left.  - MRI confirmed neoplasm.   - Neurosurgery following, plan for resection on 12/09  - Keppra 500 mg BID and Dexamethasone 4 mg q6 hours.  - PT and OT  - Plan for resection by neurosurgery on 12/09/2023    Patient to go for resection of brain mass with neurosurgery,  RCRI score of 2 points indicating 10.1% risk for 30 day death, MI, or cardiac arrest.  NPO at midnight prior to day of surgery  Will hold anticoagulation and insulin day of surgery.   Continue remaining medications day of surgery.  Patient is medically optimized for surgery.

## 2023-12-07 NOTE — PROGRESS NOTE ADULT - SUBJECTIVE AND OBJECTIVE BOX
PROGRESS NOTE:   Authored by Parag Joseph MD  Internal Medicine      Patient is a 78y old  Female who presents with a chief complaint of Unspecified fall, initial encounter     (06 Dec 2023 10:49)      SUBJECTIVE / OVERNIGHT EVENTS: NAEO, patient seen and examined at bedside    MEDICATIONS  (STANDING):  budesonide  80 MICROgram(s)/formoterol 4.5 MICROgram(s) Inhaler 2 Puff(s) Inhalation two times a day  dexAMETHasone     Tablet 4 milliGRAM(s) Oral every 6 hours  escitalopram 10 milliGRAM(s) Oral daily  folic acid 1 milliGRAM(s) Oral daily  heparin   Injectable 5000 Unit(s) SubCutaneous every 8 hours  influenza  Vaccine (HIGH DOSE) 0.7 milliLiter(s) IntraMuscular once  lactated ringers. 1000 milliLiter(s) (70 mL/Hr) IV Continuous <Continuous>  levETIRAcetam 500 milliGRAM(s) Oral two times a day  nicotine -  14 mG/24Hr(s) Patch 1 Patch Transdermal daily  tiotropium 2.5 MICROgram(s) Inhaler 2 Puff(s) Inhalation daily    MEDICATIONS  (PRN):  acetaminophen     Tablet .. 650 milliGRAM(s) Oral every 6 hours PRN Temp greater or equal to 38C (100.4F), Mild Pain (1 - 3)      CAPILLARY BLOOD GLUCOSE        I&O's Summary    06 Dec 2023 07:01  -  07 Dec 2023 07:00  --------------------------------------------------------  IN: 300 mL / OUT: 0 mL / NET: 300 mL        PHYSICAL EXAM:  Vital Signs Last 24 Hrs  T(C): 36.4 (07 Dec 2023 04:46), Max: 36.9 (06 Dec 2023 20:16)  T(F): 97.6 (07 Dec 2023 04:46), Max: 98.5 (06 Dec 2023 20:16)  HR: 71 (07 Dec 2023 04:46) (68 - 71)  BP: 147/84 (07 Dec 2023 04:46) (124/67 - 147/84)  BP(mean): --  RR: 18 (07 Dec 2023 04:46) (17 - 18)  SpO2: 98% (07 Dec 2023 04:46) (97% - 98%)    Parameters below as of 07 Dec 2023 04:46  Patient On (Oxygen Delivery Method): room air      CONSTITUTIONAL: Well-groomed, in no apparent distress  EYES: No conjunctival or scleral injection, non-icteric;   ENMT: No external nasal lesions; MMM  NECK: Trachea midline without palpable neck mass; thyroid not enlarged and non-tender  RESPIRATORY: Breathing comfortably; no dullness to percussion; lungs CTA without wheeze/rhonchi/rales  CARDIOVASCULAR: +S1S2, RRR, no M/G/R; pedal pulses full and symmetric; no lower extremity edema  GASTROINTESTINAL: No palpable masses or tenderness, +BS throughout, no rebound/guarding; no hepatosplenomegaly; no hernia palpated  LYMPHATIC: No cervical LAD or tenderness  SKIN: No rashes or ulcers noted  NEUROLOGIC: CN II-XII intact; sensation intact in LEs b/l to light touch  PSYCHIATRIC: A+O x 3; mood and affect appropriate; appropriate insight and judgment    LABS:                        12.1   16.03 )-----------( 243      ( 07 Dec 2023 07:09 )             34.9     12-06    138  |  101  |  44<H>  ----------------------------<  121<H>  3.5   |  24  |  2.08<H>    Ca    9.8      06 Dec 2023 06:55  Phos  2.4     12-  Mg     1.6     12-    TPro  6.6  /  Alb  4.1  /  TBili  0.4  /  DBili  x   /  AST  30  /  ALT  20  /  AlkPhos  64  12-06          Urinalysis Basic - ( 06 Dec 2023 11:36 )    Color: Yellow / Appearance: Clear / S.016 / pH: x  Gluc: x / Ketone: Negative mg/dL  / Bili: Negative / Urobili: 0.2 mg/dL   Blood: x / Protein: Negative mg/dL / Nitrite: Negative   Leuk Esterase: Negative / RBC: x / WBC x   Sq Epi: x / Non Sq Epi: x / Bacteria: x      COORDINATION OF CARE:  Care Discussed with Consultants/Other Providers [Y/N]: Yes  Prior or Outpatient Records Reviewed [Y/N]: Yes   PROGRESS NOTE:   Authored by Parag Joseph MD  Internal Medicine      Patient is a 78y old  Female who presents with a chief complaint of Unspecified fall, initial encounter     (06 Dec 2023 10:49)      SUBJECTIVE / OVERNIGHT EVENTS:     NAEO, patient seen and examined at bedside  Reports headache and neck pain.  No shortness of breath, fevers, new weakness, dizziness, changes in vision.     MEDICATIONS  (STANDING):  budesonide  80 MICROgram(s)/formoterol 4.5 MICROgram(s) Inhaler 2 Puff(s) Inhalation two times a day  dexAMETHasone     Tablet 4 milliGRAM(s) Oral every 6 hours  escitalopram 10 milliGRAM(s) Oral daily  folic acid 1 milliGRAM(s) Oral daily  heparin   Injectable 5000 Unit(s) SubCutaneous every 8 hours  influenza  Vaccine (HIGH DOSE) 0.7 milliLiter(s) IntraMuscular once  lactated ringers. 1000 milliLiter(s) (70 mL/Hr) IV Continuous <Continuous>  levETIRAcetam 500 milliGRAM(s) Oral two times a day  nicotine -  14 mG/24Hr(s) Patch 1 Patch Transdermal daily  tiotropium 2.5 MICROgram(s) Inhaler 2 Puff(s) Inhalation daily    MEDICATIONS  (PRN):  acetaminophen     Tablet .. 650 milliGRAM(s) Oral every 6 hours PRN Temp greater or equal to 38C (100.4F), Mild Pain (1 - 3)      CAPILLARY BLOOD GLUCOSE        I&O's Summary    06 Dec 2023 07:01  -  07 Dec 2023 07:00  --------------------------------------------------------  IN: 300 mL / OUT: 0 mL / NET: 300 mL        PHYSICAL EXAM:  Vital Signs Last 24 Hrs  T(C): 36.4 (07 Dec 2023 04:46), Max: 36.9 (06 Dec 2023 20:16)  T(F): 97.6 (07 Dec 2023 04:46), Max: 98.5 (06 Dec 2023 20:16)  HR: 71 (07 Dec 2023 04:46) (68 - 71)  BP: 147/84 (07 Dec 2023 04:46) (124/67 - 147/84)  BP(mean): --  RR: 18 (07 Dec 2023 04:46) (17 - 18)  SpO2: 98% (07 Dec 2023 04:46) (97% - 98%)    Parameters below as of 07 Dec 2023 04:46  Patient On (Oxygen Delivery Method): room air      CONSTITUTIONAL: Well-groomed, in no apparent distress  EYES: No conjunctival or scleral injection, non-icteric;   ENMT: No external nasal lesions; MMM  NECK: Trachea midline without palpable neck mass; thyroid not enlarged and non-tender  RESPIRATORY: Breathing comfortably; no dullness to percussion; lungs CTA without wheeze/rhonchi/rales  CARDIOVASCULAR: +S1S2, RRR, no M/G/R; pedal pulses full and symmetric; no lower extremity edema  GASTROINTESTINAL: No palpable masses or tenderness, +BS throughout, no rebound/guarding; no hepatosplenomegaly; no hernia palpated  LYMPHATIC: No cervical LAD or tenderness  SKIN: No rashes or ulcers noted  NEUROLOGIC: CN II-XII intact; sensation intact in LEs b/l to light touch (+) left sided dysmetria  PSYCHIATRIC: A+O x 3; mood and affect appropriate; appropriate insight and judgment    LABS:                        12.1   16.03 )-----------( 243      ( 07 Dec 2023 07:09 )             34.9     12-    138  |  101  |  44<H>  ----------------------------<  121<H>  3.5   |  24  |  2.08<H>    Ca    9.8      06 Dec 2023 06:55  Phos  2.4     12-  Mg     1.6         TPro  6.6  /  Alb  4.1  /  TBili  0.4  /  DBili  x   /  AST  30  /  ALT  20  /  AlkPhos  64  12-          Urinalysis Basic - ( 06 Dec 2023 11:36 )    Color: Yellow / Appearance: Clear / S.016 / pH: x  Gluc: x / Ketone: Negative mg/dL  / Bili: Negative / Urobili: 0.2 mg/dL   Blood: x / Protein: Negative mg/dL / Nitrite: Negative   Leuk Esterase: Negative / RBC: x / WBC x   Sq Epi: x / Non Sq Epi: x / Bacteria: x      COORDINATION OF CARE:  Care Discussed with Consultants/Other Providers [Y/N]: Yes  Prior or Outpatient Records Reviewed [Y/N]: Yes

## 2023-12-07 NOTE — PROGRESS NOTE ADULT - ASSESSMENT
74 year old female with history of lung cancer s/p chemotherapy 5 years ago previously in remission (oncologist Dr. Fredis Solomon at [facility]), COPD, HTN presents with unstable gait with CT head findings of y 2.3 x 1.5 x 1.7 cm cyst versus centrally necrotic mass in   the right parietal-temporal region. 74 year old female with history of lung cancer s/p chemotherapy 5 years ago previously in remission (oncologist Dr. Fredis Solomon at [facility]), COPD, HTN presents with unstable gait with CT head findings of y 2.3 x 1.5 x 1.7 cm cyst versus centrally necrotic mass in   the right parietal-temporal region. CT chest showing 18 mm ground glass opacity concerning for malignancy. Will go for brain mass resection prior to pulmonary workup.

## 2023-12-07 NOTE — PROGRESS NOTE ADULT - PROBLEM SELECTOR PLAN 5
- Hold home Tiamterene-Hydrochlorothiazide 37.5-25 mg daily due to elevated Cr. - Scr of 2.08 12/-6 -> 1.72 on 12/07   -  TERZEA on CKD, Scr of 1.7 back in 2019  - Urine studies ordered  - US renal shows parenchymal disease  - Avoid nephrotoxic agents  - Holding HCTZ at this time - Scr of 2.08 12/-6 -> 1.72 on 12/07   -  TEREZA on CKD, Scr of 1.7 back in 2019  - Urine studies ordered  - US renal shows parenchymal disease  - Avoid nephrotoxic agents  - Holding HCTZ at this time

## 2023-12-07 NOTE — PROGRESS NOTE ADULT - NUTRITIONAL ASSESSMENT
This patient has been assessed with a concern for Malnutrition and has been determined to have a diagnosis/diagnoses of Moderate protein-calorie malnutrition.    This patient is being managed with:   Diet Regular-  DASH/TLC {Sodium & Cholesterol Restricted} (DASH)  Entered: Dec  4 2023  4:09PM

## 2023-12-07 NOTE — PROGRESS NOTE ADULT - PROBLEM SELECTOR PLAN 3
- Scr of 2.08 12/-6  - May be TEREZA on CKD, Scr of 1.7 back in 2019  - Urine studies ordered  - US renal shows parenchymal disease  - Avoid nephrotoxic agents  - Holding HCTZ at this time - Budesonide 80 mcg/Formoterol 4.5 mcg 2 puffs BID

## 2023-12-07 NOTE — PROGRESS NOTE ADULT - PROBLEM SELECTOR PLAN 2
- Previous history of lungf cancer, last chemotherapy 5 years ago, in remission  - Patient continued to smoke pack per day for 30-40 years.   - CT chest with findings of 18 mm groundglass nodule in the superior segment of the left lower lobe, which may reflect malignancy. Additional nodule along the right oblique fissure not well assessed secondary to motion.  - Pulm following  - Thoracic surgery not recommending surgical intervention at this time.   - To obtain outpatient records from patient's oncologist.   - Patient self discontinued chemotherapy for lung cancer 5 years ago due to side effects.   - Brain mass to be addressed prior to any pulmonary intervention - Previous history of lungf cancer, last chemotherapy 5 years ago, in remission  - Patient continued to smoke pack per day for 30-40 years.   - CT chest with findings of 18 mm groundglass nodule in the superior segment of the left lower lobe, which may reflect malignancy. Additional nodule along the right oblique fissure not well assessed secondary to motion.  - Pulm following  - Thoracic surgery not recommending surgical intervention at this time.   - To obtain outpatient records from patient's oncologist.   - Patient self discontinued chemotherapy for lung cancer 5 years ago due to side effects.   - Brain mass to be addressed prior to any pulmonary intervention  - Reached out to outpatient oncology

## 2023-12-07 NOTE — PROGRESS NOTE ADULT - ATTENDING COMMENTS
78F Hx bronchitis/COPD, HTN, OA, tricuspid valve regurg, solitary pulm nodule [facility] CTH w/R parietal hypodensity  necrotic vs cystic 1.5 x 2.3 w/vasogenic edema    patient feels well. denies headache, visual changes    CTH Diffuse vasogenic edema throughout the right frontal, parietal and temporal lobes.  Mass effect in the right cerebral hemisphere with 4.5 cm of midline shift to the left  MRI w wo  2.9 x 2.0 x 1.4 cm peripheral rim enhancing lesion not seen on the patient's prior MR study with a large amount of surrounding vasogenic edema. Appearance favors a primary malignant neoplasm though metastatic disease not entirely excluded.    nonfocal neuro exam; feeling well today; d/w NSG plan for craniotomy 12/9    # Brain Mass  # RLL nodule  # COPD not on home 02  # solitary pulm nodule s/p chemotherapy 2019  # FAlls  # renal cyst  #TEREZA  # leukocytosis    appreciate NSG recs, plan for resection concern for malignancy on 12/9  leukocytosis today likely 2/2 to steroids  TEREZA on CKD stage III improved- likely from thiazide- continue to hold, improved after IVF  appreciate pulm, thoracic surgery recs-  appreciate NSG recs, decadron 4 q6hr. keppra 500mg BID  asp precautions, seizure precautions  right renal lesion- MRI opt follow up. 78F Hx bronchitis/COPD, HTN, OA, tricuspid valve regurg, solitary pulm nodule s/[facility]. CTH w/R parietal hypodensity  necrotic vs cystic 1.5 x 2.3 w/vasogenic edema    patient feels well. denies headache, visual changes    CTH Diffuse vasogenic edema throughout the right frontal, parietal and temporal lobes.  Mass effect in the right cerebral hemisphere with 4.5 cm of midline shift to the left  MRI w wo  2.9 x 2.0 x 1.4 cm peripheral rim enhancing lesion not seen on the patient's prior MR study with a large amount of surrounding vasogenic edema. Appearance favors a primary malignant neoplasm though metastatic disease not entirely excluded.    nonfocal neuro exam; feeling well today; d/w NSG plan for craniotomy 12/9    # Brain Mass  # RLL nodule  # COPD not on home 02  # solitary pulm nodule s/p chemotherapy 2019  # FAlls  # renal cyst  #TEREZA  # leukocytosis    appreciate NSG recs, plan for resection concern for malignancy on 12/9  leukocytosis today likely 2/2 to steroids  TEREZA on CKD stage III improved- likely from thiazide- continue to hold, improved after IVF  appreciate pulm, thoracic surgery recs-  appreciate NSG recs, decadron 4 q6hr. keppra 500mg BID  asp precautions, seizure precautions  right renal lesion- MRI opt follow up.

## 2023-12-07 NOTE — PROGRESS NOTE ADULT - SUBJECTIVE AND OBJECTIVE BOX
Patient seen and examined at bedside.    --Anticoagulation--  heparin   Injectable 5000 Unit(s) SubCutaneous every 8 hours    T(C): 36.4 (12-07-23 @ 04:46), Max: 36.9 (12-06-23 @ 20:16)  HR: 71 (12-07-23 @ 04:46) (68 - 71)  BP: 147/84 (12-07-23 @ 04:46) (124/67 - 147/84)  RR: 18 (12-07-23 @ 04:46) (17 - 18)  SpO2: 98% (12-07-23 @ 04:46) (97% - 98%)  Wt(kg): --    Exam: neurointact

## 2023-12-08 ENCOUNTER — TRANSCRIPTION ENCOUNTER (OUTPATIENT)
Age: 78
End: 2023-12-08

## 2023-12-08 DIAGNOSIS — N17.9 ACUTE KIDNEY FAILURE, UNSPECIFIED: ICD-10-CM

## 2023-12-08 DIAGNOSIS — E87.1 HYPO-OSMOLALITY AND HYPONATREMIA: ICD-10-CM

## 2023-12-08 PROBLEM — Z85.118 PERSONAL HISTORY OF OTHER MALIGNANT NEOPLASM OF BRONCHUS AND LUNG: Chronic | Status: ACTIVE | Noted: 2023-12-04

## 2023-12-08 LAB
ALBUMIN SERPL ELPH-MCNC: 3.9 G/DL — SIGNIFICANT CHANGE UP (ref 3.3–5)
ALBUMIN SERPL ELPH-MCNC: 3.9 G/DL — SIGNIFICANT CHANGE UP (ref 3.3–5)
ALP SERPL-CCNC: 70 U/L — SIGNIFICANT CHANGE UP (ref 40–120)
ALP SERPL-CCNC: 70 U/L — SIGNIFICANT CHANGE UP (ref 40–120)
ALT FLD-CCNC: 28 U/L — SIGNIFICANT CHANGE UP (ref 10–45)
ALT FLD-CCNC: 28 U/L — SIGNIFICANT CHANGE UP (ref 10–45)
ANION GAP SERPL CALC-SCNC: 10 MMOL/L — SIGNIFICANT CHANGE UP (ref 5–17)
ANION GAP SERPL CALC-SCNC: 10 MMOL/L — SIGNIFICANT CHANGE UP (ref 5–17)
ANION GAP SERPL CALC-SCNC: 16 MMOL/L — SIGNIFICANT CHANGE UP (ref 5–17)
ANION GAP SERPL CALC-SCNC: 16 MMOL/L — SIGNIFICANT CHANGE UP (ref 5–17)
APPEARANCE UR: CLEAR — SIGNIFICANT CHANGE UP
APPEARANCE UR: CLEAR — SIGNIFICANT CHANGE UP
AST SERPL-CCNC: 24 U/L — SIGNIFICANT CHANGE UP (ref 10–40)
AST SERPL-CCNC: 24 U/L — SIGNIFICANT CHANGE UP (ref 10–40)
BASOPHILS # BLD AUTO: 0.03 K/UL — SIGNIFICANT CHANGE UP (ref 0–0.2)
BASOPHILS # BLD AUTO: 0.03 K/UL — SIGNIFICANT CHANGE UP (ref 0–0.2)
BASOPHILS NFR BLD AUTO: 0.2 % — SIGNIFICANT CHANGE UP (ref 0–2)
BASOPHILS NFR BLD AUTO: 0.2 % — SIGNIFICANT CHANGE UP (ref 0–2)
BILIRUB SERPL-MCNC: 0.3 MG/DL — SIGNIFICANT CHANGE UP (ref 0.2–1.2)
BILIRUB SERPL-MCNC: 0.3 MG/DL — SIGNIFICANT CHANGE UP (ref 0.2–1.2)
BILIRUB UR-MCNC: NEGATIVE — SIGNIFICANT CHANGE UP
BILIRUB UR-MCNC: NEGATIVE — SIGNIFICANT CHANGE UP
BLD GP AB SCN SERPL QL: NEGATIVE — SIGNIFICANT CHANGE UP
BLD GP AB SCN SERPL QL: NEGATIVE — SIGNIFICANT CHANGE UP
BUN SERPL-MCNC: 54 MG/DL — HIGH (ref 7–23)
BUN SERPL-MCNC: 54 MG/DL — HIGH (ref 7–23)
BUN SERPL-MCNC: 55 MG/DL — HIGH (ref 7–23)
BUN SERPL-MCNC: 55 MG/DL — HIGH (ref 7–23)
CALCIUM SERPL-MCNC: 9.1 MG/DL — SIGNIFICANT CHANGE UP (ref 8.4–10.5)
CALCIUM SERPL-MCNC: 9.1 MG/DL — SIGNIFICANT CHANGE UP (ref 8.4–10.5)
CALCIUM SERPL-MCNC: 9.6 MG/DL — SIGNIFICANT CHANGE UP (ref 8.4–10.5)
CALCIUM SERPL-MCNC: 9.6 MG/DL — SIGNIFICANT CHANGE UP (ref 8.4–10.5)
CHLORIDE SERPL-SCNC: 101 MMOL/L — SIGNIFICANT CHANGE UP (ref 96–108)
CHLORIDE SERPL-SCNC: 101 MMOL/L — SIGNIFICANT CHANGE UP (ref 96–108)
CHLORIDE SERPL-SCNC: 99 MMOL/L — SIGNIFICANT CHANGE UP (ref 96–108)
CHLORIDE SERPL-SCNC: 99 MMOL/L — SIGNIFICANT CHANGE UP (ref 96–108)
CO2 SERPL-SCNC: 20 MMOL/L — LOW (ref 22–31)
CO2 SERPL-SCNC: 20 MMOL/L — LOW (ref 22–31)
CO2 SERPL-SCNC: 21 MMOL/L — LOW (ref 22–31)
CO2 SERPL-SCNC: 21 MMOL/L — LOW (ref 22–31)
COLOR SPEC: YELLOW — SIGNIFICANT CHANGE UP
COLOR SPEC: YELLOW — SIGNIFICANT CHANGE UP
CREAT ?TM UR-MCNC: 80 MG/DL — SIGNIFICANT CHANGE UP
CREAT ?TM UR-MCNC: 80 MG/DL — SIGNIFICANT CHANGE UP
CREAT SERPL-MCNC: 1.53 MG/DL — HIGH (ref 0.5–1.3)
CREAT SERPL-MCNC: 1.53 MG/DL — HIGH (ref 0.5–1.3)
CREAT SERPL-MCNC: 1.92 MG/DL — HIGH (ref 0.5–1.3)
CREAT SERPL-MCNC: 1.92 MG/DL — HIGH (ref 0.5–1.3)
DIFF PNL FLD: NEGATIVE — SIGNIFICANT CHANGE UP
DIFF PNL FLD: NEGATIVE — SIGNIFICANT CHANGE UP
EGFR: 27 ML/MIN/1.73M2 — LOW
EGFR: 27 ML/MIN/1.73M2 — LOW
EGFR: 35 ML/MIN/1.73M2 — LOW
EGFR: 35 ML/MIN/1.73M2 — LOW
EOSINOPHIL # BLD AUTO: 0 K/UL — SIGNIFICANT CHANGE UP (ref 0–0.5)
EOSINOPHIL # BLD AUTO: 0 K/UL — SIGNIFICANT CHANGE UP (ref 0–0.5)
EOSINOPHIL NFR BLD AUTO: 0 % — SIGNIFICANT CHANGE UP (ref 0–6)
EOSINOPHIL NFR BLD AUTO: 0 % — SIGNIFICANT CHANGE UP (ref 0–6)
GLUCOSE SERPL-MCNC: 134 MG/DL — HIGH (ref 70–99)
GLUCOSE SERPL-MCNC: 134 MG/DL — HIGH (ref 70–99)
GLUCOSE SERPL-MCNC: 250 MG/DL — HIGH (ref 70–99)
GLUCOSE SERPL-MCNC: 250 MG/DL — HIGH (ref 70–99)
GLUCOSE UR QL: NEGATIVE MG/DL — SIGNIFICANT CHANGE UP
GLUCOSE UR QL: NEGATIVE MG/DL — SIGNIFICANT CHANGE UP
HCT VFR BLD CALC: 38.8 % — SIGNIFICANT CHANGE UP (ref 34.5–45)
HCT VFR BLD CALC: 38.8 % — SIGNIFICANT CHANGE UP (ref 34.5–45)
HGB BLD-MCNC: 13.2 G/DL — SIGNIFICANT CHANGE UP (ref 11.5–15.5)
HGB BLD-MCNC: 13.2 G/DL — SIGNIFICANT CHANGE UP (ref 11.5–15.5)
IMM GRANULOCYTES NFR BLD AUTO: 1.1 % — HIGH (ref 0–0.9)
IMM GRANULOCYTES NFR BLD AUTO: 1.1 % — HIGH (ref 0–0.9)
KETONES UR-MCNC: ABNORMAL MG/DL
KETONES UR-MCNC: ABNORMAL MG/DL
LEUKOCYTE ESTERASE UR-ACNC: NEGATIVE — SIGNIFICANT CHANGE UP
LEUKOCYTE ESTERASE UR-ACNC: NEGATIVE — SIGNIFICANT CHANGE UP
LYMPHOCYTES # BLD AUTO: 0.81 K/UL — LOW (ref 1–3.3)
LYMPHOCYTES # BLD AUTO: 0.81 K/UL — LOW (ref 1–3.3)
LYMPHOCYTES # BLD AUTO: 5.1 % — LOW (ref 13–44)
LYMPHOCYTES # BLD AUTO: 5.1 % — LOW (ref 13–44)
MAGNESIUM SERPL-MCNC: 1.8 MG/DL — SIGNIFICANT CHANGE UP (ref 1.6–2.6)
MAGNESIUM SERPL-MCNC: 1.8 MG/DL — SIGNIFICANT CHANGE UP (ref 1.6–2.6)
MCHC RBC-ENTMCNC: 30.2 PG — SIGNIFICANT CHANGE UP (ref 27–34)
MCHC RBC-ENTMCNC: 30.2 PG — SIGNIFICANT CHANGE UP (ref 27–34)
MCHC RBC-ENTMCNC: 34 GM/DL — SIGNIFICANT CHANGE UP (ref 32–36)
MCHC RBC-ENTMCNC: 34 GM/DL — SIGNIFICANT CHANGE UP (ref 32–36)
MCV RBC AUTO: 88.8 FL — SIGNIFICANT CHANGE UP (ref 80–100)
MCV RBC AUTO: 88.8 FL — SIGNIFICANT CHANGE UP (ref 80–100)
MONOCYTES # BLD AUTO: 0.6 K/UL — SIGNIFICANT CHANGE UP (ref 0–0.9)
MONOCYTES # BLD AUTO: 0.6 K/UL — SIGNIFICANT CHANGE UP (ref 0–0.9)
MONOCYTES NFR BLD AUTO: 3.8 % — SIGNIFICANT CHANGE UP (ref 2–14)
MONOCYTES NFR BLD AUTO: 3.8 % — SIGNIFICANT CHANGE UP (ref 2–14)
NEUTROPHILS # BLD AUTO: 14.12 K/UL — HIGH (ref 1.8–7.4)
NEUTROPHILS # BLD AUTO: 14.12 K/UL — HIGH (ref 1.8–7.4)
NEUTROPHILS NFR BLD AUTO: 89.8 % — HIGH (ref 43–77)
NEUTROPHILS NFR BLD AUTO: 89.8 % — HIGH (ref 43–77)
NITRITE UR-MCNC: NEGATIVE — SIGNIFICANT CHANGE UP
NITRITE UR-MCNC: NEGATIVE — SIGNIFICANT CHANGE UP
NRBC # BLD: 0 /100 WBCS — SIGNIFICANT CHANGE UP (ref 0–0)
NRBC # BLD: 0 /100 WBCS — SIGNIFICANT CHANGE UP (ref 0–0)
OSMOLALITY SERPL: 304 MOSMOL/KG — HIGH (ref 280–301)
OSMOLALITY SERPL: 304 MOSMOL/KG — HIGH (ref 280–301)
OSMOLALITY UR: 656 MOS/KG — SIGNIFICANT CHANGE UP (ref 300–900)
OSMOLALITY UR: 656 MOS/KG — SIGNIFICANT CHANGE UP (ref 300–900)
PH UR: 6 — SIGNIFICANT CHANGE UP (ref 5–8)
PH UR: 6 — SIGNIFICANT CHANGE UP (ref 5–8)
PHOSPHATE SERPL-MCNC: 2 MG/DL — LOW (ref 2.5–4.5)
PHOSPHATE SERPL-MCNC: 2 MG/DL — LOW (ref 2.5–4.5)
PLATELET # BLD AUTO: 279 K/UL — SIGNIFICANT CHANGE UP (ref 150–400)
PLATELET # BLD AUTO: 279 K/UL — SIGNIFICANT CHANGE UP (ref 150–400)
POTASSIUM SERPL-MCNC: 3.8 MMOL/L — SIGNIFICANT CHANGE UP (ref 3.5–5.3)
POTASSIUM SERPL-MCNC: 3.8 MMOL/L — SIGNIFICANT CHANGE UP (ref 3.5–5.3)
POTASSIUM SERPL-MCNC: 3.9 MMOL/L — SIGNIFICANT CHANGE UP (ref 3.5–5.3)
POTASSIUM SERPL-MCNC: 3.9 MMOL/L — SIGNIFICANT CHANGE UP (ref 3.5–5.3)
POTASSIUM SERPL-SCNC: 3.8 MMOL/L — SIGNIFICANT CHANGE UP (ref 3.5–5.3)
POTASSIUM SERPL-SCNC: 3.8 MMOL/L — SIGNIFICANT CHANGE UP (ref 3.5–5.3)
POTASSIUM SERPL-SCNC: 3.9 MMOL/L — SIGNIFICANT CHANGE UP (ref 3.5–5.3)
POTASSIUM SERPL-SCNC: 3.9 MMOL/L — SIGNIFICANT CHANGE UP (ref 3.5–5.3)
POTASSIUM UR-SCNC: 55 MMOL/L — SIGNIFICANT CHANGE UP
POTASSIUM UR-SCNC: 55 MMOL/L — SIGNIFICANT CHANGE UP
PROT ?TM UR-MCNC: 9 MG/DL — SIGNIFICANT CHANGE UP (ref 0–12)
PROT ?TM UR-MCNC: 9 MG/DL — SIGNIFICANT CHANGE UP (ref 0–12)
PROT SERPL-MCNC: 6.9 G/DL — SIGNIFICANT CHANGE UP (ref 6–8.3)
PROT SERPL-MCNC: 6.9 G/DL — SIGNIFICANT CHANGE UP (ref 6–8.3)
PROT UR-MCNC: NEGATIVE MG/DL — SIGNIFICANT CHANGE UP
PROT UR-MCNC: NEGATIVE MG/DL — SIGNIFICANT CHANGE UP
PROT/CREAT UR-RTO: 0.1 RATIO — SIGNIFICANT CHANGE UP (ref 0–0.2)
PROT/CREAT UR-RTO: 0.1 RATIO — SIGNIFICANT CHANGE UP (ref 0–0.2)
RBC # BLD: 4.37 M/UL — SIGNIFICANT CHANGE UP (ref 3.8–5.2)
RBC # BLD: 4.37 M/UL — SIGNIFICANT CHANGE UP (ref 3.8–5.2)
RBC # FLD: 13.2 % — SIGNIFICANT CHANGE UP (ref 10.3–14.5)
RBC # FLD: 13.2 % — SIGNIFICANT CHANGE UP (ref 10.3–14.5)
RH IG SCN BLD-IMP: POSITIVE — SIGNIFICANT CHANGE UP
RH IG SCN BLD-IMP: POSITIVE — SIGNIFICANT CHANGE UP
SODIUM SERPL-SCNC: 131 MMOL/L — LOW (ref 135–145)
SODIUM SERPL-SCNC: 131 MMOL/L — LOW (ref 135–145)
SODIUM SERPL-SCNC: 136 MMOL/L — SIGNIFICANT CHANGE UP (ref 135–145)
SODIUM SERPL-SCNC: 136 MMOL/L — SIGNIFICANT CHANGE UP (ref 135–145)
SODIUM UR-SCNC: 40 MMOL/L — SIGNIFICANT CHANGE UP
SODIUM UR-SCNC: 40 MMOL/L — SIGNIFICANT CHANGE UP
SP GR SPEC: 1.02 — SIGNIFICANT CHANGE UP (ref 1–1.03)
SP GR SPEC: 1.02 — SIGNIFICANT CHANGE UP (ref 1–1.03)
UROBILINOGEN FLD QL: 0.2 MG/DL — SIGNIFICANT CHANGE UP (ref 0.2–1)
UROBILINOGEN FLD QL: 0.2 MG/DL — SIGNIFICANT CHANGE UP (ref 0.2–1)
WBC # BLD: 15.74 K/UL — HIGH (ref 3.8–10.5)
WBC # BLD: 15.74 K/UL — HIGH (ref 3.8–10.5)
WBC # FLD AUTO: 15.74 K/UL — HIGH (ref 3.8–10.5)
WBC # FLD AUTO: 15.74 K/UL — HIGH (ref 3.8–10.5)

## 2023-12-08 PROCEDURE — 99233 SBSQ HOSP IP/OBS HIGH 50: CPT | Mod: GC

## 2023-12-08 RX ORDER — SODIUM CHLORIDE 9 MG/ML
1000 INJECTION, SOLUTION INTRAVENOUS
Refills: 0 | Status: DISCONTINUED | OUTPATIENT
Start: 2023-12-08 | End: 2023-12-08

## 2023-12-08 RX ORDER — BUDESONIDE AND FORMOTEROL FUMARATE DIHYDRATE 160; 4.5 UG/1; UG/1
2 AEROSOL RESPIRATORY (INHALATION)
Refills: 0 | Status: DISCONTINUED | OUTPATIENT
Start: 2023-12-08 | End: 2023-12-09

## 2023-12-08 RX ORDER — SODIUM CHLORIDE 9 MG/ML
1000 INJECTION INTRAMUSCULAR; INTRAVENOUS; SUBCUTANEOUS
Refills: 0 | Status: DISCONTINUED | OUTPATIENT
Start: 2023-12-08 | End: 2023-12-09

## 2023-12-08 RX ADMIN — Medication 63.75 MILLIMOLE(S): at 16:25

## 2023-12-08 RX ADMIN — TIOTROPIUM BROMIDE 2 PUFF(S): 18 CAPSULE ORAL; RESPIRATORY (INHALATION) at 12:16

## 2023-12-08 RX ADMIN — Medication 4 MILLIGRAM(S): at 05:30

## 2023-12-08 RX ADMIN — Medication 1 PATCH: at 12:16

## 2023-12-08 RX ADMIN — BUDESONIDE AND FORMOTEROL FUMARATE DIHYDRATE 2 PUFF(S): 160; 4.5 AEROSOL RESPIRATORY (INHALATION) at 17:23

## 2023-12-08 RX ADMIN — HEPARIN SODIUM 5000 UNIT(S): 5000 INJECTION INTRAVENOUS; SUBCUTANEOUS at 05:30

## 2023-12-08 RX ADMIN — LEVETIRACETAM 500 MILLIGRAM(S): 250 TABLET, FILM COATED ORAL at 05:30

## 2023-12-08 RX ADMIN — SODIUM CHLORIDE 70 MILLILITER(S): 9 INJECTION, SOLUTION INTRAVENOUS at 12:26

## 2023-12-08 RX ADMIN — Medication 650 MILLIGRAM(S): at 16:00

## 2023-12-08 RX ADMIN — Medication 4 MILLIGRAM(S): at 01:37

## 2023-12-08 RX ADMIN — LEVETIRACETAM 500 MILLIGRAM(S): 250 TABLET, FILM COATED ORAL at 17:22

## 2023-12-08 RX ADMIN — Medication 1 MILLIGRAM(S): at 12:18

## 2023-12-08 RX ADMIN — ESCITALOPRAM OXALATE 10 MILLIGRAM(S): 10 TABLET, FILM COATED ORAL at 12:17

## 2023-12-08 RX ADMIN — Medication 650 MILLIGRAM(S): at 15:06

## 2023-12-08 RX ADMIN — Medication 4 MILLIGRAM(S): at 17:22

## 2023-12-08 RX ADMIN — Medication 4 MILLIGRAM(S): at 12:16

## 2023-12-08 RX ADMIN — BUDESONIDE AND FORMOTEROL FUMARATE DIHYDRATE 2 PUFF(S): 160; 4.5 AEROSOL RESPIRATORY (INHALATION) at 05:30

## 2023-12-08 NOTE — PROGRESS NOTE ADULT - ASSESSMENT
74 year old female with history of lung cancer s/p chemotherapy 5 years ago previously in remission (oncologist Dr. Fredis Solomon at [facility]), COPD, HTN presents with unstable gait with CT head findings of y 2.3 x 1.5 x 1.7 cm cyst versus centrally necrotic mass in   the right parietal-temporal region. CT chest showing 18 mm ground glass opacity concerning for malignancy. Will go for brain mass resection prior to pulmonary workup.

## 2023-12-08 NOTE — PROGRESS NOTE ADULT - PROBLEM SELECTOR PLAN 1
- CT head showing y 2.3 x 1.5 x 1.7 cm cyst versus centrally necrotic mass in the right parietal-temporal region.   Diffuse vasogenic edema throughout the right frontal, parietal and temporal lobes.  Mass effect in the right cerebral hemisphere with 4.5 cm of midline shift to the left.  - MRI confirmed neoplasm.   - Neurosurgery following, plan for resection on 12/09  - Keppra 500 mg BID and Dexamethasone 4 mg q6 hours.  - PT and OT  - Plan for resection by neurosurgery on 12/09/2023    Patient to go for resection of brain mass with neurosurgery,  RCRI score of 2 points indicating 10.1% risk for 30 day death, MI, or cardiac arrest.  NPO at midnight prior to day of surgery  Will hold anticoagulation and insulin day of surgery.   Continue remaining medications day of surgery.  Patient is medically optimized for surgery.

## 2023-12-08 NOTE — PROGRESS NOTE ADULT - PROBLEM SELECTOR PLAN 5
- Scr of 2.08 12/-6 -> 1.72 on 12/07   -  TEREZA on CKD, Scr of 1.7 back in 2019  - Urine studies ordered  - US renal shows parenchymal disease  - Avoid nephrotoxic agents  - Holding HCTZ at this time
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
## 2023-12-08 NOTE — CONSULT NOTE ADULT - PROBLEM SELECTOR RECOMMENDATION 2
Serum sodium dropped to 131 today from 136 yesterday. Likely from patient receiving meds in D5W/increased fluid intake vs lab error. Patient does have vasogenic edema known from CT menjivar 12/3, can induce hyponatremia, will have to monitor and assess for intervention need- possibly hypertonic saline.     Hold HCTZ  Can obtain serum osm, urine osm and Casey with next set of labs.   Please find out from surgery/anesthesia for Na needed to pursue resection.   Monitor serum sodium and fluid restrict <1L for now.     Discussed with medicine team.  If you have any questions, please feel free to contact me  Tony Mcmahon  Nephrology Fellow  [phone]; Prefer [employer] TEAMS  (After 5pm or on weekends please page the on-call fellow). Serum sodium dropped to 131 today from 136 yesterday. Likely from patient receiving meds in D5W/increased fluid intake vs lab error. Patient does have vasogenic edema known from CT menjivar 12/3, can induce hyponatremia, will have to monitor and assess for intervention need- possibly hypertonic saline.     Hold HCTZ  Can obtain serum osm, urine osm and Casey with next set of labs.   Please find out from surgery/anesthesia for Na needed to pursue resection.   Monitor serum sodium and fluid restrict <1L for now.     Discussed with medicine team.  If you have any questions, please feel free to contact me  Tony Mcmahon  Nephrology Fellow  [phone]; Prefer [employer] TEAMS  (After 5pm or on weekends please page the on-call fellow). Serum sodium dropped to 131 today from 136 yesterday. Likely from patient receiving meds in D5W/increased fluid intake vs lab error. Thought, patient known to have vasogenic edema known from CT menjivar 12/3, can induce hyponatremia, will have to monitor and assess for intervention need- possibly hypertonic saline.     Hold HCTZ  Please repeat stat labs: BMP and serum osm, urine osm and Casey with next set of labs.   Will consider giving 3%  cc over 3-4 hours if SNa <135 on repeat labs  Monitor serum sodium and fluid restrict <1L for now.     Discussed with medicine team.  If you have any questions, please feel free to contact me  Tony Mcmahon  Nephrology Fellow  [phone]; Prefer [employer] TEAMS  (After 5pm or on weekends please page the on-call fellow). Serum sodium dropped to 131 today from 136 yesterday. Likely from patient receiving meds in D5W/increased fluid intake vs lab error. Thought, patient known to have vasogenic edema known from CT menjivar 12/3, can induce hyponatremia, will have to monitor and assess for intervention need- possibly hypertonic saline.     Hold HCTZ  Please repeat stat labs: BMP and serum osm, urine osm and Casey with next set of labs.   Will consider giving 3%  cc over 3-4 hours if SNa <135 on repeat labs  Monitor serum sodium and fluid restrict <1L for now.     Discussed with medicine team.  If you have any questions, please feel free to contact me  Tony Mcmahon  Nephrology Fellow  [phone]; Prefer [employer] TEAMS  (After 5pm or on weekends please page the on-call fellow).

## 2023-12-08 NOTE — PROGRESS NOTE ADULT - PROBLEM SELECTOR PLAN 6
DVT: Heparin 5000 TID  Diet: DASH DVT: Heparin 5000 TID npo at midnight in anticipation of procedure.   Diet: DASH, npo at midnight in anticipation of procedure.

## 2023-12-08 NOTE — PROGRESS NOTE ADULT - SUBJECTIVE AND OBJECTIVE BOX
PATIENT:  JORGITO MONTGOMERY  [id number]    CHIEF COMPLAINT:  Patient is a 78y old  Female who presents with a chief complaint of Unsteady gai, mass noted on CT head (08 Dec 2023 17:08)      INTERVAL HISTORY/OVERNIGHT EVENTS:  - no acute events    MEDICATIONS:  MEDICATIONS  (STANDING):  budesonide 160 MICROgram(s)/formoterol 4.5 MICROgram(s) Inhaler 2 Puff(s) Inhalation two times a day  dexAMETHasone     Tablet 4 milliGRAM(s) Oral every 6 hours  escitalopram 10 milliGRAM(s) Oral daily  folic acid 1 milliGRAM(s) Oral daily  influenza  Vaccine (HIGH DOSE) 0.7 milliLiter(s) IntraMuscular once  levETIRAcetam 500 milliGRAM(s) Oral two times a day  nicotine -  14 mG/24Hr(s) Patch 1 Patch Transdermal daily  sodium chloride 0.9%. 1000 milliLiter(s) (50 mL/Hr) IV Continuous <Continuous>  tiotropium 2.5 MICROgram(s) Inhaler 2 Puff(s) Inhalation daily    MEDICATIONS  (PRN):  acetaminophen     Tablet .. 650 milliGRAM(s) Oral every 6 hours PRN Temp greater or equal to 38C (100.4F), Mild Pain (1 - 3)      ALLERGIES:  Allergies    penicillin (Short breath; Hives)    Intolerances        OBJECTIVE:  ICU Vital Signs Last 24 Hrs  T(C): 36.4 (08 Dec 2023 11:55), Max: 36.6 (08 Dec 2023 04:54)  T(F): 97.5 (08 Dec 2023 11:55), Max: 97.8 (08 Dec 2023 04:54)  HR: 68 (08 Dec 2023 11:55) (64 - 81)  BP: 136/82 (08 Dec 2023 11:55) (129/72 - 156/86)  BP(mean): --  ABP: --  ABP(mean): --  RR: 18 (08 Dec 2023 11:55) (16 - 18)  SpO2: 100% (08 Dec 2023 11:55) (95% - 100%)    O2 Parameters below as of 08 Dec 2023 11:55  Patient On (Oxygen Delivery Method): room air            Adult Advanced Hemodynamics Last 24 Hrs  CVP(mm Hg): --  CVP(cm H2O): --  CO: --  CI: --  PA: --  PA(mean): --  PCWP: --  SVR: --  SVRI: --  PVR: --  PVRI: --  CAPILLARY BLOOD GLUCOSE        CAPILLARY BLOOD GLUCOSE        I&O's Summary    07 Dec 2023 07:01  -  08 Dec 2023 07:00  --------------------------------------------------------  IN: 500 mL / OUT: 0 mL / NET: 500 mL      Daily     Daily     PHYSICAL EXAMINATION:  General: WN/WD NAD  HEENT: PERRLA, EOMI, moist mucous membranes  Neurology: A&Ox3, nonfocal, PELLETIER x 4  Respiratory: CTA B/L, normal respiratory effort, no wheezes, crackles, rales  CV: RRR, S1S2, no murmurs, rubs or gallops  Abdominal: Soft, NT, ND +BS, Last BM  Extremities: No edema, + peripheral pulses  Incisions:   Tubes:    LABS:                          13.2   15.74 )-----------( 279      ( 08 Dec 2023 14:14 )             38.8     12-08    131<L>  |  101  |  54<H>  ----------------------------<  250<H>  3.9   |  20<L>  |  1.53<H>    Ca    9.6      08 Dec 2023 14:14  Phos  2.0     12-08  Mg     1.8     12-08    TPro  6.9  /  Alb  3.9  /  TBili  0.3  /  DBili  x   /  AST  24  /  ALT  28  /  AlkPhos  70  12-08    LIVER FUNCTIONS - ( 08 Dec 2023 14:14 )  Alb: 3.9 g/dL / Pro: 6.9 g/dL / ALK PHOS: 70 U/L / ALT: 28 U/L / AST: 24 U/L / GGT: x           PT/INR - ( 07 Dec 2023 13:44 )   PT: 11.1 sec;   INR: 1.01 ratio         PTT - ( 07 Dec 2023 13:44 )  PTT:25.9 sec        Urinalysis Basic - ( 08 Dec 2023 14:14 )    Color: x / Appearance: x / SG: x / pH: x  Gluc: 250 mg/dL / Ketone: x  / Bili: x / Urobili: x   Blood: x / Protein: x / Nitrite: x   Leuk Esterase: x / RBC: x / WBC x   Sq Epi: x / Non Sq Epi: x / Bacteria: x        TELEMETRY:     EKG:     IMAGING:       PATIENT:  JORGITO MONTGOMERY  [id number]    CHIEF COMPLAINT:  Patient is a 78y old  Female who presents with a chief complaint of Unsteady gai, mass noted on CT head (08 Dec 2023 17:08)      INTERVAL HISTORY/OVERNIGHT EVENTS:  - no acute events    MEDICATIONS:  MEDICATIONS  (STANDING):  budesonide 160 MICROgram(s)/formoterol 4.5 MICROgram(s) Inhaler 2 Puff(s) Inhalation two times a day  dexAMETHasone     Tablet 4 milliGRAM(s) Oral every 6 hours  escitalopram 10 milliGRAM(s) Oral daily  folic acid 1 milliGRAM(s) Oral daily  influenza  Vaccine (HIGH DOSE) 0.7 milliLiter(s) IntraMuscular once  levETIRAcetam 500 milliGRAM(s) Oral two times a day  nicotine -  14 mG/24Hr(s) Patch 1 Patch Transdermal daily  sodium chloride 0.9%. 1000 milliLiter(s) (50 mL/Hr) IV Continuous <Continuous>  tiotropium 2.5 MICROgram(s) Inhaler 2 Puff(s) Inhalation daily    MEDICATIONS  (PRN):  acetaminophen     Tablet .. 650 milliGRAM(s) Oral every 6 hours PRN Temp greater or equal to 38C (100.4F), Mild Pain (1 - 3)      ALLERGIES:  Allergies    penicillin (Short breath; Hives)    Intolerances        OBJECTIVE:  ICU Vital Signs Last 24 Hrs  T(C): 36.4 (08 Dec 2023 11:55), Max: 36.6 (08 Dec 2023 04:54)  T(F): 97.5 (08 Dec 2023 11:55), Max: 97.8 (08 Dec 2023 04:54)  HR: 68 (08 Dec 2023 11:55) (64 - 81)  BP: 136/82 (08 Dec 2023 11:55) (129/72 - 156/86)  BP(mean): --  ABP: --  ABP(mean): --  RR: 18 (08 Dec 2023 11:55) (16 - 18)  SpO2: 100% (08 Dec 2023 11:55) (95% - 100%)    O2 Parameters below as of 08 Dec 2023 11:55  Patient On (Oxygen Delivery Method): room air            Adult Advanced Hemodynamics Last 24 Hrs  CVP(mm Hg): --  CVP(cm H2O): --  CO: --  CI: --  PA: --  PA(mean): --  PCWP: --  SVR: --  SVRI: --  PVR: --  PVRI: --  CAPILLARY BLOOD GLUCOSE        CAPILLARY BLOOD GLUCOSE        I&O's Summary    07 Dec 2023 07:01  -  08 Dec 2023 07:00  --------------------------------------------------------  IN: 500 mL / OUT: 0 mL / NET: 500 mL      Daily     Daily     PHYSICAL EXAMINATION:  General: WN/WD NAD  HEENT: PERRLA, EOMI, moist mucous membranes  Neurology: A&Ox3, nonfocal, PELLETIER x 4  Respiratory: CTA B/L, normal respiratory effort, no wheezes, crackles, rales  CV: RRR, S1S2, no murmurs, rubs or gallops  Abdominal: Soft, NT, ND +BS, Last BM  Extremities: No edema, + peripheral pulses  Incisions:   Tubes:    LABS:                          13.2   15.74 )-----------( 279      ( 08 Dec 2023 14:14 )             38.8     12-08    131<L>  |  101  |  54<H>  ----------------------------<  250<H>  3.9   |  20<L>  |  1.53<H>    Ca    9.6      08 Dec 2023 14:14  Phos  2.0     12-08  Mg     1.8     12-08    TPro  6.9  /  Alb  3.9  /  TBili  0.3  /  DBili  x   /  AST  24  /  ALT  28  /  AlkPhos  70  12-08    LIVER FUNCTIONS - ( 08 Dec 2023 14:14 )  Alb: 3.9 g/dL / Pro: 6.9 g/dL / ALK PHOS: 70 U/L / ALT: 28 U/L / AST: 24 U/L / GGT: x           PT/INR - ( 07 Dec 2023 13:44 )   PT: 11.1 sec;   INR: 1.01 ratio         PTT - ( 07 Dec 2023 13:44 )  PTT:25.9 sec        Urinalysis Basic - ( 08 Dec 2023 14:14 )    Color: x / Appearance: x / SG: x / pH: x  Gluc: 250 mg/dL / Ketone: x  / Bili: x / Urobili: x   Blood: x / Protein: x / Nitrite: x   Leuk Esterase: x / RBC: x / WBC x   Sq Epi: x / Non Sq Epi: x / Bacteria: x        TELEMETRY:     EKG:     IMAGING:

## 2023-12-08 NOTE — PROGRESS NOTE ADULT - SUBJECTIVE AND OBJECTIVE BOX
PROGRESS NOTE:   Authored by Parag Joseph MD  Internal Medicine      Patient is a 78y old  Female who presents with a chief complaint of Unsteady gait, mass noted on CT head (07 Dec 2023 11:06)      SUBJECTIVE / OVERNIGHT EVENTS: NAEO, patient seen and examined at bedside    MEDICATIONS  (STANDING):  budesonide  80 MICROgram(s)/formoterol 4.5 MICROgram(s) Inhaler 2 Puff(s) Inhalation two times a day  dexAMETHasone     Tablet 4 milliGRAM(s) Oral every 6 hours  escitalopram 10 milliGRAM(s) Oral daily  folic acid 1 milliGRAM(s) Oral daily  heparin   Injectable 5000 Unit(s) SubCutaneous every 8 hours  influenza  Vaccine (HIGH DOSE) 0.7 milliLiter(s) IntraMuscular once  lactated ringers. 1000 milliLiter(s) (70 mL/Hr) IV Continuous <Continuous>  levETIRAcetam 500 milliGRAM(s) Oral two times a day  nicotine -  14 mG/24Hr(s) Patch 1 Patch Transdermal daily  tiotropium 2.5 MICROgram(s) Inhaler 2 Puff(s) Inhalation daily    MEDICATIONS  (PRN):  acetaminophen     Tablet .. 650 milliGRAM(s) Oral every 6 hours PRN Temp greater or equal to 38C (100.4F), Mild Pain (1 - 3)      CAPILLARY BLOOD GLUCOSE        I&O's Summary    07 Dec 2023 07:01  -  08 Dec 2023 07:00  --------------------------------------------------------  IN: 500 mL / OUT: 0 mL / NET: 500 mL        PHYSICAL EXAM:  Vital Signs Last 24 Hrs  T(C): 36.6 (08 Dec 2023 04:54), Max: 36.7 (07 Dec 2023 12:34)  T(F): 97.8 (08 Dec 2023 04:54), Max: 98 (07 Dec 2023 12:34)  HR: 81 (08 Dec 2023 04:54) (62 - 81)  BP: 129/72 (08 Dec 2023 04:54) (129/72 - 156/86)  BP(mean): --  RR: 17 (08 Dec 2023 04:54) (16 - 18)  SpO2: 99% (08 Dec 2023 04:54) (95% - 99%)    Parameters below as of 08 Dec 2023 04:54  Patient On (Oxygen Delivery Method): room air      CONSTITUTIONAL: Well-groomed, in no apparent distress  EYES: No conjunctival or scleral injection, non-icteric;   ENMT: No external nasal lesions; MMM  NECK: Trachea midline without palpable neck mass; thyroid not enlarged and non-tender  RESPIRATORY: Breathing comfortably; no dullness to percussion; lungs CTA without wheeze/rhonchi/rales  CARDIOVASCULAR: +S1S2, RRR, no M/G/R; pedal pulses full and symmetric; no lower extremity edema  GASTROINTESTINAL: No palpable masses or tenderness, +BS throughout, no rebound/guarding; no hepatosplenomegaly; no hernia palpated  LYMPHATIC: No cervical LAD or tenderness  SKIN: No rashes or ulcers noted  NEUROLOGIC: CN II-XII intact; sensation intact in LEs b/l to light touch (+) left sided dysmetria  PSYCHIATRIC: A+O x 3; mood and affect appropriate; appropriate insight and judgment      LABS:                        12.1   16.03 )-----------( 243      ( 07 Dec 2023 07:09 )             34.9     12-07    136  |  100  |  51<H>  ----------------------------<  100<H>  3.4<L>   |  23  |  1.71<H>    Ca    9.9      07 Dec 2023 07:15  Phos  2.8     12-07  Mg     1.9     12-07    TPro  6.9  /  Alb  4.0  /  TBili  0.4  /  DBili  x   /  AST  26  /  ALT  22  /  AlkPhos  67  12-07    PT/INR - ( 07 Dec 2023 13:44 )   PT: 11.1 sec;   INR: 1.01 ratio         PTT - ( 07 Dec 2023 13:44 )  PTT:25.9 sec      Urinalysis Basic - ( 07 Dec 2023 07:15 )    Color: x / Appearance: x / SG: x / pH: x  Gluc: 100 mg/dL / Ketone: x  / Bili: x / Urobili: x   Blood: x / Protein: x / Nitrite: x   Leuk Esterase: x / RBC: x / WBC x   Sq Epi: x / Non Sq Epi: x / Bacteria: x        COORDINATION OF CARE:  Care Discussed with Consultants/Other Providers [Y/N]: Yes  Prior or Outpatient Records Reviewed [Y/N]: Yes   PROGRESS NOTE:   Authored by Parag Joseph MD  Internal Medicine      Patient is a 78y old  Female who presents with a chief complaint of Unsteady gait, mass noted on CT head (07 Dec 2023 11:06)      SUBJECTIVE / OVERNIGHT EVENTS: NAEO, patient seen and examined at bedside  No complaints.     MEDICATIONS  (STANDING):  budesonide  80 MICROgram(s)/formoterol 4.5 MICROgram(s) Inhaler 2 Puff(s) Inhalation two times a day  dexAMETHasone     Tablet 4 milliGRAM(s) Oral every 6 hours  escitalopram 10 milliGRAM(s) Oral daily  folic acid 1 milliGRAM(s) Oral daily  heparin   Injectable 5000 Unit(s) SubCutaneous every 8 hours  influenza  Vaccine (HIGH DOSE) 0.7 milliLiter(s) IntraMuscular once  lactated ringers. 1000 milliLiter(s) (70 mL/Hr) IV Continuous <Continuous>  levETIRAcetam 500 milliGRAM(s) Oral two times a day  nicotine -  14 mG/24Hr(s) Patch 1 Patch Transdermal daily  tiotropium 2.5 MICROgram(s) Inhaler 2 Puff(s) Inhalation daily    MEDICATIONS  (PRN):  acetaminophen     Tablet .. 650 milliGRAM(s) Oral every 6 hours PRN Temp greater or equal to 38C (100.4F), Mild Pain (1 - 3)      CAPILLARY BLOOD GLUCOSE        I&O's Summary    07 Dec 2023 07:01  -  08 Dec 2023 07:00  --------------------------------------------------------  IN: 500 mL / OUT: 0 mL / NET: 500 mL        PHYSICAL EXAM:  Vital Signs Last 24 Hrs  T(C): 36.6 (08 Dec 2023 04:54), Max: 36.7 (07 Dec 2023 12:34)  T(F): 97.8 (08 Dec 2023 04:54), Max: 98 (07 Dec 2023 12:34)  HR: 81 (08 Dec 2023 04:54) (62 - 81)  BP: 129/72 (08 Dec 2023 04:54) (129/72 - 156/86)  BP(mean): --  RR: 17 (08 Dec 2023 04:54) (16 - 18)  SpO2: 99% (08 Dec 2023 04:54) (95% - 99%)    Parameters below as of 08 Dec 2023 04:54  Patient On (Oxygen Delivery Method): room air      CONSTITUTIONAL: Well-groomed, in no apparent distress  EYES: No conjunctival or scleral injection, non-icteric;   ENMT: No external nasal lesions; MMM  NECK: Trachea midline without palpable neck mass; thyroid not enlarged and non-tender  RESPIRATORY: Breathing comfortably; no dullness to percussion; lungs CTA without wheeze/rhonchi/rales  CARDIOVASCULAR: +S1S2, RRR, no M/G/R; pedal pulses full and symmetric; no lower extremity edema  GASTROINTESTINAL: No palpable masses or tenderness, +BS throughout, no rebound/guarding; no hepatosplenomegaly; no hernia palpated  LYMPHATIC: No cervical LAD or tenderness  SKIN: No rashes or ulcers noted  NEUROLOGIC: CN II-XII intact; sensation intact in LEs b/l to light touch (+) left sided dysmetria  PSYCHIATRIC: A+O x 3; mood and affect appropriate; appropriate insight and judgment      LABS:                        12.1   16.03 )-----------( 243      ( 07 Dec 2023 07:09 )             34.9     12-07    136  |  100  |  51<H>  ----------------------------<  100<H>  3.4<L>   |  23  |  1.71<H>    Ca    9.9      07 Dec 2023 07:15  Phos  2.8     12-07  Mg     1.9     12-07    TPro  6.9  /  Alb  4.0  /  TBili  0.4  /  DBili  x   /  AST  26  /  ALT  22  /  AlkPhos  67  12-07    PT/INR - ( 07 Dec 2023 13:44 )   PT: 11.1 sec;   INR: 1.01 ratio         PTT - ( 07 Dec 2023 13:44 )  PTT:25.9 sec      Urinalysis Basic - ( 07 Dec 2023 07:15 )    Color: x / Appearance: x / SG: x / pH: x  Gluc: 100 mg/dL / Ketone: x  / Bili: x / Urobili: x   Blood: x / Protein: x / Nitrite: x   Leuk Esterase: x / RBC: x / WBC x   Sq Epi: x / Non Sq Epi: x / Bacteria: x        COORDINATION OF CARE:  Care Discussed with Consultants/Other Providers [Y/N]: Yes  Prior or Outpatient Records Reviewed [Y/N]: Yes

## 2023-12-08 NOTE — PROGRESS NOTE ADULT - NUTRITIONAL ASSESSMENT
This patient has been assessed with a concern for Malnutrition and has been determined to have a diagnosis/diagnoses of Moderate protein-calorie malnutrition.    This patient is being managed with:   Diet NPO after Midnight-     NPO Start Date: 07-Dec-2023   NPO Start Time: 23:59  Except Medications  Entered: Dec  7 2023 11:06AM    Diet Regular-  DASH/TLC {Sodium & Cholesterol Restricted} (DASH)  Entered: Dec  4 2023  4:09PM

## 2023-12-08 NOTE — PROGRESS NOTE ADULT - ATTENDING COMMENTS
75 yo F with pMHx of Stage IV lung adenoca s/p chemotherapy 5 years ago previously in remission (oncologist Dr. Fredis Solomon at [facility]), COPD, HTN presents with unstable gait with CT head findings of 2.3 x 1.5 x 1.7 cm cyst versus centrally necrotic mass, confirmed on brain MRI as ring enhancing lesion in the right parietal region with significant vasogenic edema and 5mm midline shift. She self discontinued her immunotherapy ~ 4 years ago due to how it made her feel and was following at [place] with Dr. Solomon (oncology) with q6 month PET scans - last one was base of skull to pelvis in Oct 2023 as per pt was clean.  Notes extensive dental tooth extractions over past month for which has been intermittently on short courses of abx.  Active smoker 1-2ppd x 40+ yrs.  Also breast MRI/US with suspicious axillary LN 2022, went for biopsy but never completed as LN not visualized.  Most recent Chest CT this admit with no BHAKTI, demonstrating FRANSISCA peripheral GGO ~1.8cm as well a right perifissural opacity which has decreased in size compared to 2019 imaging.  Have no recent imaging from which to compare this FRANSISCA GGO.  At this juncture burden of pulmonary findings would be unlikely cause of a brain metastases.  Question whether brain lesion may also be an abscess?  Neurosurgery planning for resection 12/9/23.    Respiratory attempted bedside spirometry but patient with excessive coughing during attempts.  Remains on RA.  Using Symbicort/Spiriva inpatient.  No need for rescue inhalers inpatient but no COPD exacerbations as outpatient in past >6 months.  ADLs not limited from respiratory standpoint, reports dyspnea when climbing several stairs, stops to rest at the top.    Recommend:  - rec hold off on biopsy for now of lung lesion and obtain records from [place]  - please call oncology - follows at [place] to obtain official report of PET CT from 10/2023 specifically  - Neurosurgery for resection of brain lesion, neoplasm vs abscess?  -continue LABA/ICS/LAMA (home inhaler is Breo)  - nicotine patch for nicotine dependence  - Patient is at intermediate risk from pulmonary standpoint for a high risk surgery.  She is optimized from respiratory standpoint for neurosurgical intervention.    Arely Ferrara MD 73 yo F with pMHx of Stage IV lung adenoca s/p chemotherapy 5 years ago previously in remission (oncologist Dr. Fredis Solomon at [facility]), COPD, HTN presents with unstable gait with CT head findings of 2.3 x 1.5 x 1.7 cm cyst versus centrally necrotic mass, confirmed on brain MRI as ring enhancing lesion in the right parietal region with significant vasogenic edema and 5mm midline shift. She self discontinued her immunotherapy ~ 4 years ago due to how it made her feel and was following at [place] with Dr. Solomon (oncology) with q6 month PET scans - last one was base of skull to pelvis in Oct 2023 as per pt was clean.  Notes extensive dental tooth extractions over past month for which has been intermittently on short courses of abx.  Active smoker 1-2ppd x 40+ yrs.  Also breast MRI/US with suspicious axillary LN 2022, went for biopsy but never completed as LN not visualized.  Most recent Chest CT this admit with no BHAKTI, demonstrating FRANSISCA peripheral GGO ~1.8cm as well a right perifissural opacity which has decreased in size compared to 2019 imaging.  Have no recent imaging from which to compare this FRANSISCA GGO.  At this juncture burden of pulmonary findings would be unlikely cause of a brain metastases.  Question whether brain lesion may also be an abscess?  Neurosurgery planning for resection 12/9/23.    Respiratory attempted bedside spirometry but patient with excessive coughing during attempts.  Remains on RA.  Using Symbicort/Spiriva inpatient.  No need for rescue inhalers inpatient but no COPD exacerbations as outpatient in past >6 months.  ADLs not limited from respiratory standpoint, reports dyspnea when climbing several stairs, stops to rest at the top.    Recommend:  - rec hold off on biopsy for now of lung lesion and obtain records from [place]  - please call oncology - follows at [place] to obtain official report of PET CT from 10/2023 specifically  - Neurosurgery for resection of brain lesion, neoplasm vs abscess?  -continue LABA/ICS/LAMA (home inhaler is Breo)  - nicotine patch for nicotine dependence  - Patient is at intermediate risk from pulmonary standpoint for a high risk surgery.  She is optimized from respiratory standpoint for neurosurgical intervention.    Arely Ferrara MD 73 yo F with pMHx of Stage IV lung adenoca s/p chemotherapy 5 years ago previously in remission (oncologist Dr. Fredis Solomon at [facility]), COPD, HTN presents with unstable gait with CT head findings of 2.3 x 1.5 x 1.7 cm cyst versus centrally necrotic mass, confirmed on brain MRI as ring enhancing lesion in the right parietal region with significant vasogenic edema and 5mm midline shift. She self discontinued her immunotherapy ~ 4 years ago due to how it made her feel and was following at [place] with Dr. Solomon (oncology) with q6 month PET scans - last one was base of skull to pelvis in Oct 2023 as per pt was clean.  Notes extensive dental tooth extractions over past month for which has been intermittently on short courses of abx.  Active smoker 1-2ppd x 40+ yrs.  Also breast MRI/US with suspicious axillary LN 2022, went for biopsy but never completed as LN not visualized.  Most recent Chest CT this admit with no BHAKTI, demonstrating FRANSISCA peripheral GGO ~1.8cm as well a right perifissural opacity which has decreased in size compared to 2019 imaging.  Have no recent imaging from which to compare this FRANSISCA GGO.  At this juncture burden of pulmonary findings would be unlikely cause of a brain metastases.  Question whether brain lesion may also be an abscess?  Neurosurgery planning for resection 12/9/23.    Respiratory attempted bedside spirometry but patient with excessive coughing during attempts. PFTs (2019): FEV1/FVC 68, FEV1 74%, FVC 83%, %, DLCO 67% - moderate obstruction with mildly reduced diffusion capacity.  Remains on RA.  Using Symbicort/Spiriva inpatient.  No need for rescue inhalers inpatient but no COPD exacerbations as outpatient in past >6 months.  ADLs not limited from respiratory standpoint, reports dyspnea when climbing several stairs, stops to rest at the top.    Recommend:  - rec hold off on biopsy for now of lung lesion and obtain records from [place]  - please call oncology - follows at [place] to obtain official report of PET CT from 10/2023 specifically  - Neurosurgery for resection of brain lesion, neoplasm vs abscess?  -continue LABA/ICS/LAMA (home inhaler is Breo)  - nicotine patch for nicotine dependence  - Patient is at intermediate risk from pulmonary standpoint for a high risk surgery.  She is optimized from respiratory standpoint for neurosurgical intervention.    Arely Ferrara MD 75 yo F with pMHx of Stage IV lung adenoca s/p chemotherapy 5 years ago previously in remission (oncologist Dr. Fredis Solomon at [facility]), COPD, HTN presents with unstable gait with CT head findings of 2.3 x 1.5 x 1.7 cm cyst versus centrally necrotic mass, confirmed on brain MRI as ring enhancing lesion in the right parietal region with significant vasogenic edema and 5mm midline shift. She self discontinued her immunotherapy ~ 4 years ago due to how it made her feel and was following at [place] with Dr. Solomon (oncology) with q6 month PET scans - last one was base of skull to pelvis in Oct 2023 as per pt was clean.  Notes extensive dental tooth extractions over past month for which has been intermittently on short courses of abx.  Active smoker 1-2ppd x 40+ yrs.  Also breast MRI/US with suspicious axillary LN 2022, went for biopsy but never completed as LN not visualized.  Most recent Chest CT this admit with no BHAKTI, demonstrating FRANSISCA peripheral GGO ~1.8cm as well a right perifissural opacity which has decreased in size compared to 2019 imaging.  Have no recent imaging from which to compare this FRANSISCA GGO.  At this juncture burden of pulmonary findings would be unlikely cause of a brain metastases.  Question whether brain lesion may also be an abscess?  Neurosurgery planning for resection 12/9/23.    Respiratory attempted bedside spirometry but patient with excessive coughing during attempts. PFTs (2019): FEV1/FVC 68, FEV1 74%, FVC 83%, %, DLCO 67% - moderate obstruction with mildly reduced diffusion capacity.  Remains on RA.  Using Symbicort/Spiriva inpatient.  No need for rescue inhalers inpatient but no COPD exacerbations as outpatient in past >6 months.  ADLs not limited from respiratory standpoint, reports dyspnea when climbing several stairs, stops to rest at the top.    Recommend:  - rec hold off on biopsy for now of lung lesion and obtain records from [place]  - please call oncology - follows at [place] to obtain official report of PET CT from 10/2023 specifically  - Neurosurgery for resection of brain lesion, neoplasm vs abscess?  -continue LABA/ICS/LAMA (home inhaler is Breo)  - nicotine patch for nicotine dependence  - Patient is at intermediate risk from pulmonary standpoint for a high risk surgery.  She is optimized from respiratory standpoint for neurosurgical intervention.    Arely Ferrara MD

## 2023-12-08 NOTE — CONSULT NOTE ADULT - ASSESSMENT
74 y.o F w/ hx of lung cancer s/p chemotherapy 5 years ago, COPD, HTN, CKD here with unsteady gait and CT head finding of cyst vs centrally necrotic mass in right parietal region. Nephrology consulted for TEREZA on CKD and hyponatremia  78 y.o F w/ hx of lung cancer s/p chemotherapy 5 years ago, COPD, HTN, CKD here with unsteady gait and CT head finding of cyst vs centrally necrotic mass in right parietal region. Nephrology consulted for CKD and hyponatremia

## 2023-12-08 NOTE — PROGRESS NOTE ADULT - PROBLEM SELECTOR PLAN 2
- Previous history of lungf cancer, last chemotherapy 5 years ago, in remission  - Patient continued to smoke pack per day for 30-40 years.   - CT chest with findings of 18 mm groundglass nodule in the superior segment of the left lower lobe, which may reflect malignancy. Additional nodule along the right oblique fissure not well assessed secondary to motion.  - Pulm following  - Thoracic surgery not recommending surgical intervention at this time.   - To obtain outpatient records from patient's oncologist.   - Patient self discontinued chemotherapy for lung cancer 5 years ago due to side effects.   - Brain mass to be addressed prior to any pulmonary intervention  - Reached out to outpatient oncology

## 2023-12-08 NOTE — CONSULT NOTE ADULT - ATTENDING COMMENTS
Rest as per Dr. Lisandro Quinn MD  O: [phone]  Contact me on teams Rest as per Dr. Lisandro Quinn MD  O: [phone]  Contact me on teams

## 2023-12-08 NOTE — PROGRESS NOTE ADULT - ASSESSMENT
74 year old female with history of lung cancer s/p chemotherapy 5 years ago previously in remission (oncologist Dr. Fredis Solomon at [facility]), COPD, HTN presents with unstable gait with CT head findings of y 2.3 x 1.5 x 1.7 cm cyst versus centrally necrotic mass in the right parietal-temporal region.    Brain mass  Recent dental surgery and infection - ?could this be infectious  she auto-discontinued her immunotherapy ~ 4 years ago due to how it made her feel and was following at [place] with Dr. Solomon (oncology) with q6 month PET scans - last one was base of skull to pelvis in Oct 2023 as per pt was clean    Attempted bedside spirometry for risk-stratification pre-op; pt with poor effort, results not interpretable   at this time she is moderate risk from a pulm for a neurosurgical intervention due to active smoking and COPD, but no further optimization can be made; no objections to proceedintg as planned    Stage 4 lung adenocarcinoma  R fissure nodule PET avid in 2019  she has adenocarcinoma, MS stable, no mutations, biopsy done here Oct 2019 by thoracic; was on Keytruda  was on immunotherapy  now with ground glass accumulation L lung, R fissure (right was there previously and is smaller)  - rec hold off on biopsy for now of lung lesion and obtain records from [place]  - please call oncology - follows at [place]    COPD   Not on home O2  - continue home Breo Ellipta or Symbicort + albuterol PRN - not in exacerbation currently    Josh Salinas MD  Pulmonary/Critical Care  [phone] [facility]  [id number] [facility]    Patient will follow up with her pulmonologist upon discharge 74 year old female with history of lung cancer s/p chemotherapy 5 years ago previously in remission (oncologist Dr. Fredis Solomon at [facility]), COPD, HTN presents with unstable gait with CT head findings of y 2.3 x 1.5 x 1.7 cm cyst versus centrally necrotic mass in the right parietal-temporal region.    Brain mass  Recent dental surgery and infection - ?could this be infectious  she auto-discontinued her immunotherapy ~ 4 years ago due to how it made her feel and was following at [place] with Dr. Solomon (oncology) with q6 month PET scans - last one was base of skull to pelvis in Oct 2023 as per pt was clean    Attempted bedside spirometry for risk-stratification pre-op; pt with poor effort, results not interpretable   at this time she is moderate risk from a pulm for a neurosurgical intervention due to active smoking and COPD, but no further optimization can be made; no objections to proceedintg as planned    Stage 4 lung adenocarcinoma  R fissure nodule PET avid in 2019  she has adenocarcinoma, MS stable, no mutations, biopsy done here Oct 2019 by thoracic; was on Keytruda  was on immunotherapy  now with ground glass accumulation L lung, R fissure (right was there previously and is smaller)  - rec hold off on biopsy for now of lung lesion and obtain records from [place]  - please call oncology - follows at [place]    COPD   Not on home O2  - continue home Breo Ellipta or Symbicort + albuterol PRN - not in exacerbation currently    Josh Salinas MD  Pulmonary/Critical Care  [phone] [facility]  [id number] [facility]    Patient will follow up with her pulmonologist upon discharge

## 2023-12-08 NOTE — PROGRESS NOTE ADULT - ATTENDING COMMENTS
78F Hx bronchitis/COPD, HTN, OA, tricuspid valve regurg, solitary pulm nodule s/p chemo 2019 [facility] w/R parietal hypodensity  necrotic vs cystic 1.5 x 2.3 w/vasogenic edema. On CTH, diffuse vasogenic edema throughout the right frontal, parietal and temporal lobes; mass effect in the right cerebral hemisphere with 4.5 cm of midline shift to the left. MRI showes peripheral rim enhancing lesion with vasogenic edema likely primary malignancy neoplasm (unable to exclude metastatic). Plan for craintomy 12/9      Patient feels well, tolerating diet. Intermittent headache and unchanged blurry vision which she attributes to no glasses. nonfocal neuro exam    # Brain Mass  # RLL nodule  # Leukocytosis 2/2 likely steroid Induced  # TEREZA 2/2 pre-renal/ATN  # mild HypoNa  # COPD not on home 02  # solitary pulm nodule s/p chemotherapy 2019  # Falls  # renal cyst        -appreciate NSG recs, plan for resection concern for malignancy on 12/9. Medically optimized for resection. TEREZA with elevated BUN likely pre-renal (decrease PO intake) which improved with IVF and ATN (received contrast). Discussed with Dr. Barcenas neurosurgery.   -repeat BMP 8pm for Na off IVF  -appreciate pulm, thoracic surgery recs  -decadron 4 q6hr. keppra 500mg BID  -asp precautions, seizure precautions  -right renal lesion- MRI opt follow up    d/w HS    The necessity of the time spent during the encounter on this date of service was due to:   - Ordering, reviewing, and interpreting labs, testing, and imaging.  - Independently obtaining a review of systems and performing a physical exam  - Reviewing prior hospitalization and where necessary, outpatient records.  - Counselling and educating patient and family regarding interpretation of aforementioned items and plan of care.    Time Spent 50 minutes    Maira Goldberg  Division of Hospital Medicine  Available on [employer] Teams 78F Hx bronchitis/COPD, HTN, OA, tricuspid valve regurg, solitary pulm nodule s/p chemo [facility] w/R parietal hypodensity  necrotic vs cystic 1.5 x 2.3 w/vasogenic edema. On CTH, diffuse vasogenic edema throughout the right frontal, parietal and temporal lobes; mass effect in the right cerebral hemisphere with 4.5 cm of midline shift to the left. MRI showes peripheral rim enhancing lesion with vasogenic edema likely primary malignancy neoplasm (unable to exclude metastatic). Plan for craintomy 12/9      Patient feels well, tolerating diet. Intermittent headache and unchanged blurry vision which she attributes to no glasses. nonfocal neuro exam    # Brain Mass  # RLL nodule  # Leukocytosis 2/2 likely steroid Induced  # TEREZA 2/2 pre-renal/ATN  # mild HypoNa  # COPD not on home 02  # solitary pulm nodule s/p chemotherapy 2019  # Falls  # renal cyst        -appreciate NSG recs, plan for resection concern for malignancy on 12/9. Medically optimized for resection. TEREZA with elevated BUN likely pre-renal (decrease PO intake) which improved with IVF and ATN (received contrast). Discussed with Dr. Barcenas neurosurgery.   -repeat BMP 8pm for Na off IVF  -appreciate pulm, thoracic surgery recs  -decadron 4 q6hr. keppra 500mg BID  -asp precautions, seizure precautions  -right renal lesion- MRI opt follow up    d/w HS    The necessity of the time spent during the encounter on this date of service was due to:   - Ordering, reviewing, and interpreting labs, testing, and imaging.  - Independently obtaining a review of systems and performing a physical exam  - Reviewing prior hospitalization and where necessary, outpatient records.  - Counselling and educating patient and family regarding interpretation of aforementioned items and plan of care.    Time Spent 50 minutes    Maira Goldberg  Division of Hospital Medicine  Available on [employer] Teams

## 2023-12-09 ENCOUNTER — APPOINTMENT (OUTPATIENT)
Dept: NEUROSURGERY | Facility: HOSPITAL | Age: 78
End: 2023-12-09

## 2023-12-09 ENCOUNTER — TRANSCRIPTION ENCOUNTER (OUTPATIENT)
Age: 78
End: 2023-12-09

## 2023-12-09 ENCOUNTER — RESULT REVIEW (OUTPATIENT)
Age: 78
End: 2023-12-09

## 2023-12-09 LAB
ALBUMIN SERPL ELPH-MCNC: 3.5 G/DL — SIGNIFICANT CHANGE UP (ref 3.3–5)
ALBUMIN SERPL ELPH-MCNC: 3.5 G/DL — SIGNIFICANT CHANGE UP (ref 3.3–5)
ALP SERPL-CCNC: 68 U/L — SIGNIFICANT CHANGE UP (ref 40–120)
ALP SERPL-CCNC: 68 U/L — SIGNIFICANT CHANGE UP (ref 40–120)
ALT FLD-CCNC: 24 U/L — SIGNIFICANT CHANGE UP (ref 10–45)
ALT FLD-CCNC: 24 U/L — SIGNIFICANT CHANGE UP (ref 10–45)
ANION GAP SERPL CALC-SCNC: 10 MMOL/L — SIGNIFICANT CHANGE UP (ref 5–17)
ANION GAP SERPL CALC-SCNC: 10 MMOL/L — SIGNIFICANT CHANGE UP (ref 5–17)
ANION GAP SERPL CALC-SCNC: 12 MMOL/L — SIGNIFICANT CHANGE UP (ref 5–17)
APTT BLD: 22.9 SEC — LOW (ref 24.5–35.6)
APTT BLD: 22.9 SEC — LOW (ref 24.5–35.6)
AST SERPL-CCNC: 18 U/L — SIGNIFICANT CHANGE UP (ref 10–40)
AST SERPL-CCNC: 18 U/L — SIGNIFICANT CHANGE UP (ref 10–40)
BASOPHILS # BLD AUTO: 0.01 K/UL — SIGNIFICANT CHANGE UP (ref 0–0.2)
BASOPHILS # BLD AUTO: 0.01 K/UL — SIGNIFICANT CHANGE UP (ref 0–0.2)
BASOPHILS NFR BLD AUTO: 0.1 % — SIGNIFICANT CHANGE UP (ref 0–2)
BASOPHILS NFR BLD AUTO: 0.1 % — SIGNIFICANT CHANGE UP (ref 0–2)
BILIRUB SERPL-MCNC: 0.3 MG/DL — SIGNIFICANT CHANGE UP (ref 0.2–1.2)
BILIRUB SERPL-MCNC: 0.3 MG/DL — SIGNIFICANT CHANGE UP (ref 0.2–1.2)
BLD GP AB SCN SERPL QL: NEGATIVE — SIGNIFICANT CHANGE UP
BLD GP AB SCN SERPL QL: NEGATIVE — SIGNIFICANT CHANGE UP
BUN SERPL-MCNC: 46 MG/DL — HIGH (ref 7–23)
BUN SERPL-MCNC: 46 MG/DL — HIGH (ref 7–23)
BUN SERPL-MCNC: 52 MG/DL — HIGH (ref 7–23)
BUN SERPL-MCNC: 52 MG/DL — HIGH (ref 7–23)
BUN SERPL-MCNC: 53 MG/DL — HIGH (ref 7–23)
BUN SERPL-MCNC: 53 MG/DL — HIGH (ref 7–23)
CALCIUM SERPL-MCNC: 7.8 MG/DL — LOW (ref 8.4–10.5)
CALCIUM SERPL-MCNC: 7.8 MG/DL — LOW (ref 8.4–10.5)
CALCIUM SERPL-MCNC: 8.7 MG/DL — SIGNIFICANT CHANGE UP (ref 8.4–10.5)
CALCIUM SERPL-MCNC: 8.7 MG/DL — SIGNIFICANT CHANGE UP (ref 8.4–10.5)
CALCIUM SERPL-MCNC: 8.8 MG/DL — SIGNIFICANT CHANGE UP (ref 8.4–10.5)
CALCIUM SERPL-MCNC: 8.8 MG/DL — SIGNIFICANT CHANGE UP (ref 8.4–10.5)
CHLORIDE SERPL-SCNC: 101 MMOL/L — SIGNIFICANT CHANGE UP (ref 96–108)
CHLORIDE SERPL-SCNC: 101 MMOL/L — SIGNIFICANT CHANGE UP (ref 96–108)
CHLORIDE SERPL-SCNC: 102 MMOL/L — SIGNIFICANT CHANGE UP (ref 96–108)
CHLORIDE SERPL-SCNC: 102 MMOL/L — SIGNIFICANT CHANGE UP (ref 96–108)
CHLORIDE SERPL-SCNC: 98 MMOL/L — SIGNIFICANT CHANGE UP (ref 96–108)
CHLORIDE SERPL-SCNC: 98 MMOL/L — SIGNIFICANT CHANGE UP (ref 96–108)
CO2 SERPL-SCNC: 21 MMOL/L — LOW (ref 22–31)
CO2 SERPL-SCNC: 21 MMOL/L — LOW (ref 22–31)
CO2 SERPL-SCNC: 23 MMOL/L — SIGNIFICANT CHANGE UP (ref 22–31)
CO2 SERPL-SCNC: 23 MMOL/L — SIGNIFICANT CHANGE UP (ref 22–31)
CO2 SERPL-SCNC: 24 MMOL/L — SIGNIFICANT CHANGE UP (ref 22–31)
CO2 SERPL-SCNC: 24 MMOL/L — SIGNIFICANT CHANGE UP (ref 22–31)
CREAT SERPL-MCNC: 1.42 MG/DL — HIGH (ref 0.5–1.3)
CREAT SERPL-MCNC: 1.42 MG/DL — HIGH (ref 0.5–1.3)
CREAT SERPL-MCNC: 1.56 MG/DL — HIGH (ref 0.5–1.3)
CREAT SERPL-MCNC: 1.56 MG/DL — HIGH (ref 0.5–1.3)
CREAT SERPL-MCNC: 1.59 MG/DL — HIGH (ref 0.5–1.3)
CREAT SERPL-MCNC: 1.59 MG/DL — HIGH (ref 0.5–1.3)
EGFR: 33 ML/MIN/1.73M2 — LOW
EGFR: 33 ML/MIN/1.73M2 — LOW
EGFR: 34 ML/MIN/1.73M2 — LOW
EGFR: 34 ML/MIN/1.73M2 — LOW
EGFR: 38 ML/MIN/1.73M2 — LOW
EGFR: 38 ML/MIN/1.73M2 — LOW
EOSINOPHIL # BLD AUTO: 0.01 K/UL — SIGNIFICANT CHANGE UP (ref 0–0.5)
EOSINOPHIL # BLD AUTO: 0.01 K/UL — SIGNIFICANT CHANGE UP (ref 0–0.5)
EOSINOPHIL NFR BLD AUTO: 0.1 % — SIGNIFICANT CHANGE UP (ref 0–6)
EOSINOPHIL NFR BLD AUTO: 0.1 % — SIGNIFICANT CHANGE UP (ref 0–6)
GLUCOSE SERPL-MCNC: 108 MG/DL — HIGH (ref 70–99)
GLUCOSE SERPL-MCNC: 108 MG/DL — HIGH (ref 70–99)
GLUCOSE SERPL-MCNC: 109 MG/DL — HIGH (ref 70–99)
GLUCOSE SERPL-MCNC: 109 MG/DL — HIGH (ref 70–99)
GLUCOSE SERPL-MCNC: 117 MG/DL — HIGH (ref 70–99)
GLUCOSE SERPL-MCNC: 117 MG/DL — HIGH (ref 70–99)
HCT VFR BLD CALC: 33.1 % — LOW (ref 34.5–45)
HCT VFR BLD CALC: 33.1 % — LOW (ref 34.5–45)
HCT VFR BLD CALC: 34.8 % — SIGNIFICANT CHANGE UP (ref 34.5–45)
HCT VFR BLD CALC: 34.8 % — SIGNIFICANT CHANGE UP (ref 34.5–45)
HGB BLD-MCNC: 11.2 G/DL — LOW (ref 11.5–15.5)
HGB BLD-MCNC: 11.2 G/DL — LOW (ref 11.5–15.5)
HGB BLD-MCNC: 11.9 G/DL — SIGNIFICANT CHANGE UP (ref 11.5–15.5)
HGB BLD-MCNC: 11.9 G/DL — SIGNIFICANT CHANGE UP (ref 11.5–15.5)
IMM GRANULOCYTES NFR BLD AUTO: 1.7 % — HIGH (ref 0–0.9)
IMM GRANULOCYTES NFR BLD AUTO: 1.7 % — HIGH (ref 0–0.9)
INR BLD: 1.06 RATIO — SIGNIFICANT CHANGE UP (ref 0.85–1.18)
INR BLD: 1.06 RATIO — SIGNIFICANT CHANGE UP (ref 0.85–1.18)
LYMPHOCYTES # BLD AUTO: 0.94 K/UL — LOW (ref 1–3.3)
LYMPHOCYTES # BLD AUTO: 0.94 K/UL — LOW (ref 1–3.3)
LYMPHOCYTES # BLD AUTO: 6.7 % — LOW (ref 13–44)
LYMPHOCYTES # BLD AUTO: 6.7 % — LOW (ref 13–44)
MAGNESIUM SERPL-MCNC: 1.6 MG/DL — SIGNIFICANT CHANGE UP (ref 1.6–2.6)
MAGNESIUM SERPL-MCNC: 1.6 MG/DL — SIGNIFICANT CHANGE UP (ref 1.6–2.6)
MAGNESIUM SERPL-MCNC: 1.8 MG/DL — SIGNIFICANT CHANGE UP (ref 1.6–2.6)
MAGNESIUM SERPL-MCNC: 1.8 MG/DL — SIGNIFICANT CHANGE UP (ref 1.6–2.6)
MCHC RBC-ENTMCNC: 29.5 PG — SIGNIFICANT CHANGE UP (ref 27–34)
MCHC RBC-ENTMCNC: 29.5 PG — SIGNIFICANT CHANGE UP (ref 27–34)
MCHC RBC-ENTMCNC: 29.8 PG — SIGNIFICANT CHANGE UP (ref 27–34)
MCHC RBC-ENTMCNC: 29.8 PG — SIGNIFICANT CHANGE UP (ref 27–34)
MCHC RBC-ENTMCNC: 33.8 GM/DL — SIGNIFICANT CHANGE UP (ref 32–36)
MCHC RBC-ENTMCNC: 33.8 GM/DL — SIGNIFICANT CHANGE UP (ref 32–36)
MCHC RBC-ENTMCNC: 34.2 GM/DL — SIGNIFICANT CHANGE UP (ref 32–36)
MCHC RBC-ENTMCNC: 34.2 GM/DL — SIGNIFICANT CHANGE UP (ref 32–36)
MCV RBC AUTO: 86.1 FL — SIGNIFICANT CHANGE UP (ref 80–100)
MCV RBC AUTO: 86.1 FL — SIGNIFICANT CHANGE UP (ref 80–100)
MCV RBC AUTO: 88 FL — SIGNIFICANT CHANGE UP (ref 80–100)
MCV RBC AUTO: 88 FL — SIGNIFICANT CHANGE UP (ref 80–100)
MONOCYTES # BLD AUTO: 0.69 K/UL — SIGNIFICANT CHANGE UP (ref 0–0.9)
MONOCYTES # BLD AUTO: 0.69 K/UL — SIGNIFICANT CHANGE UP (ref 0–0.9)
MONOCYTES NFR BLD AUTO: 4.9 % — SIGNIFICANT CHANGE UP (ref 2–14)
MONOCYTES NFR BLD AUTO: 4.9 % — SIGNIFICANT CHANGE UP (ref 2–14)
NEUTROPHILS # BLD AUTO: 12.2 K/UL — HIGH (ref 1.8–7.4)
NEUTROPHILS # BLD AUTO: 12.2 K/UL — HIGH (ref 1.8–7.4)
NEUTROPHILS NFR BLD AUTO: 86.5 % — HIGH (ref 43–77)
NEUTROPHILS NFR BLD AUTO: 86.5 % — HIGH (ref 43–77)
NRBC # BLD: 0 /100 WBCS — SIGNIFICANT CHANGE UP (ref 0–0)
PHOSPHATE SERPL-MCNC: 3.5 MG/DL — SIGNIFICANT CHANGE UP (ref 2.5–4.5)
PHOSPHATE SERPL-MCNC: 3.5 MG/DL — SIGNIFICANT CHANGE UP (ref 2.5–4.5)
PHOSPHATE SERPL-MCNC: 4.2 MG/DL — SIGNIFICANT CHANGE UP (ref 2.5–4.5)
PHOSPHATE SERPL-MCNC: 4.2 MG/DL — SIGNIFICANT CHANGE UP (ref 2.5–4.5)
PLATELET # BLD AUTO: 235 K/UL — SIGNIFICANT CHANGE UP (ref 150–400)
PLATELET # BLD AUTO: 235 K/UL — SIGNIFICANT CHANGE UP (ref 150–400)
PLATELET # BLD AUTO: 248 K/UL — SIGNIFICANT CHANGE UP (ref 150–400)
PLATELET # BLD AUTO: 248 K/UL — SIGNIFICANT CHANGE UP (ref 150–400)
POTASSIUM SERPL-MCNC: 3.7 MMOL/L — SIGNIFICANT CHANGE UP (ref 3.5–5.3)
POTASSIUM SERPL-MCNC: 4 MMOL/L — SIGNIFICANT CHANGE UP (ref 3.5–5.3)
POTASSIUM SERPL-MCNC: 4 MMOL/L — SIGNIFICANT CHANGE UP (ref 3.5–5.3)
POTASSIUM SERPL-SCNC: 3.7 MMOL/L — SIGNIFICANT CHANGE UP (ref 3.5–5.3)
POTASSIUM SERPL-SCNC: 4 MMOL/L — SIGNIFICANT CHANGE UP (ref 3.5–5.3)
POTASSIUM SERPL-SCNC: 4 MMOL/L — SIGNIFICANT CHANGE UP (ref 3.5–5.3)
PROT SERPL-MCNC: 6.1 G/DL — SIGNIFICANT CHANGE UP (ref 6–8.3)
PROT SERPL-MCNC: 6.1 G/DL — SIGNIFICANT CHANGE UP (ref 6–8.3)
PROTHROM AB SERPL-ACNC: 11.1 SEC — SIGNIFICANT CHANGE UP (ref 9.5–13)
PROTHROM AB SERPL-ACNC: 11.1 SEC — SIGNIFICANT CHANGE UP (ref 9.5–13)
RBC # BLD: 3.76 M/UL — LOW (ref 3.8–5.2)
RBC # BLD: 3.76 M/UL — LOW (ref 3.8–5.2)
RBC # BLD: 4.04 M/UL — SIGNIFICANT CHANGE UP (ref 3.8–5.2)
RBC # BLD: 4.04 M/UL — SIGNIFICANT CHANGE UP (ref 3.8–5.2)
RBC # FLD: 13.1 % — SIGNIFICANT CHANGE UP (ref 10.3–14.5)
RBC # FLD: 13.1 % — SIGNIFICANT CHANGE UP (ref 10.3–14.5)
RBC # FLD: 13.3 % — SIGNIFICANT CHANGE UP (ref 10.3–14.5)
RBC # FLD: 13.3 % — SIGNIFICANT CHANGE UP (ref 10.3–14.5)
RH IG SCN BLD-IMP: POSITIVE — SIGNIFICANT CHANGE UP
RH IG SCN BLD-IMP: POSITIVE — SIGNIFICANT CHANGE UP
SODIUM SERPL-SCNC: 131 MMOL/L — LOW (ref 135–145)
SODIUM SERPL-SCNC: 131 MMOL/L — LOW (ref 135–145)
SODIUM SERPL-SCNC: 135 MMOL/L — SIGNIFICANT CHANGE UP (ref 135–145)
SODIUM SERPL-SCNC: 135 MMOL/L — SIGNIFICANT CHANGE UP (ref 135–145)
SODIUM SERPL-SCNC: 137 MMOL/L — SIGNIFICANT CHANGE UP (ref 135–145)
SODIUM SERPL-SCNC: 137 MMOL/L — SIGNIFICANT CHANGE UP (ref 135–145)
UUN UR-MCNC: 1251 MG/DL — SIGNIFICANT CHANGE UP
UUN UR-MCNC: 1251 MG/DL — SIGNIFICANT CHANGE UP
WBC # BLD: 13.42 K/UL — HIGH (ref 3.8–10.5)
WBC # BLD: 13.42 K/UL — HIGH (ref 3.8–10.5)
WBC # BLD: 14.09 K/UL — HIGH (ref 3.8–10.5)
WBC # BLD: 14.09 K/UL — HIGH (ref 3.8–10.5)
WBC # FLD AUTO: 13.42 K/UL — HIGH (ref 3.8–10.5)
WBC # FLD AUTO: 13.42 K/UL — HIGH (ref 3.8–10.5)
WBC # FLD AUTO: 14.09 K/UL — HIGH (ref 3.8–10.5)
WBC # FLD AUTO: 14.09 K/UL — HIGH (ref 3.8–10.5)

## 2023-12-09 PROCEDURE — 88341 IMHCHEM/IMCYTCHM EA ADD ANTB: CPT | Mod: 26,59

## 2023-12-09 PROCEDURE — 61510 CRNEC TREPH EXC BRN TUM STTL: CPT

## 2023-12-09 PROCEDURE — 88342 IMHCHEM/IMCYTCHM 1ST ANTB: CPT | Mod: 26,59

## 2023-12-09 PROCEDURE — 99232 SBSQ HOSP IP/OBS MODERATE 35: CPT | Mod: GC

## 2023-12-09 PROCEDURE — 88307 TISSUE EXAM BY PATHOLOGIST: CPT | Mod: 26

## 2023-12-09 PROCEDURE — 88360 TUMOR IMMUNOHISTOCHEM/MANUAL: CPT | Mod: 26

## 2023-12-09 PROCEDURE — 99222 1ST HOSP IP/OBS MODERATE 55: CPT | Mod: GC

## 2023-12-09 PROCEDURE — 61781 SCAN PROC CRANIAL INTRA: CPT

## 2023-12-09 DEVICE — ELCTR SPINAL KIT (6 CONTACTS): Type: IMPLANTABLE DEVICE | Site: RIGHT | Status: FUNCTIONAL

## 2023-12-09 DEVICE — SURGIFOAM PAD 8CM X 12.5CM X 10MM (100): Type: IMPLANTABLE DEVICE | Site: RIGHT | Status: FUNCTIONAL

## 2023-12-09 DEVICE — SURGICEL FIBRILLAR 2 X 4": Type: IMPLANTABLE DEVICE | Site: RIGHT | Status: FUNCTIONAL

## 2023-12-09 DEVICE — SCRX-DRIVE 1.5X4MM: Type: IMPLANTABLE DEVICE | Site: RIGHT | Status: FUNCTIONAL

## 2023-12-09 DEVICE — SURGICEL 2 X 14": Type: IMPLANTABLE DEVICE | Site: RIGHT | Status: FUNCTIONAL

## 2023-12-09 DEVICE — SURGIFLO MATRIX WITH THROMBIN KIT: Type: IMPLANTABLE DEVICE | Site: RIGHT | Status: FUNCTIONAL

## 2023-12-09 DEVICE — KIT A-LINE 1LUM 20G X 12CM SAFE KIT: Type: IMPLANTABLE DEVICE | Site: RIGHT | Status: FUNCTIONAL

## 2023-12-09 DEVICE — IMPLANTABLE DEVICE: Type: IMPLANTABLE DEVICE | Site: RIGHT | Status: FUNCTIONAL

## 2023-12-09 RX ORDER — DEXAMETHASONE 0.5 MG/5ML
ELIXIR ORAL
Refills: 0 | Status: DISCONTINUED | OUTPATIENT
Start: 2023-12-09 | End: 2023-12-15

## 2023-12-09 RX ORDER — SENNA PLUS 8.6 MG/1
2 TABLET ORAL AT BEDTIME
Refills: 0 | Status: DISCONTINUED | OUTPATIENT
Start: 2023-12-09 | End: 2023-12-15

## 2023-12-09 RX ORDER — DEXAMETHASONE 0.5 MG/5ML
4 ELIXIR ORAL EVERY 12 HOURS
Refills: 0 | Status: CANCELLED | OUTPATIENT
Start: 2023-12-17 | End: 2023-12-15

## 2023-12-09 RX ORDER — ESCITALOPRAM OXALATE 10 MG/1
10 TABLET, FILM COATED ORAL DAILY
Refills: 0 | Status: DISCONTINUED | OUTPATIENT
Start: 2023-12-09 | End: 2023-12-15

## 2023-12-09 RX ORDER — DEXAMETHASONE 0.5 MG/5ML
4 ELIXIR ORAL EVERY 6 HOURS
Refills: 0 | Status: COMPLETED | OUTPATIENT
Start: 2023-12-09 | End: 2023-12-13

## 2023-12-09 RX ORDER — FAMOTIDINE 10 MG/ML
10 INJECTION INTRAVENOUS DAILY
Refills: 0 | Status: DISCONTINUED | OUTPATIENT
Start: 2023-12-09 | End: 2023-12-15

## 2023-12-09 RX ORDER — ACETAMINOPHEN 500 MG
1000 TABLET ORAL ONCE
Refills: 0 | Status: DISCONTINUED | OUTPATIENT
Start: 2023-12-09 | End: 2023-12-09

## 2023-12-09 RX ORDER — SODIUM CHLORIDE 9 MG/ML
1000 INJECTION INTRAMUSCULAR; INTRAVENOUS; SUBCUTANEOUS
Refills: 0 | Status: DISCONTINUED | OUTPATIENT
Start: 2023-12-09 | End: 2023-12-10

## 2023-12-09 RX ORDER — DEXAMETHASONE 0.5 MG/5ML
2 ELIXIR ORAL EVERY 12 HOURS
Refills: 0 | Status: CANCELLED | OUTPATIENT
Start: 2023-12-21 | End: 2023-12-15

## 2023-12-09 RX ORDER — NICOTINE POLACRILEX 2 MG
1 GUM BUCCAL DAILY
Refills: 0 | Status: DISCONTINUED | OUTPATIENT
Start: 2023-12-09 | End: 2023-12-15

## 2023-12-09 RX ORDER — DEXAMETHASONE 0.5 MG/5ML
2 ELIXIR ORAL EVERY 8 HOURS
Refills: 0 | Status: CANCELLED | OUTPATIENT
Start: 2023-12-19 | End: 2023-12-15

## 2023-12-09 RX ORDER — DEXAMETHASONE 0.5 MG/5ML
4 ELIXIR ORAL EVERY 8 HOURS
Refills: 0 | Status: DISCONTINUED | OUTPATIENT
Start: 2023-12-14 | End: 2023-12-15

## 2023-12-09 RX ORDER — FAMOTIDINE 10 MG/ML
20 INJECTION INTRAVENOUS EVERY 12 HOURS
Refills: 0 | Status: DISCONTINUED | OUTPATIENT
Start: 2023-12-09 | End: 2023-12-09

## 2023-12-09 RX ORDER — CEFAZOLIN SODIUM 1 G
2000 VIAL (EA) INJECTION EVERY 8 HOURS
Refills: 0 | Status: COMPLETED | OUTPATIENT
Start: 2023-12-09 | End: 2023-12-10

## 2023-12-09 RX ORDER — ACETAMINOPHEN 500 MG
650 TABLET ORAL EVERY 6 HOURS
Refills: 0 | Status: DISCONTINUED | OUTPATIENT
Start: 2023-12-09 | End: 2023-12-15

## 2023-12-09 RX ORDER — ONDANSETRON 8 MG/1
4 TABLET, FILM COATED ORAL EVERY 6 HOURS
Refills: 0 | Status: DISCONTINUED | OUTPATIENT
Start: 2023-12-09 | End: 2023-12-15

## 2023-12-09 RX ORDER — DEXAMETHASONE 0.5 MG/5ML
2 ELIXIR ORAL DAILY
Refills: 0 | Status: CANCELLED | OUTPATIENT
Start: 2023-12-23 | End: 2023-12-15

## 2023-12-09 RX ORDER — DEXAMETHASONE 0.5 MG/5ML
4 ELIXIR ORAL EVERY 6 HOURS
Refills: 0 | Status: DISCONTINUED | OUTPATIENT
Start: 2023-12-09 | End: 2023-12-09

## 2023-12-09 RX ORDER — OXYCODONE HYDROCHLORIDE 5 MG/1
5 TABLET ORAL EVERY 4 HOURS
Refills: 0 | Status: DISCONTINUED | OUTPATIENT
Start: 2023-12-09 | End: 2023-12-15

## 2023-12-09 RX ORDER — LEVETIRACETAM 250 MG/1
250 TABLET, FILM COATED ORAL EVERY 12 HOURS
Refills: 0 | Status: DISCONTINUED | OUTPATIENT
Start: 2023-12-09 | End: 2023-12-15

## 2023-12-09 RX ORDER — ACETAMINOPHEN 500 MG
1000 TABLET ORAL ONCE
Refills: 0 | Status: COMPLETED | OUTPATIENT
Start: 2023-12-09 | End: 2023-12-09

## 2023-12-09 RX ORDER — BUDESONIDE AND FORMOTEROL FUMARATE DIHYDRATE 160; 4.5 UG/1; UG/1
2 AEROSOL RESPIRATORY (INHALATION)
Refills: 0 | Status: DISCONTINUED | OUTPATIENT
Start: 2023-12-09 | End: 2023-12-15

## 2023-12-09 RX ADMIN — SODIUM CHLORIDE 50 MILLILITER(S): 9 INJECTION INTRAMUSCULAR; INTRAVENOUS; SUBCUTANEOUS at 02:46

## 2023-12-09 RX ADMIN — SODIUM CHLORIDE 75 MILLILITER(S): 9 INJECTION INTRAMUSCULAR; INTRAVENOUS; SUBCUTANEOUS at 20:14

## 2023-12-09 RX ADMIN — LEVETIRACETAM 500 MILLIGRAM(S): 250 TABLET, FILM COATED ORAL at 05:06

## 2023-12-09 RX ADMIN — Medication 4 MILLIGRAM(S): at 11:47

## 2023-12-09 RX ADMIN — SENNA PLUS 2 TABLET(S): 8.6 TABLET ORAL at 22:20

## 2023-12-09 RX ADMIN — Medication 650 MILLIGRAM(S): at 06:30

## 2023-12-09 RX ADMIN — Medication 650 MILLIGRAM(S): at 05:06

## 2023-12-09 RX ADMIN — OXYCODONE HYDROCHLORIDE 5 MILLIGRAM(S): 5 TABLET ORAL at 23:45

## 2023-12-09 RX ADMIN — ESCITALOPRAM OXALATE 10 MILLIGRAM(S): 10 TABLET, FILM COATED ORAL at 11:47

## 2023-12-09 RX ADMIN — Medication 100 MILLIGRAM(S): at 22:19

## 2023-12-09 RX ADMIN — Medication 400 MILLIGRAM(S): at 20:12

## 2023-12-09 RX ADMIN — Medication 1 MILLIGRAM(S): at 11:47

## 2023-12-09 RX ADMIN — Medication 1 PATCH: at 11:47

## 2023-12-09 RX ADMIN — SODIUM CHLORIDE 50 MILLILITER(S): 9 INJECTION INTRAMUSCULAR; INTRAVENOUS; SUBCUTANEOUS at 02:43

## 2023-12-09 RX ADMIN — Medication 4 MILLIGRAM(S): at 23:45

## 2023-12-09 RX ADMIN — TIOTROPIUM BROMIDE 2 PUFF(S): 18 CAPSULE ORAL; RESPIRATORY (INHALATION) at 11:49

## 2023-12-09 RX ADMIN — Medication 1 PATCH: at 20:00

## 2023-12-09 RX ADMIN — Medication 4 MILLIGRAM(S): at 01:33

## 2023-12-09 RX ADMIN — Medication 4 MILLIGRAM(S): at 05:06

## 2023-12-09 RX ADMIN — Medication 1000 MILLIGRAM(S): at 20:42

## 2023-12-09 NOTE — PROGRESS NOTE ADULT - SUBJECTIVE AND OBJECTIVE BOX
PROGRESS NOTE:   Authored by Parag Joseph MD  Internal Medicine      Patient is a 78y old  Female who presents with a chief complaint of Unsteady gai, mass noted on CT head (08 Dec 2023 20:08)      SUBJECTIVE / OVERNIGHT EVENTS: NAEO, patient seen and examined at bedside    MEDICATIONS  (STANDING):  budesonide 160 MICROgram(s)/formoterol 4.5 MICROgram(s) Inhaler 2 Puff(s) Inhalation two times a day  dexAMETHasone     Tablet 4 milliGRAM(s) Oral every 6 hours  escitalopram 10 milliGRAM(s) Oral daily  folic acid 1 milliGRAM(s) Oral daily  influenza  Vaccine (HIGH DOSE) 0.7 milliLiter(s) IntraMuscular once  levETIRAcetam 500 milliGRAM(s) Oral two times a day  nicotine -  14 mG/24Hr(s) Patch 1 Patch Transdermal daily  sodium chloride 0.9%. 1000 milliLiter(s) (50 mL/Hr) IV Continuous <Continuous>  tiotropium 2.5 MICROgram(s) Inhaler 2 Puff(s) Inhalation daily    MEDICATIONS  (PRN):  acetaminophen     Tablet .. 650 milliGRAM(s) Oral every 6 hours PRN Temp greater or equal to 38C (100.4F), Mild Pain (1 - 3)      CAPILLARY BLOOD GLUCOSE        I&O's Summary    08 Dec 2023 07:01  -  09 Dec 2023 07:00  --------------------------------------------------------  IN: 240 mL / OUT: 0 mL / NET: 240 mL        PHYSICAL EXAM:  Vital Signs Last 24 Hrs  T(C): 36.6 (09 Dec 2023 04:56), Max: 36.6 (09 Dec 2023 04:56)  T(F): 97.8 (09 Dec 2023 04:56), Max: 97.8 (09 Dec 2023 04:56)  HR: 70 (09 Dec 2023 04:56) (63 - 70)  BP: 156/81 (09 Dec 2023 04:56) (136/82 - 156/81)  BP(mean): --  RR: 18 (09 Dec 2023 04:56) (18 - 18)  SpO2: 97% (09 Dec 2023 04:56) (97% - 100%)    Parameters below as of 09 Dec 2023 04:56  Patient On (Oxygen Delivery Method): room air      CONSTITUTIONAL: Well-groomed, in no apparent distress  EYES: No conjunctival or scleral injection, non-icteric;   ENMT: No external nasal lesions; MMM  NECK: Trachea midline without palpable neck mass; thyroid not enlarged and non-tender  RESPIRATORY: Breathing comfortably; no dullness to percussion; lungs CTA without wheeze/rhonchi/rales  CARDIOVASCULAR: +S1S2, RRR, no M/G/R; pedal pulses full and symmetric; no lower extremity edema  GASTROINTESTINAL: No palpable masses or tenderness, +BS throughout, no rebound/guarding; no hepatosplenomegaly; no hernia palpated  LYMPHATIC: No cervical LAD or tenderness  SKIN: No rashes or ulcers noted  NEUROLOGIC: CN II-XII intact; sensation intact in LEs b/l to light touch  PSYCHIATRIC: A+O x 3; mood and affect appropriate; appropriate insight and judgment    LABS:                        11.9   14.09 )-----------( 248      ( 09 Dec 2023 05:38 )             34.8     12-09    135  |  102  |  52<H>  ----------------------------<  109<H>  3.7   |  23  |  1.59<H>    Ca    8.7      09 Dec 2023 05:39  Phos  3.5     12-09  Mg     1.6     12-09    TPro  6.1  /  Alb  3.5  /  TBili  0.3  /  DBili  x   /  AST  18  /  ALT  24  /  AlkPhos  68  12-09    PT/INR - ( 09 Dec 2023 05:39 )   PT: 11.1 sec;   INR: 1.06 ratio         PTT - ( 09 Dec 2023 05:39 )  PTT:22.9 sec      Urinalysis Basic - ( 09 Dec 2023 05:39 )    Color: x / Appearance: x / SG: x / pH: x  Gluc: 109 mg/dL / Ketone: x  / Bili: x / Urobili: x   Blood: x / Protein: x / Nitrite: x   Leuk Esterase: x / RBC: x / WBC x   Sq Epi: x / Non Sq Epi: x / Bacteria: x      COORDINATION OF CARE:  Care Discussed with Consultants/Other Providers [Y/N]: Y  Prior or Outpatient Records Reviewed [Y/N]: Y   PROGRESS NOTE:   Authored by Parag Joseph MD  Internal Medicine      Patient is a 78y old  Female who presents with a chief complaint of Unsteady gai, mass noted on CT head (08 Dec 2023 20:08)      SUBJECTIVE / OVERNIGHT EVENTS:     NAEO, patient seen and examined at bedside  No new complaints     MEDICATIONS  (STANDING):  budesonide 160 MICROgram(s)/formoterol 4.5 MICROgram(s) Inhaler 2 Puff(s) Inhalation two times a day  dexAMETHasone     Tablet 4 milliGRAM(s) Oral every 6 hours  escitalopram 10 milliGRAM(s) Oral daily  folic acid 1 milliGRAM(s) Oral daily  influenza  Vaccine (HIGH DOSE) 0.7 milliLiter(s) IntraMuscular once  levETIRAcetam 500 milliGRAM(s) Oral two times a day  nicotine -  14 mG/24Hr(s) Patch 1 Patch Transdermal daily  sodium chloride 0.9%. 1000 milliLiter(s) (50 mL/Hr) IV Continuous <Continuous>  tiotropium 2.5 MICROgram(s) Inhaler 2 Puff(s) Inhalation daily    MEDICATIONS  (PRN):  acetaminophen     Tablet .. 650 milliGRAM(s) Oral every 6 hours PRN Temp greater or equal to 38C (100.4F), Mild Pain (1 - 3)      CAPILLARY BLOOD GLUCOSE        I&O's Summary    08 Dec 2023 07:01  -  09 Dec 2023 07:00  --------------------------------------------------------  IN: 240 mL / OUT: 0 mL / NET: 240 mL        PHYSICAL EXAM:  Vital Signs Last 24 Hrs  T(C): 36.6 (09 Dec 2023 04:56), Max: 36.6 (09 Dec 2023 04:56)  T(F): 97.8 (09 Dec 2023 04:56), Max: 97.8 (09 Dec 2023 04:56)  HR: 70 (09 Dec 2023 04:56) (63 - 70)  BP: 156/81 (09 Dec 2023 04:56) (136/82 - 156/81)  BP(mean): --  RR: 18 (09 Dec 2023 04:56) (18 - 18)  SpO2: 97% (09 Dec 2023 04:56) (97% - 100%)    Parameters below as of 09 Dec 2023 04:56  Patient On (Oxygen Delivery Method): room air      CONSTITUTIONAL: Well-groomed, in no apparent distress  EYES: No conjunctival or scleral injection, non-icteric;   ENMT: No external nasal lesions; MMM  NECK: Trachea midline without palpable neck mass; thyroid not enlarged and non-tender  RESPIRATORY: Breathing comfortably; no dullness to percussion; lungs CTA without wheeze/rhonchi/rales  CARDIOVASCULAR: +S1S2, RRR, no M/G/R; pedal pulses full and symmetric; no lower extremity edema  GASTROINTESTINAL: No palpable masses or tenderness, +BS throughout, no rebound/guarding; no hepatosplenomegaly; no hernia palpated  LYMPHATIC: No cervical LAD or tenderness  SKIN: No rashes or ulcers noted  NEUROLOGIC: CN II-XII intact; sensation intact in LEs b/l to light touch. (+) LEFT SIDED DYSMETRIA   PSYCHIATRIC: A+O x 3; mood and affect appropriate; appropriate insight and judgment    LABS:                        11.9   14.09 )-----------( 248      ( 09 Dec 2023 05:38 )             34.8     12-09    135  |  102  |  52<H>  ----------------------------<  109<H>  3.7   |  23  |  1.59<H>    Ca    8.7      09 Dec 2023 05:39  Phos  3.5     12-09  Mg     1.6     12-09    TPro  6.1  /  Alb  3.5  /  TBili  0.3  /  DBili  x   /  AST  18  /  ALT  24  /  AlkPhos  68  12-09    PT/INR - ( 09 Dec 2023 05:39 )   PT: 11.1 sec;   INR: 1.06 ratio         PTT - ( 09 Dec 2023 05:39 )  PTT:22.9 sec      Urinalysis Basic - ( 09 Dec 2023 05:39 )    Color: x / Appearance: x / SG: x / pH: x  Gluc: 109 mg/dL / Ketone: x  / Bili: x / Urobili: x   Blood: x / Protein: x / Nitrite: x   Leuk Esterase: x / RBC: x / WBC x   Sq Epi: x / Non Sq Epi: x / Bacteria: x      COORDINATION OF CARE:  Care Discussed with Consultants/Other Providers [Y/N]: Y  Prior or Outpatient Records Reviewed [Y/N]: Y

## 2023-12-09 NOTE — PROGRESS NOTE ADULT - ASSESSMENT
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     NEURO:  Neurochecks Q1H  Continue Dex 2 week taper per neurosurgery  Keppra 250 mg BID- renally dosed for seizure prophylaxis   CTH in the AM  MRI brain with and w/o contrast when can  Continue home Sertraline 10 mg QD  Activity: [x] mobilize as tolerated [] Bedrest [] PT [] OT [] PMNR    PULM:  History of COPD  Continue home meds     CV:  SBP goal 100-160 mmHg  At home on Triamterene-HCTZ- restart BP meds as needed    RENAL:  Fluids: IVF 75 cc/hour  History of CKD  At home on Gentesa for overactive bladder, restart     GI:  Diet: Dysphagia eval then ADAT  GI prophylaxis [] not indicated [x] Pepcid while on steroids [] other:  Bowel regimen [x] miralax [x] senna [] other:    ENDO:   Goal euglycemia (-180)  ISS  Monitor glucose while on steroids    HEME/ONC:  Obtain post op H/H   VTE prophylaxis: [x] SCDs [] chemoprophylaxis [x] hold chemoprophylaxis due to: recent post op [] high risk of DVT/PE on admission due to:    ID:  Afebrile    MISC:    SOCIAL/FAMILY:  [x] awaiting [] updated at bedside [] family meeting    CODE STATUS:  [x] Full Code [] DNR [] DNI [] Palliative/Comfort Care    DISPOSITION:  [x] ICU [] Stroke Unit [] Floor [] EMU [] RCU [] PCU    [] Patient is at high risk of neurologic deterioration/death due to:     Time seen:  Time spent: ___ [] critical care minutes    Contact: [phone] 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     NEURO:  Neurochecks Q1H  Continue Dex 2 week taper per neurosurgery  Keppra 250 mg BID- renally dosed for seizure prophylaxis   CTH in the AM  MRI brain with and w/o contrast when can  Continue home Sertraline 10 mg QD  Activity: [x] mobilize as tolerated [] Bedrest [] PT [] OT [] PMNR    PULM:  History of COPD  Continue home meds     CV:  SBP goal 100-160 mmHg  At home on Triamterene-HCTZ- restart BP meds as needed    RENAL:  Fluids: IVF 75 cc/hour  History of CKD  At home on Gentesa for overactive bladder, restart     GI:  Diet: Dysphagia eval then ADAT  GI prophylaxis [] not indicated [x] Pepcid while on steroids [] other:  Bowel regimen [x] miralax [x] senna [] other:    ENDO:   Goal euglycemia (-180)  ISS  Monitor glucose while on steroids    HEME/ONC:  Obtain post op H/H   VTE prophylaxis: [x] SCDs [] chemoprophylaxis [x] hold chemoprophylaxis due to: recent post op [] high risk of DVT/PE on admission due to:    ID:  Afebrile    MISC:    SOCIAL/FAMILY:  [x] awaiting [] updated at bedside [] family meeting    CODE STATUS:  [x] Full Code [] DNR [] DNI [] Palliative/Comfort Care    DISPOSITION:  [x] ICU [] Stroke Unit [] Floor [] EMU [] RCU [] PCU    [] Patient is at high risk of neurologic deterioration/death due to:     Time seen:  Time spent: ___ [] critical care minutes    Contact: [phone] 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     NEURO:  Neurochecks Q1H  Continue Dex 2 week taper per neurosurgery  Keppra 250 mg BID- renally dosed for seizure prophylaxis   CTH in the AM  MRI brain with and w/o contrast when can  Continue home Sertraline 10 mg QD  Activity: [x] mobilize as tolerated [] Bedrest [] PT [] OT [] PMNR    PULM:  History of COPD  Continue home meds     CV:  SBP goal 100-160 mmHg  At home on Triamterene-HCTZ- restart BP meds as needed    RENAL:  Fluids: IVF 75 cc/hour  History of CKD  Creatinine clearance 33  At home on Gentesa for overactive bladder, restart     GI:  Diet: Dysphagia eval then ADAT  GI prophylaxis [] not indicated [x] Pepcid while on steroids [] other:  Bowel regimen [x] miralax [x] senna [] other:    ENDO:   Goal euglycemia (-180)  ISS  Monitor glucose while on steroids    HEME/ONC:  Obtain post op H/H   VTE prophylaxis: [x] SCDs [] chemoprophylaxis [x] hold chemoprophylaxis due to: recent post op [] high risk of DVT/PE on admission due to:    ID:  Afebrile    MISC:    SOCIAL/FAMILY:  [x] awaiting [] updated at bedside [] family meeting    CODE STATUS:  [x] Full Code [] DNR [] DNI [] Palliative/Comfort Care    DISPOSITION:  [x] ICU [] Stroke Unit [] Floor [] EMU [] RCU [] PCU    [] Patient is at high risk of neurologic deterioration/death due to:     Time seen:  Time spent: ___ [] critical care minutes    Contact: [phone] 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     NEURO:  Neurochecks Q1H  Continue Dex 2 week taper per neurosurgery  Keppra 250 mg BID- renally dosed for seizure prophylaxis   CTH in the AM  MRI brain with and w/o contrast when can  Continue home Sertraline 10 mg QD  Activity: [x] mobilize as tolerated [] Bedrest [] PT [] OT [] PMNR    PULM:  History of COPD  Continue home meds     CV:  SBP goal 100-160 mmHg  At home on Triamterene-HCTZ- restart BP meds as needed    RENAL:  Fluids: IVF 75 cc/hour  History of CKD  Creatinine clearance 33  At home on Gentesa for overactive bladder, restart     GI:  Diet: Dysphagia eval then ADAT  GI prophylaxis [] not indicated [x] Pepcid while on steroids [] other:  Bowel regimen [x] miralax [x] senna [] other:    ENDO:   Goal euglycemia (-180)  ISS  Monitor glucose while on steroids    HEME/ONC:  Obtain post op H/H   VTE prophylaxis: [x] SCDs [] chemoprophylaxis [x] hold chemoprophylaxis due to: recent post op [] high risk of DVT/PE on admission due to:    ID:  Afebrile    MISC:    SOCIAL/FAMILY:  [x] awaiting [] updated at bedside [] family meeting    CODE STATUS:  [x] Full Code [] DNR [] DNI [] Palliative/Comfort Care    DISPOSITION:  [x] ICU [] Stroke Unit [] Floor [] EMU [] RCU [] PCU    [] Patient is at high risk of neurologic deterioration/death due to:     Time seen:  Time spent: ___ [] critical care minutes    Contact: [phone] 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     NEURO:  Neurochecks Q1H  Continue Dex 2 week taper per neurosurgery  Keppra 250 mg BID- renally dosed for seizure prophylaxis   CTH in the AM  MRI brain with and w/o contrast when can  Continue home escitalopram 10 mg QD  Activity: [x] mobilize as tolerated [] Bedrest [] PT [] OT [] PMNR    PULM:  History of COPD  Continue home meds  Chronic smoker- will start nicotine patch      CV:  SBP goal 100-160 mmHg  At home on Triamterene-HCTZ- restart BP meds as needed    RENAL:  Fluids: IVF 75 cc/hour  History of CKD  Creatinine clearance 33  At home on Gentesa for overactive bladder, restart     GI:  Diet: Passed dysphagia, NPO per neurosurgery until the CTH tomorrow   GI prophylaxis [] not indicated [x] Pepcid while on steroids [] other:  Bowel regimen [x] miralax [x] senna [] other:    ENDO:   Goal euglycemia (-180)  ISS  Monitor glucose while on steroids    HEME/ONC:  Obtain post op H/H   VTE prophylaxis: [x] SCDs [] chemoprophylaxis [x] hold chemoprophylaxis due to: recent post op [] high risk of DVT/PE on admission due to:    ID:  Afebrile  Will monitor fever curve     MISC:    SOCIAL/FAMILY:  [x] awaiting [] updated at bedside [] family meeting    CODE STATUS:  [x] Full Code [] DNR [] DNI [] Palliative/Comfort Care    DISPOSITION:  [x] ICU [] Stroke Unit [] Floor [] EMU [] RCU [] PCU    [] Patient is at high risk of neurologic deterioration/death due to: tumor expansion, midline shift, herniation     Contact: [phone] 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     NEURO:  Neurochecks Q1H  Continue Dex 2 week taper per neurosurgery  Keppra 250 mg BID- renally dosed for seizure prophylaxis   CTH in the AM  MRI brain with and w/o contrast when can  Continue home escitalopram 10 mg QD  Activity: [x] mobilize as tolerated [] Bedrest [] PT [] OT [] PMNR    PULM:  History of COPD  Continue home meds  Chronic smoker- will start nicotine patch      CV:  SBP goal 100-160 mmHg  At home on Triamterene-HCTZ- restart BP meds as needed    RENAL:  Fluids: IVF 75 cc/hour  History of CKD  Creatinine clearance 33  At home on Gentesa for overactive bladder, restart     GI:  Diet: Passed dysphagia, NPO per neurosurgery until the CTH tomorrow   GI prophylaxis [] not indicated [x] Pepcid while on steroids [] other:  Bowel regimen [x] miralax [x] senna [] other:    ENDO:   Goal euglycemia (-180)  ISS  Monitor glucose while on steroids    HEME/ONC:  Obtain post op H/H   VTE prophylaxis: [x] SCDs [] chemoprophylaxis [x] hold chemoprophylaxis due to: recent post op [] high risk of DVT/PE on admission due to:    ID:  Afebrile  Will monitor fever curve     MISC:    SOCIAL/FAMILY:  [x] awaiting [] updated at bedside [] family meeting    CODE STATUS:  [x] Full Code [] DNR [] DNI [] Palliative/Comfort Care    DISPOSITION:  [x] ICU [] Stroke Unit [] Floor [] EMU [] RCU [] PCU    [] Patient is at high risk of neurologic deterioration/death due to: tumor expansion, midline shift, herniation     Contact: [phone]

## 2023-12-09 NOTE — PROGRESS NOTE ADULT - ATTENDING COMMENTS
78F Hx bronchitis/COPD, HTN, OA, tricuspid valve regurg, solitary pulm nodule s/p chemo 2019 p/f [facility]. CTH w/R parietal hypodensity  necrotic vs cystic 1.5 x 2.3 w/vasogenic edema. On CTH, diffuse vasogenic edema throughout the right frontal, parietal and temporal lobes; mass effect in the right cerebral hemisphere with 4.5 cm of midline shift to the left. MRI showes peripheral rim enhancing lesion with vasogenic edema likely primary malignancy neoplasm (unable to exclude metastatic). Plan for craniotomy 12/9    # Brain Mass  # RLL nodule  # Leukocytosis 2/2 likely steroid Induced  # TEREZA 2/2 pre-renal/ATN  # mild HypoNa  # COPD not on home 02  # solitary pulm nodule s/p chemotherapy 2019  # Falls  # renal cyst    -appreciate NSG recs, plan for resection concern for malignancy on 12/9. Medically optimized for resection. TEREZA with elevated BUN likely pre-renal (decrease PO intake) which improved with IVF and ATN (received contrast). Discussed with Dr. Barcenas neurosurgery 12/8.  -Mild hypoNa resolved off IVF  -appreciate pulm, thoracic surgery recs  -decadron 4 q6hr. keppra 500mg BID  -asp precautions, seizure precautions  -right renal lesion- MRI opt follow up  -[facility] patient appoints her sons to make medical decisions if she is unable to.     d/w HS    The necessity of the time spent during the encounter on this date of service was due to:   - Ordering, reviewing, and interpreting labs, testing, and imaging.  - Independently obtaining a review of systems and performing a physical exam  - Reviewing prior hospitalization and where necessary, outpatient records.  - Counselling and educating patient and family regarding interpretation of aforementioned items and plan of care.    Time Spent 25 minutes    Maira Goldberg  Division of Hospital Medicine  Available on [employer] Teams . 78F Hx bronchitis/COPD, HTN, OA, tricuspid valve regurg, solitary pulm nodule s/p chemo 2019 p/f [facility] fall. CTH w/R parietal hypodensity  necrotic vs cystic 1.5 x 2.3 w/vasogenic edema. On CTH, diffuse vasogenic edema throughout the right frontal, parietal and temporal lobes; mass effect in the right cerebral hemisphere with 4.5 cm of midline shift to the left. MRI showes peripheral rim enhancing lesion with vasogenic edema likely primary malignancy neoplasm (unable to exclude metastatic). Plan for craniotomy 12/9    # Brain Mass  # RLL nodule  # Leukocytosis 2/2 likely steroid Induced  # TEREZA 2/2 pre-renal/ATN  # mild HypoNa  # COPD not on home 02  # solitary pulm nodule s/p chemotherapy 2019  # Falls  # renal cyst    -appreciate NSG recs, plan for resection concern for malignancy on 12/9. Medically optimized for resection. TEREZA with elevated BUN likely pre-renal (decrease PO intake) which improved with IVF and ATN (received contrast). Discussed with Dr. Barcenas neurosurgery 12/8.  -Mild hypoNa resolved off IVF  -appreciate pulm, thoracic surgery recs  -decadron 4 q6hr. keppra 500mg BID  -asp precautions, seizure precautions  -right renal lesion- MRI opt follow up  -[facility] patient appoints her sons to make medical decisions if she is unable to.     d/w HS    The necessity of the time spent during the encounter on this date of service was due to:   - Ordering, reviewing, and interpreting labs, testing, and imaging.  - Independently obtaining a review of systems and performing a physical exam  - Reviewing prior hospitalization and where necessary, outpatient records.  - Counselling and educating patient and family regarding interpretation of aforementioned items and plan of care.    Time Spent 25 minutes    Maira Goldberg  Division of Hospital Medicine  Available on [employer] Teams .

## 2023-12-09 NOTE — PROGRESS NOTE ADULT - ASSESSMENT
1910:     PCU SHIFT NURSING NOTE      Bedside and Verbal shift change report given to JC Whitley RN (oncoming nurse) by Kavin Narayan RN (offgoing nurse). Report included the following information SBAR, Kardex, ED Summary, OR Summary, Procedure Summary, Intake/Output, MAR, Recent Results and Cardiac Rhythm NSR. Shift Summary:       Admission Date 7/27/2017   Admission Diagnosis bladder clots  Bladder injury  perperated viscous  wound dehiscence   Consults IP CONSULT TO UROLOGY  IP CONSULT TO GASTROENTEROLOGY  IP CONSULT TO NEUROLOGY  IP CONSULT TO PALLIATIVE CARE - PROVIDER  IP CONSULT TO UROLOGY  IP CONSULT TO GASTROENTEROLOGY  IP CONSULT TO CARDIOLOGY        Consults   [x]PT   [x]OT   []Speech   [x]Case Management      [x] Palliative      Cardiac Monitoring Order   [x]Yes   []No     IV drips   []Yes    Drip:                            Dose:  Drip:                            Dose:  Drip:                            Dose:   [x]No     GI Prophylaxis   []Yes   [x]No         DVT Prophylaxis   SCDs:  Sequential Compression Device: Bilateral     Patient Refused VTE Prophylaxis: Yes    Ash stockings:  Graduated Compression Stockings: Bilateral      [x] Medication   []Contraindicated   []None      Activity Level Activity Level: Bed Rest     Activity Assistance: Complete care   Purposeful Rounding every 1-2 hour? [x]Yes   Mcdonald Score  Total Score: 3   Bed Alarm (If score 3 or >)   []Yes   [] Refused (See signed refusal form in chart)   Kong Score  Kong Score: 11   Kong Score (if score 14 or less)   [x]PMT consult   []Wound Care consult      []Specialty bed   [] Nutrition consult          Needs prior to discharge:   Home O2 required:    []Yes   [x]No    If yes, how much O2 required?     Other:    Last Bowel Movement: Last Bowel Movement Date: 08/26/17      Influenza Vaccine Received Flu Vaccine for Current Season (usually Sept-March): Not Flu Season        Pneumonia Vaccine           Diet Active Orders   Diet    DIET NPO      LDAs         PICC Double Lumen 80/03/04 Basilic;Left (Active)   Central Line Being Utilized Yes 8/30/2017  8:11 PM   Criteria for Appropriate Use Total parenteral nutrition 8/30/2017  8:11 PM   Site Assessment Clean 8/30/2017  8:11 PM   Phlebitis Assessment 0 8/30/2017  8:11 PM   Infiltration Assessment 0 8/30/2017  8:11 PM   Arm Circumference (cm) 33 cm 8/2/2017 10:47 AM   Date of Last Dressing Change 08/26/17 8/30/2017  8:11 PM   Dressing Status Clean, dry, & intact 8/30/2017  8:11 PM   Action Taken Open ports on tubing capped 8/29/2017  3:08 PM   External Catheter Length (cm) 0 centimeters 8/30/2017  4:00 PM   Dressing Type Disk with Chlorhexadine gluconate (CHG); Transparent 8/30/2017  8:11 PM   Hub Color/Line Status Flushed 8/30/2017 10:01 PM   Positive Blood Return (Site #1) No 8/30/2017 10:01 PM   Hub Color/Line Status Flushed 8/30/2017 10:01 PM   Positive Blood Return (Site #2) Yes 8/30/2017 10:01 PM   Alcohol Cap Used Yes 8/29/2017  3:08 PM          Peripheral IV 08/30/17 Right Antecubital (Active)       Peripheral IV 08/31/17 Right Hand (Active)                      Urinary Catheter [REMOVED] Urinary Catheter 07/27/17 2- way-Criteria for Appropriate Use: Obstruction/retention  Urinary Catheter 07/28/17 3- way; [place]-Criteria for Appropriate Use: Obstruction/retention  [REMOVED] Urinary Catheter 07/28/17 Other (Comment)-Criteria for Appropriate Use: Medically/surgically unstable, Surgical procedure  [REMOVED] Urinary Catheter 07/26/17 2- way-Criteria for Appropriate Use: Obstruction/retention    Intake & Output   Date 08/30/17 0700 - 08/31/17 0659 08/31/17 0700 - 09/01/17 0659   Shift 0722-0377 3288-5604 24 Hour Total 6745-8450 2578-9940 24 Hour Total   I  N  T  A  K  E   I.V.  (mL/kg/hr) 3163.8  (3) 500 3663.8         Cardizem Volume 139.9  139.9         Volume (dextrose 5% infusion) 800  800         Volume (anidulafungin (ERAXIS) 100 mg in 0.9% sodium chloride 130 mL IVPB) 100  100         Volume (linezolid (ZYVOX) IVPB premix in D5W 600 mg) 600 300 900         Volume (metroNIDAZOLE (FLAGYL) IVPB premix 500 mg) 200 100 300         Volume (ampicillin-sulbactam (UNASYN) 3 g in 0.9% sodium chloride (MBP/ADV) 100 mL)  100 100         Volume (TPN ADULT - CENTRAL AA 5% D20% W/ CA + ELECTROLYTES) 1323.9  1323.9       Shift Total  (mL/kg) 3163.8  (36) 500  (5.8) 3663.8  (42.2)      O  U  T  P  U  T   Urine  (mL/kg/hr) 1550  (1.5)  1550         Urine Output (mL) (Urinary Catheter 07/28/17 3- way; [place]) 1550  1550       Shift Total  (mL/kg) 1550  (17.6)  1550  (17.8)      NET 1613.8 500 2113.8      Weight (kg) 88 86.9 86.9 86.9 86.9 86.9         Readmission Risk Assessment Tool Score High Risk            22       Total Score        3 Has Seen PCP in Last 6 Months (Yes=3, No=0)    2 . Living with Significant Other. Assisted Living. LTAC. SNF. or   Rehab    3 Patient Length of Stay (>5 days = 3)    4 IP Visits Last 12 Months (1-3=4, 4=9, >4=11)    5 Pt.  Coverage (Medicare=5 , Medicaid, or Self-Pay=4)    5 Charlson Comorbidity Score (Age + Comorbid Conditions)       Expected Length of Stay 5d 12h   Actual Length of Stay [age] year old female with history of lung cancer s/p chemotherapy 5 years ago previously in remission (oncologist Dr. Fredis Solomon at [facility]), COPD, HTN presents with unstable gait with CT head findings of y 2.3 x 1.5 x 1.7 cm cyst versus centrally necrotic mass in   the right parietal-temporal region. CT chest showing 18 mm ground glass opacity concerning for malignancy. Will go for brain mass resection prior to pulmonary workup.

## 2023-12-09 NOTE — PROGRESS NOTE ADULT - PROBLEM SELECTOR PLAN 6
DVT: Heparin 5000 TID npo at midnight in anticipation of procedure.   Diet: DASH, npo at midnight in anticipation of procedure.

## 2023-12-09 NOTE — PROGRESS NOTE ADULT - NUTRITIONAL ASSESSMENT
This patient has been assessed with a concern for Malnutrition and has been determined to have a diagnosis/diagnoses of Moderate protein-calorie malnutrition.    This patient is being managed with:   Diet NPO after Midnight-     NPO Start Date: 08-Dec-2023   NPO Start Time: 23:59  Except Medications  Entered: Dec  8 2023  6:50PM    Diet Regular-  1000mL Fluid Restriction ([id number])  Supplement Feeding Modality:  Oral  Ensure Plus High Protein Cans or Servings Per Day:  1       Frequency:  Daily  Entered: Dec  8 2023  6:18PM    Diet Regular-  Supplement Feeding Modality:  Oral  Ensure Plus High Protein Cans or Servings Per Day:  1       Frequency:  Daily  Entered: Dec  8 2023  8:11AM    The following pending diet order is being considered for treatment of Moderate protein-calorie malnutrition:null This patient has been assessed with a concern for Malnutrition and has been determined to have a diagnosis/diagnoses of Moderate protein-calorie malnutrition.    This patient is being managed with:   Diet NPO after Midnight-     NPO Start Date: 08-Dec-2023   NPO Start Time: 23:59  Except Medications  Entered: Dec  8 2023  6:50PM    Diet Regular-  1000mL Fluid Restriction ([id number])  Supplement Feeding Modality:  Oral  Ensure Plus High Protein Cans or Servings Per Day:  1       Frequency:  Daily  Entered: Dec  8 2023  6:18PM    Diet Regular-  Supplement Feeding Modality:  Oral  Ensure Plus High Protein Cans or Servings Per Day:  1       Frequency:  Daily  Entered: Dec  8 2023  8:11AM    The following pending diet order is being considered for treatment of Moderate protein-calorie malnutrition:null

## 2023-12-09 NOTE — PROGRESS NOTE ADULT - SUBJECTIVE AND OBJECTIVE BOX
Pt seen by me today and prior visits on 12/5 and 12/6. PT 79 Y/O F with h/o lung CA presents with headache and was found on floor.  Pt discovered to have singular right parietal mass, peripherally and heterogeneously enhancing with surrounding vasgogenic edema.  Pt currently PELLETIER well and conversant, largely non-focal having been placed on dexamethasone.  Pt with significant edema with mass effect and some midline shift warranting surgical excision/biopsy to reduce edema prior to anticipated radiation treatment.   R/B/A discussed with pt by me and team.  Consent obtained from patient.  Son, Krunal, at bedside today.  Pt is ok to proceed.  Appreciate medicine optimization.

## 2023-12-09 NOTE — PRE-OP CHECKLIST - VIA
"  [facility] at [facility]  PROGRESS NOTE    Date of Admission: 8/9/2023  Date of Service: 08/14/23    Primary Care Physician: Provider, No Known    Chief Complaint: f/u HTN, HLD, brief post op Afib   Problem List:   MV CAD    T2DM    HTN (hypertension)    Former smoker    Vapes nicotine containing substance    GERD      Subjective      Patient transferred to [facility] this AM, remains in NSR. Has usual post-op pain, family at bedside      Objective   Vitals: /65   Pulse 73   Temp 97.9 øF (36.6 øC) (Axillary)   Resp 16   Ht 180.3 cm (71\")   Wt 89.4 kg (197 lb)   SpO2 97%   BMI 27.48 kg/mý     Physical Exam:  GENERAL: Alert, cooperative, in no acute distress.   HEART: Regular rhythm, normal rate, and no murmurs, gallops, or rubs.   LUNGS: No wheezing, rales or rhonchi.  NEUROLOGIC: No focal abnormalities involving strength or sensation are noted.   EXTREMITIES: No edema noted. Right AKA, LLE wrap in place     Results:  Results from last 7 days   Lab Units 08/14/23  0231 08/13/23  0310 08/12/23  0219   WBC 10*3/mm3 10.07 12.95* 11.41*   HEMOGLOBIN g/dL 8.6* 9.4* 10.0*   HEMATOCRIT % 26.2* 27.1* 28.8*   PLATELETS 10*3/mm3 126* 155 160     Results from last 7 days   Lab Units 08/14/23  0231 08/13/23  0920 08/13/23  0310 08/12/23  0219   SODIUM mmol/L 134*  --  136 139   POTASSIUM mmol/L 3.8 4.0 3.8 4.2   CHLORIDE mmol/L 98  --  100 101   CO2 mmol/L 28.0  --  24.0 25.0   BUN mg/dL 22  --  17 14   CREATININE mg/dL 1.16  --  1.14 1.23   GLUCOSE mg/dL 141*  --  158* 183*      Lab Results   Component Value Date    CHOL 192 08/10/2023    TRIG 474 (H) 08/10/2023    HDL 29 (L) 08/10/2023    LDL 86 08/10/2023    AST 66 (H) 08/12/2023    ALT 32 08/12/2023     Results from last 7 days   Lab Units 08/10/23  0104   HEMOGLOBIN A1C % 7.10*     Results from last 7 days   Lab Units 08/10/23  0104   CHOLESTEROL mg/dL 192   TRIGLYCERIDES mg/dL 474*   HDL CHOL mg/dL 29*   LDL CHOL mg/dL 86           "   Results from last 7 days   Lab Units 08/12/23  0219 08/11/23  1126 08/10/23  0104   PROTIME Seconds 14.8* 18.3* 13.9   INR  1.15* 1.50* 1.06   APTT seconds  --  31.6 44.2*                 Intake/Output Summary (Last 24 hours) at 8/14/2023 0843  Last data filed at 8/14/2023 0442  Gross per 24 hour   Intake 973.91 ml   Output 800 ml   Net 173.91 ml       I personally reviewed the patient's EKG/Telemetry data    Radiology Data:   CXR 8/13/23:  IMPRESSION:  Impression:  1.Postoperative changes are noted. The left chest tube and mediastinal drains are stable in position.  2.Mild atelectasis within the lung bases.  3.Stable positioning of the right IJ venous sheath and central line.    Current Medications:  acetaminophen, 650 mg, Oral, Q8H  amiodarone, 200 mg, Oral, Q8H   Followed by  [START ON 8/20/2023] amiodarone, 200 mg, Oral, Q12H   Followed by  [START ON 9/3/2023] amiodarone, 200 mg, Oral, Daily  aspirin, 325 mg, Oral, Daily  atorvastatin, 40 mg, Oral, Nightly  carvedilol, 12.5 mg, Oral, BID With Meals  gabapentin, 300 mg, Oral, Q12H  insulin detemir, 15 Units, Subcutaneous, Nightly  insulin lispro, 3-14 Units, Subcutaneous, 4x Daily AC & at Bedtime  ipratropium-albuterol, 3 mL, Nebulization, 4x Daily - RT  pantoprazole, 40 mg, Oral, Q AM  pharmacy consult - MTM, , Does not apply, Daily  senna-docusate sodium, 2 tablet, Oral, BID  sodium chloride, 10 mL, Intravenous, Q12H      dexmedetomidine, 0.2-1.5 mcg/kg/hr, Last Rate: Stopped (08/11/23 1509)  dextrose 5 % with KCl 20 mEq, 30 mL/hr, Last Rate: 30 mL/hr (08/11/23 1128)  DOBUTamine, 2-20 mcg/kg/min  DOPamine, 2-20 mcg/kg/min  niCARdipine, 5-15 mg/hr  niCARdipine, 5-15 mg/hr  nitroglycerin, 10-50 mcg/min, Last Rate: 40 mcg/min (08/12/23 0110)  nitroglycerin, 5-200 mcg/min, Last Rate: Stopped (08/11/23 1704)  norepinephrine, 0.02-0.3 mcg/kg/min, Last Rate: Stopped (08/13/23 0311)  phenylephrine, 0.5-3 mcg/kg/min        Assessment and Plan:     1. MVCAD  - normal  EF pre-op  - CABG x4 with Dr. Louise 8/11 (LIMA-LAD, SVG to diagonal, OM and RCA)  - PO clip with #35 Atricure clip  - continue ASA, statin      2. Hypertension   - on BB  - consider ACE/ARB if needed as next agent      3. Hyperlipidemia   - LDL 86, Trig 474  - on Lipitor 40mg     4. DM2  - A1C 7.1%  - per intensivists     5. PAF  - amiodarone protocol initiated  - EKG today reviewed, acceptable QTc, remains in NSR  - defer anticoagulation at this point given brief atrial fibrillation and recent left atrial appendage closure.        Electronically signed by Laureen Perez PA-C, 08/14/23, 8:46 AM EDT.       stretcher

## 2023-12-09 NOTE — PROGRESS NOTE ADULT - NUTRITIONAL ASSESSMENT
This patient has been assessed with a concern for Malnutrition and has been determined to have a diagnosis/diagnoses of Moderate protein-calorie malnutrition.    This patient is being managed with:   Diet NPO-  NPO for Procedure/Test     NPO Start Date: 09-Dec-2023   NPO Start Time: 20:00  Entered: Dec  9 2023  7:32PM

## 2023-12-09 NOTE — PROGRESS NOTE ADULT - NUTRITIONAL ASSESSMENT
This patient has been assessed with a concern for Malnutrition and has been determined to have a diagnosis/diagnoses of Moderate protein-calorie malnutrition.    This patient is being managed with:   Diet NPO after Midnight-     NPO Start Date: 08-Dec-2023   NPO Start Time: 23:59  Except Medications  Entered: Dec  8 2023  6:50PM    Diet Regular-  1000mL Fluid Restriction ([id number])  Supplement Feeding Modality:  Oral  Ensure Plus High Protein Cans or Servings Per Day:  1       Frequency:  Daily  Entered: Dec  8 2023  6:18PM    Diet Regular-  Supplement Feeding Modality:  Oral  Ensure Plus High Protein Cans or Servings Per Day:  1       Frequency:  Daily  Entered: Dec  8 2023  8:11AM    The following pending diet order is being considered for treatment of Moderate protein-calorie malnutrition:null This patient has been assessed with a concern for Malnutrition and has been determined to have a diagnosis/diagnoses of Moderate protein-calorie malnutrition.    This patient is being managed with:   Diet NPO after Midnight-     NPO Start Date: 08-Dec-2023   NPO Start Time: 23:59  Except Medications  Entered: Dec  8 2023  6:50PM    Diet Regular-  1000mL Fluid Restriction ([id number])  Supplement Feeding Modality:  Oral  Ensure Plus High Protein Cans or Servings Per Day:  1       Frequency:  Daily  Entered: Dec  8 2023  6:18PM    Diet Regular-  Supplement Feeding Modality:  Oral  Ensure Plus High Protein Cans or Servings Per Day:  1       Frequency:  Daily  Entered: Dec  8 2023  8:11AM    The following pending diet order is being considered for treatment of Moderate protein-calorie malnutrition:null

## 2023-12-09 NOTE — PROGRESS NOTE ADULT - SUBJECTIVE AND OBJECTIVE BOX
HOSPITAL COURSE:  77 Y/O F with h/o lung CA presents with headache and was found on floor.  Pt discovered to have singular right parietal mass, peripherally and heterogeneously enhancing with surrounding vasgogenic edema.  Pt with significant edema with mass effect and some midline shift warranting surgical excision/biopsy to reduce edema prior to anticipated radiation treatment.       Admission Scores  GCS:   HH:   MF:   NIHSS:   RASS:    CAM-ICU:   ICH:    24 hour Events:  Patient POD0 from right parietal crani for mets resection.     Allergies    penicillin (Short breath; Hives)    Intolerances        REVIEW OF SYSTEMS: [ ] Unable to Assess due to neurologic exam   [ ] All ROS addressed below are non-contributory, except:  Neuro: [ ] Headache [ ] Back pain [ ] Numbness [ ] Weakness [ ] Ataxia [ ] Dizziness [ ] Aphasia [ ] Dysarthria [ ] Visual disturbance  Resp: [ ] Shortness of breath/dyspnea, [ ] Orthopnea [ ] Cough  CV: [ ] Chest pain [ ] Palpitation [ ] Lightheadedness [ ] Syncope  Renal: [ ] Thirst [ ] Edema  GI: [ ] Nausea [ ] Emesis [ ] Abdominal pain [ ] Constipation [ ] Diarrhea  Hem: [ ] Hematemesis [ ] bright red blood per rectum  ID: [ ] Fever [ ] Chills [ ] Dysuria  ENT: [ ] Rhinorrhea      DEVICES:   [ ] Restraints [ ] ET tube [ ] central line [ ] arterial line [ ] orantes [ ] NGT/OGT [ ] EVD [ ] LD [ ] VANITA/HMV [ ] Trach [ ] PEG [ ] Chest Tube     VITALS:   Vital Signs Last 24 Hrs  T(C): 36.4 (09 Dec 2023 19:15), Max: 36.9 (09 Dec 2023 11:31)  T(F): 97.5 (09 Dec 2023 19:15), Max: 98.4 (09 Dec 2023 11:31)  HR: 77 (09 Dec 2023 19:25) (60 - 77)  BP: 131/81 (09 Dec 2023 11:31) (131/81 - 156/81)  BP(mean): --  RR: 23 (09 Dec 2023 19:25) (18 - 23)  SpO2: 95% (09 Dec 2023 19:25) (95% - 99%)    Parameters below as of 09 Dec 2023 19:15  Patient On (Oxygen Delivery Method): nasal cannula      CAPILLARY BLOOD GLUCOSE        I&O's Summary    08 Dec 2023 07:01  -  09 Dec 2023 07:00  --------------------------------------------------------  IN: 240 mL / OUT: 0 mL / NET: 240 mL        Respiratory:        LABS:                        11.9   14.09 )-----------( 248      ( 09 Dec 2023 05:38 )             34.8     12-09    135  |  102  |  52<H>  ----------------------------<  109<H>  3.7   |  23  |  1.59<H>             MEDICATION LEVELS:     IVF FLUIDS/MEDICATIONS:   MEDICATIONS  (STANDING):  ceFAZolin   IVPB 2000 milliGRAM(s) IV Intermittent every 8 hours  dexAMETHasone     Tablet   Oral   famotidine    Tablet 20 milliGRAM(s) Oral every 12 hours  influenza  Vaccine (HIGH DOSE) 0.7 milliLiter(s) IntraMuscular once  levETIRAcetam 250 milliGRAM(s) Oral every 12 hours  senna 2 Tablet(s) Oral at bedtime  sodium chloride 0.9%. 1000 milliLiter(s) (75 mL/Hr) IV Continuous <Continuous>    MEDICATIONS  (PRN):  bisacodyl 5 milliGRAM(s) Oral daily PRN Constipation  ondansetron Injectable 4 milliGRAM(s) IV Push every 6 hours PRN Nausea and/or Vomiting  oxyCODONE    IR 5 milliGRAM(s) Oral every 4 hours PRN Moderate Pain (4 - 6)        IMAGING:      EXAMINATION:  PHYSICAL EXAM:    Constitutional: No Acute Distress     Neurological: Awake, alert oriented to person, place and time, Following Commands, PERRL, EOMI, No Gaze Preference, Face Symmetrical, Speech Fluent, No dysmetria, No ataxia, No nystagmus     Motor exam:          Upper extremity                         Delt     Bicep     Tricep    HG                                                 R         5/5        5/5        5/5       5/5                                               L          5/5        5/5        5/5       5/5          Lower extremity                        HF         KF        KE       DF         PF                                                  R        5/5        5/5        5/5       5/5         5/5                                               L         5/5        5/5       5/5       5/5          5/5                                                 Sensation: [ ] intact to light touch  [ ] decreased:     Reflexes: Deep Tendon Reflexes Intact     Pulmonary: Clear to Auscultation, No rales, No rhonchi, No wheezes     Cardiovascular: S1, S2, Regular rate and rhythm     Gastrointestinal: Soft, Non-tender, Non-distended     Extremities: No calf tenderness     Incision:    HOSPITAL COURSE:  79 Y/O F with h/o lung CA presents with headache and was found on floor.  Pt discovered to have singular right parietal mass, peripherally and heterogeneously enhancing with surrounding vasgogenic edema.  Pt with significant edema with mass effect and some midline shift warranting surgical excision/biopsy to reduce edema prior to anticipated radiation treatment.       Admission Scores  GCS:   HH:   MF:   NIHSS:   RASS:    CAM-ICU:   ICH:    24 hour Events:  Patient POD0 from right parietal crani for mets resection.     Allergies    penicillin (Short breath; Hives)    Intolerances        REVIEW OF SYSTEMS: [ ] Unable to Assess due to neurologic exam   [ ] All ROS addressed below are non-contributory, except:  Neuro: [ ] Headache [ ] Back pain [ ] Numbness [ ] Weakness [ ] Ataxia [ ] Dizziness [ ] Aphasia [ ] Dysarthria [ ] Visual disturbance  Resp: [ ] Shortness of breath/dyspnea, [ ] Orthopnea [ ] Cough  CV: [ ] Chest pain [ ] Palpitation [ ] Lightheadedness [ ] Syncope  Renal: [ ] Thirst [ ] Edema  GI: [ ] Nausea [ ] Emesis [ ] Abdominal pain [ ] Constipation [ ] Diarrhea  Hem: [ ] Hematemesis [ ] bright red blood per rectum  ID: [ ] Fever [ ] Chills [ ] Dysuria  ENT: [ ] Rhinorrhea      DEVICES:   [ ] Restraints [ ] ET tube [ ] central line [ ] arterial line [ ] orantes [ ] NGT/OGT [ ] EVD [ ] LD [ ] VANITA/HMV [ ] Trach [ ] PEG [ ] Chest Tube     VITALS:   Vital Signs Last 24 Hrs  T(C): 36.4 (09 Dec 2023 19:15), Max: 36.9 (09 Dec 2023 11:31)  T(F): 97.5 (09 Dec 2023 19:15), Max: 98.4 (09 Dec 2023 11:31)  HR: 77 (09 Dec 2023 19:25) (60 - 77)  BP: 131/81 (09 Dec 2023 11:31) (131/81 - 156/81)  BP(mean): --  RR: 23 (09 Dec 2023 19:25) (18 - 23)  SpO2: 95% (09 Dec 2023 19:25) (95% - 99%)    Parameters below as of 09 Dec 2023 19:15  Patient On (Oxygen Delivery Method): nasal cannula      CAPILLARY BLOOD GLUCOSE        I&O's Summary    08 Dec 2023 07:01  -  09 Dec 2023 07:00  --------------------------------------------------------  IN: 240 mL / OUT: 0 mL / NET: 240 mL        Respiratory:        LABS:                        11.9   14.09 )-----------( 248      ( 09 Dec 2023 05:38 )             34.8     12-09    135  |  102  |  52<H>  ----------------------------<  109<H>  3.7   |  23  |  1.59<H>             MEDICATION LEVELS:     IVF FLUIDS/MEDICATIONS:   MEDICATIONS  (STANDING):  ceFAZolin   IVPB 2000 milliGRAM(s) IV Intermittent every 8 hours  dexAMETHasone     Tablet   Oral   famotidine    Tablet 20 milliGRAM(s) Oral every 12 hours  influenza  Vaccine (HIGH DOSE) 0.7 milliLiter(s) IntraMuscular once  levETIRAcetam 250 milliGRAM(s) Oral every 12 hours  senna 2 Tablet(s) Oral at bedtime  sodium chloride 0.9%. 1000 milliLiter(s) (75 mL/Hr) IV Continuous <Continuous>    MEDICATIONS  (PRN):  bisacodyl 5 milliGRAM(s) Oral daily PRN Constipation  ondansetron Injectable 4 milliGRAM(s) IV Push every 6 hours PRN Nausea and/or Vomiting  oxyCODONE    IR 5 milliGRAM(s) Oral every 4 hours PRN Moderate Pain (4 - 6)        IMAGING:      EXAMINATION:  PHYSICAL EXAM:    Constitutional: No Acute Distress     Neurological: Awake, alert oriented to person, place and time, Following Commands, PERRL, EOMI, No Gaze Preference, Face Symmetrical, Speech Fluent, No dysmetria, No ataxia, No nystagmus     Motor exam:          Upper extremity                         Delt     Bicep     Tricep    HG                                                 R         5/5        5/5        5/5       5/5                                               L          5/5        5/5        5/5       5/5          Lower extremity                        HF         KF        KE       DF         PF                                                  R        5/5        5/5        5/5       5/5         5/5                                               L         5/5        5/5       5/5       5/5          5/5                                                 Sensation: [ ] intact to light touch  [ ] decreased:     Reflexes: Deep Tendon Reflexes Intact     Pulmonary: Clear to Auscultation, No rales, No rhonchi, No wheezes     Cardiovascular: S1, S2, Regular rate and rhythm     Gastrointestinal: Soft, Non-tender, Non-distended     Extremities: No calf tenderness     Incision:    HOSPITAL COURSE:  77 Y/O F with h/o lung CA presents with headache and was found on floor.  Pt discovered to have singular right parietal mass, peripherally and heterogeneously enhancing with surrounding vasogenic edema.  Pt with significant edema with mass effect and some midline shift warranting surgical excision/biopsy to reduce edema prior to anticipated radiation treatment.       Admission Scores  GCS: 15    24 hour Events:  Patient POD0 from right parietal crani for mets resection.     Allergies    penicillin (Short breath; Hives)    Intolerances        REVIEW OF SYSTEMS: [ ] Unable to Assess due to neurologic exam   [X] All ROS addressed below are non-contributory, except:  Neuro: [ ] Headache [ ] Back pain [ ] Numbness [ ] Weakness [ ] Ataxia [ ] Dizziness [ ] Aphasia [ ] Dysarthria [ ] Visual disturbance  Resp: [ ] Shortness of breath/dyspnea, [ ] Orthopnea [ ] Cough  CV: [ ] Chest pain [ ] Palpitation [ ] Lightheadedness [ ] Syncope  Renal: [ ] Thirst [ ] Edema  GI: [ ] Nausea [ ] Emesis [ ] Abdominal pain [ ] Constipation [ ] Diarrhea  Hem: [ ] Hematemesis [ ] bright red blood per rectum  ID: [ ] Fever [ ] Chills [ ] Dysuria  ENT: [ ] Rhinorrhea      DEVICES:   [ ] Restraints [ ] ET tube [ ] central line [ ] arterial line [ ] orantes [ ] NGT/OGT [ ] EVD [ ] LD [ ] VANITA/HMV [ ] Trach [ ] PEG [ ] Chest Tube     VITALS:   Vital Signs Last 24 Hrs  T(C): 36.4 (09 Dec 2023 19:15), Max: 36.9 (09 Dec 2023 11:31)  T(F): 97.5 (09 Dec 2023 19:15), Max: 98.4 (09 Dec 2023 11:31)  HR: 77 (09 Dec 2023 19:25) (60 - 77)  BP: 131/81 (09 Dec 2023 11:31) (131/81 - 156/81)  BP(mean): --  RR: 23 (09 Dec 2023 19:25) (18 - 23)  SpO2: 95% (09 Dec 2023 19:25) (95% - 99%)    Parameters below as of 09 Dec 2023 19:15  Patient On (Oxygen Delivery Method): nasal cannula      CAPILLARY BLOOD GLUCOSE        I&O's Summary    08 Dec 2023 07:01  -  09 Dec 2023 07:00  --------------------------------------------------------  IN: 240 mL / OUT: 0 mL / NET: 240 mL        Respiratory:        LABS:                        11.9   14.09 )-----------( 248      ( 09 Dec 2023 05:38 )             34.8     12-09    135  |  102  |  52<H>  ----------------------------<  109<H>  3.7   |  23  |  1.59<H>             MEDICATION LEVELS:     IVF FLUIDS/MEDICATIONS:   MEDICATIONS  (STANDING):  ceFAZolin   IVPB 2000 milliGRAM(s) IV Intermittent every 8 hours  dexAMETHasone     Tablet   Oral   famotidine    Tablet 20 milliGRAM(s) Oral every 12 hours  influenza  Vaccine (HIGH DOSE) 0.7 milliLiter(s) IntraMuscular once  levETIRAcetam 250 milliGRAM(s) Oral every 12 hours  senna 2 Tablet(s) Oral at bedtime  sodium chloride 0.9%. 1000 milliLiter(s) (75 mL/Hr) IV Continuous <Continuous>    MEDICATIONS  (PRN):  bisacodyl 5 milliGRAM(s) Oral daily PRN Constipation  ondansetron Injectable 4 milliGRAM(s) IV Push every 6 hours PRN Nausea and/or Vomiting  oxyCODONE    IR 5 milliGRAM(s) Oral every 4 hours PRN Moderate Pain (4 - 6)        IMAGING:      EXAMINATION:  PHYSICAL EXAM:    Constitutional: No Acute Distress     Neurological: Awake, alert oriented to person, place and time, Following Commands, PERRL, EOMI, No Gaze Preference, Face Symmetrical, Speech Fluent.     Motor exam:          Upper extremity                         Delt     Bicep     Tricep    HG                                                 R         5/5        5/5        5/5       5/5                                               L          5/5        5/5        5/5       5/5          Lower extremity                        HF         KF        KE       DF         PF                                                  R        5/5        5/5        5/5       5/5         5/5                                               L         5/5        5/5       5/5       5/5          5/5                                                 Sensation: [X ] intact to light touch  [ ] decreased:     Pulmonary: Clear to Auscultation, No rales, No rhonchi, No wheezes     Cardiovascular: S1, S2, Regular rate and rhythm     Gastrointestinal: Soft, Non-tender, Non-distended     Extremities: No calf tenderness      HOSPITAL COURSE:  79 Y/O F with h/o lung CA presents with headache and was found on floor.  Pt discovered to have singular right parietal mass, peripherally and heterogeneously enhancing with surrounding vasogenic edema.  Pt with significant edema with mass effect and some midline shift warranting surgical excision/biopsy to reduce edema prior to anticipated radiation treatment.       Admission Scores  GCS: 15    24 hour Events:  Patient POD0 from right parietal crani for mets resection.     Allergies    penicillin (Short breath; Hives)    Intolerances        REVIEW OF SYSTEMS: [ ] Unable to Assess due to neurologic exam   [X] All ROS addressed below are non-contributory, except:  Neuro: [ ] Headache [ ] Back pain [ ] Numbness [ ] Weakness [ ] Ataxia [ ] Dizziness [ ] Aphasia [ ] Dysarthria [ ] Visual disturbance  Resp: [ ] Shortness of breath/dyspnea, [ ] Orthopnea [ ] Cough  CV: [ ] Chest pain [ ] Palpitation [ ] Lightheadedness [ ] Syncope  Renal: [ ] Thirst [ ] Edema  GI: [ ] Nausea [ ] Emesis [ ] Abdominal pain [ ] Constipation [ ] Diarrhea  Hem: [ ] Hematemesis [ ] bright red blood per rectum  ID: [ ] Fever [ ] Chills [ ] Dysuria  ENT: [ ] Rhinorrhea      DEVICES:   [ ] Restraints [ ] ET tube [ ] central line [ ] arterial line [ ] orantes [ ] NGT/OGT [ ] EVD [ ] LD [ ] VANITA/HMV [ ] Trach [ ] PEG [ ] Chest Tube     VITALS:   Vital Signs Last 24 Hrs  T(C): 36.4 (09 Dec 2023 19:15), Max: 36.9 (09 Dec 2023 11:31)  T(F): 97.5 (09 Dec 2023 19:15), Max: 98.4 (09 Dec 2023 11:31)  HR: 77 (09 Dec 2023 19:25) (60 - 77)  BP: 131/81 (09 Dec 2023 11:31) (131/81 - 156/81)  BP(mean): --  RR: 23 (09 Dec 2023 19:25) (18 - 23)  SpO2: 95% (09 Dec 2023 19:25) (95% - 99%)    Parameters below as of 09 Dec 2023 19:15  Patient On (Oxygen Delivery Method): nasal cannula      CAPILLARY BLOOD GLUCOSE        I&O's Summary    08 Dec 2023 07:01  -  09 Dec 2023 07:00  --------------------------------------------------------  IN: 240 mL / OUT: 0 mL / NET: 240 mL        Respiratory:        LABS:                        11.9   14.09 )-----------( 248      ( 09 Dec 2023 05:38 )             34.8     12-09    135  |  102  |  52<H>  ----------------------------<  109<H>  3.7   |  23  |  1.59<H>             MEDICATION LEVELS:     IVF FLUIDS/MEDICATIONS:   MEDICATIONS  (STANDING):  ceFAZolin   IVPB 2000 milliGRAM(s) IV Intermittent every 8 hours  dexAMETHasone     Tablet   Oral   famotidine    Tablet 20 milliGRAM(s) Oral every 12 hours  influenza  Vaccine (HIGH DOSE) 0.7 milliLiter(s) IntraMuscular once  levETIRAcetam 250 milliGRAM(s) Oral every 12 hours  senna 2 Tablet(s) Oral at bedtime  sodium chloride 0.9%. 1000 milliLiter(s) (75 mL/Hr) IV Continuous <Continuous>    MEDICATIONS  (PRN):  bisacodyl 5 milliGRAM(s) Oral daily PRN Constipation  ondansetron Injectable 4 milliGRAM(s) IV Push every 6 hours PRN Nausea and/or Vomiting  oxyCODONE    IR 5 milliGRAM(s) Oral every 4 hours PRN Moderate Pain (4 - 6)        IMAGING:      EXAMINATION:  PHYSICAL EXAM:    Constitutional: No Acute Distress     Neurological: Awake, alert oriented to person, place and time, Following Commands, PERRL, EOMI, No Gaze Preference, Face Symmetrical, Speech Fluent.     Motor exam:          Upper extremity                         Delt     Bicep     Tricep    HG                                                 R         5/5        5/5        5/5       5/5                                               L          5/5        5/5        5/5       5/5          Lower extremity                        HF         KF        KE       DF         PF                                                  R        5/5        5/5        5/5       5/5         5/5                                               L         5/5        5/5       5/5       5/5          5/5                                                 Sensation: [X ] intact to light touch  [ ] decreased:     Pulmonary: Clear to Auscultation, No rales, No rhonchi, No wheezes     Cardiovascular: S1, S2, Regular rate and rhythm     Gastrointestinal: Soft, Non-tender, Non-distended     Extremities: No calf tenderness      HOSPITAL COURSE:  77 Y/O F with h/o lung CA presents with headache and was found on floor.  Pt discovered to have singular right parietal mass, peripherally and heterogeneously enhancing with surrounding vasogenic edema.  Pt with significant edema with mass effect and some midline shift warranting surgical excision/biopsy to reduce edema prior to anticipated radiation treatment.       Admission Scores  GCS: 15    24 hour Events:  Patient POD0 from right parietal crani for mets resection.     Allergies    penicillin (Short breath; Hives)    Intolerances        REVIEW OF SYSTEMS: [ ] Unable to Assess due to neurologic exam   [X] All ROS addressed below are non-contributory, except:  Neuro: [ ] Headache [ ] Back pain [ ] Numbness [ ] Weakness [ ] Ataxia [ ] Dizziness [ ] Aphasia [ ] Dysarthria [ ] Visual disturbance  Resp: [ ] Shortness of breath/dyspnea, [ ] Orthopnea [ ] Cough  CV: [ ] Chest pain [ ] Palpitation [ ] Lightheadedness [ ] Syncope  Renal: [ ] Thirst [ ] Edema  GI: [ ] Nausea [ ] Emesis [ ] Abdominal pain [ ] Constipation [ ] Diarrhea  Hem: [ ] Hematemesis [ ] bright red blood per rectum  ID: [ ] Fever [ ] Chills [ ] Dysuria  ENT: [ ] Rhinorrhea      DEVICES:   [ ] Restraints [ ] ET tube [ ] central line [ ] arterial line [ ] orantes [ ] NGT/OGT [ ] EVD [ ] LD [ ] VANITA/HMV [ ] Trach [ ] PEG [ ] Chest Tube     VITALS:   Vital Signs Last 24 Hrs  T(C): 36.4 (09 Dec 2023 19:15), Max: 36.9 (09 Dec 2023 11:31)  T(F): 97.5 (09 Dec 2023 19:15), Max: 98.4 (09 Dec 2023 11:31)  HR: 77 (09 Dec 2023 19:25) (60 - 77)  BP: 131/81 (09 Dec 2023 11:31) (131/81 - 156/81)  BP(mean): --  RR: 23 (09 Dec 2023 19:25) (18 - 23)  SpO2: 95% (09 Dec 2023 19:25) (95% - 99%)    Parameters below as of 09 Dec 2023 19:15  Patient On (Oxygen Delivery Method): nasal cannula      CAPILLARY BLOOD GLUCOSE        I&O's Summary    08 Dec 2023 07:01  -  09 Dec 2023 07:00  --------------------------------------------------------  IN: 240 mL / OUT: 0 mL / NET: 240 mL        Respiratory:        LABS:                        11.9   14.09 )-----------( 248      ( 09 Dec 2023 05:38 )             34.8     12-09    135  |  102  |  52<H>  ----------------------------<  109<H>  3.7   |  23  |  1.59<H>             MEDICATION LEVELS:     IVF FLUIDS/MEDICATIONS:   MEDICATIONS  (STANDING):  ceFAZolin   IVPB 2000 milliGRAM(s) IV Intermittent every 8 hours  dexAMETHasone     Tablet   Oral   famotidine    Tablet 20 milliGRAM(s) Oral every 12 hours  influenza  Vaccine (HIGH DOSE) 0.7 milliLiter(s) IntraMuscular once  levETIRAcetam 250 milliGRAM(s) Oral every 12 hours  senna 2 Tablet(s) Oral at bedtime  sodium chloride 0.9%. 1000 milliLiter(s) (75 mL/Hr) IV Continuous <Continuous>    MEDICATIONS  (PRN):  bisacodyl 5 milliGRAM(s) Oral daily PRN Constipation  ondansetron Injectable 4 milliGRAM(s) IV Push every 6 hours PRN Nausea and/or Vomiting  oxyCODONE    IR 5 milliGRAM(s) Oral every 4 hours PRN Moderate Pain (4 - 6)        IMAGING:      EXAMINATION:  PHYSICAL EXAM:    Constitutional: No Acute Distress     Neurological: Laying with eyes closed, AAOx3, pupils 3 mm and bilaterally reactive. Moving all 4 extremities 5/5.     Sensation: [X ] intact to light touch  [ ] decreased:     Pulmonary: Clear to Auscultation, No rales, No rhonchi, No wheezes     Cardiovascular: S1, S2, Regular rate and rhythm     Gastrointestinal: Soft, Non-tender, Non-distended     Extremities: No calf tenderness      HOSPITAL COURSE:  79 Y/O F with h/o lung CA presents with headache and was found on floor.  Pt discovered to have singular right parietal mass, peripherally and heterogeneously enhancing with surrounding vasogenic edema.  Pt with significant edema with mass effect and some midline shift warranting surgical excision/biopsy to reduce edema prior to anticipated radiation treatment.       Admission Scores  GCS: 15    24 hour Events:  Patient POD0 from right parietal crani for mets resection.     Allergies    penicillin (Short breath; Hives)    Intolerances        REVIEW OF SYSTEMS: [ ] Unable to Assess due to neurologic exam   [X] All ROS addressed below are non-contributory, except:  Neuro: [ ] Headache [ ] Back pain [ ] Numbness [ ] Weakness [ ] Ataxia [ ] Dizziness [ ] Aphasia [ ] Dysarthria [ ] Visual disturbance  Resp: [ ] Shortness of breath/dyspnea, [ ] Orthopnea [ ] Cough  CV: [ ] Chest pain [ ] Palpitation [ ] Lightheadedness [ ] Syncope  Renal: [ ] Thirst [ ] Edema  GI: [ ] Nausea [ ] Emesis [ ] Abdominal pain [ ] Constipation [ ] Diarrhea  Hem: [ ] Hematemesis [ ] bright red blood per rectum  ID: [ ] Fever [ ] Chills [ ] Dysuria  ENT: [ ] Rhinorrhea      DEVICES:   [ ] Restraints [ ] ET tube [ ] central line [ ] arterial line [ ] orantes [ ] NGT/OGT [ ] EVD [ ] LD [ ] VANITA/HMV [ ] Trach [ ] PEG [ ] Chest Tube     VITALS:   Vital Signs Last 24 Hrs  T(C): 36.4 (09 Dec 2023 19:15), Max: 36.9 (09 Dec 2023 11:31)  T(F): 97.5 (09 Dec 2023 19:15), Max: 98.4 (09 Dec 2023 11:31)  HR: 77 (09 Dec 2023 19:25) (60 - 77)  BP: 131/81 (09 Dec 2023 11:31) (131/81 - 156/81)  BP(mean): --  RR: 23 (09 Dec 2023 19:25) (18 - 23)  SpO2: 95% (09 Dec 2023 19:25) (95% - 99%)    Parameters below as of 09 Dec 2023 19:15  Patient On (Oxygen Delivery Method): nasal cannula      CAPILLARY BLOOD GLUCOSE        I&O's Summary    08 Dec 2023 07:01  -  09 Dec 2023 07:00  --------------------------------------------------------  IN: 240 mL / OUT: 0 mL / NET: 240 mL        Respiratory:        LABS:                        11.9   14.09 )-----------( 248      ( 09 Dec 2023 05:38 )             34.8     12-09    135  |  102  |  52<H>  ----------------------------<  109<H>  3.7   |  23  |  1.59<H>             MEDICATION LEVELS:     IVF FLUIDS/MEDICATIONS:   MEDICATIONS  (STANDING):  ceFAZolin   IVPB 2000 milliGRAM(s) IV Intermittent every 8 hours  dexAMETHasone     Tablet   Oral   famotidine    Tablet 20 milliGRAM(s) Oral every 12 hours  influenza  Vaccine (HIGH DOSE) 0.7 milliLiter(s) IntraMuscular once  levETIRAcetam 250 milliGRAM(s) Oral every 12 hours  senna 2 Tablet(s) Oral at bedtime  sodium chloride 0.9%. 1000 milliLiter(s) (75 mL/Hr) IV Continuous <Continuous>    MEDICATIONS  (PRN):  bisacodyl 5 milliGRAM(s) Oral daily PRN Constipation  ondansetron Injectable 4 milliGRAM(s) IV Push every 6 hours PRN Nausea and/or Vomiting  oxyCODONE    IR 5 milliGRAM(s) Oral every 4 hours PRN Moderate Pain (4 - 6)        IMAGING:      EXAMINATION:  PHYSICAL EXAM:    Constitutional: No Acute Distress     Neurological: Laying with eyes closed, AAOx3, pupils 3 mm and bilaterally reactive. Moving all 4 extremities 5/5.     Sensation: [X ] intact to light touch  [ ] decreased:     Pulmonary: Clear to Auscultation, No rales, No rhonchi, No wheezes     Cardiovascular: S1, S2, Regular rate and rhythm     Gastrointestinal: Soft, Non-tender, Non-distended     Extremities: No calf tenderness

## 2023-12-10 LAB
ANION GAP SERPL CALC-SCNC: 13 MMOL/L — SIGNIFICANT CHANGE UP (ref 5–17)
ANION GAP SERPL CALC-SCNC: 13 MMOL/L — SIGNIFICANT CHANGE UP (ref 5–17)
ANION GAP SERPL CALC-SCNC: 14 MMOL/L — SIGNIFICANT CHANGE UP (ref 5–17)
ANION GAP SERPL CALC-SCNC: 14 MMOL/L — SIGNIFICANT CHANGE UP (ref 5–17)
BASOPHILS # BLD AUTO: 0.03 K/UL — SIGNIFICANT CHANGE UP (ref 0–0.2)
BASOPHILS # BLD AUTO: 0.03 K/UL — SIGNIFICANT CHANGE UP (ref 0–0.2)
BASOPHILS NFR BLD AUTO: 0.2 % — SIGNIFICANT CHANGE UP (ref 0–2)
BASOPHILS NFR BLD AUTO: 0.2 % — SIGNIFICANT CHANGE UP (ref 0–2)
BUN SERPL-MCNC: 40 MG/DL — HIGH (ref 7–23)
BUN SERPL-MCNC: 40 MG/DL — HIGH (ref 7–23)
BUN SERPL-MCNC: 41 MG/DL — HIGH (ref 7–23)
BUN SERPL-MCNC: 41 MG/DL — HIGH (ref 7–23)
CALCIUM SERPL-MCNC: 8 MG/DL — LOW (ref 8.4–10.5)
CALCIUM SERPL-MCNC: 8 MG/DL — LOW (ref 8.4–10.5)
CALCIUM SERPL-MCNC: 8.1 MG/DL — LOW (ref 8.4–10.5)
CALCIUM SERPL-MCNC: 8.1 MG/DL — LOW (ref 8.4–10.5)
CHLORIDE SERPL-SCNC: 101 MMOL/L — SIGNIFICANT CHANGE UP (ref 96–108)
CHLORIDE SERPL-SCNC: 101 MMOL/L — SIGNIFICANT CHANGE UP (ref 96–108)
CHLORIDE SERPL-SCNC: 99 MMOL/L — SIGNIFICANT CHANGE UP (ref 96–108)
CHLORIDE SERPL-SCNC: 99 MMOL/L — SIGNIFICANT CHANGE UP (ref 96–108)
CO2 SERPL-SCNC: 20 MMOL/L — LOW (ref 22–31)
CO2 SERPL-SCNC: 20 MMOL/L — LOW (ref 22–31)
CO2 SERPL-SCNC: 21 MMOL/L — LOW (ref 22–31)
CO2 SERPL-SCNC: 21 MMOL/L — LOW (ref 22–31)
CREAT SERPL-MCNC: 1.32 MG/DL — HIGH (ref 0.5–1.3)
CREAT SERPL-MCNC: 1.32 MG/DL — HIGH (ref 0.5–1.3)
CREAT SERPL-MCNC: 1.45 MG/DL — HIGH (ref 0.5–1.3)
CREAT SERPL-MCNC: 1.45 MG/DL — HIGH (ref 0.5–1.3)
EGFR: 37 ML/MIN/1.73M2 — LOW
EGFR: 37 ML/MIN/1.73M2 — LOW
EGFR: 42 ML/MIN/1.73M2 — LOW
EGFR: 42 ML/MIN/1.73M2 — LOW
EOSINOPHIL # BLD AUTO: 0 K/UL — SIGNIFICANT CHANGE UP (ref 0–0.5)
EOSINOPHIL # BLD AUTO: 0 K/UL — SIGNIFICANT CHANGE UP (ref 0–0.5)
EOSINOPHIL NFR BLD AUTO: 0 % — SIGNIFICANT CHANGE UP (ref 0–6)
EOSINOPHIL NFR BLD AUTO: 0 % — SIGNIFICANT CHANGE UP (ref 0–6)
GLUCOSE SERPL-MCNC: 141 MG/DL — HIGH (ref 70–99)
GLUCOSE SERPL-MCNC: 141 MG/DL — HIGH (ref 70–99)
GLUCOSE SERPL-MCNC: 143 MG/DL — HIGH (ref 70–99)
GLUCOSE SERPL-MCNC: 143 MG/DL — HIGH (ref 70–99)
HCT VFR BLD CALC: 33.5 % — LOW (ref 34.5–45)
HCT VFR BLD CALC: 33.5 % — LOW (ref 34.5–45)
HGB BLD-MCNC: 11.4 G/DL — LOW (ref 11.5–15.5)
HGB BLD-MCNC: 11.4 G/DL — LOW (ref 11.5–15.5)
IMM GRANULOCYTES NFR BLD AUTO: 1.6 % — HIGH (ref 0–0.9)
IMM GRANULOCYTES NFR BLD AUTO: 1.6 % — HIGH (ref 0–0.9)
LYMPHOCYTES # BLD AUTO: 0.61 K/UL — LOW (ref 1–3.3)
LYMPHOCYTES # BLD AUTO: 0.61 K/UL — LOW (ref 1–3.3)
LYMPHOCYTES # BLD AUTO: 3.6 % — LOW (ref 13–44)
LYMPHOCYTES # BLD AUTO: 3.6 % — LOW (ref 13–44)
MAGNESIUM SERPL-MCNC: 2 MG/DL — SIGNIFICANT CHANGE UP (ref 1.6–2.6)
MAGNESIUM SERPL-MCNC: 2 MG/DL — SIGNIFICANT CHANGE UP (ref 1.6–2.6)
MAGNESIUM SERPL-MCNC: 2.5 MG/DL — SIGNIFICANT CHANGE UP (ref 1.6–2.6)
MAGNESIUM SERPL-MCNC: 2.5 MG/DL — SIGNIFICANT CHANGE UP (ref 1.6–2.6)
MCHC RBC-ENTMCNC: 30.2 PG — SIGNIFICANT CHANGE UP (ref 27–34)
MCHC RBC-ENTMCNC: 30.2 PG — SIGNIFICANT CHANGE UP (ref 27–34)
MCHC RBC-ENTMCNC: 34 GM/DL — SIGNIFICANT CHANGE UP (ref 32–36)
MCHC RBC-ENTMCNC: 34 GM/DL — SIGNIFICANT CHANGE UP (ref 32–36)
MCV RBC AUTO: 88.9 FL — SIGNIFICANT CHANGE UP (ref 80–100)
MCV RBC AUTO: 88.9 FL — SIGNIFICANT CHANGE UP (ref 80–100)
MONOCYTES # BLD AUTO: 0.93 K/UL — HIGH (ref 0–0.9)
MONOCYTES # BLD AUTO: 0.93 K/UL — HIGH (ref 0–0.9)
MONOCYTES NFR BLD AUTO: 5.5 % — SIGNIFICANT CHANGE UP (ref 2–14)
MONOCYTES NFR BLD AUTO: 5.5 % — SIGNIFICANT CHANGE UP (ref 2–14)
NEUTROPHILS # BLD AUTO: 15.2 K/UL — HIGH (ref 1.8–7.4)
NEUTROPHILS # BLD AUTO: 15.2 K/UL — HIGH (ref 1.8–7.4)
NEUTROPHILS NFR BLD AUTO: 89.1 % — HIGH (ref 43–77)
NEUTROPHILS NFR BLD AUTO: 89.1 % — HIGH (ref 43–77)
NRBC # BLD: 0 /100 WBCS — SIGNIFICANT CHANGE UP (ref 0–0)
NRBC # BLD: 0 /100 WBCS — SIGNIFICANT CHANGE UP (ref 0–0)
OSMOLALITY SERPL: 296 MOSMOL/KG — SIGNIFICANT CHANGE UP (ref 280–301)
OSMOLALITY SERPL: 296 MOSMOL/KG — SIGNIFICANT CHANGE UP (ref 280–301)
OSMOLALITY UR: 680 MOS/KG — SIGNIFICANT CHANGE UP (ref 300–900)
OSMOLALITY UR: 680 MOS/KG — SIGNIFICANT CHANGE UP (ref 300–900)
PHOSPHATE SERPL-MCNC: 2.2 MG/DL — LOW (ref 2.5–4.5)
PHOSPHATE SERPL-MCNC: 2.2 MG/DL — LOW (ref 2.5–4.5)
PHOSPHATE SERPL-MCNC: 4.1 MG/DL — SIGNIFICANT CHANGE UP (ref 2.5–4.5)
PHOSPHATE SERPL-MCNC: 4.1 MG/DL — SIGNIFICANT CHANGE UP (ref 2.5–4.5)
PLATELET # BLD AUTO: 207 K/UL — SIGNIFICANT CHANGE UP (ref 150–400)
PLATELET # BLD AUTO: 207 K/UL — SIGNIFICANT CHANGE UP (ref 150–400)
POTASSIUM SERPL-MCNC: 3.6 MMOL/L — SIGNIFICANT CHANGE UP (ref 3.5–5.3)
POTASSIUM SERPL-SCNC: 3.6 MMOL/L — SIGNIFICANT CHANGE UP (ref 3.5–5.3)
RBC # BLD: 3.77 M/UL — LOW (ref 3.8–5.2)
RBC # BLD: 3.77 M/UL — LOW (ref 3.8–5.2)
RBC # FLD: 13.5 % — SIGNIFICANT CHANGE UP (ref 10.3–14.5)
RBC # FLD: 13.5 % — SIGNIFICANT CHANGE UP (ref 10.3–14.5)
SODIUM SERPL-SCNC: 133 MMOL/L — LOW (ref 135–145)
SODIUM SERPL-SCNC: 133 MMOL/L — LOW (ref 135–145)
SODIUM SERPL-SCNC: 135 MMOL/L — SIGNIFICANT CHANGE UP (ref 135–145)
SODIUM SERPL-SCNC: 135 MMOL/L — SIGNIFICANT CHANGE UP (ref 135–145)
SODIUM UR-SCNC: 60 MMOL/L — SIGNIFICANT CHANGE UP
SODIUM UR-SCNC: 60 MMOL/L — SIGNIFICANT CHANGE UP
WBC # BLD: 17.05 K/UL — HIGH (ref 3.8–10.5)
WBC # BLD: 17.05 K/UL — HIGH (ref 3.8–10.5)
WBC # FLD AUTO: 17.05 K/UL — HIGH (ref 3.8–10.5)
WBC # FLD AUTO: 17.05 K/UL — HIGH (ref 3.8–10.5)

## 2023-12-10 PROCEDURE — 70553 MRI BRAIN STEM W/O & W/DYE: CPT | Mod: 26

## 2023-12-10 PROCEDURE — 93970 EXTREMITY STUDY: CPT | Mod: 26

## 2023-12-10 PROCEDURE — 99232 SBSQ HOSP IP/OBS MODERATE 35: CPT

## 2023-12-10 PROCEDURE — 70450 CT HEAD/BRAIN W/O DYE: CPT | Mod: 26

## 2023-12-10 RX ORDER — SODIUM CHLORIDE 9 MG/ML
2 INJECTION INTRAMUSCULAR; INTRAVENOUS; SUBCUTANEOUS EVERY 6 HOURS
Refills: 0 | Status: DISCONTINUED | OUTPATIENT
Start: 2023-12-10 | End: 2023-12-12

## 2023-12-10 RX ORDER — HEPARIN SODIUM 5000 [USP'U]/ML
5000 INJECTION INTRAVENOUS; SUBCUTANEOUS EVERY 8 HOURS
Refills: 0 | Status: DISCONTINUED | OUTPATIENT
Start: 2023-12-11 | End: 2023-12-15

## 2023-12-10 RX ORDER — MAGNESIUM SULFATE 500 MG/ML
1 VIAL (ML) INJECTION ONCE
Refills: 0 | Status: COMPLETED | OUTPATIENT
Start: 2023-12-10 | End: 2023-12-10

## 2023-12-10 RX ORDER — ENOXAPARIN SODIUM 100 MG/ML
40 INJECTION SUBCUTANEOUS
Refills: 0 | Status: DISCONTINUED | OUTPATIENT
Start: 2023-12-10 | End: 2023-12-10

## 2023-12-10 RX ORDER — SODIUM,POTASSIUM PHOSPHATES 278-250MG
2 POWDER IN PACKET (EA) ORAL ONCE
Refills: 0 | Status: COMPLETED | OUTPATIENT
Start: 2023-12-10 | End: 2023-12-10

## 2023-12-10 RX ORDER — OXYCODONE HYDROCHLORIDE 5 MG/1
5 TABLET ORAL ONCE
Refills: 0 | Status: DISCONTINUED | OUTPATIENT
Start: 2023-12-10 | End: 2023-12-10

## 2023-12-10 RX ORDER — HEPARIN SODIUM 5000 [USP'U]/ML
5000 INJECTION INTRAVENOUS; SUBCUTANEOUS EVERY 8 HOURS
Refills: 0 | Status: DISCONTINUED | OUTPATIENT
Start: 2023-12-10 | End: 2023-12-10

## 2023-12-10 RX ORDER — POTASSIUM CHLORIDE 20 MEQ
40 PACKET (EA) ORAL ONCE
Refills: 0 | Status: COMPLETED | OUTPATIENT
Start: 2023-12-10 | End: 2023-12-10

## 2023-12-10 RX ADMIN — SODIUM CHLORIDE 2 GRAM(S): 9 INJECTION INTRAMUSCULAR; INTRAVENOUS; SUBCUTANEOUS at 17:15

## 2023-12-10 RX ADMIN — BUDESONIDE AND FORMOTEROL FUMARATE DIHYDRATE 2 PUFF(S): 160; 4.5 AEROSOL RESPIRATORY (INHALATION) at 05:45

## 2023-12-10 RX ADMIN — LEVETIRACETAM 250 MILLIGRAM(S): 250 TABLET, FILM COATED ORAL at 17:15

## 2023-12-10 RX ADMIN — SODIUM CHLORIDE 2 GRAM(S): 9 INJECTION INTRAMUSCULAR; INTRAVENOUS; SUBCUTANEOUS at 11:15

## 2023-12-10 RX ADMIN — Medication 1 PATCH: at 11:15

## 2023-12-10 RX ADMIN — OXYCODONE HYDROCHLORIDE 5 MILLIGRAM(S): 5 TABLET ORAL at 05:45

## 2023-12-10 RX ADMIN — Medication 4 MILLIGRAM(S): at 23:21

## 2023-12-10 RX ADMIN — SODIUM CHLORIDE 2 GRAM(S): 9 INJECTION INTRAMUSCULAR; INTRAVENOUS; SUBCUTANEOUS at 23:21

## 2023-12-10 RX ADMIN — Medication 650 MILLIGRAM(S): at 21:18

## 2023-12-10 RX ADMIN — Medication 100 GRAM(S): at 04:21

## 2023-12-10 RX ADMIN — ESCITALOPRAM OXALATE 10 MILLIGRAM(S): 10 TABLET, FILM COATED ORAL at 11:15

## 2023-12-10 RX ADMIN — Medication 40 MILLIEQUIVALENT(S): at 23:21

## 2023-12-10 RX ADMIN — Medication 4 MILLIGRAM(S): at 11:15

## 2023-12-10 RX ADMIN — Medication 650 MILLIGRAM(S): at 14:53

## 2023-12-10 RX ADMIN — Medication 1 PATCH: at 11:12

## 2023-12-10 RX ADMIN — Medication 4 MILLIGRAM(S): at 05:45

## 2023-12-10 RX ADMIN — Medication 100 MILLIGRAM(S): at 05:45

## 2023-12-10 RX ADMIN — OXYCODONE HYDROCHLORIDE 5 MILLIGRAM(S): 5 TABLET ORAL at 08:30

## 2023-12-10 RX ADMIN — Medication 650 MILLIGRAM(S): at 21:48

## 2023-12-10 RX ADMIN — Medication 1 PATCH: at 18:56

## 2023-12-10 RX ADMIN — FAMOTIDINE 10 MILLIGRAM(S): 10 INJECTION INTRAVENOUS at 11:15

## 2023-12-10 RX ADMIN — SODIUM CHLORIDE 75 MILLILITER(S): 9 INJECTION INTRAMUSCULAR; INTRAVENOUS; SUBCUTANEOUS at 07:42

## 2023-12-10 RX ADMIN — OXYCODONE HYDROCHLORIDE 5 MILLIGRAM(S): 5 TABLET ORAL at 06:15

## 2023-12-10 RX ADMIN — OXYCODONE HYDROCHLORIDE 5 MILLIGRAM(S): 5 TABLET ORAL at 00:15

## 2023-12-10 RX ADMIN — Medication 1 PATCH: at 07:08

## 2023-12-10 RX ADMIN — OXYCODONE HYDROCHLORIDE 5 MILLIGRAM(S): 5 TABLET ORAL at 08:00

## 2023-12-10 RX ADMIN — Medication 2 PACKET(S): at 23:21

## 2023-12-10 RX ADMIN — LEVETIRACETAM 250 MILLIGRAM(S): 250 TABLET, FILM COATED ORAL at 05:45

## 2023-12-10 RX ADMIN — Medication 4 MILLIGRAM(S): at 17:15

## 2023-12-10 RX ADMIN — Medication 650 MILLIGRAM(S): at 14:23

## 2023-12-10 RX ADMIN — SENNA PLUS 2 TABLET(S): 8.6 TABLET ORAL at 21:19

## 2023-12-10 NOTE — PROGRESS NOTE ADULT - NUTRITIONAL ASSESSMENT
This patient has been assessed with a concern for Malnutrition and has been determined to have a diagnosis/diagnoses of Moderate protein-calorie malnutrition.    This patient is being managed with:   Diet Minced and Moist-  1000mL Fluid Restriction ([id number])  Entered: Dec 10 2023 10:26AM   This patient has been assessed with a concern for Malnutrition and has been determined to have a diagnosis/diagnoses of Moderate protein-calorie malnutrition.    This patient is being managed with:   Diet Minced and Moist-  1000mL Fluid Restriction ([id number])  Entered: Dec 10 2023 10:26AM

## 2023-12-10 NOTE — PROGRESS NOTE ADULT - ATTENDING COMMENTS
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

## 2023-12-10 NOTE — PROGRESS NOTE ADULT - SUBJECTIVE AND OBJECTIVE BOX
NSCU ATTENDING -- ADDITIONAL PROGRESS NOTE    Nighttime rounds were performed -- please refer to earlier Progress Note for HPI details.    T(C): 36.7 (12-10-23 @ 15:00), Max: 36.8 (12-09-23 @ 23:00)  HR: 80 (12-10-23 @ 15:00) (60 - 80)  BP: --  RR: 18 (12-10-23 @ 15:00) (12 - 19)  SpO2: 98% (12-10-23 @ 15:00) (94% - 98%)  Wt(kg): --    Relevant labwork and imaging reviewed.    CHIEF COMPLAINT:    [A/P]    ATTENDING STATEMENT:    Patient is critically ill, requiring critical care services.     Attending: I have personally and independently provided 30 minutes of critical care services.  This excludes any time spent on separate procedures or teaching.   NSCU ATTENDING -- ADDITIONAL PROGRESS NOTE    Nighttime rounds were performed -- please refer to earlier Progress Note for HPI details.    T(C): 36.7 (12-10-23 @ 15:00), Max: 36.8 (12-09-23 @ 23:00)  HR: 80 (12-10-23 @ 15:00) (60 - 80)  BP: --  RR: 18 (12-10-23 @ 15:00) (12 - 19)  SpO2: 98% (12-10-23 @ 15:00) (94% - 98%)  Wt(kg): --    Relevant labwork and imaging reviewed.    CHIEF COMPLAINT:    [A/P] POD 3 craniotomy for tumor resection  Q4 neuro/vitals  repeat CTH in am per neurosurgery   12/10 BLE soleal DVTs  repeat 12/15 eval for propagation     ATTENDING STATEMENT:    Patient is critically ill, requiring critical care services.     Attending: I have personally and independently provided 30 minutes of critical care services.  This excludes any time spent on separate procedures or teaching.

## 2023-12-10 NOTE — PROGRESS NOTE ADULT - ASSESSMENT
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     NEURO:  Neurochecks Q1H  Continue Dex 2 week taper per neurosurgery  Keppra 250 mg BID- renally dosed for seizure prophylaxis   CTH in the AM  MRI brain with and w/o contrast when can  Continue home escitalopram 10 mg QD  Activity: [x] mobilize as tolerated [] Bedrest [] PT [] OT [] PMNR    PULM:  History of COPD  Continue home meds  Chronic smoker- will start nicotine patch      CV:  SBP goal 100-160 mmHg  At home on Triamterene-HCTZ- restart BP meds as needed    RENAL:  Fluids: IVF 75 cc/hour  History of CKD  Creatinine clearance 33  At home on Gentesa for overactive bladder, restart     GI:  Diet: Passed dysphagia, NPO per neurosurgery until the CTH tomorrow   GI prophylaxis [] not indicated [x] Pepcid while on steroids [] other:  Bowel regimen [x] miralax [x] senna [] other:    ENDO:   Goal euglycemia (-180)  ISS  Monitor glucose while on steroids    HEME/ONC:  Obtain post op H/H   VTE prophylaxis: [x] SCDs [] chemoprophylaxis [x] hold chemoprophylaxis due to: recent post op [] high risk of DVT/PE on admission due to:    ID:  Afebrile  Will monitor fever curve     MISC:    SOCIAL/FAMILY:  [x] awaiting [] updated at bedside [] family meeting    CODE STATUS:  [x] Full Code [] DNR [] DNI [] Palliative/Comfort Care    DISPOSITION:  [x] ICU [] Stroke Unit [] Floor [] EMU [] RCU [] PCU    [] Patient is at high risk of neurologic deterioration/death due to: tumor expansion, midline shift, herniation     Contact: [phone] 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     NEURO:  Neurochecks Q1H  Continue Dex 2 week taper per neurosurgery  Keppra 250 mg BID- renally dosed for seizure prophylaxis   CTH in the AM  MRI brain with and w/o contrast when can  Continue home escitalopram 10 mg QD  Activity: [x] mobilize as tolerated [] Bedrest [] PT [] OT [] PMNR    PULM:  History of COPD  Continue home meds  Chronic smoker- will start nicotine patch      CV:  SBP goal 100-160 mmHg  At home on Triamterene-HCTZ- restart BP meds as needed    RENAL:  Fluids: IVF 75 cc/hour  History of CKD  Creatinine clearance 33  At home on Gentesa for overactive bladder, restart     GI:  Diet: Passed dysphagia, NPO per neurosurgery until the CTH tomorrow   GI prophylaxis [] not indicated [x] Pepcid while on steroids [] other:  Bowel regimen [x] miralax [x] senna [] other:    ENDO:   Goal euglycemia (-180)  ISS  Monitor glucose while on steroids    HEME/ONC:  Obtain post op H/H   VTE prophylaxis: [x] SCDs [] chemoprophylaxis [x] hold chemoprophylaxis due to: recent post op [] high risk of DVT/PE on admission due to:    ID:  Afebrile  Will monitor fever curve     MISC:    SOCIAL/FAMILY:  [x] awaiting [] updated at bedside [] family meeting    CODE STATUS:  [x] Full Code [] DNR [] DNI [] Palliative/Comfort Care    DISPOSITION:  [x] ICU [] Stroke Unit [] Floor [] EMU [] RCU [] PCU    [] Patient is at high risk of neurologic deterioration/death due to: tumor expansion, midline shift, herniation     Contact: [phone] ASSESSMENT/PLAN: Patient POD1 from right parietal crani for 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     NEURO:  Neurochecks Q1H  Continue Dex 2 week taper per neurosurgery  Keppra 250 mg BID- renally dosed for seizure prophylaxis   CTH expected post-op changes, pending official read   MRI brain with and w/o contrast when can  Continue home escitalopram 10 mg QD  Activity: [x] mobilize as tolerated [] Bedrest [] PT [] OT [] PMNR    PULM:  History of COPD  Continue home meds  Chronic smoker- will start nicotine patch      CV:  SBP goal 100-160 mmHg   At home on Triamterene-HCTZ- restart BP meds as needed    RENAL:  IVL   History of CKD  Creatinine clearance 33  continue  home on Gentesa for overactive bladder    GI:  Diet: regular diet   PPI while on decadron   GI prophylaxis [] not indicated [x] Pepcid while on steroids [] other:  Bowel regimen [x] miralax [x] senna [] other:    ENDO:   Goal euglycemia (-180)  ISS  Monitor glucose while on steroids    HEME/ONC:  Obtain post op H/H   VTE prophylaxis: [x] SCDs [] chemoprophylaxis [x] hold chemoprophylaxis due to: recent post op [] high risk of DVT/PE on admission due to:    ID:  Afebrile  Will monitor fever curve     MISC:    SOCIAL/FAMILY:  [x] awaiting [] updated at bedside [] family meeting    CODE STATUS:  [x] Full Code [] DNR [] DNI [] Palliative/Comfort Care    DISPOSITION:  [x] ICU [] Stroke Unit [] Floor [] EMU [] RCU [] PCU    [] Patient is at high risk of neurologic deterioration/death due to: tumor expansion, midline shift, herniation     Contact: [phone] ASSESSMENT/PLAN: Patient POD1 from right parietal crani for 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     NEURO:  Neurochecks Q1H  Continue Dex 2 week taper per neurosurgery  Keppra 250 mg BID- renally dosed for seizure prophylaxis   CTH expected post-op changes, pending official read   MRI brain with and w/o contrast when can  Continue home escitalopram 10 mg QD  Activity: [x] mobilize as tolerated [] Bedrest [] PT [] OT [] PMNR    PULM:  History of COPD  Continue home meds  Chronic smoker- will start nicotine patch      CV:  SBP goal 100-160 mmHg   At home on Triamterene-HCTZ- restart BP meds as needed    RENAL:  IVL   History of CKD  Creatinine clearance 33  continue  home on Gentesa for overactive bladder    GI:  Diet: regular diet   PPI while on decadron   GI prophylaxis [] not indicated [x] Pepcid while on steroids [] other:  Bowel regimen [x] miralax [x] senna [] other:    ENDO:   Goal euglycemia (-180)  ISS  Monitor glucose while on steroids    HEME/ONC:  Obtain post op H/H   VTE prophylaxis: [x] SCDs [] chemoprophylaxis [x] hold chemoprophylaxis due to: recent post op [] high risk of DVT/PE on admission due to:    ID:  Afebrile  Will monitor fever curve     MISC:    SOCIAL/FAMILY:  [x] awaiting [] updated at bedside [] family meeting    CODE STATUS:  [x] Full Code [] DNR [] DNI [] Palliative/Comfort Care    DISPOSITION:  [x] ICU [] Stroke Unit [] Floor [] EMU [] RCU [] PCU    [] Patient is at high risk of neurologic deterioration/death due to: tumor expansion, midline shift, herniation     Contact: [phone]

## 2023-12-10 NOTE — PROGRESS NOTE ADULT - SUBJECTIVE AND OBJECTIVE BOX
Patient seen and examined at bedside.    --Anticoagulation--    T(C): 36.3 (12-10-23 @ 03:00), Max: 36.9 (12-09-23 @ 11:31)  HR: 60 (12-10-23 @ 06:00) (60 - 77)  BP: 131/81 (12-09-23 @ 11:31) (131/81 - 131/81)  RR: 15 (12-10-23 @ 06:00) (12 - 23)  SpO2: 95% (12-10-23 @ 06:00) (94% - 98%)  Wt(kg): --    Exam: AOx3, EOMI, no facial, no drift, PELLETIER 5/5

## 2023-12-10 NOTE — PROGRESS NOTE ADULT - NUTRITIONAL ASSESSMENT
This patient has been assessed with a concern for Malnutrition and has been determined to have a diagnosis/diagnoses of Moderate protein-calorie malnutrition.    This patient is being managed with:   Diet NPO-  Except Medications  Entered: Dec  9 2023  9:13PM

## 2023-12-10 NOTE — PROGRESS NOTE ADULT - SUBJECTIVE AND OBJECTIVE BOX
HOSPITAL COURSE:  79 Y/O F with h/o lung CA presents with headache and was found on floor.  Pt discovered to have singular right parietal mass, peripherally and heterogeneously enhancing with surrounding vasogenic edema.  Pt with significant edema with mass effect and some midline shift warranting surgical excision/biopsy to reduce edema prior to anticipated radiation treatment.       Admission Scores  GCS: 15    24 hour Events:  Patient POD0 from right parietal crani for mets resection.   12/10: No events overnight     Allergies    penicillin (Short breath; Hives)    Intolerances        REVIEW OF SYSTEMS: [ ] Unable to Assess due to neurologic exam   [x] All ROS addressed below are non-contributory, except:  Neuro: [ ] Headache [ ] Back pain [ ] Numbness [ ] Weakness [ ] Ataxia [ ] Dizziness [ ] Aphasia [ ] Dysarthria [ ] Visual disturbance  Resp: [ ] Shortness of breath/dyspnea, [ ] Orthopnea [ ] Cough  CV: [ ] Chest pain [ ] Palpitation [ ] Lightheadedness [ ] Syncope  Renal: [ ] Thirst [ ] Edema  GI: [ ] Nausea [ ] Emesis [ ] Abdominal pain [ ] Constipation [ ] Diarrhea  Hem: [ ] Hematemesis [ ] bright red blood per rectum  ID: [ ] Fever [ ] Chills [ ] Dysuria  ENT: [ ] Rhinorrhea      DEVICES:   [ ] Restraints [ ] ET tube [ ] central line [ ] arterial line [ ] orantes [ ] NGT/OGT [ ] EVD [ ] LD [ ] VANITA/HMV [ ] Trach [ ] PEG [ ] Chest Tube     VITALS:   Vital Signs Last 24 Hrs  T(C): 36.8 (09 Dec 2023 23:00), Max: 36.9 (09 Dec 2023 11:31)  T(F): 98.2 (09 Dec 2023 23:00), Max: 98.4 (09 Dec 2023 11:31)  HR: 61 (10 Dec 2023 00:00) (60 - 77)  BP: 131/81 (09 Dec 2023 11:31) (131/81 - 156/81)  BP(mean): --  RR: 18 (10 Dec 2023 00:00) (14 - 23)  SpO2: 96% (10 Dec 2023 00:00) (94% - 98%)    Parameters below as of 09 Dec 2023 19:15  Patient On (Oxygen Delivery Method): nasal cannula      CAPILLARY BLOOD GLUCOSE        I&O's Summary    08 Dec 2023 07:01  -  09 Dec 2023 07:00  --------------------------------------------------------  IN: 240 mL / OUT: 0 mL / NET: 240 mL    09 Dec 2023 07:01  -  10 Dec 2023 00:59  --------------------------------------------------------  IN: 720 mL / OUT: 425 mL / NET: 295 mL        Respiratory:        LABS:                        11.2   13.42 )-----------( 235      ( 09 Dec 2023 20:47 )             33.1     12-09    131<L>  |  98  |  46<H>  ----------------------------<  117<H>  4.0   |  21<L>  |  1.42<H>             MEDICATION LEVELS:     IVF FLUIDS/MEDICATIONS:   MEDICATIONS  (STANDING):  budesonide 160 MICROgram(s)/formoterol 4.5 MICROgram(s) Inhaler 2 Puff(s) Inhalation two times a day  ceFAZolin   IVPB 2000 milliGRAM(s) IV Intermittent every 8 hours  dexAMETHasone     Tablet 4 milliGRAM(s) Oral every 6 hours  dexAMETHasone     Tablet   Oral   escitalopram 10 milliGRAM(s) Oral daily  famotidine    Tablet 10 milliGRAM(s) Oral daily  influenza  Vaccine (HIGH DOSE) 0.7 milliLiter(s) IntraMuscular once  levETIRAcetam 250 milliGRAM(s) Oral every 12 hours  nicotine -  14 mG/24Hr(s) Patch 1 Patch Transdermal daily  senna 2 Tablet(s) Oral at bedtime  sodium chloride 0.9%. 1000 milliLiter(s) (75 mL/Hr) IV Continuous <Continuous>    MEDICATIONS  (PRN):  acetaminophen     Tablet .. 650 milliGRAM(s) Oral every 6 hours PRN Temp greater or equal to 38C (100.4F), Mild Pain (1 - 3)  bisacodyl 5 milliGRAM(s) Oral daily PRN Constipation  ondansetron Injectable 4 milliGRAM(s) IV Push every 6 hours PRN Nausea and/or Vomiting  oxyCODONE    IR 5 milliGRAM(s) Oral every 4 hours PRN Moderate Pain (4 - 6)        IMAGING:      EXAMINATION:  PHYSICAL EXAM:    Constitutional: No Acute Distress     Neurological: Awake, alert oriented to person, place and time, Following Commands, PERRL, EOMI, No Gaze Preference, Face Symmetrical, Speech Fluent, No dysmetria, No ataxia, No nystagmus     Motor exam:          Upper extremity                         Delt     Bicep     Tricep    HG                                                 R         5/5        5/5        5/5       5/5                                               L          5/5        5/5        5/5       5/5          Lower extremity                        HF         KF        KE       DF         PF                                                  R        5/5        5/5        5/5       5/5         5/5                                               L         5/5        5/5       5/5       5/5          5/5                                                 Sensation: [ ] intact to light touch  [ ] decreased:     Reflexes: Deep Tendon Reflexes Intact     Pulmonary: Clear to Auscultation, No rales, No rhonchi, No wheezes     Cardiovascular: S1, S2, Regular rate and rhythm     Gastrointestinal: Soft, Non-tender, Non-distended     Extremities: No calf tenderness     Incision:    HOSPITAL COURSE:  77 Y/O F with h/o lung CA presents with headache and was found on floor.  Pt discovered to have singular right parietal mass, peripherally and heterogeneously enhancing with surrounding vasogenic edema.  Pt with significant edema with mass effect and some midline shift warranting surgical excision/biopsy to reduce edema prior to anticipated radiation treatment.       Admission Scores  GCS: 15    24 hour Events:  Patient POD0 from right parietal crani for mets resection.   12/10: No events overnight     Allergies    penicillin (Short breath; Hives)    Intolerances        REVIEW OF SYSTEMS: [ ] Unable to Assess due to neurologic exam   [x] All ROS addressed below are non-contributory, except:  Neuro: [ ] Headache [ ] Back pain [ ] Numbness [ ] Weakness [ ] Ataxia [ ] Dizziness [ ] Aphasia [ ] Dysarthria [ ] Visual disturbance  Resp: [ ] Shortness of breath/dyspnea, [ ] Orthopnea [ ] Cough  CV: [ ] Chest pain [ ] Palpitation [ ] Lightheadedness [ ] Syncope  Renal: [ ] Thirst [ ] Edema  GI: [ ] Nausea [ ] Emesis [ ] Abdominal pain [ ] Constipation [ ] Diarrhea  Hem: [ ] Hematemesis [ ] bright red blood per rectum  ID: [ ] Fever [ ] Chills [ ] Dysuria  ENT: [ ] Rhinorrhea      DEVICES:   [ ] Restraints [ ] ET tube [ ] central line [ ] arterial line [ ] orantes [ ] NGT/OGT [ ] EVD [ ] LD [ ] VANITA/HMV [ ] Trach [ ] PEG [ ] Chest Tube     VITALS:   Vital Signs Last 24 Hrs  T(C): 36.8 (09 Dec 2023 23:00), Max: 36.9 (09 Dec 2023 11:31)  T(F): 98.2 (09 Dec 2023 23:00), Max: 98.4 (09 Dec 2023 11:31)  HR: 61 (10 Dec 2023 00:00) (60 - 77)  BP: 131/81 (09 Dec 2023 11:31) (131/81 - 156/81)  BP(mean): --  RR: 18 (10 Dec 2023 00:00) (14 - 23)  SpO2: 96% (10 Dec 2023 00:00) (94% - 98%)    Parameters below as of 09 Dec 2023 19:15  Patient On (Oxygen Delivery Method): nasal cannula      CAPILLARY BLOOD GLUCOSE        I&O's Summary    08 Dec 2023 07:01  -  09 Dec 2023 07:00  --------------------------------------------------------  IN: 240 mL / OUT: 0 mL / NET: 240 mL    09 Dec 2023 07:01  -  10 Dec 2023 00:59  --------------------------------------------------------  IN: 720 mL / OUT: 425 mL / NET: 295 mL        Respiratory:        LABS:                        11.2   13.42 )-----------( 235      ( 09 Dec 2023 20:47 )             33.1     12-09    131<L>  |  98  |  46<H>  ----------------------------<  117<H>  4.0   |  21<L>  |  1.42<H>             MEDICATION LEVELS:     IVF FLUIDS/MEDICATIONS:   MEDICATIONS  (STANDING):  budesonide 160 MICROgram(s)/formoterol 4.5 MICROgram(s) Inhaler 2 Puff(s) Inhalation two times a day  ceFAZolin   IVPB 2000 milliGRAM(s) IV Intermittent every 8 hours  dexAMETHasone     Tablet 4 milliGRAM(s) Oral every 6 hours  dexAMETHasone     Tablet   Oral   escitalopram 10 milliGRAM(s) Oral daily  famotidine    Tablet 10 milliGRAM(s) Oral daily  influenza  Vaccine (HIGH DOSE) 0.7 milliLiter(s) IntraMuscular once  levETIRAcetam 250 milliGRAM(s) Oral every 12 hours  nicotine -  14 mG/24Hr(s) Patch 1 Patch Transdermal daily  senna 2 Tablet(s) Oral at bedtime  sodium chloride 0.9%. 1000 milliLiter(s) (75 mL/Hr) IV Continuous <Continuous>    MEDICATIONS  (PRN):  acetaminophen     Tablet .. 650 milliGRAM(s) Oral every 6 hours PRN Temp greater or equal to 38C (100.4F), Mild Pain (1 - 3)  bisacodyl 5 milliGRAM(s) Oral daily PRN Constipation  ondansetron Injectable 4 milliGRAM(s) IV Push every 6 hours PRN Nausea and/or Vomiting  oxyCODONE    IR 5 milliGRAM(s) Oral every 4 hours PRN Moderate Pain (4 - 6)        IMAGING:      EXAMINATION:  PHYSICAL EXAM:    Constitutional: No Acute Distress     Neurological: Awake, alert oriented to person, place and time, Following Commands, PERRL, EOMI, No Gaze Preference, Face Symmetrical, Speech Fluent, No dysmetria, No ataxia, No nystagmus     Motor exam:          Upper extremity                         Delt     Bicep     Tricep    HG                                                 R         5/5        5/5        5/5       5/5                                               L          5/5        5/5        5/5       5/5          Lower extremity                        HF         KF        KE       DF         PF                                                  R        5/5        5/5        5/5       5/5         5/5                                               L         5/5        5/5       5/5       5/5          5/5                                                 Sensation: [ ] intact to light touch  [ ] decreased:     Reflexes: Deep Tendon Reflexes Intact     Pulmonary: Clear to Auscultation, No rales, No rhonchi, No wheezes     Cardiovascular: S1, S2, Regular rate and rhythm     Gastrointestinal: Soft, Non-tender, Non-distended     Extremities: No calf tenderness     Incision:    HOSPITAL COURSE:  79 Y/O F with h/o lung CA presents with headache and was found on floor.  Pt discovered to have singular right parietal mass, peripherally and heterogeneously enhancing with surrounding vasogenic edema.  Pt with significant edema with mass effect and some midline shift warranting surgical excision/biopsy to reduce edema prior to anticipated radiation treatment.       Admission Scores  GCS: 15    24 hour Events:  Patient POD0 from right parietal crani for mets resection.   12/10: No events overnight     Allergies    penicillin (Short breath; Hives)    Intolerances        REVIEW OF SYSTEMS: [ ] Unable to Assess due to neurologic exam   [x] All ROS addressed below are non-contributory, except:  Neuro: [ ] Headache [ ] Back pain [ ] Numbness [ ] Weakness [ ] Ataxia [ ] Dizziness [ ] Aphasia [ ] Dysarthria [ ] Visual disturbance  Resp: [ ] Shortness of breath/dyspnea, [ ] Orthopnea [ ] Cough  CV: [ ] Chest pain [ ] Palpitation [ ] Lightheadedness [ ] Syncope  Renal: [ ] Thirst [ ] Edema  GI: [ ] Nausea [ ] Emesis [ ] Abdominal pain [ ] Constipation [ ] Diarrhea  Hem: [ ] Hematemesis [ ] bright red blood per rectum  ID: [ ] Fever [ ] Chills [ ] Dysuria  ENT: [ ] Rhinorrhea      DEVICES:   [ ] Restraints [ ] ET tube [ ] central line [ ] arterial line [ ] orantes [ ] NGT/OGT [ ] EVD [ ] LD [ ] VANITA/HMV [ ] Trach [ ] PEG [ ] Chest Tube     VITALS:   Vital Signs Last 24 Hrs  T(C): 36.8 (09 Dec 2023 23:00), Max: 36.9 (09 Dec 2023 11:31)  T(F): 98.2 (09 Dec 2023 23:00), Max: 98.4 (09 Dec 2023 11:31)  HR: 61 (10 Dec 2023 00:00) (60 - 77)  BP: 131/81 (09 Dec 2023 11:31) (131/81 - 156/81)  BP(mean): --  RR: 18 (10 Dec 2023 00:00) (14 - 23)  SpO2: 96% (10 Dec 2023 00:00) (94% - 98%)    Parameters below as of 09 Dec 2023 19:15  Patient On (Oxygen Delivery Method): nasal cannula      CAPILLARY BLOOD GLUCOSE        I&O's Summary    08 Dec 2023 07:01  -  09 Dec 2023 07:00  --------------------------------------------------------  IN: 240 mL / OUT: 0 mL / NET: 240 mL    09 Dec 2023 07:01  -  10 Dec 2023 00:59  --------------------------------------------------------  IN: 720 mL / OUT: 425 mL / NET: 295 mL        Respiratory:        LABS:                        11.2   13.42 )-----------( 235      ( 09 Dec 2023 20:47 )             33.1     12-09    131<L>  |  98  |  46<H>  ----------------------------<  117<H>  4.0   |  21<L>  |  1.42<H>             MEDICATION LEVELS:     IVF FLUIDS/MEDICATIONS:   MEDICATIONS  (STANDING):  budesonide 160 MICROgram(s)/formoterol 4.5 MICROgram(s) Inhaler 2 Puff(s) Inhalation two times a day  ceFAZolin   IVPB 2000 milliGRAM(s) IV Intermittent every 8 hours  dexAMETHasone     Tablet 4 milliGRAM(s) Oral every 6 hours  dexAMETHasone     Tablet   Oral   escitalopram 10 milliGRAM(s) Oral daily  famotidine    Tablet 10 milliGRAM(s) Oral daily  influenza  Vaccine (HIGH DOSE) 0.7 milliLiter(s) IntraMuscular once  levETIRAcetam 250 milliGRAM(s) Oral every 12 hours  nicotine -  14 mG/24Hr(s) Patch 1 Patch Transdermal daily  senna 2 Tablet(s) Oral at bedtime  sodium chloride 0.9%. 1000 milliLiter(s) (75 mL/Hr) IV Continuous <Continuous>    MEDICATIONS  (PRN):  acetaminophen     Tablet .. 650 milliGRAM(s) Oral every 6 hours PRN Temp greater or equal to 38C (100.4F), Mild Pain (1 - 3)  bisacodyl 5 milliGRAM(s) Oral daily PRN Constipation  ondansetron Injectable 4 milliGRAM(s) IV Push every 6 hours PRN Nausea and/or Vomiting  oxyCODONE    IR 5 milliGRAM(s) Oral every 4 hours PRN Moderate Pain (4 - 6)        IMAGING:      EXAMINATION:  PHYSICAL EXAM:    Constitutional: No Acute Distress     Neurological: Awake, alert oriented to person, place and time, Following Commands, PERRL, EOMI, No Gaze Preference, Face Symmetrical, Speech Fluent, No dysmetria, No ataxia, No nystagmus     Motor exam:          Upper extremity                         Delt     Bicep     Tricep    HG                                                 R         5/5        5/5        5/5       5/5                                               L          5/5        5/5        5/5       5/5          Lower extremity                        HF         KF        KE       DF         PF                                                  R        5/5        5/5        5/5       5/5         5/5                                               L         5/5        5/5       5/5       5/5          5/5                                                 Sensation: [X ] intact to light touch  [ ] decreased:     Pulmonary: Clear to Auscultation, No rales, No rhonchi, No wheezes     Cardiovascular: S1, S2, Regular rate and rhythm     Gastrointestinal: Soft, Non-tender, Non-distended     Extremities: No calf tenderness     Incision:    HOSPITAL COURSE:  79 Y/O F with h/o lung CA presents with headache and was found on floor.  Pt discovered to have singular right parietal mass, peripherally and heterogeneously enhancing with surrounding vasogenic edema.  Pt with significant edema with mass effect and some midline shift warranting surgical excision/biopsy to reduce edema prior to anticipated radiation treatment.       Admission Scores  GCS: 15    24 hour Events:  Patient POD0 from right parietal crani for mets resection.   12/10: No events overnight     Allergies    penicillin (Short breath; Hives)    Intolerances        REVIEW OF SYSTEMS: [ ] Unable to Assess due to neurologic exam   [x] All ROS addressed below are non-contributory, except:  Neuro: [ ] Headache [ ] Back pain [ ] Numbness [ ] Weakness [ ] Ataxia [ ] Dizziness [ ] Aphasia [ ] Dysarthria [ ] Visual disturbance  Resp: [ ] Shortness of breath/dyspnea, [ ] Orthopnea [ ] Cough  CV: [ ] Chest pain [ ] Palpitation [ ] Lightheadedness [ ] Syncope  Renal: [ ] Thirst [ ] Edema  GI: [ ] Nausea [ ] Emesis [ ] Abdominal pain [ ] Constipation [ ] Diarrhea  Hem: [ ] Hematemesis [ ] bright red blood per rectum  ID: [ ] Fever [ ] Chills [ ] Dysuria  ENT: [ ] Rhinorrhea      DEVICES:   [ ] Restraints [ ] ET tube [ ] central line [ ] arterial line [ ] orantes [ ] NGT/OGT [ ] EVD [ ] LD [ ] VANITA/HMV [ ] Trach [ ] PEG [ ] Chest Tube     VITALS:   Vital Signs Last 24 Hrs  T(C): 36.8 (09 Dec 2023 23:00), Max: 36.9 (09 Dec 2023 11:31)  T(F): 98.2 (09 Dec 2023 23:00), Max: 98.4 (09 Dec 2023 11:31)  HR: 61 (10 Dec 2023 00:00) (60 - 77)  BP: 131/81 (09 Dec 2023 11:31) (131/81 - 156/81)  BP(mean): --  RR: 18 (10 Dec 2023 00:00) (14 - 23)  SpO2: 96% (10 Dec 2023 00:00) (94% - 98%)    Parameters below as of 09 Dec 2023 19:15  Patient On (Oxygen Delivery Method): nasal cannula      CAPILLARY BLOOD GLUCOSE        I&O's Summary    08 Dec 2023 07:01  -  09 Dec 2023 07:00  --------------------------------------------------------  IN: 240 mL / OUT: 0 mL / NET: 240 mL    09 Dec 2023 07:01  -  10 Dec 2023 00:59  --------------------------------------------------------  IN: 720 mL / OUT: 425 mL / NET: 295 mL        Respiratory:        LABS:                        11.2   13.42 )-----------( 235      ( 09 Dec 2023 20:47 )             33.1     12-09    131<L>  |  98  |  46<H>  ----------------------------<  117<H>  4.0   |  21<L>  |  1.42<H>             MEDICATION LEVELS:     IVF FLUIDS/MEDICATIONS:   MEDICATIONS  (STANDING):  budesonide 160 MICROgram(s)/formoterol 4.5 MICROgram(s) Inhaler 2 Puff(s) Inhalation two times a day  ceFAZolin   IVPB 2000 milliGRAM(s) IV Intermittent every 8 hours  dexAMETHasone     Tablet 4 milliGRAM(s) Oral every 6 hours  dexAMETHasone     Tablet   Oral   escitalopram 10 milliGRAM(s) Oral daily  famotidine    Tablet 10 milliGRAM(s) Oral daily  influenza  Vaccine (HIGH DOSE) 0.7 milliLiter(s) IntraMuscular once  levETIRAcetam 250 milliGRAM(s) Oral every 12 hours  nicotine -  14 mG/24Hr(s) Patch 1 Patch Transdermal daily  senna 2 Tablet(s) Oral at bedtime  sodium chloride 0.9%. 1000 milliLiter(s) (75 mL/Hr) IV Continuous <Continuous>    MEDICATIONS  (PRN):  acetaminophen     Tablet .. 650 milliGRAM(s) Oral every 6 hours PRN Temp greater or equal to 38C (100.4F), Mild Pain (1 - 3)  bisacodyl 5 milliGRAM(s) Oral daily PRN Constipation  ondansetron Injectable 4 milliGRAM(s) IV Push every 6 hours PRN Nausea and/or Vomiting  oxyCODONE    IR 5 milliGRAM(s) Oral every 4 hours PRN Moderate Pain (4 - 6)        IMAGING:      EXAMINATION:  PHYSICAL EXAM:    Constitutional: No Acute Distress     Neurological: Awake, alert oriented to person, place and time, Following Commands, PERRL, EOMI, No Gaze Preference, left nasolabial fold flattening, Speech Fluent, No dysmetria, No ataxia, No nystagmus     Motor exam:          Upper extremity                         Delt     Bicep     Tricep    HG                                                 R         5/5        5/5        5/5       5/5                                               L          5/5        5/5        5/5       5/5          Lower extremity                        HF         KF        KE       DF         PF                                                  R        5/5        5/5        5/5       5/5         5/5                                               L         5/5        5/5       5/5       5/5          5/5                                                 Sensation: [X ] intact to light touch  [ ] decreased:     Pulmonary: Clear to Auscultation, No rales, No rhonchi, No wheezes     Cardiovascular: S1, S2, Regular rate and rhythm     Gastrointestinal: Soft, Non-tender, Non-distended     Extremities: No calf tenderness     Incision:    HOSPITAL COURSE:  79 Y/O F with h/o lung CA presents with headache and was found on floor.  Pt discovered to have singular right parietal mass, peripherally and heterogeneously enhancing with surrounding vasogenic edema.  Pt with significant edema with mass effect and some midline shift warranting surgical excision/biopsy to reduce edema prior to anticipated radiation treatment.       Admission Scores  GCS: 15    24 hour Events:  Patient POD0 from right parietal crani for mets resection.   12/10: No events overnight     Allergies    penicillin (Short breath; Hives)    Intolerances        REVIEW OF SYSTEMS: [ ] Unable to Assess due to neurologic exam   [x] All ROS addressed below are non-contributory, except:  Neuro: [ ] Headache [ ] Back pain [ ] Numbness [ ] Weakness [ ] Ataxia [ ] Dizziness [ ] Aphasia [ ] Dysarthria [ ] Visual disturbance  Resp: [ ] Shortness of breath/dyspnea, [ ] Orthopnea [ ] Cough  CV: [ ] Chest pain [ ] Palpitation [ ] Lightheadedness [ ] Syncope  Renal: [ ] Thirst [ ] Edema  GI: [ ] Nausea [ ] Emesis [ ] Abdominal pain [ ] Constipation [ ] Diarrhea  Hem: [ ] Hematemesis [ ] bright red blood per rectum  ID: [ ] Fever [ ] Chills [ ] Dysuria  ENT: [ ] Rhinorrhea      DEVICES:   [ ] Restraints [ ] ET tube [ ] central line [ ] arterial line [ ] orantes [ ] NGT/OGT [ ] EVD [ ] LD [ ] VANITA/HMV [ ] Trach [ ] PEG [ ] Chest Tube     VITALS:   Vital Signs Last 24 Hrs  T(C): 36.8 (09 Dec 2023 23:00), Max: 36.9 (09 Dec 2023 11:31)  T(F): 98.2 (09 Dec 2023 23:00), Max: 98.4 (09 Dec 2023 11:31)  HR: 61 (10 Dec 2023 00:00) (60 - 77)  BP: 131/81 (09 Dec 2023 11:31) (131/81 - 156/81)  BP(mean): --  RR: 18 (10 Dec 2023 00:00) (14 - 23)  SpO2: 96% (10 Dec 2023 00:00) (94% - 98%)    Parameters below as of 09 Dec 2023 19:15  Patient On (Oxygen Delivery Method): nasal cannula      CAPILLARY BLOOD GLUCOSE        I&O's Summary    08 Dec 2023 07:01  -  09 Dec 2023 07:00  --------------------------------------------------------  IN: 240 mL / OUT: 0 mL / NET: 240 mL    09 Dec 2023 07:01  -  10 Dec 2023 00:59  --------------------------------------------------------  IN: 720 mL / OUT: 425 mL / NET: 295 mL        Respiratory:        LABS:                        11.2   13.42 )-----------( 235      ( 09 Dec 2023 20:47 )             33.1     12-09    131<L>  |  98  |  46<H>  ----------------------------<  117<H>  4.0   |  21<L>  |  1.42<H>             MEDICATION LEVELS:     IVF FLUIDS/MEDICATIONS:   MEDICATIONS  (STANDING):  budesonide 160 MICROgram(s)/formoterol 4.5 MICROgram(s) Inhaler 2 Puff(s) Inhalation two times a day  ceFAZolin   IVPB 2000 milliGRAM(s) IV Intermittent every 8 hours  dexAMETHasone     Tablet 4 milliGRAM(s) Oral every 6 hours  dexAMETHasone     Tablet   Oral   escitalopram 10 milliGRAM(s) Oral daily  famotidine    Tablet 10 milliGRAM(s) Oral daily  influenza  Vaccine (HIGH DOSE) 0.7 milliLiter(s) IntraMuscular once  levETIRAcetam 250 milliGRAM(s) Oral every 12 hours  nicotine -  14 mG/24Hr(s) Patch 1 Patch Transdermal daily  senna 2 Tablet(s) Oral at bedtime  sodium chloride 0.9%. 1000 milliLiter(s) (75 mL/Hr) IV Continuous <Continuous>    MEDICATIONS  (PRN):  acetaminophen     Tablet .. 650 milliGRAM(s) Oral every 6 hours PRN Temp greater or equal to 38C (100.4F), Mild Pain (1 - 3)  bisacodyl 5 milliGRAM(s) Oral daily PRN Constipation  ondansetron Injectable 4 milliGRAM(s) IV Push every 6 hours PRN Nausea and/or Vomiting  oxyCODONE    IR 5 milliGRAM(s) Oral every 4 hours PRN Moderate Pain (4 - 6)        IMAGING:      EXAMINATION:  PHYSICAL EXAM:    Constitutional: No Acute Distress     Neurological: Awake, alert oriented to person, place and time, Following Commands, PERRL, EOMI, No Gaze Preference, left nasolabial fold flattening, Speech Fluent, No dysmetria, No ataxia, No nystagmus     Motor exam:          Upper extremity                         Delt     Bicep     Tricep    HG                                                 R         5/5        5/5        5/5       5/5                                               L          5/5        5/5        5/5       5/5          Lower extremity                        HF         KF        KE       DF         PF                                                  R        5/5        5/5        5/5       5/5         5/5                                               L         5/5        5/5       5/5       5/5          5/5                                                 Sensation: [X ] intact to light touch  [ ] decreased:     Pulmonary: Clear to Auscultation, No rales, No rhonchi, No wheezes     Cardiovascular: S1, S2, Regular rate and rhythm     Gastrointestinal: Soft, Non-tender, Non-distended     Extremities: No calf tenderness     Incision:     LABS:  Na: 133 (12-10 @ 05:39), 131 (12-09 @ 20:47), 135 (12-09 @ 05:39), 136 (12-08 @ 21:16), 131 (12-08 @ 14:14)  K: 3.6 (12-10 @ 05:39), 4.0 (12-09 @ 20:47), 3.7 (12-09 @ 05:39), 3.8 (12-08 @ 21:16), 3.9 (12-08 @ 14:14)  Cl: 99 (12-10 @ 05:39), 98 (12-09 @ 20:47), 102 (12-09 @ 05:39), 99 (12-08 @ 21:16), 101 (12-08 @ 14:14)  CO2: 21 (12-10 @ 05:39), 21 (12-09 @ 20:47), 23 (12-09 @ 05:39), 21 (12-08 @ 21:16), 20 (12-08 @ 14:14)  BUN: 41 (12-10 @ 05:39), 46 (12-09 @ 20:47), 52 (12-09 @ 05:39), 55 (12-08 @ 21:16), 54 (12-08 @ 14:14)  Cr: 1.32 (12-10 @ 05:39), 1.42 (12-09 @ 20:47), 1.59 (12-09 @ 05:39), 1.92 (12-08 @ 21:16), 1.53 (12-08 @ 14:14)  Glu: 143(12-10 @ 05:39), 117(12-09 @ 20:47), 109(12-09 @ 05:39), 134(12-08 @ 21:16), 250(12-08 @ 14:14)    Hgb: 11.2 (12-09 @ 20:47), 11.9 (12-09 @ 05:38), 13.2 (12-08 @ 14:14)  Hct: 33.1 (12-09 @ 20:47), 34.8 (12-09 @ 05:38), 38.8 (12-08 @ 14:14)  WBC: 13.42 (12-09 @ 20:47), 14.09 (12-09 @ 05:38), 15.74 (12-08 @ 14:14)  Plt: 235 (12-09 @ 20:47), 248 (12-09 @ 05:38), 279 (12-08 @ 14:14)    INR: 1.06 12-09-23 @ 05:39, 1.01 12-07-23 @ 13:44  PTT: 22.9 12-09-23 @ 05:39, 25.9 12-07-23 @ 13:44         HOSPITAL COURSE:  77 Y/O F with h/o lung CA presents with headache and was found on floor.  Pt discovered to have singular right parietal mass, peripherally and heterogeneously enhancing with surrounding vasogenic edema.  Pt with significant edema with mass effect and some midline shift warranting surgical excision/biopsy to reduce edema prior to anticipated radiation treatment.       Admission Scores  GCS: 15    24 hour Events:  Patient POD0 from right parietal crani for mets resection.   12/10: No events overnight     Allergies    penicillin (Short breath; Hives)    Intolerances        REVIEW OF SYSTEMS: [ ] Unable to Assess due to neurologic exam   [x] All ROS addressed below are non-contributory, except:  Neuro: [ ] Headache [ ] Back pain [ ] Numbness [ ] Weakness [ ] Ataxia [ ] Dizziness [ ] Aphasia [ ] Dysarthria [ ] Visual disturbance  Resp: [ ] Shortness of breath/dyspnea, [ ] Orthopnea [ ] Cough  CV: [ ] Chest pain [ ] Palpitation [ ] Lightheadedness [ ] Syncope  Renal: [ ] Thirst [ ] Edema  GI: [ ] Nausea [ ] Emesis [ ] Abdominal pain [ ] Constipation [ ] Diarrhea  Hem: [ ] Hematemesis [ ] bright red blood per rectum  ID: [ ] Fever [ ] Chills [ ] Dysuria  ENT: [ ] Rhinorrhea      DEVICES:   [ ] Restraints [ ] ET tube [ ] central line [ ] arterial line [ ] orantes [ ] NGT/OGT [ ] EVD [ ] LD [ ] VANITA/HMV [ ] Trach [ ] PEG [ ] Chest Tube     VITALS:   Vital Signs Last 24 Hrs  T(C): 36.8 (09 Dec 2023 23:00), Max: 36.9 (09 Dec 2023 11:31)  T(F): 98.2 (09 Dec 2023 23:00), Max: 98.4 (09 Dec 2023 11:31)  HR: 61 (10 Dec 2023 00:00) (60 - 77)  BP: 131/81 (09 Dec 2023 11:31) (131/81 - 156/81)  BP(mean): --  RR: 18 (10 Dec 2023 00:00) (14 - 23)  SpO2: 96% (10 Dec 2023 00:00) (94% - 98%)    Parameters below as of 09 Dec 2023 19:15  Patient On (Oxygen Delivery Method): nasal cannula      CAPILLARY BLOOD GLUCOSE        I&O's Summary    08 Dec 2023 07:01  -  09 Dec 2023 07:00  --------------------------------------------------------  IN: 240 mL / OUT: 0 mL / NET: 240 mL    09 Dec 2023 07:01  -  10 Dec 2023 00:59  --------------------------------------------------------  IN: 720 mL / OUT: 425 mL / NET: 295 mL        Respiratory:        LABS:                        11.2   13.42 )-----------( 235      ( 09 Dec 2023 20:47 )             33.1     12-09    131<L>  |  98  |  46<H>  ----------------------------<  117<H>  4.0   |  21<L>  |  1.42<H>             MEDICATION LEVELS:     IVF FLUIDS/MEDICATIONS:   MEDICATIONS  (STANDING):  budesonide 160 MICROgram(s)/formoterol 4.5 MICROgram(s) Inhaler 2 Puff(s) Inhalation two times a day  ceFAZolin   IVPB 2000 milliGRAM(s) IV Intermittent every 8 hours  dexAMETHasone     Tablet 4 milliGRAM(s) Oral every 6 hours  dexAMETHasone     Tablet   Oral   escitalopram 10 milliGRAM(s) Oral daily  famotidine    Tablet 10 milliGRAM(s) Oral daily  influenza  Vaccine (HIGH DOSE) 0.7 milliLiter(s) IntraMuscular once  levETIRAcetam 250 milliGRAM(s) Oral every 12 hours  nicotine -  14 mG/24Hr(s) Patch 1 Patch Transdermal daily  senna 2 Tablet(s) Oral at bedtime  sodium chloride 0.9%. 1000 milliLiter(s) (75 mL/Hr) IV Continuous <Continuous>    MEDICATIONS  (PRN):  acetaminophen     Tablet .. 650 milliGRAM(s) Oral every 6 hours PRN Temp greater or equal to 38C (100.4F), Mild Pain (1 - 3)  bisacodyl 5 milliGRAM(s) Oral daily PRN Constipation  ondansetron Injectable 4 milliGRAM(s) IV Push every 6 hours PRN Nausea and/or Vomiting  oxyCODONE    IR 5 milliGRAM(s) Oral every 4 hours PRN Moderate Pain (4 - 6)        IMAGING:      EXAMINATION:  PHYSICAL EXAM:    Constitutional: No Acute Distress     Neurological: Awake, alert oriented to person, place and time, Following Commands, PERRL, EOMI, No Gaze Preference, left nasolabial fold flattening, Speech Fluent, No dysmetria, No ataxia, No nystagmus     Motor exam:          Upper extremity                         Delt     Bicep     Tricep    HG                                                 R         5/5        5/5        5/5       5/5                                               L          5/5        5/5        5/5       5/5          Lower extremity                        HF         KF        KE       DF         PF                                                  R        5/5        5/5        5/5       5/5         5/5                                               L         5/5        5/5       5/5       5/5          5/5                                                 Sensation: [X ] intact to light touch  [ ] decreased:     Pulmonary: Clear to Auscultation, No rales, No rhonchi, No wheezes     Cardiovascular: S1, S2, Regular rate and rhythm     Gastrointestinal: Soft, Non-tender, Non-distended     Extremities: No calf tenderness     Incision:     LABS:  Na: 133 (12-10 @ 05:39), 131 (12-09 @ 20:47), 135 (12-09 @ 05:39), 136 (12-08 @ 21:16), 131 (12-08 @ 14:14)  K: 3.6 (12-10 @ 05:39), 4.0 (12-09 @ 20:47), 3.7 (12-09 @ 05:39), 3.8 (12-08 @ 21:16), 3.9 (12-08 @ 14:14)  Cl: 99 (12-10 @ 05:39), 98 (12-09 @ 20:47), 102 (12-09 @ 05:39), 99 (12-08 @ 21:16), 101 (12-08 @ 14:14)  CO2: 21 (12-10 @ 05:39), 21 (12-09 @ 20:47), 23 (12-09 @ 05:39), 21 (12-08 @ 21:16), 20 (12-08 @ 14:14)  BUN: 41 (12-10 @ 05:39), 46 (12-09 @ 20:47), 52 (12-09 @ 05:39), 55 (12-08 @ 21:16), 54 (12-08 @ 14:14)  Cr: 1.32 (12-10 @ 05:39), 1.42 (12-09 @ 20:47), 1.59 (12-09 @ 05:39), 1.92 (12-08 @ 21:16), 1.53 (12-08 @ 14:14)  Glu: 143(12-10 @ 05:39), 117(12-09 @ 20:47), 109(12-09 @ 05:39), 134(12-08 @ 21:16), 250(12-08 @ 14:14)    Hgb: 11.2 (12-09 @ 20:47), 11.9 (12-09 @ 05:38), 13.2 (12-08 @ 14:14)  Hct: 33.1 (12-09 @ 20:47), 34.8 (12-09 @ 05:38), 38.8 (12-08 @ 14:14)  WBC: 13.42 (12-09 @ 20:47), 14.09 (12-09 @ 05:38), 15.74 (12-08 @ 14:14)  Plt: 235 (12-09 @ 20:47), 248 (12-09 @ 05:38), 279 (12-08 @ 14:14)    INR: 1.06 12-09-23 @ 05:39, 1.01 12-07-23 @ 13:44  PTT: 22.9 12-09-23 @ 05:39, 25.9 12-07-23 @ 13:44

## 2023-12-10 NOTE — PROGRESS NOTE ADULT - ASSESSMENT
-S/p Right parietal crani for tumor resection on 12/9/2023  -CTH in AM  -Pending postop MRI brain stereo w/wo  -Dex 4q6, taper over 2 weeks

## 2023-12-11 PROCEDURE — 99222 1ST HOSP IP/OBS MODERATE 55: CPT

## 2023-12-11 PROCEDURE — 99223 1ST HOSP IP/OBS HIGH 75: CPT

## 2023-12-11 PROCEDURE — 99232 SBSQ HOSP IP/OBS MODERATE 35: CPT | Mod: GC

## 2023-12-11 RX ORDER — CHLORHEXIDINE GLUCONATE 213 G/1000ML
1 SOLUTION TOPICAL
Refills: 0 | Status: DISCONTINUED | OUTPATIENT
Start: 2023-12-11 | End: 2023-12-15

## 2023-12-11 RX ADMIN — Medication 650 MILLIGRAM(S): at 16:45

## 2023-12-11 RX ADMIN — HEPARIN SODIUM 5000 UNIT(S): 5000 INJECTION INTRAVENOUS; SUBCUTANEOUS at 21:47

## 2023-12-11 RX ADMIN — LEVETIRACETAM 250 MILLIGRAM(S): 250 TABLET, FILM COATED ORAL at 05:57

## 2023-12-11 RX ADMIN — SODIUM CHLORIDE 2 GRAM(S): 9 INJECTION INTRAMUSCULAR; INTRAVENOUS; SUBCUTANEOUS at 05:57

## 2023-12-11 RX ADMIN — Medication 1 PATCH: at 07:03

## 2023-12-11 RX ADMIN — Medication 650 MILLIGRAM(S): at 03:20

## 2023-12-11 RX ADMIN — Medication 4 MILLIGRAM(S): at 17:11

## 2023-12-11 RX ADMIN — Medication 4 MILLIGRAM(S): at 05:57

## 2023-12-11 RX ADMIN — SODIUM CHLORIDE 2 GRAM(S): 9 INJECTION INTRAMUSCULAR; INTRAVENOUS; SUBCUTANEOUS at 17:12

## 2023-12-11 RX ADMIN — Medication 1 PATCH: at 19:00

## 2023-12-11 RX ADMIN — Medication 4 MILLIGRAM(S): at 23:44

## 2023-12-11 RX ADMIN — Medication 1 PATCH: at 11:07

## 2023-12-11 RX ADMIN — SODIUM CHLORIDE 2 GRAM(S): 9 INJECTION INTRAMUSCULAR; INTRAVENOUS; SUBCUTANEOUS at 11:03

## 2023-12-11 RX ADMIN — Medication 650 MILLIGRAM(S): at 16:15

## 2023-12-11 RX ADMIN — ESCITALOPRAM OXALATE 10 MILLIGRAM(S): 10 TABLET, FILM COATED ORAL at 11:02

## 2023-12-11 RX ADMIN — SENNA PLUS 2 TABLET(S): 8.6 TABLET ORAL at 21:41

## 2023-12-11 RX ADMIN — LEVETIRACETAM 250 MILLIGRAM(S): 250 TABLET, FILM COATED ORAL at 17:11

## 2023-12-11 RX ADMIN — CHLORHEXIDINE GLUCONATE 1 APPLICATION(S): 213 SOLUTION TOPICAL at 21:55

## 2023-12-11 RX ADMIN — Medication 1 PATCH: at 11:06

## 2023-12-11 RX ADMIN — FAMOTIDINE 10 MILLIGRAM(S): 10 INJECTION INTRAVENOUS at 11:03

## 2023-12-11 RX ADMIN — Medication 4 MILLIGRAM(S): at 11:03

## 2023-12-11 RX ADMIN — Medication 650 MILLIGRAM(S): at 03:50

## 2023-12-11 RX ADMIN — Medication 650 MILLIGRAM(S): at 23:43

## 2023-12-11 RX ADMIN — BUDESONIDE AND FORMOTEROL FUMARATE DIHYDRATE 2 PUFF(S): 160; 4.5 AEROSOL RESPIRATORY (INHALATION) at 06:17

## 2023-12-11 RX ADMIN — BUDESONIDE AND FORMOTEROL FUMARATE DIHYDRATE 2 PUFF(S): 160; 4.5 AEROSOL RESPIRATORY (INHALATION) at 17:12

## 2023-12-11 RX ADMIN — HEPARIN SODIUM 5000 UNIT(S): 5000 INJECTION INTRAVENOUS; SUBCUTANEOUS at 16:15

## 2023-12-11 NOTE — CONSULT NOTE ADULT - NS ATTEND AMEND GEN_ALL_CORE FT
Continue Heparin Sub. Cut. 5000 units TID for below knee DVT with recent neurosurgery,      Recommend repeat LE venous duplex on 12/15.

## 2023-12-11 NOTE — PROGRESS NOTE ADULT - ASSESSMENT
ASSESSMENT/PLAN: Patient POD1 from right parietal crani for 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     NEURO:  Neurochecks Q1H  Continue Dex 2 week taper per neurosurgery  Keppra 250 mg BID- renally dosed for seizure prophylaxis   CTH expected post-op changes, pending official read   MRI brain with and w/o contrast when can  Continue home escitalopram 10 mg QD  Activity: [x] mobilize as tolerated [] Bedrest [] PT [] OT [] PMNR    PULM:  History of COPD  Continue home meds  Chronic smoker- will start nicotine patch      CV:  SBP goal 100-160 mmHg   At home on Triamterene-HCTZ- restart BP meds as needed    RENAL:  IVL   History of CKD  Creatinine clearance 33  continue  home on Gentesa for overactive bladder    GI:  Diet: regular diet   PPI while on decadron   GI prophylaxis [] not indicated [x] Pepcid while on steroids [] other:  Bowel regimen [x] miralax [x] senna [] other:    ENDO:   Goal euglycemia (-180)  ISS  Monitor glucose while on steroids    HEME/ONC:  Obtain post op H/H   VTE prophylaxis: [x] SCDs [] chemoprophylaxis [x] hold chemoprophylaxis due to: recent post op [] high risk of DVT/PE on admission due to:    ID:  Afebrile  Will monitor fever curve     MISC:    SOCIAL/FAMILY:  [x] awaiting [] updated at bedside [] family meeting    CODE STATUS:  [x] Full Code [] DNR [] DNI [] Palliative/Comfort Care    DISPOSITION:  [x] ICU [] Stroke Unit [] Floor [] EMU [] RCU [] PCU    [] Patient is at high risk of neurologic deterioration/death due to: tumor expansion, midline shift, herniation     Contact: [phone] ASSESSMENT/PLAN: Patient POD1 from right parietal crani for 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     NEURO:  Neurochecks Q1H  Continue Dex 2 week taper per neurosurgery  Keppra 250 mg BID- renally dosed for seizure prophylaxis   CTH expected post-op changes, pending official read   MRI brain with and w/o contrast when can  Continue home escitalopram 10 mg QD  Activity: [x] mobilize as tolerated [] Bedrest [] PT [] OT [] PMNR    PULM:  History of COPD  Continue home meds  Chronic smoker- will start nicotine patch      CV:  SBP goal 100-160 mmHg   At home on Triamterene-HCTZ- restart BP meds as needed    RENAL:  IVL   History of CKD  Creatinine clearance 33  continue  home on Gentesa for overactive bladder    GI:  Diet: regular diet   PPI while on decadron   GI prophylaxis [] not indicated [x] Pepcid while on steroids [] other:  Bowel regimen [x] miralax [x] senna [] other:    ENDO:   Goal euglycemia (-180)  ISS  Monitor glucose while on steroids    HEME/ONC:  Obtain post op H/H   VTE prophylaxis: [x] SCDs [] chemoprophylaxis [x] hold chemoprophylaxis due to: recent post op [] high risk of DVT/PE on admission due to:    ID:  Afebrile  Will monitor fever curve     MISC:    SOCIAL/FAMILY:  [x] awaiting [] updated at bedside [] family meeting    CODE STATUS:  [x] Full Code [] DNR [] DNI [] Palliative/Comfort Care    DISPOSITION:  [x] ICU [] Stroke Unit [] Floor [] EMU [] RCU [] PCU    [] Patient is at high risk of neurologic deterioration/death due to: tumor expansion, midline shift, herniation     Contact: [phone] ASSESSMENT/PLAN: Patient POD1 from right parietal crani for 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     NEURO:  Neurochecks Q4H  Continue Dex 2 week taper per neurosurgery  Keppra 250 mg BID  Continue home escitalopram 10 mg QD  Activity: [x] mobilize as tolerated [] Bedrest [x] PT [x] OT [] PMNR    PULM:  History of COPD  Continue home meds  Chronic smoker- will start nicotine patch      CV:  SBP goal 100-160 mmHg   At home on Triamterene-HCTZ- restart BP meds as needed    RENAL:  IVL   History of CKD  Creatinine clearance 33  continue  home on Gentesa for overactive bladder    GI:  Diet: regular diet   PPI while on decadron   GI prophylaxis [] not indicated [x] Pepcid while on steroids [] other:  Bowel regimen [x] miralax [x] senna [] other:  LBM: 12/09    ENDO:   Goal euglycemia (-180)  ISS  Monitor glucose while on steroids    HEME/ONC:  Obtain post op H/H   VTE prophylaxis: [x] SCDs [] chemoprophylaxis [x] hold chemoprophylaxis due to: [] high risk of DVT/PE on admission due to:  B/ LE DVTs below the knee  Heparin 5000 U Q 8 hours    ID:  Afebrile  Will monitor fever curve     MISC:    SOCIAL/FAMILY:  [x] awaiting [] updated at bedside [] family meeting    CODE STATUS:  [x] Full Code [] DNR [] DNI [] Palliative/Comfort Care    DISPOSITION:  [x] ICU [] Stroke Unit [] Floor [] EMU [] RCU [] PCU    [] Patient is at high risk of neurologic deterioration/death due to: tumor expansion, midline shift, herniation     Contact: [phone] ASSESSMENT/PLAN: Patient POD1 from right parietal crani for 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     NEURO:  Neurochecks Q4H  Continue Dex 2 week taper per neurosurgery  Keppra 250 mg BID  Continue home escitalopram 10 mg QD  Activity: [x] mobilize as tolerated [] Bedrest [x] PT [x] OT [] PMNR    PULM:  History of COPD  Continue home meds  Chronic smoker- will start nicotine patch      CV:  SBP goal 100-160 mmHg   At home on Triamterene-HCTZ- restart BP meds as needed    RENAL:  IVL   History of CKD  Creatinine clearance 33  continue  home on Gentesa for overactive bladder    GI:  Diet: regular diet   PPI while on decadron   GI prophylaxis [] not indicated [x] Pepcid while on steroids [] other:  Bowel regimen [x] miralax [x] senna [] other:  LBM: 12/09    ENDO:   Goal euglycemia (-180)  ISS  Monitor glucose while on steroids    HEME/ONC:  Obtain post op H/H   VTE prophylaxis: [x] SCDs [] chemoprophylaxis [x] hold chemoprophylaxis due to: [] high risk of DVT/PE on admission due to:  B/ LE DVTs below the knee  Heparin 5000 U Q 8 hours    ID:  Afebrile  Will monitor fever curve     MISC:    SOCIAL/FAMILY:  [x] awaiting [] updated at bedside [] family meeting    CODE STATUS:  [x] Full Code [] DNR [] DNI [] Palliative/Comfort Care    DISPOSITION:  [x] ICU [] Stroke Unit [] Floor [] EMU [] RCU [] PCU    [] Patient is at high risk of neurologic deterioration/death due to: tumor expansion, midline shift, herniation     Contact: [phone]

## 2023-12-11 NOTE — OCCUPATIONAL THERAPY INITIAL EVALUATION ADULT - PERTINENT HX OF CURRENT PROBLEM, REHAB EVAL
74 year old female with history of lung cancer s/p chemotherapy 5 years ago previously in remission , COPD, HTN presented to [facility] with unsteady gait for a few weeks. Patient's  found her on the floor prompting him to call 911. Went to [place] and CT brain was remarkable for brain mass with midline shift, cyst vs necrotic mass. Transferred to [facility] for neurosurgery evaluation. Neurosurgery was consulted and reccomended MRI brain, CTAP and CT chest to look for possible malignancies. Reports shortness of breath when going up staurs,  MRI Head Redemonstrated 2.9 x 2.0 x 1.4 cm peripheral rim enhancing lesion not seen on the patient's prior MR study with a large amount of surrounding vasogenic edema. Appearance favors a primary malignant neoplasm though metastatic disease not entirely excluded. 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  CT Chest/Abd/pelvis 18 mm groundglass nodule in the superior segment of the left lower lobe, which may reflect malignancy.Additional nodule along the right oblique fissure not well assessed   secondary to motion.No definite metastatic lesions are seen in the abdomen/pelvis.    12/9 s/p craniotomy for tumor resection 74 year old female with history of lung cancer s/p chemotherapy 5 years ago previously in remission , COPD, HTN presented to [facility] with unsteady gait for a few weeks. Patient's  found her on the floor prompting him to call 911. Went to [place] and CT brain was remarkable for brain mass with midline shift, cyst vs necrotic mass. Transferred to [facility] for neurosurgery evaluation. Neurosurgery was consulted and reccomended MRI brain, CTAP and CT chest to look for possible malignancies. Reports shortness of breath when going up staurs,  MRI Head Redemonstrated 2.9 x 2.0 x 1.4 cm peripheral rim enhancing lesion not seen on the patient's prior MR study with a large amount of surrounding vasogenic edema. Appearance favors a primary malignant neoplasm though metastatic disease not entirely excluded. 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  CT Chest/Abd/pelvis 18 mm groundglass nodule in the superior segment of the left lower lobe, which may reflect malignancy.Additional nodule along the right oblique fissure not well assessed   secondary to motion.No definite metastatic lesions are seen in the abdomen/pelvis.    12/9 s/p craniotomy for tumor resection

## 2023-12-11 NOTE — OCCUPATIONAL THERAPY INITIAL EVALUATION ADULT - STRENGTHENING, PT EVAL
Patient will increase strength in BUE/BLE by 1/2 grade in two weeks to facilitate increased ability to perform ADLs and functional mobility
Patient will increase strength in BUE/BLE by 1/2 grade in two weeks to facilitate increased ability to perform ADLs and functional mobility

## 2023-12-11 NOTE — OCCUPATIONAL THERAPY INITIAL EVALUATION ADULT - ADL RETRAINING, OT EVAL
GOAL: Pt will perform LB dressing independently in 4 weeks GOAL: Pt will be independent with toileting in 4 weeks
GOAL: Pt will perform LB dressing independently in 4 weeks GOAL: Pt will be independent with toileting in 4 weeks

## 2023-12-11 NOTE — PROGRESS NOTE ADULT - SUBJECTIVE AND OBJECTIVE BOX
[facility] Division of Kidney Diseases & Hypertension  FOLLOW UP NOTE  [phone]--------------------------------------------------------------------------------  Chief Complaint: hyponatremia and mihaela     24 hour events/subjective: Pt seen and evaluated bedside this morning. Reports feeling well, endorses no complaints. Denies any headaches, nausea, vomiting, fevers/chills, chest pain, SOB, abdominal pain, and leg swelling.    PAST HISTORY  --------------------------------------------------------------------------------  No significant changes to PMH, PSH, FHx, SHx, unless otherwise noted    ALLERGIES & MEDICATIONS  --------------------------------------------------------------------------------  Allergies  penicillin (Short breath; Hives)  Intolerances    Standing Inpatient Medications  budesonide 160 MICROgram(s)/formoterol 4.5 MICROgram(s) Inhaler 2 Puff(s) Inhalation two times a day  chlorhexidine 4% Liquid 1 Application(s) Topical <User Schedule>  dexAMETHasone     Tablet   Oral   dexAMETHasone     Tablet 4 milliGRAM(s) Oral every 6 hours  escitalopram 10 milliGRAM(s) Oral daily  famotidine    Tablet 10 milliGRAM(s) Oral daily  heparin   Injectable 5000 Unit(s) SubCutaneous every 8 hours  influenza  Vaccine (HIGH DOSE) 0.7 milliLiter(s) IntraMuscular once  levETIRAcetam 250 milliGRAM(s) Oral every 12 hours  nicotine -  14 mG/24Hr(s) Patch 1 Patch Transdermal daily  senna 2 Tablet(s) Oral at bedtime  sodium chloride 2 Gram(s) Oral every 6 hours    PRN Inpatient Medications  acetaminophen     Tablet .. 650 milliGRAM(s) Oral every 6 hours PRN  bisacodyl 5 milliGRAM(s) Oral daily PRN  ondansetron Injectable 4 milliGRAM(s) IV Push every 6 hours PRN  oxyCODONE    IR 5 milliGRAM(s) Oral every 4 hours PRN    REVIEW OF SYSTEMS  --------------------------------------------------------------------------------  as above    VITALS/PHYSICAL EXAM  --------------------------------------------------------------------------------  T(C): 36.8 (12-11-23 @ 07:00), Max: 36.8 (12-10-23 @ 11:00)  HR: 59 (12-11-23 @ 07:00) (59 - 80)  BP: 135/90 (12-11-23 @ 07:00) (135/90 - 143/86)  RR: 19 (12-11-23 @ 07:00) (14 - 19)  SpO2: 97% (12-11-23 @ 07:00) (94% - 98%)  Wt(kg): --    12-10-23 @ 07:01  -  12-11-23 @ 07:00  --------------------------------------------------------  IN: 795 mL / OUT: 1800 mL / NET: -1005 mL    12-11-23 @ 07:01  -  12-11-23 @ 10:57  --------------------------------------------------------  IN: 120 mL / OUT: 0 mL / NET: 120 mL    Physical Exam:  	Gen: NAD  	HEENT: Anicteric  	Pulm: CTA B/L  	CV: S1S2+  	Abd: Soft, +BS        	Ext: No LE edema B/L  	Neuro: Awake      	Skin: Warm and dry  	Dialysis access: N/A    LABS/STUDIES  --------------------------------------------------------------------------------              11.4   17.05 >-----------<  207      [12-10-23 @ 21:52]              33.5     135  |  101  |  40  ----------------------------<  141      [12-10-23 @ 21:52]  3.6   |  20  |  1.45        Ca     8.1     [12-10-23 @ 21:52]      Mg     2.0     [12-10-23 @ 21:52]      Phos  2.2     [12-10-23 @ 21:52]    Serum Osmolality 296      [12-10-23 @ 21:52]    Creatinine Trend:  SCr 1.45 [12-10 @ 21:52]  SCr 1.32 [12-10 @ 05:39]  SCr 1.42 [12-09 @ 20:47]  SCr 1.59 [12-09 @ 05:39]  SCr 1.92 [12-08 @ 21:16]    Urine Creatinine 80      [12-08-23 @ 21:53]  Urine Protein 9      [12-08-23 @ 21:53]  Urine Sodium 60      [12-10-23 @ 21:52]  Urine Urea Nitrogen 1251      [12-08-23 @ 21:53]  Urine Potassium 55      [12-08-23 @ 21:53]  Urine Osmolality 680      [12-10-23 @ 21:52]    HCV 0.05, Nonreact      [12-05-23 @ 07:13]  HIV Nonreact      [12-06-23 @ 06:56] [facility] Division of Kidney Diseases & Hypertension  FOLLOW UP NOTE  [phone]--------------------------------------------------------------------------------  Chief Complaint: hyponatremia and mihaela     24 hour events/subjective: Pt seen and evaluated bedside this morning. Reports feeling well, endorses no complaints. Denies any headaches, nausea, vomiting, fevers/chills, chest pain, SOB, abdominal pain, and leg swelling.    PAST HISTORY  --------------------------------------------------------------------------------  No significant changes to PMH, PSH, FHx, SHx, unless otherwise noted    ALLERGIES & MEDICATIONS  --------------------------------------------------------------------------------  Allergies  penicillin (Short breath; Hives)  Intolerances    Standing Inpatient Medications  budesonide 160 MICROgram(s)/formoterol 4.5 MICROgram(s) Inhaler 2 Puff(s) Inhalation two times a day  chlorhexidine 4% Liquid 1 Application(s) Topical <User Schedule>  dexAMETHasone     Tablet   Oral   dexAMETHasone     Tablet 4 milliGRAM(s) Oral every 6 hours  escitalopram 10 milliGRAM(s) Oral daily  famotidine    Tablet 10 milliGRAM(s) Oral daily  heparin   Injectable 5000 Unit(s) SubCutaneous every 8 hours  influenza  Vaccine (HIGH DOSE) 0.7 milliLiter(s) IntraMuscular once  levETIRAcetam 250 milliGRAM(s) Oral every 12 hours  nicotine -  14 mG/24Hr(s) Patch 1 Patch Transdermal daily  senna 2 Tablet(s) Oral at bedtime  sodium chloride 2 Gram(s) Oral every 6 hours    PRN Inpatient Medications  acetaminophen     Tablet .. 650 milliGRAM(s) Oral every 6 hours PRN  bisacodyl 5 milliGRAM(s) Oral daily PRN  ondansetron Injectable 4 milliGRAM(s) IV Push every 6 hours PRN  oxyCODONE    IR 5 milliGRAM(s) Oral every 4 hours PRN    REVIEW OF SYSTEMS  --------------------------------------------------------------------------------  as above    VITALS/PHYSICAL EXAM  --------------------------------------------------------------------------------  T(C): 36.8 (12-11-23 @ 07:00), Max: 36.8 (12-10-23 @ 11:00)  HR: 59 (12-11-23 @ 07:00) (59 - 80)  BP: 135/90 (12-11-23 @ 07:00) (135/90 - 143/86)  RR: 19 (12-11-23 @ 07:00) (14 - 19)  SpO2: 97% (12-11-23 @ 07:00) (94% - 98%)  Wt(kg): --    12-10-23 @ 07:01  -  12-11-23 @ 07:00  --------------------------------------------------------  IN: 795 mL / OUT: 1800 mL / NET: -1005 mL    12-11-23 @ 07:01  -  12-11-23 @ 10:57  --------------------------------------------------------  IN: 120 mL / OUT: 0 mL / NET: 120 mL    Physical Exam:  	Gen: NAD  	HEENT: Anicteric  	Pulm: CTA B/L  	CV: S1S2+  	Abd: Soft, +BS        	Ext: No LE edema B/L  	Neuro: Awake      	Skin: Warm and dry  	Dialysis access: N/A    LABS/STUDIES  --------------------------------------------------------------------------------              11.4   17.05 >-----------<  207      [12-10-23 @ 21:52]              33.5     135  |  101  |  40  ----------------------------<  141      [12-10-23 @ 21:52]  3.6   |  20  |  1.45        Ca     8.1     [12-10-23 @ 21:52]      Mg     2.0     [12-10-23 @ 21:52]      Phos  2.2     [12-10-23 @ 21:52]    Serum Osmolality 296      [12-10-23 @ 21:52]    Creatinine Trend:  SCr 1.45 [12-10 @ 21:52]  SCr 1.32 [12-10 @ 05:39]  SCr 1.42 [12-09 @ 20:47]  SCr 1.59 [12-09 @ 05:39]  SCr 1.92 [12-08 @ 21:16]    Urine Creatinine 80      [12-08-23 @ 21:53]  Urine Protein 9      [12-08-23 @ 21:53]  Urine Sodium 60      [12-10-23 @ 21:52]  Urine Urea Nitrogen 1251      [12-08-23 @ 21:53]  Urine Potassium 55      [12-08-23 @ 21:53]  Urine Osmolality 680      [12-10-23 @ 21:52]    HCV 0.05, Nonreact      [12-05-23 @ 07:13]  HIV Nonreact      [12-06-23 @ 06:56]

## 2023-12-11 NOTE — OCCUPATIONAL THERAPY INITIAL EVALUATION ADULT - TRANSFER SAFETY CONCERNS NOTED: SIT/STAND, REHAB EVAL
decreased balance during turns
decreased balance during turns/decreased safety awareness/losing balance

## 2023-12-11 NOTE — PROGRESS NOTE ADULT - SUBJECTIVE AND OBJECTIVE BOX
Patient seen and examined at bedside.    --Anticoagulation--    T(C): 36.3 (12-10-23 @ 03:00), Max: 36.9 (12-09-23 @ 11:31)  HR: 60 (12-10-23 @ 06:00) (60 - 77)  BP: 131/81 (12-09-23 @ 11:31) (131/81 - 131/81)  RR: 15 (12-10-23 @ 06:00) (12 - 23)  SpO2: 95% (12-10-23 @ 06:00) (94% - 98%)  Wt(kg): --    Exam: AOx3, PERRL, EOMI, no facial, no drift, PELLETIER 5/5

## 2023-12-11 NOTE — OCCUPATIONAL THERAPY INITIAL EVALUATION ADULT - DIAGNOSIS, OT EVAL
Patient presents with decreased balance, strength, endurance impacting ability to perform ADLs and functional mobility
Patient presents with decreased balance, coordination, vision, strength, endurance impacting ability to perform ADLs and functional mobility

## 2023-12-11 NOTE — PROGRESS NOTE ADULT - ATTENDING COMMENTS
brain mass with vasogenic edema and brain compression POD1 from right parietal crani   neuro checks q 4 hr,  decadron being tapered per NS  for vasogenic edema, follow official path , Keppra 250 mg BID- renally dosed for seizure prophylaxis , PT/OT/OBC   SIADH, IVL, restrict free water intake < 1 L, salt tablet 2 g q 6 hr, sodium goal 135-140  heparin 5000 units q 8 hr for chemoprophylaxis , bilateral soleal DVT  repeat venous dopplers in 5 days, contraindicated to fully anticoagulate , RA, regular diet     Not critical pending floor bed brain mass with vasogenic edema and brain compression POD1 from right parietal crani   neuro checks q 4 hr,  decadron being tapered per NS  for vasogenic edema, follow official path , Keppra 250 mg BID- renally dosed for seizure prophylaxis , PT/OT/OBC   SIADH, IVL, restrict free water intake < 1 L, salt tablet 2 g q 6 hr, sodium goal 135-140  heparin 5000 units q 8 hr for chemoprophylaxis , bilateral soleal DVT  repeat venous dopplers in 5 days, contraindicated to fully anticoagulate , RA, regular diet     Not critical pending floor bed.

## 2023-12-11 NOTE — CONSULT NOTE ADULT - SUBJECTIVE AND OBJECTIVE BOX
Vascular Cardiology Consult Note     DIRECT PROVIDER NUMBER: [phone]  / Available on TEAMS    CC:  unsteady gait / mass noted on CT head    HPI:  73 y/o F wit PMHX lung cancer s/p chemotherapy 5 years ago previously in remission, COPD, HTN presents with unstable gait with CT head findings of central mass in right parietal-temporal region s/p right craniotomy for tumor resection on . LE venous duplex 12/10 showed bilateral soleal DVT.Patient denies prior VTE. Currently hemodynamically stable on RA.  Vascular Cardiology consulted.     Allergies  penicillin (Short breath; Hives)    MEDICATIONS:  heparin   Injectable 5000 Unit(s) SubCutaneous every 8 hours  budesonide 160 MICROgram(s)/formoterol 4.5 MICROgram(s) Inhaler 2 Puff(s) Inhalation two times a day  acetaminophen     Tablet .. 650 milliGRAM(s) Oral every 6 hours PRN  escitalopram 10 milliGRAM(s) Oral daily  levETIRAcetam 250 milliGRAM(s) Oral every 12 hours  ondansetron Injectable 4 milliGRAM(s) IV Push every 6 hours PRN  oxyCODONE    IR 5 milliGRAM(s) Oral every 4 hours PRN  bisacodyl 5 milliGRAM(s) Oral daily PRN  famotidine    Tablet 10 milliGRAM(s) Oral daily  senna 2 Tablet(s) Oral at bedtime  dexAMETHasone     Tablet   Oral   dexAMETHasone     Tablet 4 milliGRAM(s) Oral every 6 hours  chlorhexidine 4% Liquid 1 Application(s) Topical <User Schedule>  influenza  Vaccine (HIGH DOSE) 0.7 milliLiter(s) IntraMuscular once  sodium chloride 2 Gram(s) Oral every 6 hours    PAST MEDICAL & SURGICAL HISTORY:  Arthritis  left knee recently, right knee arthritis for several years  HTN (hypertension)  controlled with meds for several years  Depression  Regurgitation  tricuspid valve  Osteoarthritis of multiple joints, unspecified osteoarthritis type  COPD (chronic obstructive pulmonary disease)  Solitary pulmonary nodule  Received last dose of chemo 2019  History of lung cancer  S/P appendectomy  in   S/P breast biopsy  bilateral  breast  - benign  S/P D&C  for missed  about 25 years ago  Female bladder prolapse  bladder sling -     FAMILY HISTORY:  Family history of pancreatic cancer (Father)  Family history of COPD (chronic obstructive pulmonary disease) (Mother)  Family history of heart disease (Mother)  Family history of leukemia (Sibling)  brother  Family history of hepatitis C (Sibling)  brother  Family history of rheumatic fever (Sibling)    SOCIAL HISTORY:  unchanged    REVIEW OF SYSTEMS:  CONSTITUTIONAL: No fever  EYES: No eye pain  ENT:  No throat pain  NECK: No pain   RESPIRATORY: No SOB   CARDIOVASCULAR: No C/P  GASTROINTESTINAL: No abdominal pain  GENITOURINARY: No hematuria  SKIN: No LE wounds  LYMPH Nodes: No enlarged glands noted  ENDOCRINE: No heat or cold intolerance noted  MUSCULOSKELETAL: No LE edema   PSYCHIATRIC: No depression, anxiety  HEME/LYMPH: No bleeding gums  ALLERGY AND IMMUNOLOGIC: No hives    [ x] All others negative	    PHYSICAL EXAM:  T(C): 36.5 (23 @ 12:00), Max: 36.8 (12-10-23 @ 19:00)  HR: 73 (23 @ 12:00) (59 - 80)  BP: 150/72 (23 @ 12:00) (135/90 - 150/72)  RR: 18 (23 @ 12:00) (15 - 19)  SpO2: 96% (23 @ 12:00) (96% - 100%)  Wt(kg): --  I&O's Summary    10 Dec 2023 07:01  -  11 Dec 2023 07:00  --------------------------------------------------------  IN: 795 mL / OUT: 1800 mL / NET: -1005 mL    11 Dec 2023 07:01  -  11 Dec 2023 12:23  --------------------------------------------------------  IN: 120 mL / OUT: 600 mL / NET: -480 mL    Appearance: NAD  	  HEENT: Dressing intact to scalp  Cardiovascular: RRR, S1 and S2  Respiratory: CTA B/L  Psychiatry:  AAO x 3  Gastrointestinal:  Soft, Non-tender, + BS	  Skin: No cyanosis	  Extremities: No LE edema, bilateral calves soft      LABS:	 	    CBC Full  -  ( 10 Dec 2023 21:52 )  WBC Count : 17.05 K/uL  Hemoglobin : 11.4 g/dL  Hematocrit : 33.5 %  Platelet Count - Automated : 207 K/uL  Mean Cell Volume : 88.9 fl  Mean Cell Hemoglobin : 30.2 pg  Mean Cell Hemoglobin Concentration : 34.0 gm/dL  Auto Neutrophil # : 15.20 K/uL  Auto Lymphocyte # : 0.61 K/uL  Auto Monocyte # : 0.93 K/uL  Auto Eosinophil # : 0.00 K/uL  Auto Basophil # : 0.03 K/uL  Auto Neutrophil % : 89.1 %  Auto Lymphocyte % : 3.6 %  Auto Monocyte % : 5.5 %  Auto Eosinophil % : 0.0 %  Auto Basophil % : 0.2 %    12-10    135  |  101  |  40<H>  ----------------------------<  141<H>  3.6   |  20<L>  |  1.45<H>  12-10    133<L>  |  99  |  41<H>  ----------------------------<  143<H>  3.6   |  21<L>  |  1.32<H>    Ca    8.1<L>      10 Dec 2023 21:52  Ca    8.0<L>      10 Dec 2023 05:39  Phos  2.2     12-10  Phos  4.1     12-10  Mg     2.0     12-10  Mg     2.5     12-10        Assessment:  1. Bilateral Soleal DVT  2. S/p right craniotomy for tumor resection on .  3. History of Lung CA  4. Abdominal Aorta Dilated 4 cm on CT .      Plan:  1. Continue Heparin Sub. Cut. 5000 units TID for below knee DVT with recent neurosurgery,      Recommend repeat LE venous duplex on .  2. Patient hemodynamically stable on RA and denies any cardiopulmonary symptoms.   3. Appreciate pulmonary follow-up for lung nodule on CT.  4. Outpatient follow-up for dilated abdominal aorta.     5. Appreciate excellent Neurosurgical acre.        Thank you  GINNY Navarro, MS, [facility]  Vascular Cardiology Service    Please call with any questions:   DIRECT SERVICE NUMBER: [phone] / Available on TEAMS Vascular Cardiology Consult Note     DIRECT PROVIDER NUMBER: [phone]  / Available on TEAMS    CC:  unsteady gait / mass noted on CT head    HPI:  75 y/o F wit PMHX lung cancer s/p chemotherapy 5 years ago previously in remission, COPD, HTN presents with unstable gait with CT head findings of central mass in right parietal-temporal region s/p right craniotomy for tumor resection on . LE venous duplex 12/10 showed bilateral soleal DVT.Patient denies prior VTE. Currently hemodynamically stable on RA.  Vascular Cardiology consulted.     Allergies  penicillin (Short breath; Hives)    MEDICATIONS:  heparin   Injectable 5000 Unit(s) SubCutaneous every 8 hours  budesonide 160 MICROgram(s)/formoterol 4.5 MICROgram(s) Inhaler 2 Puff(s) Inhalation two times a day  acetaminophen     Tablet .. 650 milliGRAM(s) Oral every 6 hours PRN  escitalopram 10 milliGRAM(s) Oral daily  levETIRAcetam 250 milliGRAM(s) Oral every 12 hours  ondansetron Injectable 4 milliGRAM(s) IV Push every 6 hours PRN  oxyCODONE    IR 5 milliGRAM(s) Oral every 4 hours PRN  bisacodyl 5 milliGRAM(s) Oral daily PRN  famotidine    Tablet 10 milliGRAM(s) Oral daily  senna 2 Tablet(s) Oral at bedtime  dexAMETHasone     Tablet   Oral   dexAMETHasone     Tablet 4 milliGRAM(s) Oral every 6 hours  chlorhexidine 4% Liquid 1 Application(s) Topical <User Schedule>  influenza  Vaccine (HIGH DOSE) 0.7 milliLiter(s) IntraMuscular once  sodium chloride 2 Gram(s) Oral every 6 hours    PAST MEDICAL & SURGICAL HISTORY:  Arthritis  left knee recently, right knee arthritis for several years  HTN (hypertension)  controlled with meds for several years  Depression  Regurgitation  tricuspid valve  Osteoarthritis of multiple joints, unspecified osteoarthritis type  COPD (chronic obstructive pulmonary disease)  Solitary pulmonary nodule  Received last dose of chemo 2019  History of lung cancer  S/P appendectomy  in   S/P breast biopsy  bilateral  breast  - benign  S/P D&C  for missed  about 25 years ago  Female bladder prolapse  bladder sling -     FAMILY HISTORY:  Family history of pancreatic cancer (Father)  Family history of COPD (chronic obstructive pulmonary disease) (Mother)  Family history of heart disease (Mother)  Family history of leukemia (Sibling)  brother  Family history of hepatitis C (Sibling)  brother  Family history of rheumatic fever (Sibling)    SOCIAL HISTORY:  unchanged    REVIEW OF SYSTEMS:  CONSTITUTIONAL: No fever  EYES: No eye pain  ENT:  No throat pain  NECK: No pain   RESPIRATORY: No SOB   CARDIOVASCULAR: No C/P  GASTROINTESTINAL: No abdominal pain  GENITOURINARY: No hematuria  SKIN: No LE wounds  LYMPH Nodes: No enlarged glands noted  ENDOCRINE: No heat or cold intolerance noted  MUSCULOSKELETAL: No LE edema   PSYCHIATRIC: No depression, anxiety  HEME/LYMPH: No bleeding gums  ALLERGY AND IMMUNOLOGIC: No hives    [ x] All others negative	    PHYSICAL EXAM:  T(C): 36.5 (23 @ 12:00), Max: 36.8 (12-10-23 @ 19:00)  HR: 73 (23 @ 12:00) (59 - 80)  BP: 150/72 (23 @ 12:00) (135/90 - 150/72)  RR: 18 (23 @ 12:00) (15 - 19)  SpO2: 96% (23 @ 12:00) (96% - 100%)  Wt(kg): --  I&O's Summary    10 Dec 2023 07:01  -  11 Dec 2023 07:00  --------------------------------------------------------  IN: 795 mL / OUT: 1800 mL / NET: -1005 mL    11 Dec 2023 07:01  -  11 Dec 2023 12:23  --------------------------------------------------------  IN: 120 mL / OUT: 600 mL / NET: -480 mL    Appearance: NAD  	  HEENT: Dressing intact to scalp  Cardiovascular: RRR, S1 and S2  Respiratory: CTA B/L  Psychiatry:  AAO x 3  Gastrointestinal:  Soft, Non-tender, + BS	  Skin: No cyanosis	  Extremities: No LE edema, bilateral calves soft      LABS:	 	    CBC Full  -  ( 10 Dec 2023 21:52 )  WBC Count : 17.05 K/uL  Hemoglobin : 11.4 g/dL  Hematocrit : 33.5 %  Platelet Count - Automated : 207 K/uL  Mean Cell Volume : 88.9 fl  Mean Cell Hemoglobin : 30.2 pg  Mean Cell Hemoglobin Concentration : 34.0 gm/dL  Auto Neutrophil # : 15.20 K/uL  Auto Lymphocyte # : 0.61 K/uL  Auto Monocyte # : 0.93 K/uL  Auto Eosinophil # : 0.00 K/uL  Auto Basophil # : 0.03 K/uL  Auto Neutrophil % : 89.1 %  Auto Lymphocyte % : 3.6 %  Auto Monocyte % : 5.5 %  Auto Eosinophil % : 0.0 %  Auto Basophil % : 0.2 %    12-10    135  |  101  |  40<H>  ----------------------------<  141<H>  3.6   |  20<L>  |  1.45<H>  12-10    133<L>  |  99  |  41<H>  ----------------------------<  143<H>  3.6   |  21<L>  |  1.32<H>    Ca    8.1<L>      10 Dec 2023 21:52  Ca    8.0<L>      10 Dec 2023 05:39  Phos  2.2     12-10  Phos  4.1     12-10  Mg     2.0     12-10  Mg     2.5     12-10        Assessment:  1. Bilateral Soleal DVT  2. S/p right craniotomy for tumor resection on .  3. History of Lung CA  4. Abdominal Aorta Dilated 4 cm on CT .      Plan:  1. Continue Heparin Sub. Cut. 5000 units TID for below knee DVT with recent neurosurgery,      Recommend repeat LE venous duplex on .  2. Patient hemodynamically stable on RA and denies any cardiopulmonary symptoms.   3. Appreciate pulmonary follow-up for lung nodule on CT.  4. Outpatient follow-up for dilated abdominal aorta.     5. Appreciate excellent Neurosurgical acre.        Thank you  GINNY Navarro, MS, [facility]  Vascular Cardiology Service    Please call with any questions:   DIRECT SERVICE NUMBER: [phone] / Available on TEAMS Vascular Cardiology Consult Note     DIRECT PROVIDER NUMBER: [phone]  / Available on TEAMS    CC:  unsteady gait / mass noted on CT head    HPI:  73 y/o F wit PMHX lung cancer s/p chemotherapy 5 years ago previously in remission, COPD, HTN presents with unstable gait with CT head findings of central mass in right parietal-temporal region s/p right craniotomy for tumor resection on . LE venous duplex 12/10 showed bilateral soleal DVT.Patient denies prior VTE. Currently hemodynamically stable on RA.  Vascular Cardiology consulted.     Allergies  penicillin (Short breath; Hives)    MEDICATIONS:  heparin   Injectable 5000 Unit(s) SubCutaneous every 8 hours  budesonide 160 MICROgram(s)/formoterol 4.5 MICROgram(s) Inhaler 2 Puff(s) Inhalation two times a day  acetaminophen     Tablet .. 650 milliGRAM(s) Oral every 6 hours PRN  escitalopram 10 milliGRAM(s) Oral daily  levETIRAcetam 250 milliGRAM(s) Oral every 12 hours  ondansetron Injectable 4 milliGRAM(s) IV Push every 6 hours PRN  oxyCODONE    IR 5 milliGRAM(s) Oral every 4 hours PRN  bisacodyl 5 milliGRAM(s) Oral daily PRN  famotidine    Tablet 10 milliGRAM(s) Oral daily  senna 2 Tablet(s) Oral at bedtime  dexAMETHasone     Tablet   Oral   dexAMETHasone     Tablet 4 milliGRAM(s) Oral every 6 hours  chlorhexidine 4% Liquid 1 Application(s) Topical <User Schedule>  influenza  Vaccine (HIGH DOSE) 0.7 milliLiter(s) IntraMuscular once  sodium chloride 2 Gram(s) Oral every 6 hours    PAST MEDICAL & SURGICAL HISTORY:  Arthritis  left knee recently, right knee arthritis for several years  HTN (hypertension)  controlled with meds for several years  Depression  Regurgitation  tricuspid valve  Osteoarthritis of multiple joints, unspecified osteoarthritis type  COPD (chronic obstructive pulmonary disease)  Solitary pulmonary nodule  Received last dose of chemo 2019  History of lung cancer  S/P appendectomy  in   S/P breast biopsy  bilateral  breast  - benign  S/P D&C  for missed  about 25 years ago  Female bladder prolapse  bladder sling -     FAMILY HISTORY:  Family history of pancreatic cancer (Father)  Family history of COPD (chronic obstructive pulmonary disease) (Mother)  Family history of heart disease (Mother)  Family history of leukemia (Sibling)  brother  Family history of hepatitis C (Sibling)  brother  Family history of rheumatic fever (Sibling)    SOCIAL HISTORY:  unchanged    REVIEW OF SYSTEMS:  CONSTITUTIONAL: No fever  EYES: No eye pain  ENT:  No throat pain  NECK: No pain   RESPIRATORY: No SOB   CARDIOVASCULAR: No C/P  GASTROINTESTINAL: No abdominal pain  GENITOURINARY: No hematuria  SKIN: No LE wounds  LYMPH Nodes: No enlarged glands noted  ENDOCRINE: No heat or cold intolerance noted  MUSCULOSKELETAL: No LE edema   PSYCHIATRIC: No depression, anxiety  HEME/LYMPH: No bleeding gums  ALLERGY AND IMMUNOLOGIC: No hives    [ x] All others negative	    PHYSICAL EXAM:  T(C): 36.5 (23 @ 12:00), Max: 36.8 (12-10-23 @ 19:00)  HR: 73 (23 @ 12:00) (59 - 80)  BP: 150/72 (23 @ 12:00) (135/90 - 150/72)  RR: 18 (23 @ 12:00) (15 - 19)  SpO2: 96% (23 @ 12:00) (96% - 100%)  Wt(kg): --  I&O's Summary    10 Dec 2023 07:01  -  11 Dec 2023 07:00  --------------------------------------------------------  IN: 795 mL / OUT: 1800 mL / NET: -1005 mL    11 Dec 2023 07:01  -  11 Dec 2023 12:23  --------------------------------------------------------  IN: 120 mL / OUT: 600 mL / NET: -480 mL    Appearance: NAD  	  HEENT: Dressing intact to scalp  Cardiovascular: RRR, S1 and S2  Respiratory: CTA B/L  Psychiatry:  AAO x 3  Gastrointestinal:  Soft, Non-tender, + BS	  Skin: No cyanosis	  Extremities: No LE edema, bilateral calves soft      LABS:	 	    CBC Full  -  ( 10 Dec 2023 21:52 )  WBC Count : 17.05 K/uL  Hemoglobin : 11.4 g/dL  Hematocrit : 33.5 %  Platelet Count - Automated : 207 K/uL  Mean Cell Volume : 88.9 fl  Mean Cell Hemoglobin : 30.2 pg  Mean Cell Hemoglobin Concentration : 34.0 gm/dL  Auto Neutrophil # : 15.20 K/uL  Auto Lymphocyte # : 0.61 K/uL  Auto Monocyte # : 0.93 K/uL  Auto Eosinophil # : 0.00 K/uL  Auto Basophil # : 0.03 K/uL  Auto Neutrophil % : 89.1 %  Auto Lymphocyte % : 3.6 %  Auto Monocyte % : 5.5 %  Auto Eosinophil % : 0.0 %  Auto Basophil % : 0.2 %    12-10    135  |  101  |  40<H>  ----------------------------<  141<H>  3.6   |  20<L>  |  1.45<H>  12-10    133<L>  |  99  |  41<H>  ----------------------------<  143<H>  3.6   |  21<L>  |  1.32<H>    Ca    8.1<L>      10 Dec 2023 21:52  Ca    8.0<L>      10 Dec 2023 05:39  Phos  2.2     12-10  Phos  4.1     12-10  Mg     2.0     12-10  Mg     2.5     12-10        Assessment:  1. Bilateral Soleal DVT  2. S/p right craniotomy for tumor resection on .  3. History of Lung CA  4. Abdominal Aorta Dilated 4 cm on CT .      Plan:  1. Continue Heparin Sub. Cut. 5000 units TID for below knee DVT with recent neurosurgery,      Recommend repeat LE venous duplex on 12/15.  2. Patient hemodynamically stable on RA and denies any cardiopulmonary symptoms.   3. Appreciate pulmonary follow-up for lung nodule on CT.  4. Outpatient follow-up for dilated abdominal aorta.     5. Appreciate excellent Neurosurgical acre.        Thank you  GINNY Navarro, MS, [facility]  Vascular Cardiology Service    Please call with any questions:   DIRECT SERVICE NUMBER: [phone] / Available on TEAMS Vascular Cardiology Consult Note     DIRECT PROVIDER NUMBER: [phone]  / Available on TEAMS    CC:  unsteady gait / mass noted on CT head    HPI:  73 y/o F wit PMHX lung cancer s/p chemotherapy 5 years ago previously in remission, COPD, HTN presents with unstable gait with CT head findings of central mass in right parietal-temporal region s/p right craniotomy for tumor resection on . LE venous duplex 12/10 showed bilateral soleal DVT.Patient denies prior VTE. Currently hemodynamically stable on RA.  Vascular Cardiology consulted.     Allergies  penicillin (Short breath; Hives)    MEDICATIONS:  heparin   Injectable 5000 Unit(s) SubCutaneous every 8 hours  budesonide 160 MICROgram(s)/formoterol 4.5 MICROgram(s) Inhaler 2 Puff(s) Inhalation two times a day  acetaminophen     Tablet .. 650 milliGRAM(s) Oral every 6 hours PRN  escitalopram 10 milliGRAM(s) Oral daily  levETIRAcetam 250 milliGRAM(s) Oral every 12 hours  ondansetron Injectable 4 milliGRAM(s) IV Push every 6 hours PRN  oxyCODONE    IR 5 milliGRAM(s) Oral every 4 hours PRN  bisacodyl 5 milliGRAM(s) Oral daily PRN  famotidine    Tablet 10 milliGRAM(s) Oral daily  senna 2 Tablet(s) Oral at bedtime  dexAMETHasone     Tablet   Oral   dexAMETHasone     Tablet 4 milliGRAM(s) Oral every 6 hours  chlorhexidine 4% Liquid 1 Application(s) Topical <User Schedule>  influenza  Vaccine (HIGH DOSE) 0.7 milliLiter(s) IntraMuscular once  sodium chloride 2 Gram(s) Oral every 6 hours    PAST MEDICAL & SURGICAL HISTORY:  Arthritis  left knee recently, right knee arthritis for several years  HTN (hypertension)  controlled with meds for several years  Depression  Regurgitation  tricuspid valve  Osteoarthritis of multiple joints, unspecified osteoarthritis type  COPD (chronic obstructive pulmonary disease)  Solitary pulmonary nodule  Received last dose of chemo 2019  History of lung cancer  S/P appendectomy  in   S/P breast biopsy  bilateral  breast  - benign  S/P D&C  for missed  about 25 years ago  Female bladder prolapse  bladder sling -     FAMILY HISTORY:  Family history of pancreatic cancer (Father)  Family history of COPD (chronic obstructive pulmonary disease) (Mother)  Family history of heart disease (Mother)  Family history of leukemia (Sibling)  brother  Family history of hepatitis C (Sibling)  brother  Family history of rheumatic fever (Sibling)    SOCIAL HISTORY:  unchanged    REVIEW OF SYSTEMS:  CONSTITUTIONAL: No fever  EYES: No eye pain  ENT:  No throat pain  NECK: No pain   RESPIRATORY: No SOB   CARDIOVASCULAR: No C/P  GASTROINTESTINAL: No abdominal pain  GENITOURINARY: No hematuria  SKIN: No LE wounds  LYMPH Nodes: No enlarged glands noted  ENDOCRINE: No heat or cold intolerance noted  MUSCULOSKELETAL: No LE edema   PSYCHIATRIC: No depression, anxiety  HEME/LYMPH: No bleeding gums  ALLERGY AND IMMUNOLOGIC: No hives    [ x] All others negative	    PHYSICAL EXAM:  T(C): 36.5 (23 @ 12:00), Max: 36.8 (12-10-23 @ 19:00)  HR: 73 (23 @ 12:00) (59 - 80)  BP: 150/72 (23 @ 12:00) (135/90 - 150/72)  RR: 18 (23 @ 12:00) (15 - 19)  SpO2: 96% (23 @ 12:00) (96% - 100%)  Wt(kg): --  I&O's Summary    10 Dec 2023 07:01  -  11 Dec 2023 07:00  --------------------------------------------------------  IN: 795 mL / OUT: 1800 mL / NET: -1005 mL    11 Dec 2023 07:01  -  11 Dec 2023 12:23  --------------------------------------------------------  IN: 120 mL / OUT: 600 mL / NET: -480 mL    Appearance: NAD  	  HEENT: Dressing intact to scalp  Cardiovascular: RRR, S1 and S2  Respiratory: CTA B/L  Psychiatry:  AAO x 3  Gastrointestinal:  Soft, Non-tender, + BS	  Skin: No cyanosis	  Extremities: No LE edema, bilateral calves soft      LABS:	 	    CBC Full  -  ( 10 Dec 2023 21:52 )  WBC Count : 17.05 K/uL  Hemoglobin : 11.4 g/dL  Hematocrit : 33.5 %  Platelet Count - Automated : 207 K/uL  Mean Cell Volume : 88.9 fl  Mean Cell Hemoglobin : 30.2 pg  Mean Cell Hemoglobin Concentration : 34.0 gm/dL  Auto Neutrophil # : 15.20 K/uL  Auto Lymphocyte # : 0.61 K/uL  Auto Monocyte # : 0.93 K/uL  Auto Eosinophil # : 0.00 K/uL  Auto Basophil # : 0.03 K/uL  Auto Neutrophil % : 89.1 %  Auto Lymphocyte % : 3.6 %  Auto Monocyte % : 5.5 %  Auto Eosinophil % : 0.0 %  Auto Basophil % : 0.2 %    12-10    135  |  101  |  40<H>  ----------------------------<  141<H>  3.6   |  20<L>  |  1.45<H>  12-10    133<L>  |  99  |  41<H>  ----------------------------<  143<H>  3.6   |  21<L>  |  1.32<H>    Ca    8.1<L>      10 Dec 2023 21:52  Ca    8.0<L>      10 Dec 2023 05:39  Phos  2.2     12-10  Phos  4.1     12-10  Mg     2.0     12-10  Mg     2.5     12-10        Assessment:  1. Bilateral Soleal DVT  2. S/p right craniotomy for tumor resection on .  3. History of Lung CA  4. Abdominal Aorta Dilated 4 cm on CT .      Plan:  1. Continue Heparin Sub. Cut. 5000 units TID for below knee DVT with recent neurosurgery,      Recommend repeat LE venous duplex on 12/15.  2. Patient hemodynamically stable on RA and denies any cardiopulmonary symptoms.   3. Appreciate pulmonary follow-up for lung nodule on CT.  4. Outpatient follow-up for dilated abdominal aorta.     5. Appreciate excellent Neurosurgical acre.        Thank you  GINNY Navarro, MS, [facility]  Vascular Cardiology Service    Please call with any questions:   DIRECT SERVICE NUMBER: [phone] / Available on TEAMS

## 2023-12-11 NOTE — OCCUPATIONAL THERAPY INITIAL EVALUATION ADULT - NS ASR FOLLOW COMMAND OT EVAL
increased processing time, delayed/100% of the time/able to follow single-step instructions
100% of the time/able to follow single-step instructions

## 2023-12-11 NOTE — OCCUPATIONAL THERAPY INITIAL EVALUATION ADULT - TRANSFER TRAINING, PT EVAL
GOAL: Patient will be independent with functional transfer with use of rolling walker  in 4 weeks
GOAL: Patient will be independent with functional transfer with use of rolling walker  in 4 weeks

## 2023-12-11 NOTE — PROGRESS NOTE ADULT - ASSESSMENT
-S/p Right parietal crani for tumor resection on 12/9/2023  -xfer to 4c and start SQH in AM  -Vasc cards to see in AM for DVT, repeat dopps-p  -Dex 4q6, taper over 2 weeks

## 2023-12-11 NOTE — PROGRESS NOTE ADULT - ATTENDING COMMENTS
73 yo female with h./o lung caner s/p removal of brain mass. pt feeling well. eating without difficulty.    A/P  1. Brain mass removed with good recovery. Await pathology report. h/o lung cancer . Of immunotherapy to ~ 4 years. Last PET scan negative.        suspicious for brain met. continue decadron  and AED as per neurosurgery.    2. COPD: Continue bronchodilators and home medication.    3. Stage Iv NSCLC. follow up with oncologist and pulmonologist at [place].    needs repeat Ct chest  to follow  GGO 75 yo female with h./o lung caner s/p removal of brain mass. pt feeling well. eating without difficulty.    A/P  1. Brain mass removed with good recovery. Await pathology report. h/o lung cancer . Of immunotherapy to ~ 4 years. Last PET scan negative.        suspicious for brain met. continue decadron  and AED as per neurosurgery.    2. COPD: Continue bronchodilators and home medication.    3. Stage Iv NSCLC. follow up with oncologist and pulmonologist at [place].    needs repeat Ct chest  to follow  GGO

## 2023-12-11 NOTE — PROGRESS NOTE ADULT - ASSESSMENT
78 y.o F w/ hx of lung cancer s/p chemotherapy 5 years ago, COPD, HTN, CKD here with unsteady gait and CT head finding of cyst vs centrally necrotic mass in right parietal region. Nephrology consulted for CKD and hyponatremia

## 2023-12-11 NOTE — OCCUPATIONAL THERAPY INITIAL EVALUATION ADULT - PHYSICAL ASSIST/NONPHYSICAL ASSIST: SUPINE/SIT, REHAB EVAL
verbal cues/1 person assist
verbal cues/1 person assist
Complex Repair And Modified Advancement Flap Text: The defect edges were debeveled with a #15 scalpel blade.  The primary defect was closed partially with a complex linear closure.  Given the location of the remaining defect, shape of the defect and the proximity to free margins a modified advancement flap was deemed most appropriate for complete closure of the defect.  Using a sterile surgical marker, an appropriate advancement flap was drawn incorporating the defect and placing the expected incisions within the relaxed skin tension lines where possible.    The area thus outlined was incised deep to adipose tissue with a #15 scalpel blade.  The skin margins were undermined to an appropriate distance in all directions utilizing iris scissors.

## 2023-12-11 NOTE — CHART NOTE - NSCHARTNOTEFT_GEN_A_CORE
Nutrition Follow Up Note  Patient seen for: malnutrition follow up  Chart reviewed, events noted    Per chart, patient is a 75 y/o female with PMH including h/o lung cancer (s/p chemotherapy 5 years ago, previously in remission), COPD, HTN. Patient presents to [facility] for neurosurgery evaluation as a transfer from [facility] after initial presentation for unsteady gait for a few weeks, underwent CT brain remarkable for brain mass w/ midline shift, cyst vs necrotic mass per MD. S/p R craniotomy for tumor resection 12/9. Post-OP c/b LE venous duplex 12/10 showing b/l soleal DVT, vascular cardiology consulted.    Source: [x] Patient       [x] EMR        [x] RN        [] Family at bedside       [] Other:  - If unable to interview patient: [] Trach/Vent/BiPAP  [] Disoriented/confused/inappropriate to interview    Diet Order:  Diet, Minced and Moist:   1000mL Fluid Restriction ([id number]) (12-10-23 @ 10:26) [Active]    Is current order appropriate/adequate? [x] Yes  []  No:     PO intake:   [x] >75%  Adequate    [] 50-75%  Fair       [] <50%  Poor    Nutrition-related concerns:  - patient reports to be eating well since last RD assessment, tolerating current consistency diet w/ no issues, reports preferring softer foods 2/2 having dental work in the past  - prior hyponatremia 12/9, 12/10 - now WNL since evening of 12/10  - hypophosphatemic 12/10, s/p supplementation noted, request updated level with next labs  - currently ordered for decadron taper noted, no current insulin regimen in place, to continue to follow blood glucose trend    GI:  Last BM: 12/9  Bowel Regimen? [x] Yes: senna, dulcolax    Weights:   12/4: 68.4kg  - request updated weight of patient when appropriate    Nutritionally Pertinent MEDICATIONS  (STANDING):  dexAMETHasone     Tablet  dexAMETHasone     Tablet  famotidine    Tablet  senna  sodium chloride    Pertinent Labs: 12-10 @ 21:52: Na 135, BUN 40<H>, Cr 1.45<H>, <H>, K+ 3.6, Phos 2.2<L>, Mg 2.0, Alk Phos --, ALT/SGPT --, AST/SGOT --, HbA1c --    Skin per nursing documentation: R head surgical incision s/p R craniotomy per documentation, no pressure injuries per documentation  Edema: none per documentation    Estimated Needs:   [] no change since previous assessment  [] recalculated  Estimated Caloric Needs: 1700-2040kcal/day (25-30kcal/kg)  Estimated Protein Needs: 68-82g/pro/day (1.0-1.2g/pro/kg)  Estimated Fluid Needs: defer to team  based on dosing weight 68kg    Previous Nutrition Diagnosis: moderate acute on chronic malnutrition, inadequate protein energy intake  Nutrition Diagnosis is: [x] ongoing: being addressed w/ PO diet    New Nutrition Diagnosis: [x] Not applicable    Nutrition Care Plan:  [x] In Progress  [] Achieved  [] Not applicable    Nutrition Interventions:     Education Provided:       [x] Yes: current diet order, adequate protein/caloric intake    Recommendations:          Monitoring and Evaluation:   Continue to monitor nutritional intake, tolerance to diet prescription, weights, labs, skin integrity    RD remains available upon request and will follow up per protocol  Janes Martinez RD, CDN - contact on TEAMS. Nutrition Follow Up Note  Patient seen for: malnutrition follow up  Chart reviewed, events noted    Per chart, patient is a 75 y/o female with PMH including h/o lung cancer (s/p chemotherapy 5 years ago, previously in remission), COPD, HTN. Patient presents to [facility] for neurosurgery evaluation as a transfer from [facility] after initial presentation for unsteady gait for a few weeks, underwent CT brain remarkable for brain mass w/ midline shift, cyst vs necrotic mass per MD. S/p R craniotomy for tumor resection 12/9. Post-OP c/b LE venous duplex 12/10 showing b/l soleal DVT, vascular cardiology consulted.    Source: [x] Patient       [x] EMR        [x] RN        [] Family at bedside       [] Other:  - If unable to interview patient: [] Trach/Vent/BiPAP  [] Disoriented/confused/inappropriate to interview    Diet Order:  Diet, Minced and Moist:   1000mL Fluid Restriction ([id number]) (12-10-23 @ 10:26) [Active]    Is current order appropriate/adequate? [x] Yes  []  No:     PO intake:   [x] >75%  Adequate    [] 50-75%  Fair       [] <50%  Poor    Nutrition-related concerns:  - patient reports to be eating well since last RD assessment, tolerating current consistency diet w/ no issues, reports preferring softer foods 2/2 having dental work in the past  - prior hyponatremia 12/9, 12/10 - now WNL since evening of 12/10  - hypophosphatemic 12/10, s/p supplementation noted, request updated level with next labs  - currently ordered for decadron taper noted, no current insulin regimen in place, to continue to follow blood glucose trend    GI:  Last BM: 12/9  Bowel Regimen? [x] Yes: senna, dulcolax    Weights:   12/4: 68.4kg  - request updated weight of patient when appropriate    Nutritionally Pertinent MEDICATIONS  (STANDING):  dexAMETHasone     Tablet  dexAMETHasone     Tablet  famotidine    Tablet  senna  sodium chloride    Pertinent Labs: 12-10 @ 21:52: Na 135, BUN 40<H>, Cr 1.45<H>, <H>, K+ 3.6, Phos 2.2<L>, Mg 2.0, Alk Phos --, ALT/SGPT --, AST/SGOT --, HbA1c --    Skin per nursing documentation: R head surgical incision s/p R craniotomy per documentation, no pressure injuries per documentation  Edema: none per documentation    Estimated Needs:   [] no change since previous assessment  [] recalculated  Estimated Caloric Needs: 1700-2040kcal/day (25-30kcal/kg)  Estimated Protein Needs: 68-82g/pro/day (1.0-1.2g/pro/kg)  Estimated Fluid Needs: defer to team  based on dosing weight 68kg    Previous Nutrition Diagnosis: moderate acute on chronic malnutrition, inadequate protein energy intake  Nutrition Diagnosis is: [x] ongoing: being addressed w/ PO diet    New Nutrition Diagnosis: [x] Not applicable    Nutrition Care Plan:  [x] In Progress  [] Achieved  [] Not applicable    Nutrition Interventions:     Education Provided:       [x] Yes: current diet order, adequate protein/caloric intake    Recommendations:          Monitoring and Evaluation:   Continue to monitor nutritional intake, tolerance to diet prescription, weights, labs, skin integrity    RD remains available upon request and will follow up per protocol  Janes Martinez RD, CDN - contact on TEAMS. Nutrition Follow Up Note  Patient seen for: malnutrition follow up  Chart reviewed, events noted    Per chart, patient is a 73 y/o female with PMH including h/o lung cancer (s/p chemotherapy 5 years ago, previously in remission), COPD, HTN. Patient presents to [facility] for neurosurgery evaluation as a transfer from [facility] after initial presentation for unsteady gait for a few weeks, underwent CT brain remarkable for brain mass w/ midline shift, cyst vs necrotic mass per MD. S/p R craniotomy for tumor resection 12/9. Post-OP c/b LE venous duplex 12/10 showing b/l soleal DVT, vascular cardiology consulted.    Source: [x] Patient       [x] EMR        [x] RN        [] Family at bedside       [] Other:  - If unable to interview patient: [] Trach/Vent/BiPAP  [] Disoriented/confused/inappropriate to interview    Diet Order:  Diet, Minced and Moist:   1000mL Fluid Restriction ([id number]) (12-10-23 @ 10:26) [Active]    Is current order appropriate/adequate? [x] Yes  []  No:     PO intake:   [x] >75%  Adequate    [] 50-75%  Fair       [] <50%  Poor    Nutrition-related concerns:  - patient reports to be eating well since last RD assessment, tolerating current consistency diet w/ no issues, reports preferring softer foods 2/2 having dental work in the past  - prior hyponatremia 12/9, 12/10 - now WNL since evening of 12/10  - hypophosphatemic 12/10, s/p supplementation noted, request updated level with next labs  - currently ordered for decadron taper noted, no current insulin regimen in place, to continue to follow blood glucose trend    GI:  Last BM: 12/9  Bowel Regimen? [x] Yes: senna, dulcolax    Weights:   12/4: 68.4kg  - request updated weight of patient when appropriate    Nutritionally Pertinent MEDICATIONS  (STANDING):  dexAMETHasone     Tablet  dexAMETHasone     Tablet  famotidine    Tablet  senna  sodium chloride    Pertinent Labs: 12-10 @ 21:52: Na 135, BUN 40<H>, Cr 1.45<H>, <H>, K+ 3.6, Phos 2.2<L>, Mg 2.0, Alk Phos --, ALT/SGPT --, AST/SGOT --, HbA1c --    Skin per nursing documentation: R head surgical incision s/p R craniotomy per documentation, no pressure injuries per documentation  Edema: none per documentation    Estimated Needs:   [x] recalculated  Estimated Caloric Needs: 1847-2189kcal/day (27-32kcal/kg)  Estimated Protein Needs: 89-103g/pro/day (1.3-1.5g/pro/kg)  Estimated Fluid Needs: defer to team  based on most recent weight 68.4kg and w/ consideration for recent R cranoiotomy    Previous Nutrition Diagnosis: moderate acute on chronic malnutrition, inadequate protein energy intake  Nutrition Diagnosis is: [x] ongoing: being addressed w/ PO diet    New Nutrition Diagnosis: [x] Not applicable    Nutrition Care Plan:  [x] In Progress  [] Achieved  [] Not applicable    Nutrition Interventions:     Education Provided:       [x] Yes: current diet order, adequate protein/caloric intake    Recommendations:      1. continue current diet as tolerated of: minced and moist, 1000cc fluid restricted diet  2. recommend to d/c fluid restriction when deemed medically appropriate  3. recommend addition of Ensure Plus HP 1x/day to help provide extra calories and protein  4. monitor PO intake, weight trend, electrolytes, blood glucose levels, labs, BMs    Monitoring and Evaluation:   Continue to monitor nutritional intake, tolerance to diet prescription, weights, labs, skin integrity    RD remains available upon request and will follow up per protocol  Janes Martinez RD, CDN - contact on TEAMS. Nutrition Follow Up Note  Patient seen for: malnutrition follow up  Chart reviewed, events noted    Per chart, patient is a 75 y/o female with PMH including h/o lung cancer (s/p chemotherapy 5 years ago, previously in remission), COPD, HTN. Patient presents to [facility] for neurosurgery evaluation as a transfer from [facility] after initial presentation for unsteady gait for a few weeks, underwent CT brain remarkable for brain mass w/ midline shift, cyst vs necrotic mass per MD. S/p R craniotomy for tumor resection 12/9. Post-OP c/b LE venous duplex 12/10 showing b/l soleal DVT, vascular cardiology consulted.    Source: [x] Patient       [x] EMR        [x] RN        [] Family at bedside       [] Other:  - If unable to interview patient: [] Trach/Vent/BiPAP  [] Disoriented/confused/inappropriate to interview    Diet Order:  Diet, Minced and Moist:   1000mL Fluid Restriction ([id number]) (12-10-23 @ 10:26) [Active]    Is current order appropriate/adequate? [x] Yes  []  No:     PO intake:   [x] >75%  Adequate    [] 50-75%  Fair       [] <50%  Poor    Nutrition-related concerns:  - patient reports to be eating well since last RD assessment, tolerating current consistency diet w/ no issues, reports preferring softer foods 2/2 having dental work in the past  - prior hyponatremia 12/9, 12/10 - now WNL since evening of 12/10  - hypophosphatemic 12/10, s/p supplementation noted, request updated level with next labs  - currently ordered for decadron taper noted, no current insulin regimen in place, to continue to follow blood glucose trend    GI:  Last BM: 12/9  Bowel Regimen? [x] Yes: senna, dulcolax    Weights:   12/4: 68.4kg  - request updated weight of patient when appropriate    Nutritionally Pertinent MEDICATIONS  (STANDING):  dexAMETHasone     Tablet  dexAMETHasone     Tablet  famotidine    Tablet  senna  sodium chloride    Pertinent Labs: 12-10 @ 21:52: Na 135, BUN 40<H>, Cr 1.45<H>, <H>, K+ 3.6, Phos 2.2<L>, Mg 2.0, Alk Phos --, ALT/SGPT --, AST/SGOT --, HbA1c --    Skin per nursing documentation: R head surgical incision s/p R craniotomy per documentation, no pressure injuries per documentation  Edema: none per documentation    Estimated Needs:   [x] recalculated  Estimated Caloric Needs: 1847-2189kcal/day (27-32kcal/kg)  Estimated Protein Needs: 89-103g/pro/day (1.3-1.5g/pro/kg)  Estimated Fluid Needs: defer to team  based on most recent weight 68.4kg and w/ consideration for recent R cranoiotomy    Previous Nutrition Diagnosis: moderate acute on chronic malnutrition, inadequate protein energy intake  Nutrition Diagnosis is: [x] ongoing: being addressed w/ PO diet    New Nutrition Diagnosis: [x] Not applicable    Nutrition Care Plan:  [x] In Progress  [] Achieved  [] Not applicable    Nutrition Interventions:     Education Provided:       [x] Yes: current diet order, adequate protein/caloric intake    Recommendations:      1. continue current diet as tolerated of: minced and moist, 1000cc fluid restricted diet  2. recommend to d/c fluid restriction when deemed medically appropriate  3. recommend addition of Ensure Plus HP 1x/day to help provide extra calories and protein  4. monitor PO intake, weight trend, electrolytes, blood glucose levels, labs, BMs    Monitoring and Evaluation:   Continue to monitor nutritional intake, tolerance to diet prescription, weights, labs, skin integrity    RD remains available upon request and will follow up per protocol  Janes Martinez RD, CDN - contact on TEAMS.

## 2023-12-11 NOTE — OCCUPATIONAL THERAPY INITIAL EVALUATION ADULT - LIVES WITH, PROFILE
Lives on 2nd floor of a 2 family house with boyfriend, approx 8 steps to enter front door and 11 steps to main living area unable to recall handrail available.
Lives on 2nd floor of a 2 family house with boyfriend, approx 8 steps to enter front door and 11 steps to main living area unable to recall handrail available.

## 2023-12-11 NOTE — PROGRESS NOTE ADULT - PROBLEM SELECTOR PLAN 2
Pt with hyponatremia. Likely from patient receiving meds in D5W/increased fluid intake vs lab error. Serum sodium dropped to 131 (12/9), now improved to 135 today. Continue to hold HCTZ. Can d/c NaCl tabs. Please restrict free water intake. Continue to monitor serum SNa.     Final recommendations pending attending signature.  If you have any questions, please feel free to contact me  Angela Davis  Nephrology Fellow  [facility]/Page [phone]  (After 5pm or on weekends please page the on-call fellow) Pt with hyponatremia. Likely from patient receiving meds in D5W/increased fluid intake vs lab error. Serum sodium dropped to 131 (12/9), now improved to 135 today. Continue to hold HCTZ. Can d/c NaCl tabs. Please restrict free water intake. Continue to monitor serum SNa.     Final recommendations pending attending signature.  If you have any questions, please feel free to contact me  Angela Davis  Nephrology Fellow  [employer]/Page [phone]  (After 5pm or on weekends please page the on-call fellow)

## 2023-12-11 NOTE — CONSULT NOTE ADULT - SUBJECTIVE AND OBJECTIVE BOX
Patient is a 78y old  Female who presents with a chief complaint of unsteady gait / mass noted on CT head (11 Dec 2023 12:22)    Admission HPI:  74 year old female with history of lung cancer s/p chemotherapy 5 years ago previously in remission (oncologist Dr. Fredis Solomon at [facility]), COPD, HTN presented to [place] [facility] with unsteady gait for a few weeks. Patient's  found her on he floor prompting him to call 911. Went to [place] and CT brain was remarkable for brain mass with midline shift, cyst vs necrotic mass. Transferred to [facility] for neurosurgery evaluation. Neurosurgery was consulted and reccomended MRI brain, CTAP and CT chest to look for possible malignancies, Keppra 500 mg BID, and Decadron 8 mg once in the ED and then Decadron 4mg q 6 hours. Reports shortness of breath when going up staurs, No fevers, chest pain, palpitations, vomiting, abdominal pain, diarrhea, constipation, dysuria, hematuria, melena, hematochezia.  (04 Dec 2023 13:50)    Interval History:  Patient went to OR on 12/9 for R craniotomy and resection of parietal met.  Post-op w functional deficits.    REVIEW OF SYSTEMS: + HA, + weakness, + difficulty walking, No chest pain, shortness of breath, nausea, vomiting or diarhea; other ROS neg     PAST MEDICAL & SURGICAL HISTORY  Arthritis    HTN (hypertension)    Depression    Regurgitation    Bronchitis    Migraine    Kidney calculi    Spinal stenosis, unspecified spinal region    Osteoarthritis of multiple joints, unspecified osteoarthritis type    COPD (chronic obstructive pulmonary disease)    Asthma    Localized enlarged lymph nodes    Solitary pulmonary nodule    History of lung cancer    S/P appendectomy    S/P breast biopsy    S/P D&C    Female bladder prolapse    FUNCTIONAL HISTORY:   Lives w a friend in a home w 12 ELIESER  PTA needed some help w ADLs    CURRENT FUNCTIONAL STATUS:  Min A transfers and gait    FAMILY HISTORY   Family history of pancreatic cancer (Father)    Family history of COPD (chronic obstructive pulmonary disease) (Mother)    Family history of heart disease (Mother)    Family history of leukemia (Sibling)    Family history of hepatitis C (Sibling)    Family history of rheumatic fever (Sibling)    MEDICATIONS   acetaminophen     Tablet .. 650 milliGRAM(s) Oral every 6 hours PRN  bisacodyl 5 milliGRAM(s) Oral daily PRN  budesonide 160 MICROgram(s)/formoterol 4.5 MICROgram(s) Inhaler 2 Puff(s) Inhalation two times a day  chlorhexidine 4% Liquid 1 Application(s) Topical <User Schedule>  dexAMETHasone     Tablet   Oral   dexAMETHasone     Tablet 4 milliGRAM(s) Oral every 6 hours  escitalopram 10 milliGRAM(s) Oral daily  famotidine    Tablet 10 milliGRAM(s) Oral daily  heparin   Injectable 5000 Unit(s) SubCutaneous every 8 hours  influenza  Vaccine (HIGH DOSE) 0.7 milliLiter(s) IntraMuscular once  levETIRAcetam 250 milliGRAM(s) Oral every 12 hours  nicotine -  14 mG/24Hr(s) Patch 1 Patch Transdermal daily  ondansetron Injectable 4 milliGRAM(s) IV Push every 6 hours PRN  oxyCODONE    IR 5 milliGRAM(s) Oral every 4 hours PRN  senna 2 Tablet(s) Oral at bedtime  sodium chloride 2 Gram(s) Oral every 6 hours    ALLERGIES  penicillin (Short breath; Hives)    VITALS  T(C): 36.5 (12-11-23 @ 12:00), Max: 36.8 (12-10-23 @ 19:00)  HR: 70 (12-11-23 @ 12:23) (59 - 80)  BP: 150/72 (12-11-23 @ 12:23) (135/90 - 150/72)  RR: 18 (12-11-23 @ 12:00) (15 - 19)  SpO2: 97% (12-11-23 @ 12:23) (96% - 100%)  Wt(kg): --    PHYSICAL EXAM  Constitutional - NAD, Comfortable  HEENT - Incision intact, EOMI  Neck - Supple  Chest - No distress, no use of accessory muscles for respiration  Cardiovascular -Well perfused  Abdomen - BS+, Soft, NTND  Extremities - No C/C/E, No calf tenderness   Neurologic Exam -                 AAO x 3  Speech clear/fluent/intelligible  Motor RUE 4/5, LUE 4+/5, bl LEs 4+/5  No clonus    Psychiatric - Mood stable, Affect WNL    RECENT LABS/IMAGING  CBC Full  -  ( 10 Dec 2023 21:52 )  WBC Count : 17.05 K/uL  RBC Count : 3.77 M/uL  Hemoglobin : 11.4 g/dL  Hematocrit : 33.5 %  Platelet Count - Automated : 207 K/uL  Mean Cell Volume : 88.9 fl  Mean Cell Hemoglobin : 30.2 pg  Mean Cell Hemoglobin Concentration : 34.0 gm/dL  Auto Neutrophil # : 15.20 K/uL  Auto Lymphocyte # : 0.61 K/uL  Auto Monocyte # : 0.93 K/uL  Auto Eosinophil # : 0.00 K/uL  Auto Basophil # : 0.03 K/uL  Auto Neutrophil % : 89.1 %  Auto Lymphocyte % : 3.6 %  Auto Monocyte % : 5.5 %  Auto Eosinophil % : 0.0 %  Auto Basophil % : 0.2 %    12-10    135  |  101  |  40<H>  ----------------------------<  141<H>  3.6   |  20<L>  |  1.45<H>    Ca    8.1<L>      10 Dec 2023 21:52  Phos  2.2     12-10  Mg     2.0     12-10      Urinalysis Basic - ( 10 Dec 2023 21:52 )    Color: x / Appearance: x / SG: x / pH: x  Gluc: 141 mg/dL / Ketone: x  / Bili: x / Urobili: x   Blood: x / Protein: x / Nitrite: x   Leuk Esterase: x / RBC: x / WBC x   Sq Epi: x / Non Sq Epi: x / Bacteria: x    Impression:  75 yo with functional deficits secondary to diagnosis of brain mass    Plan:  PT- ROM, Bed Mob, Transfers, Amb w AD and bracing as needed  OT- ADLs, bracing  SLP- Dysphagia eval and treat  Prec- Falls, Cardiac  DVT Prophylaxis- On Heparin  Monitor wbc ct, renal function  Skin- Turn q2 h  Dispo- Acute Rehab- patient requires active and ongoing therapeutic interventions of multiple disciplines and can tolerate 3 hours of therapies x 4wks. Can actively participate and benefit from  an intensive rehabilitation program. Requires supervision by a rehabilitation physician and a coordinated interdisciplinary approach to providing rehabilitation.    Patient is a 78y old  Female who presents with a chief complaint of unsteady gait / mass noted on CT head (11 Dec 2023 12:22)    Admission HPI:  74 year old female with history of lung cancer s/p chemotherapy 5 years ago previously in remission (oncologist Dr. Fredis Solomon at [facility]), COPD, HTN presented to [facility] [facility] with unsteady gait for a few weeks. Patient's  found her on he floor prompting him to call 911. Went to [place] and CT brain was remarkable for brain mass with midline shift, cyst vs necrotic mass. Transferred to [facility] for neurosurgery evaluation. Neurosurgery was consulted and reccomended MRI brain, CTAP and CT chest to look for possible malignancies, Keppra 500 mg BID, and Decadron 8 mg once in the ED and then Decadron 4mg q 6 hours. Reports shortness of breath when going up staurs, No fevers, chest pain, palpitations, vomiting, abdominal pain, diarrhea, constipation, dysuria, hematuria, melena, hematochezia.  (04 Dec 2023 13:50)    Interval History:  Patient went to OR on 12/9 for R craniotomy and resection of parietal met.  Post-op w functional deficits.    REVIEW OF SYSTEMS: + HA, + weakness, + difficulty walking, No chest pain, shortness of breath, nausea, vomiting or diarhea; other ROS neg     PAST MEDICAL & SURGICAL HISTORY  Arthritis    HTN (hypertension)    Depression    Regurgitation    Bronchitis    Migraine    Kidney calculi    Spinal stenosis, unspecified spinal region    Osteoarthritis of multiple joints, unspecified osteoarthritis type    COPD (chronic obstructive pulmonary disease)    Asthma    Localized enlarged lymph nodes    Solitary pulmonary nodule    History of lung cancer    S/P appendectomy    S/P breast biopsy    S/P D&C    Female bladder prolapse    FUNCTIONAL HISTORY:   Lives w a friend in a home w 12 ELIESER  PTA needed some help w ADLs    CURRENT FUNCTIONAL STATUS:  Min A transfers and gait    FAMILY HISTORY   Family history of pancreatic cancer (Father)    Family history of COPD (chronic obstructive pulmonary disease) (Mother)    Family history of heart disease (Mother)    Family history of leukemia (Sibling)    Family history of hepatitis C (Sibling)    Family history of rheumatic fever (Sibling)    MEDICATIONS   acetaminophen     Tablet .. 650 milliGRAM(s) Oral every 6 hours PRN  bisacodyl 5 milliGRAM(s) Oral daily PRN  budesonide 160 MICROgram(s)/formoterol 4.5 MICROgram(s) Inhaler 2 Puff(s) Inhalation two times a day  chlorhexidine 4% Liquid 1 Application(s) Topical <User Schedule>  dexAMETHasone     Tablet   Oral   dexAMETHasone     Tablet 4 milliGRAM(s) Oral every 6 hours  escitalopram 10 milliGRAM(s) Oral daily  famotidine    Tablet 10 milliGRAM(s) Oral daily  heparin   Injectable 5000 Unit(s) SubCutaneous every 8 hours  influenza  Vaccine (HIGH DOSE) 0.7 milliLiter(s) IntraMuscular once  levETIRAcetam 250 milliGRAM(s) Oral every 12 hours  nicotine -  14 mG/24Hr(s) Patch 1 Patch Transdermal daily  ondansetron Injectable 4 milliGRAM(s) IV Push every 6 hours PRN  oxyCODONE    IR 5 milliGRAM(s) Oral every 4 hours PRN  senna 2 Tablet(s) Oral at bedtime  sodium chloride 2 Gram(s) Oral every 6 hours    ALLERGIES  penicillin (Short breath; Hives)    VITALS  T(C): 36.5 (12-11-23 @ 12:00), Max: 36.8 (12-10-23 @ 19:00)  HR: 70 (12-11-23 @ 12:23) (59 - 80)  BP: 150/72 (12-11-23 @ 12:23) (135/90 - 150/72)  RR: 18 (12-11-23 @ 12:00) (15 - 19)  SpO2: 97% (12-11-23 @ 12:23) (96% - 100%)  Wt(kg): --    PHYSICAL EXAM  Constitutional - NAD, Comfortable  HEENT - Incision intact, EOMI  Neck - Supple  Chest - No distress, no use of accessory muscles for respiration  Cardiovascular -Well perfused  Abdomen - BS+, Soft, NTND  Extremities - No C/C/E, No calf tenderness   Neurologic Exam -                 AAO x 3  Speech clear/fluent/intelligible  Motor RUE 4/5, LUE 4+/5, bl LEs 4+/5  No clonus    Psychiatric - Mood stable, Affect WNL    RECENT LABS/IMAGING  CBC Full  -  ( 10 Dec 2023 21:52 )  WBC Count : 17.05 K/uL  RBC Count : 3.77 M/uL  Hemoglobin : 11.4 g/dL  Hematocrit : 33.5 %  Platelet Count - Automated : 207 K/uL  Mean Cell Volume : 88.9 fl  Mean Cell Hemoglobin : 30.2 pg  Mean Cell Hemoglobin Concentration : 34.0 gm/dL  Auto Neutrophil # : 15.20 K/uL  Auto Lymphocyte # : 0.61 K/uL  Auto Monocyte # : 0.93 K/uL  Auto Eosinophil # : 0.00 K/uL  Auto Basophil # : 0.03 K/uL  Auto Neutrophil % : 89.1 %  Auto Lymphocyte % : 3.6 %  Auto Monocyte % : 5.5 %  Auto Eosinophil % : 0.0 %  Auto Basophil % : 0.2 %    12-10    135  |  101  |  40<H>  ----------------------------<  141<H>  3.6   |  20<L>  |  1.45<H>    Ca    8.1<L>      10 Dec 2023 21:52  Phos  2.2     12-10  Mg     2.0     12-10      Urinalysis Basic - ( 10 Dec 2023 21:52 )    Color: x / Appearance: x / SG: x / pH: x  Gluc: 141 mg/dL / Ketone: x  / Bili: x / Urobili: x   Blood: x / Protein: x / Nitrite: x   Leuk Esterase: x / RBC: x / WBC x   Sq Epi: x / Non Sq Epi: x / Bacteria: x    Impression:  75 yo with functional deficits secondary to diagnosis of brain mass    Plan:  PT- ROM, Bed Mob, Transfers, Amb w AD and bracing as needed  OT- ADLs, bracing  SLP- Dysphagia eval and treat  Prec- Falls, Cardiac  DVT Prophylaxis- On Heparin  Monitor wbc ct, renal function  Skin- Turn q2 h  Dispo- Acute Rehab- patient requires active and ongoing therapeutic interventions of multiple disciplines and can tolerate 3 hours of therapies x 4wks. Can actively participate and benefit from  an intensive rehabilitation program. Requires supervision by a rehabilitation physician and a coordinated interdisciplinary approach to providing rehabilitation.

## 2023-12-11 NOTE — PROGRESS NOTE ADULT - ASSESSMENT
74 year old female with history of lung cancer s/p chemotherapy 5 years ago previously in remission (oncologist Dr. Fredis Solomon at [facility]), COPD, HTN presents with unstable gait with CT head findings of y 2.3 x 1.5 x 1.7 cm cyst versus centrally necrotic mass in the right parietal-temporal region.    Brain mass  Recent dental surgery and infection - ?could this be infectious  she auto-discontinued her immunotherapy ~ 4 years ago due to how it made her feel and was following at [place] with Dr. Solomon (oncology) with q6 month PET scans - last one was base of skull to pelvis in Oct 2023 as per pt was clean    Stage 4 lung adenocarcinoma  R fissure nodule PET avid in 2019  she has adenocarcinoma, MS stable, no mutations, biopsy done here Oct 2019 by thoracic; was on Keytruda  was on immunotherapy  now with ground glass accumulation L lung, R fissure (right was there previously and is smaller)  - f/u outpatient for L GGO - repeat CT/PET within 4-6 weeks and compare to recent PET  - f/u brain mass pathology    COPD   Not on home O2  - continue home Breo Ellipta or Symbicort + albuterol PRN - not in exacerbation currently  - follow up outpatient with her pulmonologist as per pt preference - follows at [place]    Pulmonary to sign off at this time; please reconsult as needed    Josh Salinas MD  Pulmonary/Critical Care  [phone] [facility]  [id number] [facility] 74 year old female with history of lung cancer s/p chemotherapy 5 years ago previously in remission (oncologist Dr. Fredis Solomon at [facility]), COPD, HTN presents with unstable gait with CT head findings of y 2.3 x 1.5 x 1.7 cm cyst versus centrally necrotic mass in the right parietal-temporal region.    Brain mass  Recent dental surgery and infection - ?could this be infectious  she auto-discontinued her immunotherapy ~ 4 years ago due to how it made her feel and was following at [place] with Dr. Solomon (oncology) with q6 month PET scans - last one was base of skull to pelvis in Oct 2023 as per pt was clean    Stage 4 lung adenocarcinoma  R fissure nodule PET avid in 2019  she has adenocarcinoma, MS stable, no mutations, biopsy done here Oct 2019 by thoracic; was on Keytruda  was on immunotherapy  now with ground glass accumulation L lung, R fissure (right was there previously and is smaller)  - f/u outpatient for L GGO - repeat CT/PET within 4-6 weeks and compare to recent PET  - f/u brain mass pathology    COPD   Not on home O2  - continue home Breo Ellipta or Symbicort + albuterol PRN - not in exacerbation currently  - follow up outpatient with her pulmonologist as per pt preference - follows at [place]    Pulmonary to sign off at this time; please reconsult as needed    Josh Salinas MD  Pulmonary/Critical Care  [phone] [facility]  [id number] [facility]

## 2023-12-11 NOTE — PROGRESS NOTE ADULT - PROBLEM SELECTOR PLAN 1
Pt with TEREZA on CKD, now resolved. CKD likely in setting of distant excessive NSAID use. Per Elina CHENG, Scr of 1.7 in Nov, 2021 and 1.7 in Aug 2023. Now here with Scr of 1.7 on admission 12/3, peaked at 2.08 on 12/6 and now decreased to 1.45 today. Patient received IV contrasts on 12/4. No hypotension no nephrotoxic agents noted. Patient was given triamterene/HCTZ 12/3-12/6. UA completely bland. Renal US with b/l renal parenchymal disease and cysts. Monitor BMP and dose meds per [facility].

## 2023-12-11 NOTE — PROGRESS NOTE ADULT - SUBJECTIVE AND OBJECTIVE BOX
HOSPITAL COURSE:  77 Y/O F with h/o lung CA presents with headache and was found on floor.  Pt discovered to have singular right parietal mass, peripherally and heterogeneously enhancing with surrounding vasogenic edema.  Pt with significant edema with mass effect and some midline shift warranting surgical excision/biopsy to reduce edema prior to anticipated radiation treatment.       Admission Scores  GCS: 15    24 hour Events:  Patient POD0 from right parietal crani for mets resection.   12/10: No events overnight     Allergies    penicillin (Short breath; Hives)    Intolerances        REVIEW OF SYSTEMS: [ ] Unable to Assess due to neurologic exam   [x] All ROS addressed below are non-contributory, except:  Neuro: [ ] Headache [ ] Back pain [ ] Numbness [ ] Weakness [ ] Ataxia [ ] Dizziness [ ] Aphasia [ ] Dysarthria [ ] Visual disturbance  Resp: [ ] Shortness of breath/dyspnea, [ ] Orthopnea [ ] Cough  CV: [ ] Chest pain [ ] Palpitation [ ] Lightheadedness [ ] Syncope  Renal: [ ] Thirst [ ] Edema  GI: [ ] Nausea [ ] Emesis [ ] Abdominal pain [ ] Constipation [ ] Diarrhea  Hem: [ ] Hematemesis [ ] bright red blood per rectum  ID: [ ] Fever [ ] Chills [ ] Dysuria  ENT: [ ] Rhinorrhea      DEVICES:   [ ] Restraints [ ] ET tube [ ] central line [ ] arterial line [ ] orantes [ ] NGT/OGT [ ] EVD [ ] LD [ ] VANITA/HMV [ ] Trach [ ] PEG [ ] Chest Tube     VITALS:   Vital Signs Last 24 Hrs  T(C): 36.8 (09 Dec 2023 23:00), Max: 36.9 (09 Dec 2023 11:31)  T(F): 98.2 (09 Dec 2023 23:00), Max: 98.4 (09 Dec 2023 11:31)  HR: 61 (10 Dec 2023 00:00) (60 - 77)  BP: 131/81 (09 Dec 2023 11:31) (131/81 - 156/81)  BP(mean): --  RR: 18 (10 Dec 2023 00:00) (14 - 23)  SpO2: 96% (10 Dec 2023 00:00) (94% - 98%)    Parameters below as of 09 Dec 2023 19:15  Patient On (Oxygen Delivery Method): nasal cannula      CAPILLARY BLOOD GLUCOSE        I&O's Summary    08 Dec 2023 07:01  -  09 Dec 2023 07:00  --------------------------------------------------------  IN: 240 mL / OUT: 0 mL / NET: 240 mL    09 Dec 2023 07:01  -  10 Dec 2023 00:59  --------------------------------------------------------  IN: 720 mL / OUT: 425 mL / NET: 295 mL        Respiratory:        LABS:                        11.2   13.42 )-----------( 235      ( 09 Dec 2023 20:47 )             33.1     12-09    131<L>  |  98  |  46<H>  ----------------------------<  117<H>  4.0   |  21<L>  |  1.42<H>             MEDICATION LEVELS:     IVF FLUIDS/MEDICATIONS:   MEDICATIONS  (STANDING):  budesonide 160 MICROgram(s)/formoterol 4.5 MICROgram(s) Inhaler 2 Puff(s) Inhalation two times a day  ceFAZolin   IVPB 2000 milliGRAM(s) IV Intermittent every 8 hours  dexAMETHasone     Tablet 4 milliGRAM(s) Oral every 6 hours  dexAMETHasone     Tablet   Oral   escitalopram 10 milliGRAM(s) Oral daily  famotidine    Tablet 10 milliGRAM(s) Oral daily  influenza  Vaccine (HIGH DOSE) 0.7 milliLiter(s) IntraMuscular once  levETIRAcetam 250 milliGRAM(s) Oral every 12 hours  nicotine -  14 mG/24Hr(s) Patch 1 Patch Transdermal daily  senna 2 Tablet(s) Oral at bedtime  sodium chloride 0.9%. 1000 milliLiter(s) (75 mL/Hr) IV Continuous <Continuous>    MEDICATIONS  (PRN):  acetaminophen     Tablet .. 650 milliGRAM(s) Oral every 6 hours PRN Temp greater or equal to 38C (100.4F), Mild Pain (1 - 3)  bisacodyl 5 milliGRAM(s) Oral daily PRN Constipation  ondansetron Injectable 4 milliGRAM(s) IV Push every 6 hours PRN Nausea and/or Vomiting  oxyCODONE    IR 5 milliGRAM(s) Oral every 4 hours PRN Moderate Pain (4 - 6)        IMAGING:      EXAMINATION:  PHYSICAL EXAM:    Constitutional: No Acute Distress     Neurological: Awake, alert oriented to person, place and time, Following Commands, PERRL, EOMI, No Gaze Preference, left nasolabial fold flattening, Speech Fluent, No dysmetria, No ataxia, No nystagmus     Motor exam:          Upper extremity                         Delt     Bicep     Tricep    HG                                                 R         5/5        5/5        5/5       5/5                                               L          5/5        5/5        5/5       5/5          Lower extremity                        HF         KF        KE       DF         PF                                                  R        5/5        5/5        5/5       5/5         5/5                                               L         5/5        5/5       5/5       5/5          5/5                                                 Sensation: [X ] intact to light touch  [ ] decreased:     Pulmonary: Clear to Auscultation, No rales, No rhonchi, No wheezes     Cardiovascular: S1, S2, Regular rate and rhythm     Gastrointestinal: Soft, Non-tender, Non-distended     Extremities: No calf tenderness     Incision:     LABS:  Na: 133 (12-10 @ 05:39), 131 (12-09 @ 20:47), 135 (12-09 @ 05:39), 136 (12-08 @ 21:16), 131 (12-08 @ 14:14)  K: 3.6 (12-10 @ 05:39), 4.0 (12-09 @ 20:47), 3.7 (12-09 @ 05:39), 3.8 (12-08 @ 21:16), 3.9 (12-08 @ 14:14)  Cl: 99 (12-10 @ 05:39), 98 (12-09 @ 20:47), 102 (12-09 @ 05:39), 99 (12-08 @ 21:16), 101 (12-08 @ 14:14)  CO2: 21 (12-10 @ 05:39), 21 (12-09 @ 20:47), 23 (12-09 @ 05:39), 21 (12-08 @ 21:16), 20 (12-08 @ 14:14)  BUN: 41 (12-10 @ 05:39), 46 (12-09 @ 20:47), 52 (12-09 @ 05:39), 55 (12-08 @ 21:16), 54 (12-08 @ 14:14)  Cr: 1.32 (12-10 @ 05:39), 1.42 (12-09 @ 20:47), 1.59 (12-09 @ 05:39), 1.92 (12-08 @ 21:16), 1.53 (12-08 @ 14:14)  Glu: 143(12-10 @ 05:39), 117(12-09 @ 20:47), 109(12-09 @ 05:39), 134(12-08 @ 21:16), 250(12-08 @ 14:14)    Hgb: 11.2 (12-09 @ 20:47), 11.9 (12-09 @ 05:38), 13.2 (12-08 @ 14:14)  Hct: 33.1 (12-09 @ 20:47), 34.8 (12-09 @ 05:38), 38.8 (12-08 @ 14:14)  WBC: 13.42 (12-09 @ 20:47), 14.09 (12-09 @ 05:38), 15.74 (12-08 @ 14:14)  Plt: 235 (12-09 @ 20:47), 248 (12-09 @ 05:38), 279 (12-08 @ 14:14)    INR: 1.06 12-09-23 @ 05:39, 1.01 12-07-23 @ 13:44  PTT: 22.9 12-09-23 @ 05:39, 25.9 12-07-23 @ 13:44         HOSPITAL COURSE:  79 Y/O F with h/o lung CA presents with headache and was found on floor.  Pt discovered to have singular right parietal mass, peripherally and heterogeneously enhancing with surrounding vasogenic edema.  Pt with significant edema with mass effect and some midline shift warranting surgical excision/biopsy to reduce edema prior to anticipated radiation treatment.       Admission Scores  GCS: 15    24 hour Events:  Patient POD0 from right parietal crani for mets resection.   12/10: No events overnight     Allergies    penicillin (Short breath; Hives)    Intolerances        REVIEW OF SYSTEMS: [ ] Unable to Assess due to neurologic exam   [x] All ROS addressed below are non-contributory, except:  Neuro: [ ] Headache [ ] Back pain [ ] Numbness [ ] Weakness [ ] Ataxia [ ] Dizziness [ ] Aphasia [ ] Dysarthria [ ] Visual disturbance  Resp: [ ] Shortness of breath/dyspnea, [ ] Orthopnea [ ] Cough  CV: [ ] Chest pain [ ] Palpitation [ ] Lightheadedness [ ] Syncope  Renal: [ ] Thirst [ ] Edema  GI: [ ] Nausea [ ] Emesis [ ] Abdominal pain [ ] Constipation [ ] Diarrhea  Hem: [ ] Hematemesis [ ] bright red blood per rectum  ID: [ ] Fever [ ] Chills [ ] Dysuria  ENT: [ ] Rhinorrhea      DEVICES:   [ ] Restraints [ ] ET tube [ ] central line [ ] arterial line [ ] orantes [ ] NGT/OGT [ ] EVD [ ] LD [ ] VANITA/HMV [ ] Trach [ ] PEG [ ] Chest Tube     VITALS:   Vital Signs Last 24 Hrs  T(C): 36.8 (09 Dec 2023 23:00), Max: 36.9 (09 Dec 2023 11:31)  T(F): 98.2 (09 Dec 2023 23:00), Max: 98.4 (09 Dec 2023 11:31)  HR: 61 (10 Dec 2023 00:00) (60 - 77)  BP: 131/81 (09 Dec 2023 11:31) (131/81 - 156/81)  BP(mean): --  RR: 18 (10 Dec 2023 00:00) (14 - 23)  SpO2: 96% (10 Dec 2023 00:00) (94% - 98%)    Parameters below as of 09 Dec 2023 19:15  Patient On (Oxygen Delivery Method): nasal cannula      CAPILLARY BLOOD GLUCOSE        I&O's Summary    08 Dec 2023 07:01  -  09 Dec 2023 07:00  --------------------------------------------------------  IN: 240 mL / OUT: 0 mL / NET: 240 mL    09 Dec 2023 07:01  -  10 Dec 2023 00:59  --------------------------------------------------------  IN: 720 mL / OUT: 425 mL / NET: 295 mL        Respiratory:        LABS:                        11.2   13.42 )-----------( 235      ( 09 Dec 2023 20:47 )             33.1     12-09    131<L>  |  98  |  46<H>  ----------------------------<  117<H>  4.0   |  21<L>  |  1.42<H>             MEDICATION LEVELS:     IVF FLUIDS/MEDICATIONS:   MEDICATIONS  (STANDING):  budesonide 160 MICROgram(s)/formoterol 4.5 MICROgram(s) Inhaler 2 Puff(s) Inhalation two times a day  ceFAZolin   IVPB 2000 milliGRAM(s) IV Intermittent every 8 hours  dexAMETHasone     Tablet 4 milliGRAM(s) Oral every 6 hours  dexAMETHasone     Tablet   Oral   escitalopram 10 milliGRAM(s) Oral daily  famotidine    Tablet 10 milliGRAM(s) Oral daily  influenza  Vaccine (HIGH DOSE) 0.7 milliLiter(s) IntraMuscular once  levETIRAcetam 250 milliGRAM(s) Oral every 12 hours  nicotine -  14 mG/24Hr(s) Patch 1 Patch Transdermal daily  senna 2 Tablet(s) Oral at bedtime  sodium chloride 0.9%. 1000 milliLiter(s) (75 mL/Hr) IV Continuous <Continuous>    MEDICATIONS  (PRN):  acetaminophen     Tablet .. 650 milliGRAM(s) Oral every 6 hours PRN Temp greater or equal to 38C (100.4F), Mild Pain (1 - 3)  bisacodyl 5 milliGRAM(s) Oral daily PRN Constipation  ondansetron Injectable 4 milliGRAM(s) IV Push every 6 hours PRN Nausea and/or Vomiting  oxyCODONE    IR 5 milliGRAM(s) Oral every 4 hours PRN Moderate Pain (4 - 6)        IMAGING:      EXAMINATION:  PHYSICAL EXAM:    Constitutional: No Acute Distress     Neurological: Awake, alert oriented to person, place and time, Following Commands, PERRL, EOMI, No Gaze Preference, left nasolabial fold flattening, Speech Fluent, No dysmetria, No ataxia, No nystagmus     Motor exam:          Upper extremity                         Delt     Bicep     Tricep    HG                                                 R         5/5        5/5        5/5       5/5                                               L          5/5        5/5        5/5       5/5          Lower extremity                        HF         KF        KE       DF         PF                                                  R        5/5        5/5        5/5       5/5         5/5                                               L         5/5        5/5       5/5       5/5          5/5                                                 Sensation: [X ] intact to light touch  [ ] decreased:     Pulmonary: Clear to Auscultation, No rales, No rhonchi, No wheezes     Cardiovascular: S1, S2, Regular rate and rhythm     Gastrointestinal: Soft, Non-tender, Non-distended     Extremities: No calf tenderness     Incision:     LABS:  Na: 133 (12-10 @ 05:39), 131 (12-09 @ 20:47), 135 (12-09 @ 05:39), 136 (12-08 @ 21:16), 131 (12-08 @ 14:14)  K: 3.6 (12-10 @ 05:39), 4.0 (12-09 @ 20:47), 3.7 (12-09 @ 05:39), 3.8 (12-08 @ 21:16), 3.9 (12-08 @ 14:14)  Cl: 99 (12-10 @ 05:39), 98 (12-09 @ 20:47), 102 (12-09 @ 05:39), 99 (12-08 @ 21:16), 101 (12-08 @ 14:14)  CO2: 21 (12-10 @ 05:39), 21 (12-09 @ 20:47), 23 (12-09 @ 05:39), 21 (12-08 @ 21:16), 20 (12-08 @ 14:14)  BUN: 41 (12-10 @ 05:39), 46 (12-09 @ 20:47), 52 (12-09 @ 05:39), 55 (12-08 @ 21:16), 54 (12-08 @ 14:14)  Cr: 1.32 (12-10 @ 05:39), 1.42 (12-09 @ 20:47), 1.59 (12-09 @ 05:39), 1.92 (12-08 @ 21:16), 1.53 (12-08 @ 14:14)  Glu: 143(12-10 @ 05:39), 117(12-09 @ 20:47), 109(12-09 @ 05:39), 134(12-08 @ 21:16), 250(12-08 @ 14:14)    Hgb: 11.2 (12-09 @ 20:47), 11.9 (12-09 @ 05:38), 13.2 (12-08 @ 14:14)  Hct: 33.1 (12-09 @ 20:47), 34.8 (12-09 @ 05:38), 38.8 (12-08 @ 14:14)  WBC: 13.42 (12-09 @ 20:47), 14.09 (12-09 @ 05:38), 15.74 (12-08 @ 14:14)  Plt: 235 (12-09 @ 20:47), 248 (12-09 @ 05:38), 279 (12-08 @ 14:14)    INR: 1.06 12-09-23 @ 05:39, 1.01 12-07-23 @ 13:44  PTT: 22.9 12-09-23 @ 05:39, 25.9 12-07-23 @ 13:44         HOSPITAL COURSE:  79 Y/O F with h/o lung CA presents with headache and was found on floor.  Pt discovered to have singular right parietal mass, peripherally and heterogeneously enhancing with surrounding vasogenic edema.  Pt with significant edema with mass effect and some midline shift warranting surgical excision/biopsy to reduce edema prior to anticipated radiation treatment.       Admission Scores  GCS: 15    24 hour Events:  Patient POD0 from right parietal crani for mets resection.   12/10: No events overnight   12/11: No acute events overnight     Allergies    penicillin (Short breath; Hives)    Intolerances        REVIEW OF SYSTEMS: [ ] Unable to Assess due to neurologic exam   [x] All ROS addressed below are non-contributory, except:  Neuro: [ ] Headache [ ] Back pain [ ] Numbness [ ] Weakness [ ] Ataxia [ ] Dizziness [ ] Aphasia [ ] Dysarthria [ ] Visual disturbance  Resp: [ ] Shortness of breath/dyspnea, [ ] Orthopnea [ ] Cough  CV: [ ] Chest pain [ ] Palpitation [ ] Lightheadedness [ ] Syncope  Renal: [ ] Thirst [ ] Edema  GI: [ ] Nausea [ ] Emesis [ ] Abdominal pain [ ] Constipation [ ] Diarrhea  Hem: [ ] Hematemesis [ ] bright red blood per rectum  ID: [ ] Fever [ ] Chills [ ] Dysuria  ENT: [ ] Rhinorrhea      DEVICES:   [ ] Restraints [ ] ET tube [ ] central line [ ] arterial line [ ] orantes [ ] NGT/OGT [ ] EVD [ ] LD [ ] VANITA/HMV [ ] Trach [ ] PEG [ ] Chest Tube     VITALS:   Vital Signs Last 24 Hrs  T(C): 36.8 (09 Dec 2023 23:00), Max: 36.9 (09 Dec 2023 11:31)  T(F): 98.2 (09 Dec 2023 23:00), Max: 98.4 (09 Dec 2023 11:31)  HR: 61 (10 Dec 2023 00:00) (60 - 77)  BP: 131/81 (09 Dec 2023 11:31) (131/81 - 156/81)  BP(mean): --  RR: 18 (10 Dec 2023 00:00) (14 - 23)  SpO2: 96% (10 Dec 2023 00:00) (94% - 98%)    Parameters below as of 09 Dec 2023 19:15  Patient On (Oxygen Delivery Method): nasal cannula      CAPILLARY BLOOD GLUCOSE        I&O's Summary    08 Dec 2023 07:01  -  09 Dec 2023 07:00  --------------------------------------------------------  IN: 240 mL / OUT: 0 mL / NET: 240 mL    09 Dec 2023 07:01  -  10 Dec 2023 00:59  --------------------------------------------------------  IN: 720 mL / OUT: 425 mL / NET: 295 mL        Respiratory:        LABS:                        11.2   13.42 )-----------( 235      ( 09 Dec 2023 20:47 )             33.1     12-09    131<L>  |  98  |  46<H>  ----------------------------<  117<H>  4.0   |  21<L>  |  1.42<H>             MEDICATION LEVELS:     IVF FLUIDS/MEDICATIONS:   MEDICATIONS  (STANDING):  budesonide 160 MICROgram(s)/formoterol 4.5 MICROgram(s) Inhaler 2 Puff(s) Inhalation two times a day  ceFAZolin   IVPB 2000 milliGRAM(s) IV Intermittent every 8 hours  dexAMETHasone     Tablet 4 milliGRAM(s) Oral every 6 hours  dexAMETHasone     Tablet   Oral   escitalopram 10 milliGRAM(s) Oral daily  famotidine    Tablet 10 milliGRAM(s) Oral daily  influenza  Vaccine (HIGH DOSE) 0.7 milliLiter(s) IntraMuscular once  levETIRAcetam 250 milliGRAM(s) Oral every 12 hours  nicotine -  14 mG/24Hr(s) Patch 1 Patch Transdermal daily  senna 2 Tablet(s) Oral at bedtime  sodium chloride 0.9%. 1000 milliLiter(s) (75 mL/Hr) IV Continuous <Continuous>    MEDICATIONS  (PRN):  acetaminophen     Tablet .. 650 milliGRAM(s) Oral every 6 hours PRN Temp greater or equal to 38C (100.4F), Mild Pain (1 - 3)  bisacodyl 5 milliGRAM(s) Oral daily PRN Constipation  ondansetron Injectable 4 milliGRAM(s) IV Push every 6 hours PRN Nausea and/or Vomiting  oxyCODONE    IR 5 milliGRAM(s) Oral every 4 hours PRN Moderate Pain (4 - 6)        IMAGING:      EXAMINATION:  PHYSICAL EXAM:    Constitutional: No Acute Distress     Neurological: Awake, alert oriented to person, place and time, Following Commands, PERRL, EOMI, No Gaze Preference, left nasolabial fold flattening, Speech Fluent, No dysmetria, No ataxia, No nystagmus     Motor exam:          Upper extremity                         Delt     Bicep     Tricep    HG                                                 R         5/5        5/5        5/5       5/5                                               L          5/5        5/5        5/5       5/5          Lower extremity                        HF         KF        KE       DF         PF                                                  R        5/5        5/5        5/5       5/5         5/5                                               L         5/5        5/5       5/5       5/5          5/5                                                 Sensation: [X ] intact to light touch  [ ] decreased:     Pulmonary: Clear to Auscultation, No rales, No rhonchi, No wheezes     Cardiovascular: S1, S2, Regular rate and rhythm     Gastrointestinal: Soft, Non-tender, Non-distended     Extremities: No calf tenderness     Incision:     LABS:  Na: 133 (12-10 @ 05:39), 131 (12-09 @ 20:47), 135 (12-09 @ 05:39), 136 (12-08 @ 21:16), 131 (12-08 @ 14:14)  K: 3.6 (12-10 @ 05:39), 4.0 (12-09 @ 20:47), 3.7 (12-09 @ 05:39), 3.8 (12-08 @ 21:16), 3.9 (12-08 @ 14:14)  Cl: 99 (12-10 @ 05:39), 98 (12-09 @ 20:47), 102 (12-09 @ 05:39), 99 (12-08 @ 21:16), 101 (12-08 @ 14:14)  CO2: 21 (12-10 @ 05:39), 21 (12-09 @ 20:47), 23 (12-09 @ 05:39), 21 (12-08 @ 21:16), 20 (12-08 @ 14:14)  BUN: 41 (12-10 @ 05:39), 46 (12-09 @ 20:47), 52 (12-09 @ 05:39), 55 (12-08 @ 21:16), 54 (12-08 @ 14:14)  Cr: 1.32 (12-10 @ 05:39), 1.42 (12-09 @ 20:47), 1.59 (12-09 @ 05:39), 1.92 (12-08 @ 21:16), 1.53 (12-08 @ 14:14)  Glu: 143(12-10 @ 05:39), 117(12-09 @ 20:47), 109(12-09 @ 05:39), 134(12-08 @ 21:16), 250(12-08 @ 14:14)    Hgb: 11.2 (12-09 @ 20:47), 11.9 (12-09 @ 05:38), 13.2 (12-08 @ 14:14)  Hct: 33.1 (12-09 @ 20:47), 34.8 (12-09 @ 05:38), 38.8 (12-08 @ 14:14)  WBC: 13.42 (12-09 @ 20:47), 14.09 (12-09 @ 05:38), 15.74 (12-08 @ 14:14)  Plt: 235 (12-09 @ 20:47), 248 (12-09 @ 05:38), 279 (12-08 @ 14:14)    INR: 1.06 12-09-23 @ 05:39, 1.01 12-07-23 @ 13:44  PTT: 22.9 12-09-23 @ 05:39, 25.9 12-07-23 @ 13:44         HOSPITAL COURSE:  77 Y/O F with h/o lung CA presents with headache and was found on floor.  Pt discovered to have singular right parietal mass, peripherally and heterogeneously enhancing with surrounding vasogenic edema.  Pt with significant edema with mass effect and some midline shift warranting surgical excision/biopsy to reduce edema prior to anticipated radiation treatment.       Admission Scores  GCS: 15    24 hour Events:  Patient POD0 from right parietal crani for mets resection.   12/10: No events overnight   12/11: No acute events overnight     Allergies    penicillin (Short breath; Hives)    Intolerances        REVIEW OF SYSTEMS: [ ] Unable to Assess due to neurologic exam   [x] All ROS addressed below are non-contributory, except:  Neuro: [ ] Headache [ ] Back pain [ ] Numbness [ ] Weakness [ ] Ataxia [ ] Dizziness [ ] Aphasia [ ] Dysarthria [ ] Visual disturbance  Resp: [ ] Shortness of breath/dyspnea, [ ] Orthopnea [ ] Cough  CV: [ ] Chest pain [ ] Palpitation [ ] Lightheadedness [ ] Syncope  Renal: [ ] Thirst [ ] Edema  GI: [ ] Nausea [ ] Emesis [ ] Abdominal pain [ ] Constipation [ ] Diarrhea  Hem: [ ] Hematemesis [ ] bright red blood per rectum  ID: [ ] Fever [ ] Chills [ ] Dysuria  ENT: [ ] Rhinorrhea      DEVICES:   [ ] Restraints [ ] ET tube [ ] central line [ ] arterial line [ ] orantes [ ] NGT/OGT [ ] EVD [ ] LD [ ] VANITA/HMV [ ] Trach [ ] PEG [ ] Chest Tube     VITALS:   Vital Signs Last 24 Hrs  T(C): 36.8 (09 Dec 2023 23:00), Max: 36.9 (09 Dec 2023 11:31)  T(F): 98.2 (09 Dec 2023 23:00), Max: 98.4 (09 Dec 2023 11:31)  HR: 61 (10 Dec 2023 00:00) (60 - 77)  BP: 131/81 (09 Dec 2023 11:31) (131/81 - 156/81)  BP(mean): --  RR: 18 (10 Dec 2023 00:00) (14 - 23)  SpO2: 96% (10 Dec 2023 00:00) (94% - 98%)    Parameters below as of 09 Dec 2023 19:15  Patient On (Oxygen Delivery Method): nasal cannula      CAPILLARY BLOOD GLUCOSE        I&O's Summary    08 Dec 2023 07:01  -  09 Dec 2023 07:00  --------------------------------------------------------  IN: 240 mL / OUT: 0 mL / NET: 240 mL    09 Dec 2023 07:01  -  10 Dec 2023 00:59  --------------------------------------------------------  IN: 720 mL / OUT: 425 mL / NET: 295 mL        Respiratory:        LABS:                        11.2   13.42 )-----------( 235      ( 09 Dec 2023 20:47 )             33.1     12-09    131<L>  |  98  |  46<H>  ----------------------------<  117<H>  4.0   |  21<L>  |  1.42<H>             MEDICATION LEVELS:     IVF FLUIDS/MEDICATIONS:   MEDICATIONS  (STANDING):  budesonide 160 MICROgram(s)/formoterol 4.5 MICROgram(s) Inhaler 2 Puff(s) Inhalation two times a day  ceFAZolin   IVPB 2000 milliGRAM(s) IV Intermittent every 8 hours  dexAMETHasone     Tablet 4 milliGRAM(s) Oral every 6 hours  dexAMETHasone     Tablet   Oral   escitalopram 10 milliGRAM(s) Oral daily  famotidine    Tablet 10 milliGRAM(s) Oral daily  influenza  Vaccine (HIGH DOSE) 0.7 milliLiter(s) IntraMuscular once  levETIRAcetam 250 milliGRAM(s) Oral every 12 hours  nicotine -  14 mG/24Hr(s) Patch 1 Patch Transdermal daily  senna 2 Tablet(s) Oral at bedtime  sodium chloride 0.9%. 1000 milliLiter(s) (75 mL/Hr) IV Continuous <Continuous>    MEDICATIONS  (PRN):  acetaminophen     Tablet .. 650 milliGRAM(s) Oral every 6 hours PRN Temp greater or equal to 38C (100.4F), Mild Pain (1 - 3)  bisacodyl 5 milliGRAM(s) Oral daily PRN Constipation  ondansetron Injectable 4 milliGRAM(s) IV Push every 6 hours PRN Nausea and/or Vomiting  oxyCODONE    IR 5 milliGRAM(s) Oral every 4 hours PRN Moderate Pain (4 - 6)        IMAGING:      EXAMINATION:  PHYSICAL EXAM:    Constitutional: No Acute Distress     Neurological: Awake, alert oriented to person, place and time, Following Commands, PERRL, EOMI, No Gaze Preference, left nasolabial fold flattening, Speech Fluent, No dysmetria, No ataxia, No nystagmus     Motor exam:          Upper extremity                         Delt     Bicep     Tricep    HG                                                 R         5/5        5/5        5/5       5/5                                               L          5/5        5/5        5/5       5/5          Lower extremity                        HF         KF        KE       DF         PF                                                  R        5/5        5/5        5/5       5/5         5/5                                               L         5/5        5/5       5/5       5/5          5/5                                                 Sensation: [X ] intact to light touch  [ ] decreased:     Pulmonary: Clear to Auscultation, No rales, No rhonchi, No wheezes     Cardiovascular: S1, S2, Regular rate and rhythm     Gastrointestinal: Soft, Non-tender, Non-distended     Extremities: No calf tenderness     Incision:     LABS:  Na: 133 (12-10 @ 05:39), 131 (12-09 @ 20:47), 135 (12-09 @ 05:39), 136 (12-08 @ 21:16), 131 (12-08 @ 14:14)  K: 3.6 (12-10 @ 05:39), 4.0 (12-09 @ 20:47), 3.7 (12-09 @ 05:39), 3.8 (12-08 @ 21:16), 3.9 (12-08 @ 14:14)  Cl: 99 (12-10 @ 05:39), 98 (12-09 @ 20:47), 102 (12-09 @ 05:39), 99 (12-08 @ 21:16), 101 (12-08 @ 14:14)  CO2: 21 (12-10 @ 05:39), 21 (12-09 @ 20:47), 23 (12-09 @ 05:39), 21 (12-08 @ 21:16), 20 (12-08 @ 14:14)  BUN: 41 (12-10 @ 05:39), 46 (12-09 @ 20:47), 52 (12-09 @ 05:39), 55 (12-08 @ 21:16), 54 (12-08 @ 14:14)  Cr: 1.32 (12-10 @ 05:39), 1.42 (12-09 @ 20:47), 1.59 (12-09 @ 05:39), 1.92 (12-08 @ 21:16), 1.53 (12-08 @ 14:14)  Glu: 143(12-10 @ 05:39), 117(12-09 @ 20:47), 109(12-09 @ 05:39), 134(12-08 @ 21:16), 250(12-08 @ 14:14)    Hgb: 11.2 (12-09 @ 20:47), 11.9 (12-09 @ 05:38), 13.2 (12-08 @ 14:14)  Hct: 33.1 (12-09 @ 20:47), 34.8 (12-09 @ 05:38), 38.8 (12-08 @ 14:14)  WBC: 13.42 (12-09 @ 20:47), 14.09 (12-09 @ 05:38), 15.74 (12-08 @ 14:14)  Plt: 235 (12-09 @ 20:47), 248 (12-09 @ 05:38), 279 (12-08 @ 14:14)    INR: 1.06 12-09-23 @ 05:39, 1.01 12-07-23 @ 13:44  PTT: 22.9 12-09-23 @ 05:39, 25.9 12-07-23 @ 13:44

## 2023-12-11 NOTE — PROGRESS NOTE ADULT - ATTENDING COMMENTS
ulysses jimenez  nephrology attending   please contact me on TEAMS   Office- [phone] ulysses jimenez  nephrology attending   please contact me on TEAMS   Office- [phone]

## 2023-12-11 NOTE — OCCUPATIONAL THERAPY INITIAL EVALUATION ADULT - BALANCE TRAINING, PT EVAL
GOAL: Patient will increase static/dynamic sitting/standing balance by 1/2 grade to facilitate increased ability to perform ADLs and functional mobility
GOAL: Patient will increase static/dynamic sitting/standing balance by 1/2 grade to facilitate increased ability to perform ADLs and functional mobility

## 2023-12-12 ENCOUNTER — TRANSCRIPTION ENCOUNTER (OUTPATIENT)
Age: 78
End: 2023-12-12

## 2023-12-12 DIAGNOSIS — I82.409 ACUTE EMBOLISM AND THROMBOSIS OF UNSPECIFIED DEEP VEINS OF UNSPECIFIED LOWER EXTREMITY: ICD-10-CM

## 2023-12-12 LAB
ANION GAP SERPL CALC-SCNC: 12 MMOL/L — SIGNIFICANT CHANGE UP (ref 5–17)
ANION GAP SERPL CALC-SCNC: 12 MMOL/L — SIGNIFICANT CHANGE UP (ref 5–17)
BUN SERPL-MCNC: 39 MG/DL — HIGH (ref 7–23)
BUN SERPL-MCNC: 39 MG/DL — HIGH (ref 7–23)
CALCIUM SERPL-MCNC: 7.9 MG/DL — LOW (ref 8.4–10.5)
CALCIUM SERPL-MCNC: 7.9 MG/DL — LOW (ref 8.4–10.5)
CHLORIDE SERPL-SCNC: 101 MMOL/L — SIGNIFICANT CHANGE UP (ref 96–108)
CHLORIDE SERPL-SCNC: 101 MMOL/L — SIGNIFICANT CHANGE UP (ref 96–108)
CO2 SERPL-SCNC: 23 MMOL/L — SIGNIFICANT CHANGE UP (ref 22–31)
CO2 SERPL-SCNC: 23 MMOL/L — SIGNIFICANT CHANGE UP (ref 22–31)
CREAT SERPL-MCNC: 1.2 MG/DL — SIGNIFICANT CHANGE UP (ref 0.5–1.3)
CREAT SERPL-MCNC: 1.2 MG/DL — SIGNIFICANT CHANGE UP (ref 0.5–1.3)
EGFR: 47 ML/MIN/1.73M2 — LOW
EGFR: 47 ML/MIN/1.73M2 — LOW
GLUCOSE SERPL-MCNC: 120 MG/DL — HIGH (ref 70–99)
GLUCOSE SERPL-MCNC: 120 MG/DL — HIGH (ref 70–99)
HCT VFR BLD CALC: 33.6 % — LOW (ref 34.5–45)
HCT VFR BLD CALC: 33.6 % — LOW (ref 34.5–45)
HGB BLD-MCNC: 11.4 G/DL — LOW (ref 11.5–15.5)
HGB BLD-MCNC: 11.4 G/DL — LOW (ref 11.5–15.5)
MAGNESIUM SERPL-MCNC: 1.7 MG/DL — SIGNIFICANT CHANGE UP (ref 1.6–2.6)
MAGNESIUM SERPL-MCNC: 1.7 MG/DL — SIGNIFICANT CHANGE UP (ref 1.6–2.6)
MCHC RBC-ENTMCNC: 29.7 PG — SIGNIFICANT CHANGE UP (ref 27–34)
MCHC RBC-ENTMCNC: 29.7 PG — SIGNIFICANT CHANGE UP (ref 27–34)
MCHC RBC-ENTMCNC: 33.9 GM/DL — SIGNIFICANT CHANGE UP (ref 32–36)
MCHC RBC-ENTMCNC: 33.9 GM/DL — SIGNIFICANT CHANGE UP (ref 32–36)
MCV RBC AUTO: 87.5 FL — SIGNIFICANT CHANGE UP (ref 80–100)
MCV RBC AUTO: 87.5 FL — SIGNIFICANT CHANGE UP (ref 80–100)
NRBC # BLD: 0 /100 WBCS — SIGNIFICANT CHANGE UP (ref 0–0)
NRBC # BLD: 0 /100 WBCS — SIGNIFICANT CHANGE UP (ref 0–0)
PHOSPHATE SERPL-MCNC: 2.7 MG/DL — SIGNIFICANT CHANGE UP (ref 2.5–4.5)
PHOSPHATE SERPL-MCNC: 2.7 MG/DL — SIGNIFICANT CHANGE UP (ref 2.5–4.5)
PLATELET # BLD AUTO: 208 K/UL — SIGNIFICANT CHANGE UP (ref 150–400)
PLATELET # BLD AUTO: 208 K/UL — SIGNIFICANT CHANGE UP (ref 150–400)
POTASSIUM SERPL-MCNC: 3.9 MMOL/L — SIGNIFICANT CHANGE UP (ref 3.5–5.3)
POTASSIUM SERPL-MCNC: 3.9 MMOL/L — SIGNIFICANT CHANGE UP (ref 3.5–5.3)
POTASSIUM SERPL-SCNC: 3.9 MMOL/L — SIGNIFICANT CHANGE UP (ref 3.5–5.3)
POTASSIUM SERPL-SCNC: 3.9 MMOL/L — SIGNIFICANT CHANGE UP (ref 3.5–5.3)
RBC # BLD: 3.84 M/UL — SIGNIFICANT CHANGE UP (ref 3.8–5.2)
RBC # BLD: 3.84 M/UL — SIGNIFICANT CHANGE UP (ref 3.8–5.2)
RBC # FLD: 13.2 % — SIGNIFICANT CHANGE UP (ref 10.3–14.5)
RBC # FLD: 13.2 % — SIGNIFICANT CHANGE UP (ref 10.3–14.5)
SODIUM SERPL-SCNC: 136 MMOL/L — SIGNIFICANT CHANGE UP (ref 135–145)
SODIUM SERPL-SCNC: 136 MMOL/L — SIGNIFICANT CHANGE UP (ref 135–145)
WBC # BLD: 14.94 K/UL — HIGH (ref 3.8–10.5)
WBC # BLD: 14.94 K/UL — HIGH (ref 3.8–10.5)
WBC # FLD AUTO: 14.94 K/UL — HIGH (ref 3.8–10.5)
WBC # FLD AUTO: 14.94 K/UL — HIGH (ref 3.8–10.5)

## 2023-12-12 PROCEDURE — 99232 SBSQ HOSP IP/OBS MODERATE 35: CPT

## 2023-12-12 PROCEDURE — 99233 SBSQ HOSP IP/OBS HIGH 50: CPT

## 2023-12-12 RX ORDER — POLYETHYLENE GLYCOL 3350 17 G/17G
17 POWDER, FOR SOLUTION ORAL
Refills: 0 | Status: DISCONTINUED | OUTPATIENT
Start: 2023-12-12 | End: 2023-12-15

## 2023-12-12 RX ORDER — MAGNESIUM SULFATE 500 MG/ML
2 VIAL (ML) INJECTION ONCE
Refills: 0 | Status: COMPLETED | OUTPATIENT
Start: 2023-12-12 | End: 2023-12-12

## 2023-12-12 RX ORDER — CALCIUM GLUCONATE 100 MG/ML
2 VIAL (ML) INTRAVENOUS ONCE
Refills: 0 | Status: COMPLETED | OUTPATIENT
Start: 2023-12-12 | End: 2023-12-12

## 2023-12-12 RX ORDER — SODIUM CHLORIDE 9 MG/ML
500 INJECTION INTRAMUSCULAR; INTRAVENOUS; SUBCUTANEOUS ONCE
Refills: 0 | Status: COMPLETED | OUTPATIENT
Start: 2023-12-12 | End: 2023-12-12

## 2023-12-12 RX ORDER — ALBUTEROL 90 UG/1
2 AEROSOL, METERED ORAL EVERY 6 HOURS
Refills: 0 | Status: DISCONTINUED | OUTPATIENT
Start: 2023-12-12 | End: 2023-12-15

## 2023-12-12 RX ADMIN — LEVETIRACETAM 250 MILLIGRAM(S): 250 TABLET, FILM COATED ORAL at 05:24

## 2023-12-12 RX ADMIN — Medication 1 PATCH: at 19:00

## 2023-12-12 RX ADMIN — SODIUM CHLORIDE 2 GRAM(S): 9 INJECTION INTRAMUSCULAR; INTRAVENOUS; SUBCUTANEOUS at 06:07

## 2023-12-12 RX ADMIN — POLYETHYLENE GLYCOL 3350 17 GRAM(S): 17 POWDER, FOR SOLUTION ORAL at 17:40

## 2023-12-12 RX ADMIN — HEPARIN SODIUM 5000 UNIT(S): 5000 INJECTION INTRAVENOUS; SUBCUTANEOUS at 13:36

## 2023-12-12 RX ADMIN — HEPARIN SODIUM 5000 UNIT(S): 5000 INJECTION INTRAVENOUS; SUBCUTANEOUS at 05:25

## 2023-12-12 RX ADMIN — OXYCODONE HYDROCHLORIDE 5 MILLIGRAM(S): 5 TABLET ORAL at 03:14

## 2023-12-12 RX ADMIN — Medication 650 MILLIGRAM(S): at 07:51

## 2023-12-12 RX ADMIN — Medication 25 GRAM(S): at 11:52

## 2023-12-12 RX ADMIN — LEVETIRACETAM 250 MILLIGRAM(S): 250 TABLET, FILM COATED ORAL at 17:39

## 2023-12-12 RX ADMIN — Medication 650 MILLIGRAM(S): at 00:13

## 2023-12-12 RX ADMIN — ESCITALOPRAM OXALATE 10 MILLIGRAM(S): 10 TABLET, FILM COATED ORAL at 11:52

## 2023-12-12 RX ADMIN — SENNA PLUS 2 TABLET(S): 8.6 TABLET ORAL at 21:13

## 2023-12-12 RX ADMIN — Medication 1 PATCH: at 12:17

## 2023-12-12 RX ADMIN — Medication 200 GRAM(S): at 13:30

## 2023-12-12 RX ADMIN — SODIUM CHLORIDE 500 MILLILITER(S): 9 INJECTION INTRAMUSCULAR; INTRAVENOUS; SUBCUTANEOUS at 17:49

## 2023-12-12 RX ADMIN — Medication 5 MILLIGRAM(S): at 21:13

## 2023-12-12 RX ADMIN — Medication 4 MILLIGRAM(S): at 05:25

## 2023-12-12 RX ADMIN — HEPARIN SODIUM 5000 UNIT(S): 5000 INJECTION INTRAVENOUS; SUBCUTANEOUS at 21:14

## 2023-12-12 RX ADMIN — Medication 650 MILLIGRAM(S): at 21:43

## 2023-12-12 RX ADMIN — OXYCODONE HYDROCHLORIDE 5 MILLIGRAM(S): 5 TABLET ORAL at 14:00

## 2023-12-12 RX ADMIN — Medication 650 MILLIGRAM(S): at 08:15

## 2023-12-12 RX ADMIN — OXYCODONE HYDROCHLORIDE 5 MILLIGRAM(S): 5 TABLET ORAL at 02:44

## 2023-12-12 RX ADMIN — SODIUM CHLORIDE 2 GRAM(S): 9 INJECTION INTRAMUSCULAR; INTRAVENOUS; SUBCUTANEOUS at 00:14

## 2023-12-12 RX ADMIN — Medication 1 PATCH: at 11:53

## 2023-12-12 RX ADMIN — BUDESONIDE AND FORMOTEROL FUMARATE DIHYDRATE 2 PUFF(S): 160; 4.5 AEROSOL RESPIRATORY (INHALATION) at 05:25

## 2023-12-12 RX ADMIN — FAMOTIDINE 10 MILLIGRAM(S): 10 INJECTION INTRAVENOUS at 11:52

## 2023-12-12 RX ADMIN — Medication 650 MILLIGRAM(S): at 21:13

## 2023-12-12 RX ADMIN — OXYCODONE HYDROCHLORIDE 5 MILLIGRAM(S): 5 TABLET ORAL at 13:36

## 2023-12-12 RX ADMIN — BUDESONIDE AND FORMOTEROL FUMARATE DIHYDRATE 2 PUFF(S): 160; 4.5 AEROSOL RESPIRATORY (INHALATION) at 17:39

## 2023-12-12 RX ADMIN — Medication 4 MILLIGRAM(S): at 23:15

## 2023-12-12 RX ADMIN — Medication 1 PATCH: at 07:58

## 2023-12-12 RX ADMIN — Medication 4 MILLIGRAM(S): at 17:39

## 2023-12-12 RX ADMIN — Medication 4 MILLIGRAM(S): at 11:52

## 2023-12-12 NOTE — PROGRESS NOTE ADULT - PROBLEM SELECTOR PLAN 6
baseline Cr 1.7 in 2019  - renal US demonstrating parenchymal disease  - Cr improving now better than reported previous baseline  - continue to hold triamterene-HCTZ  - Nephrology consult recs appreciated  - renally dose meds to GFR, avoid nephrotoxic agents

## 2023-12-12 NOTE — PROGRESS NOTE ADULT - NUTRITIONAL ASSESSMENT
This patient has been assessed with a concern for Malnutrition and has been determined to have a diagnosis/diagnoses of Moderate protein-calorie malnutrition.    This patient is being managed with:   Diet Minced and Moist-  1000mL Fluid Restriction ([id number])  Supplement Feeding Modality:  Oral  Ensure Enlive Cans or Servings Per Day:  1       Frequency:  Daily  Entered: Dec 11 2023  2:59PM   This patient has been assessed with a concern for Malnutrition and has been determined to have a diagnosis/diagnoses of Moderate protein-calorie malnutrition.    This patient is being managed with:   Diet Minced and Moist-  1000mL Fluid Restriction ([id number])  Supplement Feeding Modality:  Oral  Ensure Enlive Cans or Servings Per Day:  1       Frequency:  Daily  Entered: Dec 11 2023  2:59PM

## 2023-12-12 NOTE — PROGRESS NOTE ADULT - PROBLEM SELECTOR PLAN 3
- duplex of LE on 12/10 with b/l acute soleal DVT  - Vascular Cardiology consulted, recs appreciated  - c/w with hep subc for ppx  - serial duplex as per Vasc cards

## 2023-12-12 NOTE — DISCHARGE NOTE PROVIDER - CARE PROVIDER_API CALL
Karri Barcenas  Neurosurgery  [address] [facility], Floor 1  [place] [address]  Phone: [phone]  Fax: [phone]  Follow Up Time:     Jarad Aparicio  Vascular Medicine  [address], [address] [place] [address]  Phone: [phone]  Fax: [phone]  Follow Up Time:    Karri Barcenas  Neurosurgery  [address] [facility], Floor 1  [place] [address]  Phone: [phone]  Fax: [phone]  Follow Up Time:     Jarad Aparicio  Vascular Medicine  [address], [address] [place] [address]  Phone: [phone]  Fax: [phone]  Follow Up Time:

## 2023-12-12 NOTE — PHYSICAL THERAPY INITIAL EVALUATION ADULT - GAIT TRAINING, PT EVAL
GOAL: Patient will ambulate 200 feet with supervision with least restrictive AD in 2 weeks
GOAL: Patient will ambulate 300 feet independently with least restrictive assistive device in 2 weeks.

## 2023-12-12 NOTE — PROGRESS NOTE ADULT - ASSESSMENT
74 year old female with history of lung cancer s/p chemotherapy 5 years ago previously in remission (oncologist Dr. Fredis Solomon at [facility]), COPD, HTN presents with unstable gait with CT head findings of y 2.3 x 1.5 x 1.7 cm cyst versus centrally necrotic mass in the right parietal-temporal region. CT chest showing 18 mm ground glass opacity concerning for malignancy. Now s/p right parietal crani for 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 on 12/9/23. NSCU course c/b acute b/l soleal DVTs found on duplex. Transferred out of NSCU on 12/11/23.

## 2023-12-12 NOTE — PHYSICAL THERAPY INITIAL EVALUATION ADULT - LEVEL OF INDEPENDENCE: GAIT, REHAB EVAL
minimum assist (75% patients effort)/moderate assist (50% patients effort)
minimum assist (75% patients effort)/moderate assist (50% patients effort)

## 2023-12-12 NOTE — PHYSICAL THERAPY INITIAL EVALUATION ADULT - TRANSFER TRAINING, PT EVAL
GOAL: Patient will perform sit to stand transfers independently with least restrictive assistive device in 2 weeks with proper hand placement
GOAL: Patient will transfer independently with least restrictive AD in 2 weeks

## 2023-12-12 NOTE — CHART NOTE - NSCHARTNOTEFT_GEN_A_CORE
Pt with TEREZA on CKD, and hyponatremia. TEREZA and hyponatremia have resolved. SCr 1.2 and SNa 136 today. Nephrology will sign off.    Upon discharge please have patient follow up with Nephrology in 2-3wks. For appointment scheduling with one of our physicians please email Nephrology at [email or web address]. Our address is [address] [place] [address], and office number .      If you have any questions, please feel free to contact me  Angela Davis  Nephrology Fellow  AuditFile/Page [id number]  (After 5pm or on weekends please page the on-call fellow) Pt with TEREZA on CKD, and hyponatremia. TEREZA and hyponatremia have resolved. SCr 1.2 and SNa 136 today. Nephrology will sign off.    Upon discharge please have patient follow up with Nephrology in 2-3wks. For appointment scheduling with one of our physicians please email Nephrology at [email or web address]. Our address is [address] [place] [address], and office number .      If you have any questions, please feel free to contact me  Angela Davis  Nephrology Fellow  [employer]/Page [phone]  (After 5pm or on weekends please page the on-call fellow)

## 2023-12-12 NOTE — DISCHARGE NOTE PROVIDER - HOSPITAL COURSE
74 year old female with history of lung cancer s/p chemotherapy 5 years ago previously in remission (oncologist Dr. Fredis Solomon at [facility]), COPD, HTN presented to [place] with unsteady gait for a few weeks. Patient's  found her on he floor prompting him to call 911. Went to [place] and CT brain was remarkable for brain mass with midline shift, cyst vs necrotic mass. Transferred to [facility] for neurosurgery evaluation. Neurosurgery was consulted and reccomended MRI brain, CTAP and CT chest to look for possible malignancies, Keppra 500 mg BID, and Decadron 8 mg once in the ED and then Decadron 4mg q 6 hours. Reports shortness of breath when going up staurs, No fevers, chest pain, palpitations, vomiting, abdominal pain, diarrhea, constipation, dysuria, hematuria, melena, hematochezia.  (04 Dec 2023 13:50)    PROCEDURE:  Adm 12/4. 12/9 Rt Pariet Crani Rsxn Tumor      POD#3    PLAN:  Neuro: Path-P. 12/10 B/L LE DVT, SQH TID, Rpt LED 12/15. Hyponatremia resolved, DC NaCl tabs now, Cont FR 1L/day FU Na. Brielle 500cc now for increase BUN. Hypocalcemia supp now FU. Dex taper as written x 2 weeks.  Inc activity/OOB.     Pulm note of 12/11-Stage 4 lung adenocarcinoma. R fissure nodule PET avid in 2019 she has adenocarcinoma, MS stable, no mutations, biopsy done here Oct 2019 by thoracic; was on Keytruda was on immunotherapy now with ground glass accumulation L lung, R fissure (right was there previously and is smaller) - f/u outpatient for L GGO - repeat CT/PET within 4-6 weeks and compare to recent PET- f/u brain mass pathology COPD Not on home O2- continue home Breo Ellipta or Symbicort + albuterol PRN - not in exacerbation currently  - follow up outpatient with her pulmonologist as per pt preference - follows at [facility] to sign off at this time; please reconsult as needed Josh Salinas MD  Pulmonary/Critical Care [phone] [facility] [id number] [facility] Cards/Dr KATHE Aparicio note of 12/11-1. Bilateral Soleal DVT 2. S/p right craniotomy for tumor resection on 12/9. 3. History of Lung CA 4. Abdominal Aorta Dilated 4 cm on CT 12/4.    Plan:1. Continue Heparin Sub. Cut. 5000 units TID for below knee DVT with recent neurosurgery,    Recommend repeat LE venous duplex on 12/15.2. Patient hemodynamically stable on RA and denies any cardiopulmonary symptoms. 3. Appreciate pulmonary follow-up for lung nodule on CT.4. Outpatient follow-up for dilated abdominal aorta.   5. Appreciate excellent Neurosurgical acre.     Nephro note of 12/11- Problem/Plan - 1:·  Problem: CKD (chronic kidney disease). ·  Plan: Pt with TEREZA on CKD, now resolved. CKD likely in setting of distant excessive NSAID use. Per Elina CHENG, Scr of 1.7 in Nov, 2021 and 1.7 in Aug 2023. Now here with Scr of 1.7 on admission 12/3, peaked at 2.08 on 12/6 and now decreased to 1.45 today. Patient received IV contrasts on 12/4. No hypotension no nephrotoxic agents noted. Patient was given triamterene/HCTZ 12/3-12/6. UA completely bland. Renal US with b/l renal parenchymal disease and cysts. Monitor BMP and dose meds per CrCl. Problem/Plan - 2:·  Problem: Hyponatremia. ·  Plan: Pt with hyponatremia. Likely from patient receiving meds in D5W/increased fluid intake vs lab error. Serum sodium dropped to 131 (12/9), now improved to 135 today. Continue to hold HCTZ. Can d/c NaCl tabs. Please restrict free water intake. Continue to monitor serum SNa. Final recommendations pending attending signature.If you have any questions, please feel free to contact me Angela Davis Nephrology Fellow [phone]/Page [phone]  (After 5pm or on weekends please page the on-call fellow).    Med note of 12/9- Problem/Plan - 1:·  Problem: Brain mass. ·  Plan: - CT head showing y 2.3 x 1.5 x 1.7 cm cyst versus centrally necrotic mass in the right parietal-temporal region.   Diffuse vasogenic edema throughout the right frontal, parietal and temporal lobes.  Mass effect in the right cerebral hemisphere with 4.5 cm of midline shift to the left.  - MRI confirmed neoplasm. - Neurosurgery following, plan for resection on 12/09- Keppra 500 mg BID and Dexamethasone 4 mg q6 hours.- PT and OT- Plan for resection by neurosurgery on 12/09/2023 Patient to go for resection of brain mass with neurosurgery, RCRI score of 2 points indicating 10.1% risk for 30 day death, MI, or cardiac arrest.  NPO at midnight prior to day of surgery Will hold anticoagulation and insulin day of surgery. Continue remaining medications day of surgery.Patient is medically optimized for surgery.   Problem/Plan - 2:·  Problem: Ground glass opacity present on imaging of lung. ·  Plan: - Previous history of lungf cancer, last chemotherapy 5 years ago, in remission- Patient continued to smoke pack per day for 30-40 years. - CT chest with findings of 18 mm groundglass nodule in the superior segment of the left lower lobe, which may reflect malignancy. Additional nodule along the right oblique fissure not well assessed secondary to motion. Pulm following- Thoracic surgery not recommending surgical intervention at this time. - To obtain outpatient records from patient's oncologist. - Patient self discontinued chemotherapy for lung cancer 5 years ago due to side effects. - Brain mass to be addressed prior to any pulmonary intervention- Reached out to outpatient oncology. Problem/Plan - 3:·  Problem: History of COPD. ·  Plan: - Budesonide 80 mcg/Formoterol 4.5 mcg 2 puffs BID.   Problem/Plan - 4:·  Problem: Hypertension. ·  Plan: - Hold home Tiamterene-Hydrochlorothiazide 37.5-25 mg daily due to elevated Cr. Problem/Plan - 5:·  Problem: CKD (chronic kidney disease). ·  Plan: - Scr of 2.08 12/-6 -> 1.72 on 12/07 -  TEREZA on CKD, Scr of 1.7 back in 2019- Urine studies ordered- US renal shows parenchymal disease- Avoid nephrotoxic agents  - Holding HCTZ at this time. Problem/Plan - 6:·  Problem: Prophylactic measure. ·  Plan: DVT: Heparin 5000 TID npo at midnight in anticipation of procedure. Diet: DASH, npo at midnight in anticipation of procedure.    Respiratory: Patient instructed to use incentive spirometer [ X] YES [ ] NO              DVT ppx: [X ] SQL [ ] SQH and Venodynes [ ] Left [ ] Right [ X] Bilateral    Discharge Planning:  The patient was evaluated by PT/OT/PMR and recommended Acute Rehab.   She was subsequently DC on >>> in stable condition. 74 year old female with history of lung cancer s/p chemotherapy 5 years ago previously in remission (oncologist Dr. Fredis Solomon at [facility]), COPD, HTN presented to [place] with unsteady gait for a few weeks. Patient's  found her on he floor prompting him to call 911. Went to [place] and CT brain was remarkable for brain mass with midline shift, cyst vs necrotic mass. Transferred to [facility] for neurosurgery evaluation. Neurosurgery was consulted and reccomended MRI brain, CTAP and CT chest to look for possible malignancies, Keppra 500 mg BID, and Decadron 8 mg once in the ED and then Decadron 4mg q 6 hours. Reports shortness of breath when going up staurs, No fevers, chest pain, palpitations, vomiting, abdominal pain, diarrhea, constipation, dysuria, hematuria, melena, hematochezia.  (04 Dec 2023 13:50)    PROCEDURE:  Adm 12/4. 12/9 Rt Pariet Crani Rsxn Tumor      POD#3    PLAN:  Neuro: Path-P. 12/10 B/L LE DVT, SQH TID, Rpt LED 12/15. Hyponatremia resolved, DC NaCl tabs now, Cont FR 1L/day FU Na. Brielle 500cc now for increase BUN. Hypocalcemia supp now FU. Dex taper as written x 2 weeks.  Inc activity/OOB.     Pulm note of 12/11-Stage 4 lung adenocarcinoma. R fissure nodule PET avid in 2019 she has adenocarcinoma, MS stable, no mutations, biopsy done here Oct 2019 by thoracic; was on Keytruda was on immunotherapy now with ground glass accumulation L lung, R fissure (right was there previously and is smaller) - f/u outpatient for L GGO - repeat CT/PET within 4-6 weeks and compare to recent PET- f/u brain mass pathology COPD Not on home O2- continue home Breo Ellipta or Symbicort + albuterol PRN - not in exacerbation currently  - follow up outpatient with her pulmonologist as per pt preference - follows at [facility] to sign off at this time; please reconsult as needed Josh Salinas MD  Pulmonary/Critical Care [phone] [facility] [id number] [facility] Cards/Dr KATHE Aparicio note of 12/11-1. Bilateral Soleal DVT 2. S/p right craniotomy for tumor resection on 12/9. 3. History of Lung CA 4. Abdominal Aorta Dilated 4 cm on CT 12/4.    Plan:1. Continue Heparin Sub. Cut. 5000 units TID for below knee DVT with recent neurosurgery,    Recommend repeat LE venous duplex on 12/15.2. Patient hemodynamically stable on RA and denies any cardiopulmonary symptoms. 3. Appreciate pulmonary follow-up for lung nodule on CT.4. Outpatient follow-up for dilated abdominal aorta.   5. Appreciate excellent Neurosurgical acre.     Nephro note of 12/11- Problem/Plan - 1:·  Problem: CKD (chronic kidney disease). ·  Plan: Pt with TEREZA on CKD, now resolved. CKD likely in setting of distant excessive NSAID use. Per Elina CHENG, Scr of 1.7 in Nov, 2021 and 1.7 in Aug 2023. Now here with Scr of 1.7 on admission 12/3, peaked at 2.08 on 12/6 and now decreased to 1.45 today. Patient received IV contrasts on 12/4. No hypotension no nephrotoxic agents noted. Patient was given triamterene/HCTZ 12/3-12/6. UA completely bland. Renal US with b/l renal parenchymal disease and cysts. Monitor BMP and dose meds per CrCl. Problem/Plan - 2:·  Problem: Hyponatremia. ·  Plan: Pt with hyponatremia. Likely from patient receiving meds in D5W/increased fluid intake vs lab error. Serum sodium dropped to 131 (12/9), now improved to 135 today. Continue to hold HCTZ. Can d/c NaCl tabs. Please restrict free water intake. Continue to monitor serum SNa. Final recommendations pending attending signature.If you have any questions, please feel free to contact me Angela Davis Nephrology Fellow [employer]/Page [phone]  (After 5pm or on weekends please page the on-call fellow).    Med note of 12/9- Problem/Plan - 1:·  Problem: Brain mass. ·  Plan: - CT head showing y 2.3 x 1.5 x 1.7 cm cyst versus centrally necrotic mass in the right parietal-temporal region.   Diffuse vasogenic edema throughout the right frontal, parietal and temporal lobes.  Mass effect in the right cerebral hemisphere with 4.5 cm of midline shift to the left.  - MRI confirmed neoplasm. - Neurosurgery following, plan for resection on 12/09- Keppra 500 mg BID and Dexamethasone 4 mg q6 hours.- PT and OT- Plan for resection by neurosurgery on 12/09/2023 Patient to go for resection of brain mass with neurosurgery, RCRI score of 2 points indicating 10.1% risk for 30 day death, MI, or cardiac arrest.  NPO at midnight prior to day of surgery Will hold anticoagulation and insulin day of surgery. Continue remaining medications day of surgery.Patient is medically optimized for surgery.   Problem/Plan - 2:·  Problem: Ground glass opacity present on imaging of lung. ·  Plan: - Previous history of lungf cancer, last chemotherapy 5 years ago, in remission- Patient continued to smoke pack per day for 30-40 years. - CT chest with findings of 18 mm groundglass nodule in the superior segment of the left lower lobe, which may reflect malignancy. Additional nodule along the right oblique fissure not well assessed secondary to motion. Pulm following- Thoracic surgery not recommending surgical intervention at this time. - To obtain outpatient records from patient's oncologist. - Patient self discontinued chemotherapy for lung cancer 5 years ago due to side effects. - Brain mass to be addressed prior to any pulmonary intervention- Reached out to outpatient oncology. Problem/Plan - 3:·  Problem: History of COPD. ·  Plan: - Budesonide 80 mcg/Formoterol 4.5 mcg 2 puffs BID.   Problem/Plan - 4:·  Problem: Hypertension. ·  Plan: - Hold home Tiamterene-Hydrochlorothiazide 37.5-25 mg daily due to elevated Cr. Problem/Plan - 5:·  Problem: CKD (chronic kidney disease). ·  Plan: - Scr of 2.08 12/-6 -> 1.72 on 12/07 -  TEREZA on CKD, Scr of 1.7 back in 2019- Urine studies ordered- US renal shows parenchymal disease- Avoid nephrotoxic agents  - Holding HCTZ at this time. Problem/Plan - 6:·  Problem: Prophylactic measure. ·  Plan: DVT: Heparin 5000 TID npo at midnight in anticipation of procedure. Diet: DASH, npo at midnight in anticipation of procedure.    Respiratory: Patient instructed to use incentive spirometer [ X] YES [ ] NO              DVT ppx: [X ] SQL [ ] SQH and Venodynes [ ] Left [ ] Right [ X] Bilateral    Discharge Planning:  The patient was evaluated by PT/OT/PMR and recommended Acute Rehab.   She was subsequently DC on >>> in stable condition. 74 year old female with history of lung cancer s/p chemotherapy 5 years ago previously in remission (oncologist Dr. Fredis Solomon at [facility]), COPD, HTN presented to [place] with unsteady gait for a few weeks. Patient's  found her on he floor prompting him to call 911. Went to [place] and CT brain was remarkable for brain mass with midline shift, cyst vs necrotic mass. Transferred to [facility] for neurosurgery evaluation. Neurosurgery was consulted and recommended MRI brain, CTAP and CT chest to look for possible malignancies, Keppra 500 mg BID, and Decadron 8 mg once in the ED and then Decadron 4mg q 6 hours. Reports shortness of breath when going up stairs, No fevers, chest pain, palpitations, vomiting, abdominal pain, diarrhea, constipation, dysuria, hematuria, melena, hematochezia.  Adm 12/4. Hospital course noted right parietal brain mass on CTH and MRI scan. Patient underwent 12/9 Rt Parietal Crani Rsxn of Tumor. Pathology pending. Monitored in the NSCU and transferred to surgical floor. Also, CT chest showing 18 mm ground glass opacity concerning for malignancy being followed by pulmonology and oncology. Pulmonology recommending repeat CT/PET scan in 4-6 weeks post discharge. Nephrology following for TEREZA on CKD and hyponatremia now resolved. Patient found to have bilateral soleal DVTs on H for DVT ppx. Vascular cardiology following recommended surveillance lower extremity dopplers. Physical therapy/ Occupational therapy / PMR recommended patient for rehab    On the day of discharge she is medically and neurologically stable for discharge to sub acute rehab.     74 year old female with history of lung cancer s/p chemotherapy 5 years ago previously in remission (oncologist Dr. Fredis Solomon at [facility]), COPD, HTN presented to [place] with unsteady gait for a few weeks. Patient's  found her on he floor prompting him to call 911. Went to [place] and CT brain was remarkable for brain mass with midline shift, cyst vs necrotic mass. Transferred to [facility] for neurosurgery evaluation. Neurosurgery was consulted and recommended MRI brain, CTAP and CT chest to look for possible malignancies, Keppra 500 mg BID, and Decadron 8 mg once in the ED and then Decadron 4mg q 6 hours. Reports shortness of breath when going up stairs, No fevers, chest pain, palpitations, vomiting, abdominal pain, diarrhea, constipation, dysuria, hematuria, melena, hematochezia.  Adm 12/4. Hospital course noted right parietal brain mass on CTH and MRI scan. Patient underwent 12/9 Rt Parietal Crani Rsxn of Tumor. Pathology pending. Monitored in the NSCU and transferred to surgical floor. Also, CT chest showing 18 mm ground glass opacity concerning for malignancy being followed by pulmonology and oncology. Pulmonology recommending repeat CT/PET scan in 4-6 weeks post discharge. Nephrology following for TEREZA on CKD and hyponatremia now resolved. Patient found to have bilateral soleal DVTs on H for DVT ppx. Vascular cardiology following recommended surveillance lower extremity dopplers. Physical therapy/ Occupational therapy / PMR recommended patient for rehab    On the day of discharge she is medically and neurologically stable for discharge to sub acute rehab.     74 year old female with history of lung cancer s/p chemotherapy 5 years ago previously in remission (oncologist Dr. Fredis Solomon at [facility]), COPD, HTN presented to [place] with unsteady gait for a few weeks. Patient's  found her on he floor prompting him to call 911. Went to [place] and CT brain was remarkable for brain mass with midline shift, cyst vs necrotic mass. Transferred to [facility] for neurosurgery evaluation. Neurosurgery was consulted and recommended MRI brain, CTAP and CT chest to look for possible malignancies, Keppra 500 mg BID, and Decadron 8 mg once in the ED and then Decadron 4mg q 6 hours. Reports shortness of breath when going up stairs, No fevers, chest pain, palpitations, vomiting, abdominal pain, diarrhea, constipation, dysuria, hematuria, melena, hematochezia.  Adm 12/4. Hospital course noted right parietal brain mass on CTH and MRI scan. Patient underwent 12/9 Rt Parietal Crani Rsxn of Tumor. Pathology pending. Monitored in the NSCU and transferred to surgical floor. Also, CT chest showing 18 mm ground glass opacity concerning for malignancy being followed by pulmonology and oncology. Oncology recommending close follow up with private oncologist Dr. Fredis Solomon at [facility] after discharge from rehab. Pulmonology recommending repeat CT/PET scan in 4-6 weeks post discharge. Nephrology following for TEREZA on CKD and hyponatremia now resolved. Patient found to have bilateral soleal DVTs on SQH for DVT ppx. Vascular cardiology following recommended surveillance lower extremity dopplers. Physical therapy/ Occupational therapy / PMR recommended patient for rehab    On the day of discharge she is medically and neurologically stable for discharge to sub acute rehab.     74 year old female with history of lung cancer s/p chemotherapy 5 years ago previously in remission (oncologist Dr. Fredis Solomon at [facility]), COPD, HTN presented to [place] with unsteady gait for a few weeks. Patient's  found her on he floor prompting him to call 911. Went to [place] and CT brain was remarkable for brain mass with midline shift, cyst vs necrotic mass. Transferred to [facility] for neurosurgery evaluation. Neurosurgery was consulted and recommended MRI brain, CTAP and CT chest to look for possible malignancies, Keppra 500 mg BID, and Decadron 8 mg once in the ED and then Decadron 4mg q 6 hours. Reports shortness of breath when going up stairs, No fevers, chest pain, palpitations, vomiting, abdominal pain, diarrhea, constipation, dysuria, hematuria, melena, hematochezia.  Adm 12/4. Hospital course noted right parietal brain mass on CTH and MRI scan. Patient underwent 12/9 Rt Parietal Crani Rsxn of Tumor. Pathology pending. Monitored in the NSCU and transferred to surgical floor. Also, CT chest showing 18 mm ground glass opacity concerning for malignancy being followed by pulmonology and oncology. Oncology recommending close follow up with private oncologist Dr. Fredis Solomon at [facility] after discharge from rehab. Pulmonology recommending repeat CT/PET scan in 4-6 weeks post discharge. Nephrology following for TEREZA on CKD and hyponatremia now resolved. Patient found to have bilateral soleal DVTs on SQH for DVT ppx. Vascular cardiology following recommended surveillance lower extremity dopplers. Physical therapy/ Occupational therapy / PMR recommended patient for rehab    On the day of discharge she is medically and neurologically stable for discharge to sub acute rehab.

## 2023-12-12 NOTE — PHYSICAL THERAPY INITIAL EVALUATION ADULT - PERTINENT HX OF CURRENT PROBLEM, REHAB EVAL
78 year old female with history of lung cancer s/p chemotherapy 5 years ago previously in remission (oncologist Dr. Fredis Solomon at [facility]), COPD, HTN presented to [place] with unsteady gait for a few weeks. Patient's  found her on he floor prompting him to call 911. Went to [place] and CT brain was remarkable for brain mass with midline shift, cyst vs necrotic mass. Transferred to [facility] for neurosurgery evaluation. Neurosurgery was consulted and reccomended MRI brain, CTAP and CT chest to look for possible malignancies, Keppra 500 mg BID, and Decadron 8 mg once in the ED and then Decadron 4mg q 6 hours. Reports shortness of breath when going up stairs  12/3/23: Head CT: Approximately 2.3 x 1.5 x 1.7 cm cyst versus centrally necrotic mass in the right parietal-temporal region.  Diffuse vasogenic edema throughout the right frontal, parietal and temporal lobes.  Mass effect in the right cerebral hemisphere with 4.5 cm of midline shift to the left.  12/4/23: CT chest/abdomen/pelvis: 18 mm groundglass nodule in the superior segment of the left lower lobe, which may reflect malignancy. Additional nodule along the right oblique fissure not well assessed secondary to motion. No definite metastatic lesions are seen in the abdomen/pelvis.Aneurysmal dilatation of the infrarenal abdominal aorta is slightly increased compared with MRI of 8/13/2021.  MRI brain:  Redemonstrated 2.9 x 2.0 x 1.4 cm peripheral rim enhancing lesion not seen on the patient's prior MR study with a large amount of surrounding vasogenic edema.  12/10/23: Bilateral LE dopplers: Acute deep venous thrombosis: below the knee. Bilateral soleal veins acute deep vein thrombosis.  MRI head: Right parietal craniotomy with postoperative changes after resection of right parietal ring-enhancing mass. No significant enhancement. No change in vasogenic edema compared with 12/4/2023.  CT head:   Patient status post right parietal craniotomy for resection of a right parietal ring-enhancing lesion. Postoperative changes as discussed above. Recommend MRI of the brain for further evaluation of the operative bed. Small midline shift to the left of approximately 0.4 cm. 78 year old female with history of lung cancer s/p chemotherapy 5 years ago previously in remission (oncologist Dr. Fredis Solomon at [facility]), COPD, HTN presented to [place] with unsteady gait for a few weeks. Patient's  found her on he floor prompting him to call 911. Went to [place] and CT brain was remarkable for brain mass with midline shift, cyst vs necrotic mass. Transferred to [facility] for neurosurgery evaluation. Neurosurgery was consulted and reccomended MRI brain, CTAP and CT chest to look for possible malignancies, Keppra 500 mg BID, and Decadron 8 mg once in the ED and then Decadron 4mg q 6 hours. Reports shortness of breath when going up stairs  12/3/23: Head CT: Approximately 2.3 x 1.5 x 1.7 cm cyst versus centrally necrotic mass in the right parietal-temporal region.  Diffuse vasogenic edema throughout the right frontal, parietal and temporal lobes.  Mass effect in the right cerebral hemisphere with 4.5 cm of midline shift to the left.  12/4/23: CT chest/abdomen/pelvis: 18 mm groundglass nodule in the superior segment of the left lower lobe, which may reflect malignancy. Additional nodule along the right oblique fissure not well assessed secondary to motion. No definite metastatic lesions are seen in the abdomen/pelvis.Aneurysmal dilatation of the infrarenal abdominal aorta is slightly increased compared with MRI of 8/13/2021.  MRI brain:  Redemonstrated 2.9 x 2.0 x 1.4 cm peripheral rim enhancing lesion not seen on the patient's prior MR study with a large amount of surrounding vasogenic edema.  12/10/23: Bilateral LE dopplers: Acute deep venous thrombosis: below the knee. Bilateral soleal veins acute deep vein thrombosis.  MRI head: Right parietal craniotomy with postoperative changes after resection of right parietal ring-enhancing mass. No significant enhancement. No change in vasogenic edema compared with 12/4/2023.  CT head:   Patient status post right parietal craniotomy for resection of a right parietal ring-enhancing lesion. Postoperative changes as discussed above. Recommend MRI of the brain for further evaluation of the operative bed. Small midline shift to the left of approximately 0.4 cm.

## 2023-12-12 NOTE — PHYSICAL THERAPY INITIAL EVALUATION ADULT - GENERAL OBSERVATIONS, REHAB EVAL
Patient received semi reclined in bed, NAD, VSS, +PIV capped
Pt received semi supine in bed +IVL, +NC, ok for PT per RN Lona.

## 2023-12-12 NOTE — PHYSICAL THERAPY INITIAL EVALUATION ADULT - BALANCE TRAINING, PT EVAL
GOAL: Patient will improve standing balance to good in 2 weeks
GOAL: Patient will demonstrate an increase in static/dynamic balance in sitting/standing where deficient by at least 1 grade within 2 weeks to facilitate greater independence during functional mobility and ADL's.

## 2023-12-12 NOTE — PROGRESS NOTE ADULT - PROBLEM SELECTOR PLAN 2
- Previous history of lung cancer, last chemotherapy 5 years ago, in remission. though patient self discontinued chemotherapy for lung cancer 5 years ago due to side effects  - Patient continued to smoke pack per day for 30-40 years.   - CT chest with findings of 18 mm groundglass nodule in the superior segment of the left lower lobe, which may reflect malignancy. Additional nodule along the right oblique fissure not well assessed secondary to motion.  - Pulmonary consult recs appreciated: recommending repeat CT/PET within 4-6 weeks and compare to recent PET  - Thoracic surgery not recommending surgical intervention at this time  - Will need close outpatient f/u with pulmonary and oncology

## 2023-12-12 NOTE — DISCHARGE NOTE PROVIDER - NSDCMRMEDTOKEN_GEN_ALL_CORE_FT
Afrin 0.05% nasal spray: 1 spray(s) in each nostril once a day  Breo Ellipta 200 mcg-25 mcg/inh inhalation powder: 1 puff(s) inhaled once a day  escitalopram 10 mg oral tablet: 1 tab(s) orally once a day  folic acid 1 mg oral tablet: 1 tab(s) orally once a day  Gemtesa 75 mg oral tablet: 1 tab(s) orally once a day  triamterene-hydrochlorothiazide 37.5 mg-25 mg oral tablet: 1 tab(s) orally once a day  Vitamin B complex: 1 tablet orally once a day  Vitamin D tablet: 1 tablet orally once a day  ZyrTEC 10 mg oral tablet: 1 tab(s) orally once a day as needed   acetaminophen 325 mg oral tablet: 2 tab(s) orally every 6 hours as needed for Mild Pain (1 - 3)  albuterol 90 mcg/inh inhalation aerosol: 2 puff(s) inhaled every 6 hours As needed Shortness of Breath and/or Wheezing  bisacodyl 5 mg oral delayed release tablet: 1 tab(s) orally once a day (at bedtime)  budesonide-formoterol 160 mcg-4.5 mcg/inh inhalation aerosol: 2 puff(s) inhaled 2 times a day  dexamethasone: 4 milligram(s) orally every 8 hours taper   4 mg q8 hours po for 3 days then   4 mg every 12 hours for 2 days then   2 mg q8 hours for 2 days then   2 mg q12 hours for 2 days then   continue on 1 mg po q12 hours until further follow up is done.  escitalopram 10 mg oral tablet: 1 tab(s) orally once a day  famotidine 10 mg oral tablet: 1 tab(s) orally once a day  heparin: 5,000 unit(s) subcutaneous every 8 hours  levETIRAcetam 250 mg oral tablet: 1 tab(s) orally every 12 hours  nicotine 14 mg/24 hr transdermal film, extended release: 1 patch transdermal once a day  NIFEdipine 30 mg oral tablet, extended release: 1 tab(s) orally once a day  oxyCODONE 5 mg oral tablet: 1 tab(s) orally every 4 hours As needed Moderate Pain (4 - 6)  polyethylene glycol 3350 oral powder for reconstitution: 17 gram(s) orally 2 times a day  senna leaf extract oral tablet: 2 tab(s) orally once a day (at bedtime)

## 2023-12-12 NOTE — PROGRESS NOTE ADULT - SUBJECTIVE AND OBJECTIVE BOX
Jeanie Adams MD  Division of Hospital Medicine  [facility]  Spectra: [phone]      Patient is a 78y old  Female who presents with a chief complaint of Unsteady gait, mass noted on CT head (12 Dec 2023 12:12)      SUBJECTIVE / OVERNIGHT EVENTS: no acute events overnight. no fever, chills, chest pain, nor dyspnea. has mild intermittent headache but states controlled on current regimen.  ADDITIONAL REVIEW OF SYSTEMS:    MEDICATIONS  (STANDING):  bisacodyl 5 milliGRAM(s) Oral at bedtime  budesonide 160 MICROgram(s)/formoterol 4.5 MICROgram(s) Inhaler 2 Puff(s) Inhalation two times a day  calcium gluconate IVPB 2 Gram(s) IV Intermittent once  chlorhexidine 4% Liquid 1 Application(s) Topical <User Schedule>  dexAMETHasone     Tablet   Oral   dexAMETHasone     Tablet 4 milliGRAM(s) Oral every 6 hours  escitalopram 10 milliGRAM(s) Oral daily  famotidine    Tablet 10 milliGRAM(s) Oral daily  heparin   Injectable 5000 Unit(s) SubCutaneous every 8 hours  influenza  Vaccine (HIGH DOSE) 0.7 milliLiter(s) IntraMuscular once  levETIRAcetam 250 milliGRAM(s) Oral every 12 hours  nicotine -  14 mG/24Hr(s) Patch 1 Patch Transdermal daily  polyethylene glycol 3350 17 Gram(s) Oral two times a day  senna 2 Tablet(s) Oral at bedtime  sodium chloride 0.9% Bolus 500 milliLiter(s) IV Bolus once    MEDICATIONS  (PRN):  acetaminophen     Tablet .. 650 milliGRAM(s) Oral every 6 hours PRN Temp greater or equal to 38C (100.4F), Mild Pain (1 - 3)  ondansetron Injectable 4 milliGRAM(s) IV Push every 6 hours PRN Nausea and/or Vomiting  oxyCODONE    IR 5 milliGRAM(s) Oral every 4 hours PRN Moderate Pain (4 - 6)      CAPILLARY BLOOD GLUCOSE        I&O's Summary    11 Dec 2023 07:01  -  12 Dec 2023 07:00  --------------------------------------------------------  IN: 240 mL / OUT: 900 mL / NET: -660 mL        PHYSICAL EXAM:  Vital Signs Last 24 Hrs  T(C): 36.5 (12 Dec 2023 11:54), Max: 36.7 (11 Dec 2023 21:11)  T(F): 97.7 (12 Dec 2023 11:54), Max: 98.1 (11 Dec 2023 21:11)  HR: 55 (12 Dec 2023 11:54) (55 - 81)  BP: 132/80 (12 Dec 2023 11:54) (131/75 - 160/70)  BP(mean): --  RR: 18 (12 Dec 2023 11:54) (18 - 18)  SpO2: 99% (12 Dec 2023 11:54) (95% - 99%)    Parameters below as of 12 Dec 2023 04:50  Patient On (Oxygen Delivery Method): room air      CONSTITUTIONAL: NAD, well-developed, well-groomed  EYES: PERRLA; conjunctiva and sclera clear  ENMT: Moist oral mucosa, no pharyngeal injection or exudates; normal dentition  NECK: Supple, no palpable masses; no thyromegaly  RESPIRATORY: Normal respiratory effort; lungs are clear to auscultation bilaterally  CARDIOVASCULAR: Regular rate and rhythm, normal S1 and S2, no murmur/rub/gallop; No lower extremity edema; Peripheral pulses are 2+ bilaterally  ABDOMEN: Soft, Nondistended, Nontender to palpation, normoactive bowel sounds  MUSCULOSKELETAL: No clubbing or cyanosis of digits; no joint swelling or tenderness to palpation  PSYCH: A+O to person, place, and time; affect appropriate  NEUROLOGY: CN 2-12 are intact and symmetric; no gross sensory deficits, moving all extremities strong  SKIN: No rashes; no palpable lesions, +L crani site with staples c/d/i      LABS:                        11.4   14.94 )-----------( 208      ( 12 Dec 2023 05:03 )             33.6     12-12    136  |  101  |  39<H>  ----------------------------<  120<H>  3.9   |  23  |  1.20    Ca    7.9<L>      12 Dec 2023 05:02  Phos  2.7     12-12  Mg     1.7     12-12      Urinalysis Basic - ( 12 Dec 2023 05:02 )    Color: x / Appearance: x / SG: x / pH: x  Gluc: 120 mg/dL / Ketone: x  / Bili: x / Urobili: x   Blood: x / Protein: x / Nitrite: x   Leuk Esterase: x / RBC: x / WBC x   Sq Epi: x / Non Sq Epi: x / Bacteria: x      RADIOLOGY & ADDITIONAL TESTS:  Results Reviewed: leukocytosis improving, TEREZA resolving, hypomagnesemia repleted  Imaging Personally Reviewed:  12/10/23 Duplex of LE:  FINDINGS:    RIGHT:  Normal compressibility of the RIGHT common femoral, femoral and popliteal   veins.  Doppler examination shows normal spontaneous and phasic flow.  There is acute deep vein thrombosis of the right soleal vein. The rest of   the visualized calf veins are unremarkable.    LEFT:  Normal compressibility of the LEFT common femoral, femoral and popliteal   veins.  Doppler examination shows normal spontaneous and phasic flow.  There is acute deep vein thrombosis of the left soleal vein. The rest of   the visualized calf veins are unremarkable.    IMPRESSION:  Acute deep venous thrombosis: below the knee.    Bilateral soleal veins acute deep vein thrombosis.  Electrocardiogram Personally Reviewed:    COORDINATION OF CARE:  Care Discussed with Consultants/Other Providers [Y]: Neurosurgery GINNY Hernandez  Prior or Outpatient Records Reviewed [Y/N]:   Jeanie Adams MD  Division of Hospital Medicine  [facility]  Spectra: [phone]      Patient is a 78y old  Female who presents with a chief complaint of Unsteady gait, mass noted on CT head (12 Dec 2023 12:12)      SUBJECTIVE / OVERNIGHT EVENTS: no acute events overnight. no fever, chills, chest pain, nor dyspnea. has mild intermittent headache but states controlled on current regimen.  ADDITIONAL REVIEW OF SYSTEMS:    MEDICATIONS  (STANDING):  bisacodyl 5 milliGRAM(s) Oral at bedtime  budesonide 160 MICROgram(s)/formoterol 4.5 MICROgram(s) Inhaler 2 Puff(s) Inhalation two times a day  calcium gluconate IVPB 2 Gram(s) IV Intermittent once  chlorhexidine 4% Liquid 1 Application(s) Topical <User Schedule>  dexAMETHasone     Tablet   Oral   dexAMETHasone     Tablet 4 milliGRAM(s) Oral every 6 hours  escitalopram 10 milliGRAM(s) Oral daily  famotidine    Tablet 10 milliGRAM(s) Oral daily  heparin   Injectable 5000 Unit(s) SubCutaneous every 8 hours  influenza  Vaccine (HIGH DOSE) 0.7 milliLiter(s) IntraMuscular once  levETIRAcetam 250 milliGRAM(s) Oral every 12 hours  nicotine -  14 mG/24Hr(s) Patch 1 Patch Transdermal daily  polyethylene glycol 3350 17 Gram(s) Oral two times a day  senna 2 Tablet(s) Oral at bedtime  sodium chloride 0.9% Bolus 500 milliLiter(s) IV Bolus once    MEDICATIONS  (PRN):  acetaminophen     Tablet .. 650 milliGRAM(s) Oral every 6 hours PRN Temp greater or equal to 38C (100.4F), Mild Pain (1 - 3)  ondansetron Injectable 4 milliGRAM(s) IV Push every 6 hours PRN Nausea and/or Vomiting  oxyCODONE    IR 5 milliGRAM(s) Oral every 4 hours PRN Moderate Pain (4 - 6)      CAPILLARY BLOOD GLUCOSE        I&O's Summary    11 Dec 2023 07:01  -  12 Dec 2023 07:00  --------------------------------------------------------  IN: 240 mL / OUT: 900 mL / NET: -660 mL        PHYSICAL EXAM:  Vital Signs Last 24 Hrs  T(C): 36.5 (12 Dec 2023 11:54), Max: 36.7 (11 Dec 2023 21:11)  T(F): 97.7 (12 Dec 2023 11:54), Max: 98.1 (11 Dec 2023 21:11)  HR: 55 (12 Dec 2023 11:54) (55 - 81)  BP: 132/80 (12 Dec 2023 11:54) (131/75 - 160/70)  BP(mean): --  RR: 18 (12 Dec 2023 11:54) (18 - 18)  SpO2: 99% (12 Dec 2023 11:54) (95% - 99%)    Parameters below as of 12 Dec 2023 04:50  Patient On (Oxygen Delivery Method): room air      CONSTITUTIONAL: NAD, well-developed, well-groomed  EYES: PERRLA; conjunctiva and sclera clear  ENMT: Moist oral mucosa, no pharyngeal injection or exudates; normal dentition  NECK: Supple, no palpable masses; no thyromegaly  RESPIRATORY: Normal respiratory effort; lungs are clear to auscultation bilaterally  CARDIOVASCULAR: Regular rate and rhythm, normal S1 and S2, no murmur/rub/gallop; No lower extremity edema; Peripheral pulses are 2+ bilaterally  ABDOMEN: Soft, Nondistended, Nontender to palpation, normoactive bowel sounds  MUSCULOSKELETAL: No clubbing or cyanosis of digits; no joint swelling or tenderness to palpation  PSYCH: A+O to person, place, and time; affect appropriate  NEUROLOGY: CN 2-12 are intact and symmetric; no gross sensory deficits, moving all extremities strong  SKIN: No rashes; no palpable lesions, +L crani site with staples c/d/i      LABS:                        11.4   14.94 )-----------( 208      ( 12 Dec 2023 05:03 )             33.6     12-12    136  |  101  |  39<H>  ----------------------------<  120<H>  3.9   |  23  |  1.20    Ca    7.9<L>      12 Dec 2023 05:02  Phos  2.7     12-12  Mg     1.7     12-12      Urinalysis Basic - ( 12 Dec 2023 05:02 )    Color: x / Appearance: x / SG: x / pH: x  Gluc: 120 mg/dL / Ketone: x  / Bili: x / Urobili: x   Blood: x / Protein: x / Nitrite: x   Leuk Esterase: x / RBC: x / WBC x   Sq Epi: x / Non Sq Epi: x / Bacteria: x      RADIOLOGY & ADDITIONAL TESTS:  Results Reviewed: leukocytosis improving, TEREZA resolving, hypomagnesemia repleted  Imaging Personally Reviewed:  12/10/23 Duplex of LE:  FINDINGS:    RIGHT:  Normal compressibility of the RIGHT common femoral, femoral and popliteal   veins.  Doppler examination shows normal spontaneous and phasic flow.  There is acute deep vein thrombosis of the right soleal vein. The rest of   the visualized calf veins are unremarkable.    LEFT:  Normal compressibility of the LEFT common femoral, femoral and popliteal   veins.  Doppler examination shows normal spontaneous and phasic flow.  There is acute deep vein thrombosis of the left soleal vein. The rest of   the visualized calf veins are unremarkable.    IMPRESSION:  Acute deep venous thrombosis: below the knee.    Bilateral soleal veins acute deep vein thrombosis.  Electrocardiogram Personally Reviewed:    COORDINATION OF CARE:  Care Discussed with Consultants/Other Providers [Y]: Neurosurgery GINNY Hernandez  Prior or Outpatient Records Reviewed [Y/N]:

## 2023-12-12 NOTE — PROGRESS NOTE ADULT - PROBLEM SELECTOR PLAN 1
- CT head showing 2.3 x 1.5 x 1.7 cm cyst versus centrally necrotic mass in the right parietal-temporal region. Diffuse vasogenic edema throughout the right frontal, parietal and temporal lobes.  Mass effect in the right cerebral hemisphere with 4.5 cm of midline shift to the left.  - MRI confirmed neoplasm  - s/p right parietal crani for 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 on 12/9/23  - c/w keppra for seizure ppx  - dexamethasone taper for vasogenic edema as per neurosurgery team  - post-op care as per neurosurgery team

## 2023-12-12 NOTE — PROGRESS NOTE ADULT - SUBJECTIVE AND OBJECTIVE BOX
Patient is a 78y old  Female who presents with a chief complaint of unsteady gait / mass noted on CT head (11 Dec 2023 12:22)    Patient sitting in chair and comfortable.  No CP, no SOB.  Min A transfers and gait.    MEDICATIONS  (STANDING):  budesonide 160 MICROgram(s)/formoterol 4.5 MICROgram(s) Inhaler 2 Puff(s) Inhalation two times a day  chlorhexidine 4% Liquid 1 Application(s) Topical <User Schedule>  dexAMETHasone     Tablet 4 milliGRAM(s) Oral every 6 hours  dexAMETHasone     Tablet   Oral   escitalopram 10 milliGRAM(s) Oral daily  famotidine    Tablet 10 milliGRAM(s) Oral daily  heparin   Injectable 5000 Unit(s) SubCutaneous every 8 hours  influenza  Vaccine (HIGH DOSE) 0.7 milliLiter(s) IntraMuscular once  levETIRAcetam 250 milliGRAM(s) Oral every 12 hours  magnesium sulfate  IVPB 2 Gram(s) IV Intermittent once  nicotine -  14 mG/24Hr(s) Patch 1 Patch Transdermal daily  senna 2 Tablet(s) Oral at bedtime  sodium chloride 2 Gram(s) Oral every 6 hours    MEDICATIONS  (PRN):  acetaminophen     Tablet .. 650 milliGRAM(s) Oral every 6 hours PRN Temp greater or equal to 38C (100.4F), Mild Pain (1 - 3)  bisacodyl 5 milliGRAM(s) Oral daily PRN Constipation  ondansetron Injectable 4 milliGRAM(s) IV Push every 6 hours PRN Nausea and/or Vomiting  oxyCODONE    IR 5 milliGRAM(s) Oral every 4 hours PRN Moderate Pain (4 - 6)    Vital Signs Last 24 Hrs  T(C): 36.6 (12 Dec 2023 08:36), Max: 36.8 (11 Dec 2023 11:00)  T(F): 97.9 (12 Dec 2023 08:36), Max: 98.2 (11 Dec 2023 11:00)  HR: 68 (12 Dec 2023 08:36) (61 - 81)  BP: 131/75 (12 Dec 2023 08:36) (131/75 - 160/70)  BP(mean): 90 (11 Dec 2023 11:00) (90 - 90)  RR: 18 (12 Dec 2023 08:36) (18 - 18)  SpO2: 99% (12 Dec 2023 08:36) (95% - 100%)    PHYSICAL EXAM  Constitutional - NAD, Comfortable  HEENT - Incision intact, EOMI  Neck - Supple  Chest - No distress, no use of accessory muscles for respiration  Cardiovascular -Well perfused  Abdomen - BS+, Soft, NTND  Extremities - No C/C/E, No calf tenderness   Neurologic Exam -                 AAO x 3  Speech clear/fluent/intelligible  Motor RUE 4/5, LUE 4+/5, bl LEs 4+/5  No clonus    Psychiatric - Mood stable, Affect WNL                          11.4   14.94 )-----------( 208      ( 12 Dec 2023 05:03 )             33.6     12-12    136  |  101  |  39<H>  ----------------------------<  120<H>  3.9   |  23  |  1.20    Ca    7.9<L>      12 Dec 2023 05:02  Phos  2.7     12-12  Mg     1.7     12-12    Impression:  75 yo with functional deficits secondary to diagnosis of brain mass    Plan:  PT- ROM, Bed Mob, Transfers, Amb w AD and bracing as needed  OT- ADLs, bracing  SLP- Dysphagia eval and treat  Prec- Falls, Cardiac  DVT Prophylaxis- On Heparin  Skin- Turn q2 h  Dispo- Acute Rehab- patient requires active and ongoing therapeutic interventions of multiple disciplines and can tolerate 3 hours of therapies x 4wks. Can actively participate and benefit from  an intensive rehabilitation program. Requires supervision by a rehabilitation physician and a coordinated interdisciplinary approach to providing rehabilitation.    Patient is a 78y old  Female who presents with a chief complaint of unsteady gait / mass noted on CT head (11 Dec 2023 12:22)    Patient sitting in chair and comfortable.  No CP, no SOB.  Min A transfers and gait.    MEDICATIONS  (STANDING):  budesonide 160 MICROgram(s)/formoterol 4.5 MICROgram(s) Inhaler 2 Puff(s) Inhalation two times a day  chlorhexidine 4% Liquid 1 Application(s) Topical <User Schedule>  dexAMETHasone     Tablet 4 milliGRAM(s) Oral every 6 hours  dexAMETHasone     Tablet   Oral   escitalopram 10 milliGRAM(s) Oral daily  famotidine    Tablet 10 milliGRAM(s) Oral daily  heparin   Injectable 5000 Unit(s) SubCutaneous every 8 hours  influenza  Vaccine (HIGH DOSE) 0.7 milliLiter(s) IntraMuscular once  levETIRAcetam 250 milliGRAM(s) Oral every 12 hours  magnesium sulfate  IVPB 2 Gram(s) IV Intermittent once  nicotine -  14 mG/24Hr(s) Patch 1 Patch Transdermal daily  senna 2 Tablet(s) Oral at bedtime  sodium chloride 2 Gram(s) Oral every 6 hours    MEDICATIONS  (PRN):  acetaminophen     Tablet .. 650 milliGRAM(s) Oral every 6 hours PRN Temp greater or equal to 38C (100.4F), Mild Pain (1 - 3)  bisacodyl 5 milliGRAM(s) Oral daily PRN Constipation  ondansetron Injectable 4 milliGRAM(s) IV Push every 6 hours PRN Nausea and/or Vomiting  oxyCODONE    IR 5 milliGRAM(s) Oral every 4 hours PRN Moderate Pain (4 - 6)    Vital Signs Last 24 Hrs  T(C): 36.6 (12 Dec 2023 08:36), Max: 36.8 (11 Dec 2023 11:00)  T(F): 97.9 (12 Dec 2023 08:36), Max: 98.2 (11 Dec 2023 11:00)  HR: 68 (12 Dec 2023 08:36) (61 - 81)  BP: 131/75 (12 Dec 2023 08:36) (131/75 - 160/70)  BP(mean): 90 (11 Dec 2023 11:00) (90 - 90)  RR: 18 (12 Dec 2023 08:36) (18 - 18)  SpO2: 99% (12 Dec 2023 08:36) (95% - 100%)    PHYSICAL EXAM  Constitutional - NAD, Comfortable  HEENT - Incision intact, EOMI  Neck - Supple  Chest - No distress, no use of accessory muscles for respiration  Cardiovascular -Well perfused  Abdomen - BS+, Soft, NTND  Extremities - No C/C/E, No calf tenderness   Neurologic Exam -                 AAO x 3  Speech clear/fluent/intelligible  Motor RUE 4/5, LUE 4+/5, bl LEs 4+/5  No clonus    Psychiatric - Mood stable, Affect WNL                          11.4   14.94 )-----------( 208      ( 12 Dec 2023 05:03 )             33.6     12-12    136  |  101  |  39<H>  ----------------------------<  120<H>  3.9   |  23  |  1.20    Ca    7.9<L>      12 Dec 2023 05:02  Phos  2.7     12-12  Mg     1.7     12-12    Impression:  73 yo with functional deficits secondary to diagnosis of brain mass    Plan:  PT- ROM, Bed Mob, Transfers, Amb w AD and bracing as needed  OT- ADLs, bracing  SLP- Dysphagia eval and treat  Prec- Falls, Cardiac  DVT Prophylaxis- On Heparin  Skin- Turn q2 h  Dispo- Acute Rehab- patient requires active and ongoing therapeutic interventions of multiple disciplines and can tolerate 3 hours of therapies x 4wks. Can actively participate and benefit from  an intensive rehabilitation program. Requires supervision by a rehabilitation physician and a coordinated interdisciplinary approach to providing rehabilitation.

## 2023-12-12 NOTE — PROGRESS NOTE ADULT - NSPROGADDITIONALINFOA_GEN_ALL_CORE
.  Jeanie Adams MD  Division of Hospital Medicine  [facility]   Spectra: [phone]    Plan discussed with patient and neurosurgery GINNY Hernandez .  Jeanie Adams MD  Division of Hospital Medicine  [facility]   Spectra: [phone]    Plan discussed with patient and neurosurgery GINNY Hernandez

## 2023-12-12 NOTE — PHYSICAL THERAPY INITIAL EVALUATION ADULT - PLANNED THERAPY INTERVENTIONS, PT EVAL
GOAL: PAtient will negotiate up / down 12 steps with supervision in 2 weeks/balance training/bed mobility training/gait training/transfer training

## 2023-12-12 NOTE — DISCHARGE NOTE PROVIDER - PROVIDER TOKENS
PROVIDER:[TOKEN:[[id number]]],PROVIDER:[TOKEN:[[id number]]] PROVIDER:[TOKEN:[[id number]]],PROVIDER:[TOKEN:[[id number]]]

## 2023-12-12 NOTE — PHYSICAL THERAPY INITIAL EVALUATION ADULT - ADDITIONAL COMMENTS
Pt lives in an apartment with boyfriend there are 12 steps to enter, first floor set up. Pt performed ADL/IADLs with some assist. Ambulates with no assistive device. Patient endorses recent falls
Pt lives in an apartment with boyfriend there are 12 steps to enter, first floor set up. Pt performed ADL/IADLs with some assist. Ambulates with no assistive device.

## 2023-12-12 NOTE — DISCHARGE NOTE PROVIDER - NSDCACTIVITY_GEN_ALL_CORE
Bathing allowed/Do not drive or operate machinery/Stairs allowed/Walking - Indoors allowed/No heavy lifting/straining/Walking - Outdoors allowed/Follow Instructions Provided by your Surgical Team Bathing allowed/Do not drive or operate machinery/Showering allowed/Stairs allowed/Walking - Indoors allowed/No heavy lifting/straining/Walking - Outdoors allowed/Follow Instructions Provided by your Surgical Team

## 2023-12-12 NOTE — DISCHARGE NOTE PROVIDER - DETAILS OF MALNUTRITION DIAGNOSIS/DIAGNOSES
This patient has been assessed with a concern for Malnutrition and was treated during this hospitalization for the following Nutrition diagnosis/diagnoses:     -  12/06/2023: Moderate protein-calorie malnutrition

## 2023-12-12 NOTE — PROGRESS NOTE ADULT - SUBJECTIVE AND OBJECTIVE BOX
Vascular Cardiology Progress Note     DIRECT PROVIDER NUMBER: [phone]  / Available on TEAMS    CC:  unsteady gait / mass noted on CT head    Interval Events:  Denies C/P or SOB.  No LE pain or edema.    Allergies  penicillin (Short breath; Hives)    MEDICATIONS  (STANDING):  bisacodyl 5 milliGRAM(s) Oral at bedtime  budesonide 160 MICROgram(s)/formoterol 4.5 MICROgram(s) Inhaler 2 Puff(s) Inhalation two times a day  calcium gluconate IVPB 2 Gram(s) IV Intermittent once  chlorhexidine 4% Liquid 1 Application(s) Topical <User Schedule>  dexAMETHasone     Tablet   Oral   dexAMETHasone     Tablet 4 milliGRAM(s) Oral every 6 hours  escitalopram 10 milliGRAM(s) Oral daily  famotidine    Tablet 10 milliGRAM(s) Oral daily  heparin   Injectable 5000 Unit(s) SubCutaneous every 8 hours  influenza  Vaccine (HIGH DOSE) 0.7 milliLiter(s) IntraMuscular once  levETIRAcetam 250 milliGRAM(s) Oral every 12 hours  nicotine -  14 mG/24Hr(s) Patch 1 Patch Transdermal daily  polyethylene glycol 3350 17 Gram(s) Oral two times a day  senna 2 Tablet(s) Oral at bedtime  sodium chloride 0.9% Bolus 500 milliLiter(s) IV Bolus once      PAST MEDICAL & SURGICAL HISTORY:  Arthritis  left knee recently, right knee arthritis for several years  HTN (hypertension)  controlled with meds for several years  Depression  Regurgitation  tricuspid valve  Osteoarthritis of multiple joints, unspecified osteoarthritis type  COPD (chronic obstructive pulmonary disease)  Solitary pulmonary nodule  Received last dose of chemo 2019  History of lung cancer  S/P appendectomy  in   S/P breast biopsy  bilateral  breast  - benign  S/P D&C  for missed  about 25 years ago  Female bladder prolapse  bladder sling -     FAMILY HISTORY:  Family history of pancreatic cancer (Father)  Family history of COPD (chronic obstructive pulmonary disease) (Mother)  Family history of heart disease (Mother)  Family history of leukemia (Sibling)  brother  Family history of hepatitis C (Sibling)  brother  Family history of rheumatic fever (Sibling)    SOCIAL HISTORY:  unchanged    REVIEW OF SYSTEMS:  CONSTITUTIONAL: No fever  EYES: No eye pain  ENT:  No throat pain  NECK: No pain   RESPIRATORY: No SOB   CARDIOVASCULAR: No C/P  GASTROINTESTINAL: No abdominal pain  GENITOURINARY: No hematuria  SKIN: No LE wounds  LYMPH Nodes: No enlarged glands noted  ENDOCRINE: No heat or cold intolerance noted  MUSCULOSKELETAL: No LE edema   PSYCHIATRIC: No depression, anxiety  HEME/LYMPH: No bleeding gums  ALLERGY AND IMMUNOLOGIC: No hives    [ x] All others negative	    PHYSICAL EXAM:  Vital Signs Last 24 Hrs  T(C): 36.5 (12 Dec 2023 11:54), Max: 36.7 (11 Dec 2023 21:11)  T(F): 97.7 (12 Dec 2023 11:54), Max: 98.1 (11 Dec 2023 21:11)  HR: 55 (12 Dec 2023 11:54) (55 - 81)  BP: 132/80 (12 Dec 2023 11:54) (131/75 - 160/70)  BP(mean): --  RR: 18 (12 Dec 2023 11:54) (18 - 18)  SpO2: 99% (12 Dec 2023 11:54) (95% - 99%)    Parameters below as of 12 Dec 2023 04:50  Patient On (Oxygen Delivery Method): room air      Appearance: NAD  	  HEENT: Dressing intact to scalp  Cardiovascular: RRR, S1 and S2  Respiratory: CTA B/L  Psychiatry:  AAO x 3  Gastrointestinal:  Soft, Non-tender, + BS	  Skin: No cyanosis	  Extremities: No LE edema, bilateral calves soft      LABS:	 	                        11.4   14.94 )-----------( 208      ( 12 Dec 2023 05:03 )             33.6     12-    136  |  101  |  39<H>  ----------------------------<  120<H>  3.9   |  23  |  1.20    Ca    7.9<L>      12 Dec 2023 05:02  Phos  2.7     12-  Mg     1.7     12-12      Urinalysis Basic - ( 12 Dec 2023 05:02 )    Color: x / Appearance: x / SG: x / pH: x  Gluc: 120 mg/dL / Ketone: x  / Bili: x / Urobili: x   Blood: x / Protein: x / Nitrite: x   Leuk Esterase: x / RBC: x / WBC x   Sq Epi: x / Non Sq Epi: x / Bacteria: x      Assessment:  1. Bilateral Soleal DVT  2. S/p right craniotomy for tumor resection on .  3. History of Lung CA  4. Abdominal Aorta Dilated 4 cm on CT .      Plan:  1. Continue Heparin Sub. Cut. 5000 units TID for below knee DVT with recent neurosurgery.      Recommend repeat LE venous duplex on 12/15.  2. Patient hemodynamically stable on RA and denies any cardiopulmonary symptoms.   3. Appreciate pulmonary follow-up for lung nodule on CT.  4. Outpatient follow-up for dilated abdominal aorta.     5. Appreciate excellent Neurosurgical acre.        Thank you  GINNY Navarro, MS, [facility]  Vascular Cardiology Service    Please call with any questions:   DIRECT SERVICE NUMBER: [phone] / Available on TEAMS Vascular Cardiology Progress Note     DIRECT PROVIDER NUMBER: [phone]  / Available on TEAMS    CC:  unsteady gait / mass noted on CT head    Interval Events:  Denies C/P or SOB.  No LE pain or edema.    Allergies  penicillin (Short breath; Hives)    MEDICATIONS  (STANDING):  bisacodyl 5 milliGRAM(s) Oral at bedtime  budesonide 160 MICROgram(s)/formoterol 4.5 MICROgram(s) Inhaler 2 Puff(s) Inhalation two times a day  calcium gluconate IVPB 2 Gram(s) IV Intermittent once  chlorhexidine 4% Liquid 1 Application(s) Topical <User Schedule>  dexAMETHasone     Tablet   Oral   dexAMETHasone     Tablet 4 milliGRAM(s) Oral every 6 hours  escitalopram 10 milliGRAM(s) Oral daily  famotidine    Tablet 10 milliGRAM(s) Oral daily  heparin   Injectable 5000 Unit(s) SubCutaneous every 8 hours  influenza  Vaccine (HIGH DOSE) 0.7 milliLiter(s) IntraMuscular once  levETIRAcetam 250 milliGRAM(s) Oral every 12 hours  nicotine -  14 mG/24Hr(s) Patch 1 Patch Transdermal daily  polyethylene glycol 3350 17 Gram(s) Oral two times a day  senna 2 Tablet(s) Oral at bedtime  sodium chloride 0.9% Bolus 500 milliLiter(s) IV Bolus once      PAST MEDICAL & SURGICAL HISTORY:  Arthritis  left knee recently, right knee arthritis for several years  HTN (hypertension)  controlled with meds for several years  Depression  Regurgitation  tricuspid valve  Osteoarthritis of multiple joints, unspecified osteoarthritis type  COPD (chronic obstructive pulmonary disease)  Solitary pulmonary nodule  Received last dose of chemo 2019  History of lung cancer  S/P appendectomy  in   S/P breast biopsy  bilateral  breast  - benign  S/P D&C  for missed  about 25 years ago  Female bladder prolapse  bladder sling -     FAMILY HISTORY:  Family history of pancreatic cancer (Father)  Family history of COPD (chronic obstructive pulmonary disease) (Mother)  Family history of heart disease (Mother)  Family history of leukemia (Sibling)  brother  Family history of hepatitis C (Sibling)  brother  Family history of rheumatic fever (Sibling)    SOCIAL HISTORY:  unchanged    REVIEW OF SYSTEMS:  CONSTITUTIONAL: No fever  EYES: No eye pain  ENT:  No throat pain  NECK: No pain   RESPIRATORY: No SOB   CARDIOVASCULAR: No C/P  GASTROINTESTINAL: No abdominal pain  GENITOURINARY: No hematuria  SKIN: No LE wounds  LYMPH Nodes: No enlarged glands noted  ENDOCRINE: No heat or cold intolerance noted  MUSCULOSKELETAL: No LE edema   PSYCHIATRIC: No depression, anxiety  HEME/LYMPH: No bleeding gums  ALLERGY AND IMMUNOLOGIC: No hives    [ x] All others negative	    PHYSICAL EXAM:  Vital Signs Last 24 Hrs  T(C): 36.5 (12 Dec 2023 11:54), Max: 36.7 (11 Dec 2023 21:11)  T(F): 97.7 (12 Dec 2023 11:54), Max: 98.1 (11 Dec 2023 21:11)  HR: 55 (12 Dec 2023 11:54) (55 - 81)  BP: 132/80 (12 Dec 2023 11:54) (131/75 - 160/70)  BP(mean): --  RR: 18 (12 Dec 2023 11:54) (18 - 18)  SpO2: 99% (12 Dec 2023 11:54) (95% - 99%)    Parameters below as of 12 Dec 2023 04:50  Patient On (Oxygen Delivery Method): room air      Appearance: NAD  	  HEENT: Dressing intact to scalp  Cardiovascular: RRR, S1 and S2  Respiratory: CTA B/L  Psychiatry:  AAO x 3  Gastrointestinal:  Soft, Non-tender, + BS	  Skin: No cyanosis	  Extremities: No LE edema, bilateral calves soft      LABS:	 	                        11.4   14.94 )-----------( 208      ( 12 Dec 2023 05:03 )             33.6     12-    136  |  101  |  39<H>  ----------------------------<  120<H>  3.9   |  23  |  1.20    Ca    7.9<L>      12 Dec 2023 05:02  Phos  2.7     12-  Mg     1.7     12-12      Urinalysis Basic - ( 12 Dec 2023 05:02 )    Color: x / Appearance: x / SG: x / pH: x  Gluc: 120 mg/dL / Ketone: x  / Bili: x / Urobili: x   Blood: x / Protein: x / Nitrite: x   Leuk Esterase: x / RBC: x / WBC x   Sq Epi: x / Non Sq Epi: x / Bacteria: x      Assessment:  1. Bilateral Soleal DVT  2. S/p right craniotomy for tumor resection on .  3. History of Lung CA  4. Abdominal Aorta Dilated 4 cm on CT .      Plan:  1. Continue Heparin Sub. Cut. 5000 units TID for below knee DVT with recent neurosurgery.      Recommend repeat LE venous duplex on 12/15.  2. Patient hemodynamically stable on RA and denies any cardiopulmonary symptoms.   3. Appreciate pulmonary follow-up for lung nodule on CT.  4. Outpatient follow-up for dilated abdominal aorta.     5. Appreciate excellent Neurosurgical acre.        Thank you  GINNY Navarro, MS, [facility]  Vascular Cardiology Service    Please call with any questions:   DIRECT SERVICE NUMBER: [phone] / Available on TEAMS

## 2023-12-12 NOTE — DISCHARGE NOTE PROVIDER - NSDCCPCAREPLAN_GEN_ALL_CORE_FT
PRINCIPAL DISCHARGE DIAGNOSIS  Diagnosis: Brain mass  Assessment and Plan of Treatment:      PRINCIPAL DISCHARGE DIAGNOSIS  Diagnosis: Brain mass  Assessment and Plan of Treatment: -Please follow up with Dr. Barcenas in one week; you may call the office to make an appointment at your earliest convenience.  -No strenous activity. No heavy lifting. Do not return to work or operate a motor vehicle until cleared by physician. DO NOT start aspirin / NSAIDS (motrin, advil ...) until cleared by your Neurosurgeon.  -You may shower on the 3rd day after your surgery.  No soaking in tub,   Do not apply any ointments to incision.  Ok to shower but please do not allow water to hit incision site directly. Gently pat dry after shower. Staples will be removed at follow up appointment. Keep incision site clean and dry. OK to shower but do not scrub incision area and pat dry when done.  -Return to ER immediately for any of the following: fever, bleeding, new onset numbness/tingling/weakness, nausea and/or vomiting, chest pain, shortness of breath, confusion, seizure, altered mental status, urinary and/or fecal incontinence or retention.       PRINCIPAL DISCHARGE DIAGNOSIS  Diagnosis: Brain mass  Assessment and Plan of Treatment: -Please follow up with Dr. Barcenas in one week; you may call the office to make an appointment at your earliest convenience.  -No strenous activity. No heavy lifting. Do not return to work or operate a motor vehicle until cleared by physician. DO NOT start aspirin / NSAIDS (motrin, advil ...) until cleared by your Neurosurgeon.  -You may shower on the 3rd day after your surgery.  No soaking in tub,   Do not apply any ointments to incision.  Ok to shower but please do not allow water to hit incision site directly. Gently pat dry after shower. Staples will be removed at follow up appointment. Keep incision site clean and dry. OK to shower but do not scrub incision area and pat dry when done.  -Return to ER immediately for any of the following: fever, bleeding, new onset numbness/tingling/weakness, nausea and/or vomiting, chest pain, shortness of breath, confusion, seizure, altered mental status, urinary and/or fecal incontinence or retention.  Follow up with oncologist Dr. Fredis Solomon at [facility]        SECONDARY DISCHARGE DIAGNOSES  Diagnosis: Acute kidney injury superimposed on CKD  Assessment and Plan of Treatment: Resolved. Follow up with nephrologist    Diagnosis: Hypertension  Assessment and Plan of Treatment: Please make an appointment for follow up with your primary care physician after discharge. Continue with Nifedipine Xl.      Diagnosis: Hyponatremia  Assessment and Plan of Treatment: Resolved. Follow up with nephrologist.    Diagnosis: Acute deep vein thrombosis (DVT) of lower extremity  Assessment and Plan of Treatment: Bilateral soleal DVT. Follow up with Dr. Aparicio Vascular Cardiologist.    Diagnosis: Ground glass opacity present on imaging of lung  Assessment and Plan of Treatment: Follow up with Dr. Fredis Solomon at [facility] regarding repeat CT Chest and PET scan.

## 2023-12-12 NOTE — DISCHARGE NOTE PROVIDER - NSDCFUADDINST_GEN_ALL_CORE_FT
Return to Emergency Department or contact your Neurosurgeon if any changes in mental status, weakness, numbness or tingling of extremities; difficulty swallowing; drainage or redness of wound, fever; pain in legs; difficulty urinating or constipation.  Donot restart your Aspirin or take any Motrin/NSAIDS until checking with your Neurosurgeon.  No strenous activity. No heavy lifting. Do not return to work until cleared by physician. No driving until cleared by physician.  Remove dressing on 3rd day after your surgery and leave incision open to air. You may shower on the 3rd day after you surgery.  No soaking in tub,  Donot remove steri strips if present.  Donot apply any ointements to incision.    B/L Low Ext DVT-No venodynes.  Rpt Dopp LW 10/15 Return to Emergency Department or contact your neurosurgeon if any changes in mental status, weakness, numbness or tingling of extremities; difficulty swallowing; drainage or redness of wound, fever; pain in legs; difficulty urinating or constipation.  Do not restart your Aspirin or take any Motrin/NSAIDS until checking with your Neurosurgeon.  No strenuous activity. No heavy lifting. Do not return to work until cleared by physician. No driving until cleared by physician.  Remove dressing on 3rd day after your surgery and leave incision open to air. You may shower on the 3rd day after you surgery.  No soaking in tub,  Do not remove steri strips if present.  Do not apply any ointments to incision.    B/L Low Ext DVT-No venodynes.  Rpt Dopp LW 12/15 Return to Emergency Department or contact your neurosurgeon if any changes in mental status, weakness, numbness or tingling of extremities; difficulty swallowing; drainage or redness of wound, fever; pain in legs; difficulty urinating or constipation.  Do not restart your Aspirin or take any Motrin/NSAIDS until checking with your Neurosurgeon.  No strenuous activity. No heavy lifting. Do not return to work until cleared by physician. No driving until cleared by physician.  Remove dressing on 3rd day after your surgery and leave incision open to air. You may shower on the 3rd day after you surgery.  No soaking in tub,  Do not remove steri strips if present.  Do not apply any ointments to incision.    B/L Low Ext DVT-No venodynes.  Rpt Doppler of lower extremity on 12/22 ,  12/29 and 7 days after that if patient is still at rehab- please communicate result with Dr. Heck.   -Please continue dvt ppx sq heparin until further follow up with Dr. Aparicio.   -Please remove surgical site staples on 12/24.   -Please check sodium once a week and if sodium normal then can take off water restriction.

## 2023-12-12 NOTE — PROGRESS NOTE ADULT - ASSESSMENT
74 year old female with history of lung cancer s/p chemotherapy 5 years ago previously in remission (oncologist Dr. Fredis Solomon at [facility]), COPD, HTN presented to [facility] with unsteady gait for a few weeks. Patient's  found her on he floor prompting him to call 911. Went to [place] and CT brain was remarkable for brain mass with midline shift, cyst vs necrotic mass. Transferred to [facility] for neurosurgery evaluation. Neurosurgery was consulted and reccomended MRI brain, CTAP and CT chest to look for possible malignancies, Keppra 500 mg BID, and Decadron 8 mg once in the ED and then Decadron 4mg q 6 hours. Reports shortness of breath when going up staurs, No fevers, chest pain, palpitations, vomiting, abdominal pain, diarrhea, constipation, dysuria, hematuria, melena, hematochezia.  (04 Dec 2023 13:50)    PROCEDURE:  Adm 12/4. 12/9 Rt Pariet Crani Rsxn Tumor      POD#3    PLAN:  Neuro: Path-P. 12/10 B/L LE DVT, SQH TID, Rpt LED 12/15. Hyponatremia resolved, DC NaCl tabs now, Cont FR 1L/day FU Na. Brielle 500cc now for increase BUN. Hypocalcemia supp now FU. Dex taper as written x 2 weeks.  Inc activity/OOB.     Pulm note of 12/11-Stage 4 lung adenocarcinoma. R fissure nodule PET avid in 2019 she has adenocarcinoma, MS stable, no mutations, biopsy done here Oct 2019 by thoracic; was on Keytruda was on immunotherapy now with ground glass accumulation L lung, R fissure (right was there previously and is smaller) - f/u outpatient for L GGO - repeat CT/PET within 4-6 weeks and compare to recent PET- f/u brain mass pathology COPD Not on home O2- continue home Breo Ellipta or Symbicort + albuterol PRN - not in exacerbation currently  - follow up outpatient with her pulmonologist as per pt preference - follows at [facility] to sign off at this time; please reconsult as needed Josh Salinas MD  Pulmonary/Critical Care [phone] [facility] [id number] [facility] Cards/Dr KATHE Aparicio note of 12/11-1. Bilateral Soleal DVT 2. S/p right craniotomy for tumor resection on 12/9. 3. History of Lung CA 4. Abdominal Aorta Dilated 4 cm on CT 12/4.    Plan:1. Continue Heparin Sub. Cut. 5000 units TID for below knee DVT with recent neurosurgery,    Recommend repeat LE venous duplex on 12/15.2. Patient hemodynamically stable on RA and denies any cardiopulmonary symptoms. 3. Appreciate pulmonary follow-up for lung nodule on CT.4. Outpatient follow-up for dilated abdominal aorta.   5. Appreciate excellent Neurosurgical acre.     Nephro note of 12/11- Problem/Plan - 1:·  Problem: CKD (chronic kidney disease). ·  Plan: Pt with TEREZA on CKD, now resolved. CKD likely in setting of distant excessive NSAID use. Per Elina CHENG, Scr of 1.7 in Nov, 2021 and 1.7 in Aug 2023. Now here with Scr of 1.7 on admission 12/3, peaked at 2.08 on 12/6 and now decreased to 1.45 today. Patient received IV contrasts on 12/4. No hypotension no nephrotoxic agents noted. Patient was given triamterene/HCTZ 12/3-12/6. UA completely bland. Renal US with b/l renal parenchymal disease and cysts. Monitor BMP and dose meds per CrCl. Problem/Plan - 2:·  Problem: Hyponatremia. ·  Plan: Pt with hyponatremia. Likely from patient receiving meds in D5W/increased fluid intake vs lab error. Serum sodium dropped to 131 (12/9), now improved to 135 today. Continue to hold HCTZ. Can d/c NaCl tabs. Please restrict free water intake. Continue to monitor serum SNa. Final recommendations pending attending signature.If you have any questions, please feel free to contact me Angela Davis Nephrology Fellow [place]/Page [phone]  (After 5pm or on weekends please page the on-call fellow).    Med note of 12/9- Problem/Plan - 1:·  Problem: Brain mass. ·  Plan: - CT head showing y 2.3 x 1.5 x 1.7 cm cyst versus centrally necrotic mass in the right parietal-temporal region.   Diffuse vasogenic edema throughout the right frontal, parietal and temporal lobes.  Mass effect in the right cerebral hemisphere with 4.5 cm of midline shift to the left.  - MRI confirmed neoplasm. - Neurosurgery following, plan for resection on 12/09- Keppra 500 mg BID and Dexamethasone 4 mg q6 hours.- PT and OT- Plan for resection by neurosurgery on 12/09/2023 Patient to go for resection of brain mass with neurosurgery, RCRI score of 2 points indicating 10.1% risk for 30 day death, MI, or cardiac arrest.  NPO at midnight prior to day of surgery Will hold anticoagulation and insulin day of surgery. Continue remaining medications day of surgery.Patient is medically optimized for surgery.   Problem/Plan - 2:·  Problem: Ground glass opacity present on imaging of lung. ·  Plan: - Previous history of lungf cancer, last chemotherapy 5 years ago, in remission- Patient continued to smoke pack per day for 30-40 years. - CT chest with findings of 18 mm groundglass nodule in the superior segment of the left lower lobe, which may reflect malignancy. Additional nodule along the right oblique fissure not well assessed secondary to motion. Pulm following- Thoracic surgery not recommending surgical intervention at this time. - To obtain outpatient records from patient's oncologist. - Patient self discontinued chemotherapy for lung cancer 5 years ago due to side effects. - Brain mass to be addressed prior to any pulmonary intervention- Reached out to outpatient oncology. Problem/Plan - 3:·  Problem: History of COPD. ·  Plan: - Budesonide 80 mcg/Formoterol 4.5 mcg 2 puffs BID.   Problem/Plan - 4:·  Problem: Hypertension. ·  Plan: - Hold home Tiamterene-Hydrochlorothiazide 37.5-25 mg daily due to elevated Cr. Problem/Plan - 5:·  Problem: CKD (chronic kidney disease). ·  Plan: - Scr of 2.08 12/-6 -> 1.72 on 12/07 -  TEREZA on CKD, Scr of 1.7 back in 2019- Urine studies ordered- US renal shows parenchymal disease- Avoid nephrotoxic agents  - Holding HCTZ at this time. Problem/Plan - 6:·  Problem: Prophylactic measure. ·  Plan: DVT: Heparin 5000 TID npo at midnight in anticipation of procedure. Diet: DASH, npo at midnight in anticipation of procedure.    Respiratory: Patient instructed to use incentive spirometer [ X] YES [ ] NO              DVT ppx: [X ] SQL [ ] SQH and Venodynes [ ] Left [ ] Right [ X] Bilateral    Discharge Planning:  The patient was evaluated by PT/OT/PMR and recommended Acute Rehab.   She was subsequently DC on >>> in stable condition.    More than 30 minutes spent on total encounter: more than 50% of the visit was spent on educating the patient and family regarding condition, medications, follow up plans, signs and symptoms to be concerned with, preparing paperwork, and questions answered regarding discharge.       74 year old female with history of lung cancer s/p chemotherapy 5 years ago previously in remission (oncologist Dr. Fredis Solomon at [facility]), COPD, HTN presented to [facility] with unsteady gait for a few weeks. Patient's  found her on he floor prompting him to call 911. Went to [place] and CT brain was remarkable for brain mass with midline shift, cyst vs necrotic mass. Transferred to [facility] for neurosurgery evaluation. Neurosurgery was consulted and reccomended MRI brain, CTAP and CT chest to look for possible malignancies, Keppra 500 mg BID, and Decadron 8 mg once in the ED and then Decadron 4mg q 6 hours. Reports shortness of breath when going up staurs, No fevers, chest pain, palpitations, vomiting, abdominal pain, diarrhea, constipation, dysuria, hematuria, melena, hematochezia.  (04 Dec 2023 13:50)    PROCEDURE:  Adm 12/4. 12/9 Rt Pariet Crani Rsxn Tumor      POD#3    PLAN:  Neuro: Path-P. 12/10 B/L LE DVT, SQH TID, Rpt LED 12/15. Hyponatremia resolved, DC NaCl tabs now, Cont FR 1L/day FU Na. Brielle 500cc now for increase BUN. Hypocalcemia supp now FU. Dex taper as written x 2 weeks.  Inc activity/OOB.     Pulm note of 12/11-Stage 4 lung adenocarcinoma. R fissure nodule PET avid in 2019 she has adenocarcinoma, MS stable, no mutations, biopsy done here Oct 2019 by thoracic; was on Keytruda was on immunotherapy now with ground glass accumulation L lung, R fissure (right was there previously and is smaller) - f/u outpatient for L GGO - repeat CT/PET within 4-6 weeks and compare to recent PET- f/u brain mass pathology COPD Not on home O2- continue home Breo Ellipta or Symbicort + albuterol PRN - not in exacerbation currently  - follow up outpatient with her pulmonologist as per pt preference - follows at [facility] to sign off at this time; please reconsult as needed Josh Salinas MD  Pulmonary/Critical Care [phone] [facility] [address] [facility] Cards/Dr KATHE Aparicio note of 12/11-1. Bilateral Soleal DVT 2. S/p right craniotomy for tumor resection on 12/9. 3. History of Lung CA 4. Abdominal Aorta Dilated 4 cm on CT 12/4.    Plan:1. Continue Heparin Sub. Cut. 5000 units TID for below knee DVT with recent neurosurgery,    Recommend repeat LE venous duplex on 12/15.2. Patient hemodynamically stable on RA and denies any cardiopulmonary symptoms. 3. Appreciate pulmonary follow-up for lung nodule on CT.4. Outpatient follow-up for dilated abdominal aorta.   5. Appreciate excellent Neurosurgical acre.     Nephro note of 12/11- Problem/Plan - 1:·  Problem: CKD (chronic kidney disease). ·  Plan: Pt with TEREZA on CKD, now resolved. CKD likely in setting of distant excessive NSAID use. Per Elina CHENG, Scr of 1.7 in Nov, 2021 and 1.7 in Aug 2023. Now here with Scr of 1.7 on admission 12/3, peaked at 2.08 on 12/6 and now decreased to 1.45 today. Patient received IV contrasts on 12/4. No hypotension no nephrotoxic agents noted. Patient was given triamterene/HCTZ 12/3-12/6. UA completely bland. Renal US with b/l renal parenchymal disease and cysts. Monitor BMP and dose meds per CrCl. Problem/Plan - 2:·  Problem: Hyponatremia. ·  Plan: Pt with hyponatremia. Likely from patient receiving meds in D5W/increased fluid intake vs lab error. Serum sodium dropped to 131 (12/9), now improved to 135 today. Continue to hold HCTZ. Can d/c NaCl tabs. Please restrict free water intake. Continue to monitor serum SNa. Final recommendations pending attending signature.If you have any questions, please feel free to contact me Angela Davis Nephrology Fellow [place]/Page [phone]  (After 5pm or on weekends please page the on-call fellow).    Med note of 12/9- Problem/Plan - 1:·  Problem: Brain mass. ·  Plan: - CT head showing y 2.3 x 1.5 x 1.7 cm cyst versus centrally necrotic mass in the right parietal-temporal region.   Diffuse vasogenic edema throughout the right frontal, parietal and temporal lobes.  Mass effect in the right cerebral hemisphere with 4.5 cm of midline shift to the left.  - MRI confirmed neoplasm. - Neurosurgery following, plan for resection on 12/09- Keppra 500 mg BID and Dexamethasone 4 mg q6 hours.- PT and OT- Plan for resection by neurosurgery on 12/09/2023 Patient to go for resection of brain mass with neurosurgery, RCRI score of 2 points indicating 10.1% risk for 30 day death, MI, or cardiac arrest.  NPO at midnight prior to day of surgery Will hold anticoagulation and insulin day of surgery. Continue remaining medications day of surgery.Patient is medically optimized for surgery.   Problem/Plan - 2:·  Problem: Ground glass opacity present on imaging of lung. ·  Plan: - Previous history of lungf cancer, last chemotherapy 5 years ago, in remission- Patient continued to smoke pack per day for 30-40 years. - CT chest with findings of 18 mm groundglass nodule in the superior segment of the left lower lobe, which may reflect malignancy. Additional nodule along the right oblique fissure not well assessed secondary to motion. Pulm following- Thoracic surgery not recommending surgical intervention at this time. - To obtain outpatient records from patient's oncologist. - Patient self discontinued chemotherapy for lung cancer 5 years ago due to side effects. - Brain mass to be addressed prior to any pulmonary intervention- Reached out to outpatient oncology. Problem/Plan - 3:·  Problem: History of COPD. ·  Plan: - Budesonide 80 mcg/Formoterol 4.5 mcg 2 puffs BID.   Problem/Plan - 4:·  Problem: Hypertension. ·  Plan: - Hold home Tiamterene-Hydrochlorothiazide 37.5-25 mg daily due to elevated Cr. Problem/Plan - 5:·  Problem: CKD (chronic kidney disease). ·  Plan: - Scr of 2.08 12/-6 -> 1.72 on 12/07 -  TEREZA on CKD, Scr of 1.7 back in 2019- Urine studies ordered- US renal shows parenchymal disease- Avoid nephrotoxic agents  - Holding HCTZ at this time. Problem/Plan - 6:·  Problem: Prophylactic measure. ·  Plan: DVT: Heparin 5000 TID npo at midnight in anticipation of procedure. Diet: DASH, npo at midnight in anticipation of procedure.    Respiratory: Patient instructed to use incentive spirometer [ X] YES [ ] NO              DVT ppx: [X ] SQL [ ] SQH and Venodynes [ ] Left [ ] Right [ X] Bilateral    Discharge Planning:  The patient was evaluated by PT/OT/PMR and recommended Acute Rehab.   She was subsequently DC on >>> in stable condition.    More than 30 minutes spent on total encounter: more than 50% of the visit was spent on educating the patient and family regarding condition, medications, follow up plans, signs and symptoms to be concerned with, preparing paperwork, and questions answered regarding discharge.

## 2023-12-12 NOTE — PROGRESS NOTE ADULT - NS ATTEND AMEND GEN_ALL_CORE FT
. Continue Heparin Sub. Cut. 5000 units TID for below knee DVT with recent neurosurgery.      Recommend repeat LE venous duplex on 12/15.

## 2023-12-12 NOTE — DISCHARGE NOTE PROVIDER - NSDCFUADDAPPT_GEN_ALL_CORE_FT
Please call your Neurosurgeon for an appointment after Rehab for wound check and further recommendations.    Followup with your Jose Ramon MD after Rehab    Followup with your Pulmonologist for Lung nodule    Followup Vascular/Dr Aparicio for Lower ext DVT   Please call your Neurosurgeon for an appointment after Rehab for wound check and further recommendations.    Follow up with your Private MD after Rehab    Follow up with your Pulmonologist for Lung nodule    Follow up Vascular/Dr Aparicio for Lower ext soleal DVT    Follow up with your oncologist    Follow up with Nephrologist   Please call your Neurosurgeon for an appointment after Rehab for wound check and further recommendations.    Follow up with your Private MD after Rehab    Follow up with your Pulmonologist for Lung nodule    Follow up Vascular/Dr Jarad Aparicio for Lower ext soleal DVT    Upon discharge will need close follow up with Dr. Fredis Solomon at [facility] for ongoing Oncology management and decision regarding referring to Radiation oncology    Upon discharge please have patient follow up with Nephrology in 2-3wks. For appointment scheduling with one of our physicians please email Nephrology at [email or web address]. Our address is [address] [place] [address], and office number .   Please call your Neurosurgeon for an appointment after Rehab for wound check and further recommendations.    Follow up with your Private MD after Rehab    Follow up with your Pulmonologist for Lung nodule    Follow up Vascular/Dr Jarad Aparicio for Lower ext soleal DVT    Upon discharge will need close follow up with Dr. Fredis Solomon at [facility] for ongoing Oncology management and decision regarding referring to Radiation oncology    Upon discharge please have patient follow up with Nephrology in 2-3wks. For appointment scheduling with one of our physicians please email Nephrology at [email or web address]. Our address is [address] [place] [address], and office number .

## 2023-12-12 NOTE — PROGRESS NOTE ADULT - SUBJECTIVE AND OBJECTIVE BOX
SUBJECTIVE: This is my initial visit with this pt. Doing well in NAD, but c/o mild Rt sided/incisional pain.    OVERNIGHT EVENTS: None    Vital Signs Last 24 Hrs  T(C): 36.6 (12 Dec 2023 08:36), Max: 36.7 (11 Dec 2023 21:11)  T(F): 97.9 (12 Dec 2023 08:36), Max: 98.1 (11 Dec 2023 21:11)  HR: 68 (12 Dec 2023 08:36) (61 - 81)  BP: 131/75 (12 Dec 2023 08:36) (131/75 - 160/70)  BP(mean): --  RR: 18 (12 Dec 2023 08:36) (18 - 18)  SpO2: 99% (12 Dec 2023 08:36) (95% - 99%)    Parameters below as of 12 Dec 2023 04:50  Patient On (Oxygen Delivery Method): room air    IVF: [X ] IVL [ ] NS+K@ FR 1L/Day  DIET: [X ] Regular-Minced & Moist  [ ] CCD [ ] Renal [ ] Puree [ ] Dysphagia [ ] Tube Feeds:   PCA: [ ] YES [x ] NO   SWENSON: [ ] YES [ x] NO [ X] VOID   BM: [ ] YES [X ] NO     DRAINS: None    PHYSICAL EXAM:    General: No Acute Distress     Neurological: Awake, alert oriented to person, place and time, Following Commands, PERRL, EOMI, Face Symmetrical, Speech Fluent, Moving all extremities, Muscle Strength normal in all four extremities, No Drift, Sensation to Light Touch Intact    Pulmonary: Clear to Auscultation, No Rales, No Rhonchi, No Wheezes     Cardiovascular: S1, S2, Regular Rate and Rhythm     Gastrointestinal: Soft, Nontender, Nondistended     Incision: Rt post auricular staples-flat/CDI    LABS:                        11.4   14.94 )-----------( 208      ( 12 Dec 2023 05:03 )             33.6    12-12    136  |  101  |  39<H>  ----------------------------<  120<H>  3.9   |  23  |  1.20    Ca    7.9<L>      12 Dec 2023 05:02  Phos  2.7     12-12  Mg     1.7     12-12    Culture - Urine (12.03.23 @ 23:10)    Specimen Source: Clean Catch Clean Catch (Midstream)   Culture Results:   <10,000 CFU/mL Normal Urogenital Isabella    12-11 @ 07:01  -  12-12 @ 07:00  --------------------------------------------------------  IN: 240 mL / OUT: 900 mL / NET: -660 mL    IMAGING:   < from: VA Duplex Lower Ext Vein Scan, Bilat (12.10.23 @ 15:57) >  Acute deep venous thrombosis: below the knee.    Bilateral soleal veins acute deep vein thrombosis.    < end of copied text >    < from: MR Head w/wo IV Cont (12.10.23 @ 15:50) >    IMPRESSION: Right parietal craniotomy with postoperative changes after   resection of right parietal ring-enhancing mass. No significant   enhancement. No change in vasogenic edema compared with 12/4/2023.    < end of copied text >    < from: US Kidney and Bladder (12.06.23 @ 15:02) >  Right kidney: 9.6 cm. No renal mass, hydronephrosis or calculi. There are   multiple cysts including an upper pole cyst measuring 2.2 cm x 2.2 cm x   2.4 cm. This corresponds with indeterminate hypodensity mentioned on CT   report 12/4/2023. Underlying increased parenchymal echogenicity.    Left kidney: 11.5 cm. No renal mass, hydronephrosis or calculi. There are   multiple cysts including the largest cyst which is at the upper pole   measuring 3.5 cm x 3.3 cm x 3.6 cm. Underlying increased parenchymal   echogenicity.    Urinary bladder: Mildly underdistended.    IMPRESSION:    Renal parenchymal disease. No hydronephrosis.    < end of copied text >    < from: CT Abdomen and Pelvis w/ IV Cont (12.04.23 @ 06:25) >  IMPRESSION:  Motion artifact.    18 mm groundglass nodule in the superior segment of the left lower lobe,   which may reflect malignancy.    Additional nodule along the right oblique fissure not well assessed   secondary to motion.    No definite metastatic lesions are seen in the abdomen/pelvis.    Aneurysmal dilatation of the infrarenal abdominal aorta is slightly   increased compared with MRI of 8/13/2021.    < end of copied text >    MEDICATIONS  (STANDING):  budesonide 160 MICROgram(s)/formoterol 4.5 MICROgram(s) Inhaler 2 Puff(s) Inhalation two times a day  chlorhexidine 4% Liquid 1 Application(s) Topical <User Schedule>  dexAMETHasone     Tablet   Oral   dexAMETHasone     Tablet 4 milliGRAM(s) Oral every 6 hours  escitalopram 10 milliGRAM(s) Oral daily  famotidine    Tablet 10 milliGRAM(s) Oral daily  heparin   Injectable 5000 Unit(s) SubCutaneous every 8 hours  influenza  Vaccine (HIGH DOSE) 0.7 milliLiter(s) IntraMuscular once  levETIRAcetam 250 milliGRAM(s) Oral every 12 hours  magnesium sulfate  IVPB 2 Gram(s) IV Intermittent once  nicotine -  14 mG/24Hr(s) Patch 1 Patch Transdermal daily  senna 2 Tablet(s) Oral at bedtime  sodium chloride 2 Gram(s) Oral every 6 hours    MEDICATIONS  (PRN):  acetaminophen     Tablet .. 650 milliGRAM(s) Oral every 6 hours PRN Temp greater or equal to 38C (100.4F), Mild Pain (1 - 3)  bisacodyl 5 milliGRAM(s) Oral daily PRN Constipation  ondansetron Injectable 4 milliGRAM(s) IV Push every 6 hours PRN Nausea and/or Vomiting  oxyCODONE    IR 5 milliGRAM(s) Oral every 4 hours PRN Moderate Pain (4 - 6)

## 2023-12-12 NOTE — DISCHARGE NOTE PROVIDER - CARE PROVIDERS DIRECT ADDRESSES
[email or web address]

## 2023-12-12 NOTE — PHYSICAL THERAPY INITIAL EVALUATION ADULT - GAIT DEVIATIONS NOTED, PT EVAL
decreased damian/decreased weight-shifting ability
decreased damian/decreased step length/decreased weight-shifting ability

## 2023-12-12 NOTE — DISCHARGE NOTE PROVIDER - DISCHARGE DIET
Other Diet Instructions/Easy to Chew Diet Minced and Moist Diet Minced and Moist Diet/Other Diet Instructions

## 2023-12-12 NOTE — PHYSICAL THERAPY INITIAL EVALUATION ADULT - BALANCE DISTURBANCE, IDENTIFIED IMPAIRMENT CONTRIBUTE, REHAB EVAL
impaired coordination/decreased strength
impaired coordination/impaired motor control/decreased strength

## 2023-12-12 NOTE — PROGRESS NOTE ADULT - PROBLEM SELECTOR PLAN 4
not in acute exacerbation.   - c/w Budesonide 80 mcg/Formoterol 4.5 mcg 2 puffs BID  - c/w albuterol PRN

## 2023-12-12 NOTE — PHYSICAL THERAPY INITIAL EVALUATION ADULT - DIAGNOSIS, PT EVAL
Decreased mobility 2/2 balance, strength and coordination impairments
Decreased functional mobility/capacity secondary to impairments listed below

## 2023-12-12 NOTE — PROGRESS NOTE ADULT - NUTRITIONAL ASSESSMENT
This patient has been assessed with a concern for Malnutrition and has been determined to have a diagnosis/diagnoses of Moderate protein-calorie malnutrition.    This patient is being managed with:   Diet NPO after Midnight-     NPO Start Date: 08-Dec-2023   NPO Start Time: 23:59  Except Medications  Entered: Dec  8 2023  6:50PM    Diet Regular-  1000mL Fluid Restriction ([id number])  Supplement Feeding Modality:  Oral  Ensure Plus High Protein Cans or Servings Per Day:  1       Frequency:  Daily  Entered: Dec  8 2023  6:18PM    Diet Regular-  Supplement Feeding Modality:  Oral  Ensure Plus High Protein Cans or Servings Per Day:  1       Frequency:  Daily  Entered: Dec  8 2023  8:11AM    The following pending diet order is being considered for treatment of Moderate protein-calorie malnutrition:null This patient has been assessed with a concern for Malnutrition and has been determined to have a diagnosis/diagnoses of Moderate protein-calorie malnutrition.    This patient is being managed with:   Diet NPO after Midnight-     NPO Start Date: 08-Dec-2023   NPO Start Time: 23:59  Except Medications  Entered: Dec  8 2023  6:50PM    Diet Regular-  1000mL Fluid Restriction ([id number])  Supplement Feeding Modality:  Oral  Ensure Plus High Protein Cans or Servings Per Day:  1       Frequency:  Daily  Entered: Dec  8 2023  6:18PM    Diet Regular-  Supplement Feeding Modality:  Oral  Ensure Plus High Protein Cans or Servings Per Day:  1       Frequency:  Daily  Entered: Dec  8 2023  8:11AM    The following pending diet order is being considered for treatment of Moderate protein-calorie malnutrition:null

## 2023-12-13 PROCEDURE — 99232 SBSQ HOSP IP/OBS MODERATE 35: CPT

## 2023-12-13 RX ADMIN — Medication 1 PATCH: at 12:00

## 2023-12-13 RX ADMIN — OXYCODONE HYDROCHLORIDE 5 MILLIGRAM(S): 5 TABLET ORAL at 01:21

## 2023-12-13 RX ADMIN — Medication 4 MILLIGRAM(S): at 11:54

## 2023-12-13 RX ADMIN — POLYETHYLENE GLYCOL 3350 17 GRAM(S): 17 POWDER, FOR SOLUTION ORAL at 17:16

## 2023-12-13 RX ADMIN — Medication 650 MILLIGRAM(S): at 21:59

## 2023-12-13 RX ADMIN — Medication 4 MILLIGRAM(S): at 17:16

## 2023-12-13 RX ADMIN — OXYCODONE HYDROCHLORIDE 5 MILLIGRAM(S): 5 TABLET ORAL at 00:51

## 2023-12-13 RX ADMIN — HEPARIN SODIUM 5000 UNIT(S): 5000 INJECTION INTRAVENOUS; SUBCUTANEOUS at 17:15

## 2023-12-13 RX ADMIN — HEPARIN SODIUM 5000 UNIT(S): 5000 INJECTION INTRAVENOUS; SUBCUTANEOUS at 21:25

## 2023-12-13 RX ADMIN — Medication 10 MILLIGRAM(S): at 19:16

## 2023-12-13 RX ADMIN — Medication 4 MILLIGRAM(S): at 05:18

## 2023-12-13 RX ADMIN — FAMOTIDINE 10 MILLIGRAM(S): 10 INJECTION INTRAVENOUS at 11:54

## 2023-12-13 RX ADMIN — Medication 650 MILLIGRAM(S): at 21:28

## 2023-12-13 RX ADMIN — CHLORHEXIDINE GLUCONATE 1 APPLICATION(S): 213 SOLUTION TOPICAL at 21:25

## 2023-12-13 RX ADMIN — SENNA PLUS 2 TABLET(S): 8.6 TABLET ORAL at 21:25

## 2023-12-13 RX ADMIN — BUDESONIDE AND FORMOTEROL FUMARATE DIHYDRATE 2 PUFF(S): 160; 4.5 AEROSOL RESPIRATORY (INHALATION) at 17:17

## 2023-12-13 RX ADMIN — HEPARIN SODIUM 5000 UNIT(S): 5000 INJECTION INTRAVENOUS; SUBCUTANEOUS at 05:18

## 2023-12-13 RX ADMIN — LEVETIRACETAM 250 MILLIGRAM(S): 250 TABLET, FILM COATED ORAL at 17:16

## 2023-12-13 RX ADMIN — BUDESONIDE AND FORMOTEROL FUMARATE DIHYDRATE 2 PUFF(S): 160; 4.5 AEROSOL RESPIRATORY (INHALATION) at 05:18

## 2023-12-13 RX ADMIN — LEVETIRACETAM 250 MILLIGRAM(S): 250 TABLET, FILM COATED ORAL at 05:18

## 2023-12-13 RX ADMIN — ESCITALOPRAM OXALATE 10 MILLIGRAM(S): 10 TABLET, FILM COATED ORAL at 11:54

## 2023-12-13 NOTE — PROGRESS NOTE ADULT - ASSESSMENT
1. Bilateral Soleal DVT  2. S/p right craniotomy for tumor resection on 12/9.  3. History of Lung CA  4. Abdominal Aorta Dilated 4 cm on CT 12/4.      Plan:  1. Continue Heparin Sub. Cut. 5000 units TID for below knee DVT with recent neurosurgery.      Recommend repeat LE venous duplex on 12/15.  2. Patient hemodynamically stable on RA and denies any cardiopulmonary symptoms.   3. Appreciate pulmonary follow-up for lung nodule on CT.  4. Outpatient follow-up for dilated abdominal aorta.     5. Appreciate excellent Neurosurgical acre.        Thank you  GINNY Navarro, MS, [facility]  Vascular Cardiology Service    Please call with any questions:   DIRECT SERVICE NUMBER: [phone] / Available on TEAMS 1. Bilateral Soleal DVT  2. S/p right craniotomy for tumor resection on 12/9.  3. History of Lung CA  4. Abdominal Aorta Dilated 4 cm on CT 12/4.      Plan:  1. Continue Heparin Sub. Cut. 5000 units TID for below knee DVT with recent neurosurgery.      Recommend repeat LE venous duplex on 12/15.  2. Patient hemodynamically stable on RA and denies any cardiopulmonary symptoms.   3. Appreciate pulmonary follow-up for lung nodule on CT.  4. Outpatient follow-up for dilated abdominal aorta.     5. Appreciate excellent Neurosurgical acre.        Thank you  GINNY Navarro, MS, [facility]  Vascular Cardiology Service    Please call with any questions:   DIRECT SERVICE NUMBER: [phone] / Available on TEAMS 1. Bilateral Soleal DVT  2. S/p right craniotomy for tumor resection on 12/9.  3. History of Lung CA  4. Abdominal Aorta Dilated 4 cm on CT 12/4.      Plan:  1. Continue Heparin Sub. Cut. 5000 units TID for below knee DVT with recent neurosurgery.      Recommend repeat LE venous duplex on 12/15.  2. Patient hemodynamically stable on RA and denies any cardiopulmonary symptoms.   3. Appreciate pulmonary follow-up for lung nodule on CT.  4. Outpatient follow-up for dilated abdominal aorta.     5. Appreciate excellent Neurosurgical acre.

## 2023-12-13 NOTE — PROGRESS NOTE ADULT - NSPROGADDITIONALINFOA_GEN_ALL_CORE
.  Jeanie Adams MD  Division of Hospital Medicine  [facility]   Spectra: [phone]    Plan discussed with patient and neurosurgery GINNY Tiwari. .  Jeanie Adams MD  Division of Hospital Medicine  [facility]   Spectra: [phone]    Plan discussed with patient and neurosurgery GINNY Tiwari.

## 2023-12-13 NOTE — PROGRESS NOTE ADULT - NUTRITIONAL ASSESSMENT
This patient has been assessed with a concern for Malnutrition and has been determined to have a diagnosis/diagnoses of Moderate protein-calorie malnutrition.    This patient is being managed with:   Diet NPO after Midnight-     NPO Start Date: 08-Dec-2023   NPO Start Time: 23:59  Except Medications  Entered: Dec  8 2023  6:50PM    Diet Regular-  1000mL Fluid Restriction ([id number])  Supplement Feeding Modality:  Oral  Ensure Plus High Protein Cans or Servings Per Day:  1       Frequency:  Daily  Entered: Dec  8 2023  6:18PM    Diet Regular-  Supplement Feeding Modality:  Oral  Ensure Plus High Protein Cans or Servings Per Day:  1       Frequency:  Daily  Entered: Dec  8 2023  8:11AM    The following pending diet order is being considered for treatment of Moderate protein-calorie malnutrition:null This patient has been assessed with a concern for Malnutrition and has been determined to have a diagnosis/diagnoses of Moderate protein-calorie malnutrition.    This patient is being managed with:   Diet NPO after Midnight-     NPO Start Date: 08-Dec-2023   NPO Start Time: 23:59  Except Medications  Entered: Dec  8 2023  6:50PM    Diet Regular-  1000mL Fluid Restriction ([id number])  Supplement Feeding Modality:  Oral  Ensure Plus High Protein Cans or Servings Per Day:  1       Frequency:  Daily  Entered: Dec  8 2023  6:18PM    Diet Regular-  Supplement Feeding Modality:  Oral  Ensure Plus High Protein Cans or Servings Per Day:  1       Frequency:  Daily  Entered: Dec  8 2023  8:11AM    The following pending diet order is being considered for treatment of Moderate protein-calorie malnutrition:null

## 2023-12-13 NOTE — PROGRESS NOTE ADULT - PROBLEM SELECTOR PLAN 6
[address] [facility]    POD #0    Subjective     Doing well, NPO, pain controlled, UOP better, no pressors    Objective     Patient Vitals for the past 24 hrs:   Temp Pulse Resp BP SpO2   11/16/21 1715 -- (!) 101 19 (!) 130/50 97 %   11/16/21 1700 -- (!) 102 20 (!) 119/47 95 %   11/16/21 1645 -- (!) 102 21 (!) 119/56 97 %   11/16/21 1630 -- 97 15 (!) 118/43 96 %   11/16/21 1615 -- 98 17 (!) 114/51 96 %   11/16/21 1600 -- 98 16 (!) 115/45 96 %   11/16/21 1545 -- 94 15 (!) 118/49 96 %   11/16/21 1530 -- 97 15 (!) 107/45 96 %   11/16/21 1515 -- 96 14 (!) 117/46 97 %   11/16/21 1500 -- 94 15 (!) 116/47 96 %   11/16/21 1445 -- 97 15 (!) 115/45 96 %   11/16/21 1430 -- 97 19 (!) 110/44 97 %   11/16/21 1415 -- 95 14 (!) 111/41 97 %   11/16/21 1400 -- 95 14 (!) 111/46 97 %   11/16/21 1345 -- 94 18 (!) 113/43 97 %   11/16/21 1330 -- 97 16 (!) 114/45 97 %   11/16/21 1315 -- 94 14 (!) 110/46 97 %   11/16/21 1300 -- 93 14 (!) 108/42 96 %   11/16/21 1245 -- 97 17 (!) 115/44 97 %   11/16/21 1230 -- 94 15 (!) 102/41 97 %   11/16/21 1215 -- 95 17 (!) 100/37 97 %   11/16/21 1200 -- 96 19 (!) 105/34 97 %   11/16/21 1145 -- 97 14 (!) 101/39 97 %   11/16/21 1015 -- 96 16 (!) 107/46 97 %   11/16/21 1000 -- (!) 103 18 (!) 113/47 97 %   11/16/21 0945 -- (!) 102 17 (!) 113/48 97 %   11/16/21 0930 -- 98 15 (!) 109/49 96 %   11/16/21 0845 -- 94 13 (!) 99/47 95 %   11/16/21 0830 -- 98 19 (!) 111/48 96 %   11/16/21 0815 -- 97 17 (!) 105/43 96 %   11/16/21 0800 -- 97 17 (!) 110/44 96 %   11/16/21 0745 -- 100 16 (!) 117/57 96 %   11/16/21 0732 -- 99 14 (!) 110/52 96 %   11/16/21 0700 -- 98 14 (!) 98/50 95 %   11/16/21 0642 -- 99 14 (!) 99/47 96 %   11/16/21 0624 -- (!) 104 18 (!) 121/52 96 %   11/16/21 0609 -- (!) 104 18 (!) 101/40 94 %   11/16/21 0539 -- (!) 108 17 (!) 105/47 94 %   11/16/21 0509 -- (!) 105 17 (!) 101/40 93 %   11/16/21 0324 -- 94 17 (!) 108/42 96 %   11/16/21 0309 -- 94 16 (!) 100/41 98 %   11/16/21 0308 -- 94 18 -- 97 %   11/16/21 0254 -- 96 18 (!) 109/39 97 %   11/16/21 0239 -- 97 18 -- 96 %   11/16/21 0238 -- 96 18 (!) 105/42 97 %   11/16/21 0228 -- 99 17 -- 98 %   11/16/21 0224 98 °F (36.7 °C) (!) 105 12 (!) 99/44 95 %   11/16/21 0220 -- 99 18 (!) 99/44 96 %   11/15/21 2219 -- (!) 113 23 (!) 125/47 96 %   11/15/21 2159 98 °F (36.7 °C) 90 16 (!) 132/49 95 %   11/15/21 2123 -- -- -- (!) 132/49 96 %   11/15/21 2100 -- -- -- (!) 139/39 94 %   11/15/21 2055 -- -- -- -- 94 %   11/15/21 2050 -- -- -- (!) 119/49 96 %   11/15/21 2006 -- -- -- (!) 156/43 --   11/15/21 1908 97.8 °F (36.6 °C) 100 18 (!) 141/83 98 %       Date 11/15/21 0700 - 11/16/21 0659 11/16/21 0700 - 11/17/21 0659   Shift 4538-9223 2722-9332 24 Hour Total 7229-6796 4279-3593 24 Hour Total   INTAKE   I.V.  1300(1.8) 1300(0.9)        Volume (lactated Ringers infusion)  1300 1300      Shift Total(mL/kg)  1300(21.1) 1300(21.1)      OUTPUT   Urine  320(0.4) 320(0.2) 350  350     Urine Output  120 120        Urine Output (mL) (Urinary Catheter 11/15/21 2- way; [place])  200 200 350  350   Emesis/NG output  50 50 275  275     Output (ml) (Nasogastric Tube 11/16/21)  50 50 275  275   Drains  150 150 110  110     Output (ml) (Mary Zarate #1 11/16/21 Anterior;  Left Abdomen)  150 150 110  110   Other  100 100        Other Output  100 100      Blood  15 15        Estimated Blood Loss  15 15      Shift Total(mL/kg)  635(10.3) 635(10.3) 735(11.9)  735(11.9)   NET  666 385 -606 -732   Weight (kg)  61.7 61.7 61.7 61.7 61.7       PE  Pulm - CTAB  CV - RRR  Abd - soft, ND, BS hypo, incision c/d/i, URSULA ss    Labs  Recent Results (from the past 12 hour(s))   METABOLIC PANEL, BASIC    Collection Time: 11/16/21 12:40 PM   Result Value Ref Range    Sodium 140 136 - 145 mmol/L    Potassium 3.9 3.5 - 5.1 mmol/L    Chloride 114 (H) 97 - 108 mmol/L    CO2 16 (L) 21 - 32 mmol/L    Anion gap 10 5 - 15 mmol/L    Glucose 101 (H) 65 - 100 mg/dL    BUN 58 (H) 6 - 20 MG/DL    Creatinine 1.75 (H) 0.55 - 1.02 MG/DL    BUN/Creatinine ratio 33 (H) 12 - 20      GFR est AA 33 (L) >60 ml/min/1.73m2    GFR est non-AA 27 (L) >60 ml/min/1.73m2    Calcium 7.2 (L) 8.5 - 10.1 MG/DL   LACTIC ACID    Collection Time: 11/16/21 12:40 PM   Result Value Ref Range    Lactic acid 1.2 0.4 - 2.0 MMOL/L         Assessment     Cathey Lefort is a 82 y. o.yr old female s/p open R colectomy and small bowel resection    Plan     Keeping her NPO for now  Continue IVF  UOP looks good  Check am labs  Keeping vasquez catheter today  improving    Jacinto Sosa MD baseline Cr 1.7 in 2019  - renal US demonstrating parenchymal disease  - Cr improving now better than reported previous baseline  - continue to hold triamterene-HCTZ  - Nephrology consult recs appreciated  - renally dose meds to GFR, avoid nephrotoxic agents

## 2023-12-13 NOTE — PROGRESS NOTE ADULT - SUBJECTIVE AND OBJECTIVE BOX
Patient seen and examined at bedside.    Overnight Events: No overnight events. Denies chest pain, shortness of breath.     Review Of Systems: No chest pain, shortness of breath, or palpitations            Current Meds:  acetaminophen     Tablet .. 650 milliGRAM(s) Oral every 6 hours PRN  albuterol    90 MICROgram(s) HFA Inhaler 2 Puff(s) Inhalation every 6 hours PRN  bisacodyl 5 milliGRAM(s) Oral at bedtime  budesonide 160 MICROgram(s)/formoterol 4.5 MICROgram(s) Inhaler 2 Puff(s) Inhalation two times a day  chlorhexidine 4% Liquid 1 Application(s) Topical <User Schedule>  dexAMETHasone     Tablet   Oral   dexAMETHasone     Tablet 4 milliGRAM(s) Oral every 6 hours  escitalopram 10 milliGRAM(s) Oral daily  famotidine    Tablet 10 milliGRAM(s) Oral daily  heparin   Injectable 5000 Unit(s) SubCutaneous every 8 hours  influenza  Vaccine (HIGH DOSE) 0.7 milliLiter(s) IntraMuscular once  levETIRAcetam 250 milliGRAM(s) Oral every 12 hours  nicotine -  14 mG/24Hr(s) Patch 1 Patch Transdermal daily  ondansetron Injectable 4 milliGRAM(s) IV Push every 6 hours PRN  oxyCODONE    IR 5 milliGRAM(s) Oral every 4 hours PRN  polyethylene glycol 3350 17 Gram(s) Oral two times a day  senna 2 Tablet(s) Oral at bedtime      Vitals:  T(F): 98.4 (12-13), Max: 98.4 (12-13)  HR: 70 (12-13) (55 - 75)  BP: 156/88 (12-13) (116/79 - 177/84)  RR: 18 (12-13)  SpO2: 97% (12-13)  I&O's Summary      Physical Exam:  Appearance: NAD  	  HEENT: Dressing intact to scalp  Cardiovascular: RRR, S1 and S2  Respiratory: CTA B/L  Psychiatry:  AAO x 3  Gastrointestinal:  Soft, Non-tender, + BS	  Skin: No cyanosis	  Extremities: No LE edema, bilateral calves soft                            11.4   14.94 )-----------( 208      ( 12 Dec 2023 05:03 )             33.6     12-12    136  |  101  |  39<H>  ----------------------------<  120<H>  3.9   |  23  |  1.20    Ca    7.9<L>      12 Dec 2023 05:02  Phos  2.7     12-12  Mg     1.7     12-12                    New ECG(s): Personally reviewed    Echo:    Stress Testing:     Cath:    Imaging:    Interpretation of Telemetry:

## 2023-12-13 NOTE — PROGRESS NOTE ADULT - NUTRITIONAL ASSESSMENT
This patient has been assessed with a concern for Malnutrition and has been determined to have a diagnosis/diagnoses of Moderate protein-calorie malnutrition.    This patient is being managed with:   Diet Minced and Moist-  1000mL Fluid Restriction ([id number])  Supplement Feeding Modality:  Oral  Ensure Enlive Cans or Servings Per Day:  1       Frequency:  Daily  Entered: Dec 11 2023  2:59PM   This patient has been assessed with a concern for Malnutrition and has been determined to have a diagnosis/diagnoses of Moderate protein-calorie malnutrition.    This patient is being managed with:   Diet Minced and Moist-  1000mL Fluid Restriction ([id number])  Supplement Feeding Modality:  Oral  Ensure Enlive Cans or Servings Per Day:  1       Frequency:  Daily  Entered: Dec 11 2023  2:59PM

## 2023-12-13 NOTE — PROGRESS NOTE ADULT - PROBLEM SELECTOR PLAN 3
- duplex of LE on 12/10 with b/l acute soleal DVT  - Vascular Cardiology consulted, recs appreciated  - c/w with hep subc for ppx  - serial duplex as per Vasc cards - next due 12/15

## 2023-12-13 NOTE — PROGRESS NOTE ADULT - ASSESSMENT
PLAN:  NEURO:  CARDIOVASCULAR:  PULMONARY:  RENAL:  GI:  HEME:  ID:  ENDO:    DVT PROPHYLAXIS:  [] Venodynes                                [] Heparin/Lovenox    FALL RISK:  [] Low Risk                                    [] Impulsive 74 year old female with history of lung cancer s/p chemotherapy 5 years ago previously in remission (oncologist Dr. Fredis Solomon at [facility]), COPD, HTN presented to [place] with unsteady gait for a few weeks. Patient's  found her on he floor prompting him to call 911. Went to [place] and CT brain was remarkable for brain mass with midline shift, cyst vs necrotic mass. Transferred to [facility] for neurosurgery evaluation. Neurosurgery was consulted and reccomended MRI brain, CTAP and CT chest to look for possible malignancies, Keppra 500 mg BID, and Decadron 8 mg once in the ED and then Decadron 4mg q 6 hours. Reports shortness of breath when going up staurs, No fevers, chest pain, palpitations, vomiting, abdominal pain, diarrhea, constipation, dysuria, hematuria, melena, hematochezia.  (04 Dec 2023 13:50)    Procedure:  Right craniotomy for tumor 12/9  POD#4      PLAN:  NEURO:  C/w neuro checks q 4 hrs  c/w Decadron taper  increase activity as tolerated   Await transfer to rehab    CARDIOVASCULAR:  Hemodynamically stable  H/o dilated Abdominal aorta: out patient f/u after rehab  C/w respiratory treatment    PULMONARY:  History of lung cancer, Solitary pulmonary nodule  Received last dose of chemo 5/2019    RENAL:  creat improved  Voiding    GI:  Mince and Moist diet  Bowel regimen: Miralax and Senna  PPX: pepcid  Last BM    HEME: H/H stable    ID: Afebrile    ENDO: no issue    DVT PROPHYLAXIS:  [x] Venodynes                                [x] Heparin/Lovenox   H/o soleal DVT, c/w SQH    FALL RISK:  [x] Low Risk                                    [] Impulsive    Will discuss with Dr Barcenas  [id number] 74 year old female with history of lung cancer s/p chemotherapy 5 years ago previously in remission (oncologist Dr. Fredis Solomon at [facility]), COPD, HTN presented to [place] with unsteady gait for a few weeks. Patient's  found her on he floor prompting him to call 911. Went to [place] and CT brain was remarkable for brain mass with midline shift, cyst vs necrotic mass. Transferred to [facility] for neurosurgery evaluation. Neurosurgery was consulted and reccomended MRI brain, CTAP and CT chest to look for possible malignancies, Keppra 500 mg BID, and Decadron 8 mg once in the ED and then Decadron 4mg q 6 hours. Reports shortness of breath when going up staurs, No fevers, chest pain, palpitations, vomiting, abdominal pain, diarrhea, constipation, dysuria, hematuria, melena, hematochezia.  (04 Dec 2023 13:50)    Procedure:  Right craniotomy for tumor 12/9  POD#4      PLAN:  NEURO:  C/w neuro checks q 4 hrs  c/w Decadron taper  increase activity as tolerated   Await transfer to rehab    CARDIOVASCULAR:  Hemodynamically stable  H/o dilated Abdominal aorta: out patient f/u after rehab  C/w respiratory treatment    PULMONARY:  History of lung cancer, Solitary pulmonary nodule  Received last dose of chemo 5/2019    RENAL:  creat improved  Voiding    GI:  Mince and Moist diet  Bowel regimen: Miralax and Senna  PPX: pepcid  Last BM    HEME: H/H stable    ID: Afebrile    ENDO: no issue    DVT PROPHYLAXIS:  [x] Venodynes                                [x] Heparin/Lovenox   H/o soleal DVT, c/w SQH    FALL RISK:  [x] Low Risk                                    [] Impulsive    Will discuss with Dr Barcenas  [id number] 74 year old female with history of lung cancer s/p chemotherapy 5 years ago previously in remission (oncologist Dr. Fredis Solomon at [facility]), COPD, HTN presented to [place] with unsteady gait for a few weeks. Patient's  found her on he floor prompting him to call 911. Went to [place] and CT brain was remarkable for brain mass with midline shift, cyst vs necrotic mass. Transferred to [facility] for neurosurgery evaluation. Neurosurgery was consulted and reccomended MRI brain, CTAP and CT chest to look for possible malignancies, Keppra 500 mg BID, and Decadron 8 mg once in the ED and then Decadron 4mg q 6 hours. Reports shortness of breath when going up staurs, No fevers, chest pain, palpitations, vomiting, abdominal pain, diarrhea, constipation, dysuria, hematuria, melena, hematochezia.  (04 Dec 2023 13:50)    Procedure:  Right craniotomy for tumor 12/9  POD#4      PLAN:  NEURO:  C/w neuro checks q 4 hrs  c/w Decadron taper  increase activity as tolerated   Await transfer to rehab    CARDIOVASCULAR:  Hemodynamically stable  H/o dilated Abdominal aorta: out patient f/u after rehab  C/w respiratory treatment    PULMONARY:  History of lung cancer, Solitary pulmonary nodule  Received last dose of chemo 5/2019    RENAL:  creat improved  Voiding    GI:  Mince and Moist diet  Bowel regimen: Miralax and Senna  PPX: pepcid  Last BM: needs a BM, Dulcolax ordered    HEME: H/H stable    ID: Afebrile    ENDO: no issue    DVT PROPHYLAXIS:  [x] Venodynes                                [x] Heparin/Lovenox   H/o soleal DVT, c/w SQH    FALL RISK:  [x] Low Risk                                    [] Impulsive    Will discuss with Dr Barcenas  [id number] 74 year old female with history of lung cancer s/p chemotherapy 5 years ago previously in remission (oncologist Dr. Fredis Solomon at [facility]), COPD, HTN presented to [place] with unsteady gait for a few weeks. Patient's  found her on he floor prompting him to call 911. Went to [place] and CT brain was remarkable for brain mass with midline shift, cyst vs necrotic mass. Transferred to [facility] for neurosurgery evaluation. Neurosurgery was consulted and reccomended MRI brain, CTAP and CT chest to look for possible malignancies, Keppra 500 mg BID, and Decadron 8 mg once in the ED and then Decadron 4mg q 6 hours. Reports shortness of breath when going up staurs, No fevers, chest pain, palpitations, vomiting, abdominal pain, diarrhea, constipation, dysuria, hematuria, melena, hematochezia.  (04 Dec 2023 13:50)    Procedure:  Right craniotomy for tumor 12/9  POD#4      PLAN:  NEURO:  C/w neuro checks q 4 hrs  c/w Decadron taper  increase activity as tolerated   Await transfer to rehab    CARDIOVASCULAR:  Hemodynamically stable  H/o dilated Abdominal aorta: out patient f/u after rehab  C/w respiratory treatment    PULMONARY:  History of lung cancer, Solitary pulmonary nodule  Received last dose of chemo 5/2019    RENAL:  creat improved  Voiding    GI:  Mince and Moist diet  Bowel regimen: Miralax and Senna  PPX: pepcid  Last BM: needs a BM, Dulcolax ordered    HEME: H/H stable    ID: Afebrile    ENDO: no issue    DVT PROPHYLAXIS:  [x] Venodynes                                [x] Heparin/Lovenox   H/o soleal DVT, c/w SQH    FALL RISK:  [x] Low Risk                                    [] Impulsive    Will discuss with Dr Barcenas  [id number] 74 year old female with history of lung cancer s/p chemotherapy 5 years ago previously in remission (oncologist Dr. Fredis Solomon at [facility]), COPD, HTN presented to [place] with unsteady gait for a few weeks. Patient's  found her on he floor prompting him to call 911. Went to [place] and CT brain was remarkable for brain mass with midline shift, cyst vs necrotic mass. Transferred to [facility] for neurosurgery evaluation. Neurosurgery was consulted and reccomended MRI brain, CTAP and CT chest to look for possible malignancies, Keppra 500 mg BID, and Decadron 8 mg once in the ED and then Decadron 4mg q 6 hours. Reports shortness of breath when going up staurs, No fevers, chest pain, palpitations, vomiting, abdominal pain, diarrhea, constipation, dysuria, hematuria, melena, hematochezia.  (04 Dec 2023 13:50)    Procedure:  Right craniotomy for tumor 12/9  POD#4      PLAN:  NEURO:  C/w neuro checks q 4 hrs  c/w Decadron taper  increase activity as tolerated   Await transfer to rehab    CARDIOVASCULAR:  Hemodynamically stable  H/o dilated Abdominal aorta: out patient f/u after rehab  C/w respiratory treatment    PULMONARY:  History of lung cancer, Solitary pulmonary nodule  Received last dose of chemo 5/2019    RENAL:  creat improved, continue to hold diuretics  Voiding    GI:  Mince and Moist diet  Bowel regimen: Miralax and Senna  PPX: pepcid  Last BM: needs a BM, Dulcolax ordered    HEME: H/H stable    ID: Afebrile    ENDO: no issue    DVT PROPHYLAXIS:  [x] Venodynes                                [x] Heparin/Lovenox   H/o soleal DVT, c/w SQH    FALL RISK:  [x] Low Risk                                    [] Impulsive    Will discuss with Dr Barcenas  [id number] 74 year old female with history of lung cancer s/p chemotherapy 5 years ago previously in remission (oncologist Dr. Fredis Solomon at [facility]), COPD, HTN presented to [place] with unsteady gait for a few weeks. Patient's  found her on he floor prompting him to call 911. Went to [place] and CT brain was remarkable for brain mass with midline shift, cyst vs necrotic mass. Transferred to [facility] for neurosurgery evaluation. Neurosurgery was consulted and reccomended MRI brain, CTAP and CT chest to look for possible malignancies, Keppra 500 mg BID, and Decadron 8 mg once in the ED and then Decadron 4mg q 6 hours. Reports shortness of breath when going up staurs, No fevers, chest pain, palpitations, vomiting, abdominal pain, diarrhea, constipation, dysuria, hematuria, melena, hematochezia.  (04 Dec 2023 13:50)    Procedure:  Right craniotomy for tumor 12/9  POD#4      PLAN:  NEURO:  C/w neuro checks q 4 hrs  c/w Decadron taper  increase activity as tolerated   Await transfer to rehab    CARDIOVASCULAR:  Hemodynamically stable  H/o dilated Abdominal aorta: out patient f/u after rehab  C/w respiratory treatment    PULMONARY:  History of lung cancer, Solitary pulmonary nodule  Received last dose of chemo 5/2019    RENAL:  creat improved, continue to hold diuretics  Voiding    GI:  Mince and Moist diet  Bowel regimen: Miralax and Senna  PPX: pepcid  Last BM: needs a BM, Dulcolax ordered    HEME: H/H stable    ID: Afebrile    ENDO: no issue    DVT PROPHYLAXIS:  [x] Venodynes                                [x] Heparin/Lovenox   H/o soleal DVT, c/w SQH    FALL RISK:  [x] Low Risk                                    [] Impulsive    Will discuss with Dr Barcenas  [id number]

## 2023-12-13 NOTE — PROGRESS NOTE ADULT - SUBJECTIVE AND OBJECTIVE BOX
Jeanie Adams MD  Division of Hospital Medicine  [facility]  Spectra: [phone]      Patient is a 78y old  Female who presents with a chief complaint of Unsteady gai, mass noted on CT head (13 Dec 2023 09:37)      SUBJECTIVE / OVERNIGHT EVENTS: no acute events overnight. headache much improved and controlled on current regimen. no dizziness, lightheadedness. denies any fever, chills, chest pain, nor dyspnea, +constipation but passing flatus. thinks may have BM today.  ADDITIONAL REVIEW OF SYSTEMS:    MEDICATIONS  (STANDING):  bisacodyl 5 milliGRAM(s) Oral at bedtime  budesonide 160 MICROgram(s)/formoterol 4.5 MICROgram(s) Inhaler 2 Puff(s) Inhalation two times a day  chlorhexidine 4% Liquid 1 Application(s) Topical <User Schedule>  dexAMETHasone     Tablet   Oral   dexAMETHasone     Tablet 4 milliGRAM(s) Oral every 6 hours  escitalopram 10 milliGRAM(s) Oral daily  famotidine    Tablet 10 milliGRAM(s) Oral daily  heparin   Injectable 5000 Unit(s) SubCutaneous every 8 hours  influenza  Vaccine (HIGH DOSE) 0.7 milliLiter(s) IntraMuscular once  levETIRAcetam 250 milliGRAM(s) Oral every 12 hours  nicotine -  14 mG/24Hr(s) Patch 1 Patch Transdermal daily  polyethylene glycol 3350 17 Gram(s) Oral two times a day  senna 2 Tablet(s) Oral at bedtime    MEDICATIONS  (PRN):  acetaminophen     Tablet .. 650 milliGRAM(s) Oral every 6 hours PRN Temp greater or equal to 38C (100.4F), Mild Pain (1 - 3)  albuterol    90 MICROgram(s) HFA Inhaler 2 Puff(s) Inhalation every 6 hours PRN Shortness of Breath and/or Wheezing  ondansetron Injectable 4 milliGRAM(s) IV Push every 6 hours PRN Nausea and/or Vomiting  oxyCODONE    IR 5 milliGRAM(s) Oral every 4 hours PRN Moderate Pain (4 - 6)      CAPILLARY BLOOD GLUCOSE        I&O's Summary      PHYSICAL EXAM:  Vital Signs Last 24 Hrs  T(C): 36.9 (13 Dec 2023 08:00), Max: 36.9 (13 Dec 2023 08:00)  T(F): 98.4 (13 Dec 2023 08:00), Max: 98.4 (13 Dec 2023 08:00)  HR: 70 (13 Dec 2023 08:00) (55 - 75)  BP: 156/88 (13 Dec 2023 08:00) (116/79 - 177/84)  BP(mean): --  RR: 18 (13 Dec 2023 08:00) (18 - 18)  SpO2: 97% (13 Dec 2023 08:00) (96% - 99%)    Parameters below as of 13 Dec 2023 08:00  Patient On (Oxygen Delivery Method): room air    CONSTITUTIONAL: NAD, well-developed, well-groomed  EYES: PERRLA; conjunctiva and sclera clear  ENMT: Moist oral mucosa, no pharyngeal injection or exudates; normal dentition  NECK: Supple, no palpable masses; no thyromegaly  RESPIRATORY: Normal respiratory effort; lungs are clear to auscultation bilaterally  CARDIOVASCULAR: Regular rate and rhythm, normal S1 and S2, no murmur/rub/gallop; No lower extremity edema  ABDOMEN: Soft, Nondistended, Nontender to palpation, normoactive bowel sounds  MUSCULOSKELETAL: No clubbing or cyanosis of digits; no joint swelling or tenderness to palpation  PSYCH: A+O to person, place, and time; affect appropriate  NEUROLOGY: CN 2-12 are intact and symmetric; no gross sensory deficits, moving all extremities strong  SKIN: No rashes; no palpable lesions, +L crani site with staples c/d/i    LABS:                        11.4   14.94 )-----------( 208      ( 12 Dec 2023 05:03 )             33.6     12-12    136  |  101  |  39<H>  ----------------------------<  120<H>  3.9   |  23  |  1.20    Ca    7.9<L>      12 Dec 2023 05:02  Phos  2.7     12-12  Mg     1.7     12-12    Urinalysis Basic - ( 12 Dec 2023 05:02 )    Color: x / Appearance: x / SG: x / pH: x  Gluc: 120 mg/dL / Ketone: x  / Bili: x / Urobili: x   Blood: x / Protein: x / Nitrite: x   Leuk Esterase: x / RBC: x / WBC x   Sq Epi: x / Non Sq Epi: x / Bacteria: x      RADIOLOGY & ADDITIONAL TESTS:  Results Reviewed:   Imaging Personally Reviewed:  Electrocardiogram Personally Reviewed:    COORDINATION OF CARE:  Care Discussed with Consultants/Other Providers [Y]: Neurosurgery GINNY Tiwari  Prior or Outpatient Records Reviewed [Y/N]:   Jeanie Adams MD  Division of Hospital Medicine  [facility]  Spectra: [phone]      Patient is a 78y old  Female who presents with a chief complaint of Unsteady gai, mass noted on CT head (13 Dec 2023 09:37)      SUBJECTIVE / OVERNIGHT EVENTS: no acute events overnight. headache much improved and controlled on current regimen. no dizziness, lightheadedness. denies any fever, chills, chest pain, nor dyspnea, +constipation but passing flatus. thinks may have BM today.  ADDITIONAL REVIEW OF SYSTEMS:    MEDICATIONS  (STANDING):  bisacodyl 5 milliGRAM(s) Oral at bedtime  budesonide 160 MICROgram(s)/formoterol 4.5 MICROgram(s) Inhaler 2 Puff(s) Inhalation two times a day  chlorhexidine 4% Liquid 1 Application(s) Topical <User Schedule>  dexAMETHasone     Tablet   Oral   dexAMETHasone     Tablet 4 milliGRAM(s) Oral every 6 hours  escitalopram 10 milliGRAM(s) Oral daily  famotidine    Tablet 10 milliGRAM(s) Oral daily  heparin   Injectable 5000 Unit(s) SubCutaneous every 8 hours  influenza  Vaccine (HIGH DOSE) 0.7 milliLiter(s) IntraMuscular once  levETIRAcetam 250 milliGRAM(s) Oral every 12 hours  nicotine -  14 mG/24Hr(s) Patch 1 Patch Transdermal daily  polyethylene glycol 3350 17 Gram(s) Oral two times a day  senna 2 Tablet(s) Oral at bedtime    MEDICATIONS  (PRN):  acetaminophen     Tablet .. 650 milliGRAM(s) Oral every 6 hours PRN Temp greater or equal to 38C (100.4F), Mild Pain (1 - 3)  albuterol    90 MICROgram(s) HFA Inhaler 2 Puff(s) Inhalation every 6 hours PRN Shortness of Breath and/or Wheezing  ondansetron Injectable 4 milliGRAM(s) IV Push every 6 hours PRN Nausea and/or Vomiting  oxyCODONE    IR 5 milliGRAM(s) Oral every 4 hours PRN Moderate Pain (4 - 6)      CAPILLARY BLOOD GLUCOSE        I&O's Summary      PHYSICAL EXAM:  Vital Signs Last 24 Hrs  T(C): 36.9 (13 Dec 2023 08:00), Max: 36.9 (13 Dec 2023 08:00)  T(F): 98.4 (13 Dec 2023 08:00), Max: 98.4 (13 Dec 2023 08:00)  HR: 70 (13 Dec 2023 08:00) (55 - 75)  BP: 156/88 (13 Dec 2023 08:00) (116/79 - 177/84)  BP(mean): --  RR: 18 (13 Dec 2023 08:00) (18 - 18)  SpO2: 97% (13 Dec 2023 08:00) (96% - 99%)    Parameters below as of 13 Dec 2023 08:00  Patient On (Oxygen Delivery Method): room air    CONSTITUTIONAL: NAD, well-developed, well-groomed  EYES: PERRLA; conjunctiva and sclera clear  ENMT: Moist oral mucosa, no pharyngeal injection or exudates; normal dentition  NECK: Supple, no palpable masses; no thyromegaly  RESPIRATORY: Normal respiratory effort; lungs are clear to auscultation bilaterally  CARDIOVASCULAR: Regular rate and rhythm, normal S1 and S2, no murmur/rub/gallop; No lower extremity edema  ABDOMEN: Soft, Nondistended, Nontender to palpation, normoactive bowel sounds  MUSCULOSKELETAL: No clubbing or cyanosis of digits; no joint swelling or tenderness to palpation  PSYCH: A+O to person, place, and time; affect appropriate  NEUROLOGY: CN 2-12 are intact and symmetric; no gross sensory deficits, moving all extremities strong  SKIN: No rashes; no palpable lesions, +L crani site with staples c/d/i    LABS:                        11.4   14.94 )-----------( 208      ( 12 Dec 2023 05:03 )             33.6     12-12    136  |  101  |  39<H>  ----------------------------<  120<H>  3.9   |  23  |  1.20    Ca    7.9<L>      12 Dec 2023 05:02  Phos  2.7     12-12  Mg     1.7     12-12    Urinalysis Basic - ( 12 Dec 2023 05:02 )    Color: x / Appearance: x / SG: x / pH: x  Gluc: 120 mg/dL / Ketone: x  / Bili: x / Urobili: x   Blood: x / Protein: x / Nitrite: x   Leuk Esterase: x / RBC: x / WBC x   Sq Epi: x / Non Sq Epi: x / Bacteria: x      RADIOLOGY & ADDITIONAL TESTS:  Results Reviewed:   Imaging Personally Reviewed:  Electrocardiogram Personally Reviewed:    COORDINATION OF CARE:  Care Discussed with Consultants/Other Providers [Y]: Neurosurgery GINNY Tiwari  Prior or Outpatient Records Reviewed [Y/N]:

## 2023-12-13 NOTE — PROGRESS NOTE ADULT - SUBJECTIVE AND OBJECTIVE BOX
Vascular Cardiology Progress Note     DIRECT PROVIDER NUMBER: [phone]  / Available on TEAMS    CC:  unsteady gait / mass noted on CT head    Interval Events:  Denies C/P or SOB.  No LE pain or edema.    Allergies  penicillin (Short breath; Hives)    MEDICATIONS  (STANDING):  bisacodyl 5 milliGRAM(s) Oral at bedtime  bisacodyl Suppository 10 milliGRAM(s) Rectal once  budesonide 160 MICROgram(s)/formoterol 4.5 MICROgram(s) Inhaler 2 Puff(s) Inhalation two times a day  chlorhexidine 4% Liquid 1 Application(s) Topical <User Schedule>  dexAMETHasone     Tablet   Oral   dexAMETHasone     Tablet 4 milliGRAM(s) Oral every 6 hours  escitalopram 10 milliGRAM(s) Oral daily  famotidine    Tablet 10 milliGRAM(s) Oral daily  heparin   Injectable 5000 Unit(s) SubCutaneous every 8 hours  influenza  Vaccine (HIGH DOSE) 0.7 milliLiter(s) IntraMuscular once  levETIRAcetam 250 milliGRAM(s) Oral every 12 hours  nicotine -  14 mG/24Hr(s) Patch 1 Patch Transdermal daily  polyethylene glycol 3350 17 Gram(s) Oral two times a day  senna 2 Tablet(s) Oral at bedtime      PAST MEDICAL & SURGICAL HISTORY:  Arthritis  left knee recently, right knee arthritis for several years  HTN (hypertension)  controlled with meds for several years  Depression  Regurgitation  tricuspid valve  Osteoarthritis of multiple joints, unspecified osteoarthritis type  COPD (chronic obstructive pulmonary disease)  Solitary pulmonary nodule  Received last dose of chemo 2019  History of lung cancer  S/P appendectomy  in   S/P breast biopsy  bilateral  breast  - benign  S/P D&C  for missed  about 25 years ago  Female bladder prolapse  bladder sling -     FAMILY HISTORY:  Family history of pancreatic cancer (Father)  Family history of COPD (chronic obstructive pulmonary disease) (Mother)  Family history of heart disease (Mother)  Family history of leukemia (Sibling)  brother  Family history of hepatitis C (Sibling)  brother  Family history of rheumatic fever (Sibling)    SOCIAL HISTORY:  unchanged    REVIEW OF SYSTEMS:  CONSTITUTIONAL: No fever  EYES: No eye pain  ENT:  No throat pain  NECK: No pain   RESPIRATORY: No SOB   CARDIOVASCULAR: No C/P  GASTROINTESTINAL: No abdominal pain  GENITOURINARY: No hematuria  SKIN: No LE wounds  LYMPH Nodes: No enlarged glands noted  ENDOCRINE: No heat or cold intolerance noted  MUSCULOSKELETAL: No LE edema   PSYCHIATRIC: No depression, anxiety  HEME/LYMPH: No bleeding gums  ALLERGY AND IMMUNOLOGIC: No hives    [ x] All others negative	    PHYSICAL EXAM:  Vital Signs Last 24 Hrs  T(C): 36.9 (13 Dec 2023 08:00), Max: 36.9 (13 Dec 2023 08:00)  T(F): 98.4 (13 Dec 2023 08:00), Max: 98.4 (13 Dec 2023 08:00)  HR: 70 (13 Dec 2023 08:00) (70 - 75)  BP: 156/88 (13 Dec 2023 08:00) (116/79 - 177/84)  BP(mean): --  RR: 18 (13 Dec 2023 08:00) (18 - 18)  SpO2: 97% (13 Dec 2023 08:00) (96% - 98%)    Parameters below as of 13 Dec 2023 08:00  Patient On (Oxygen Delivery Method): room air      Appearance: NAD  	  HEENT: Dressing intact to scalp  Cardiovascular: RRR, S1 and S2  Respiratory: CTA B/L  Psychiatry:  AAO x 3  Gastrointestinal:  Soft, Non-tender, + BS	  Skin: No cyanosis	  Extremities: No LE edema, bilateral calves soft      LABS:	 	                        11.4   14.94 )-----------( 208      ( 12 Dec 2023 05:03 )             33.6     12-12    136  |  101  |  39<H>  ----------------------------<  120<H>  3.9   |  23  |  1.20    Ca    7.9<L>      12 Dec 2023 05:02  Phos  2.7     12-  Mg     1.7     12-12      Urinalysis Basic - ( 12 Dec 2023 05:02 )    Color: x / Appearance: x / SG: x / pH: x  Gluc: 120 mg/dL / Ketone: x  / Bili: x / Urobili: x   Blood: x / Protein: x / Nitrite: x   Leuk Esterase: x / RBC: x / WBC x   Sq Epi: x / Non Sq Epi: x / Bacteria: x        Assessment:  1. Bilateral Soleal DVT  2. S/p right craniotomy for tumor resection on .  3. History of Lung CA  4. Abdominal Aorta Dilated 4 cm on CT .      Plan:  1. Continue Heparin Sub. Cut. 5000 units TID for below knee DVT with recent neurosurgery.      Recommend repeat LE venous duplex on 12/15.  2. Patient hemodynamically stable on RA and denies any cardiopulmonary symptoms.   3. Appreciate pulmonary follow-up for lung nodule on CT.  4. Outpatient follow-up for dilated abdominal aorta.     5. Appreciate excellent Neurosurgical acre.        Thank you  GINNY Navarro, MS, [facility]  Vascular Cardiology Service    Please call with any questions:   DIRECT SERVICE NUMBER: [phone] / Available on TEAMS Vascular Cardiology Progress Note     DIRECT PROVIDER NUMBER: [phone]  / Available on TEAMS    CC:  unsteady gait / mass noted on CT head    Interval Events:  Denies C/P or SOB.  No LE pain or edema.    Allergies  penicillin (Short breath; Hives)    MEDICATIONS  (STANDING):  bisacodyl 5 milliGRAM(s) Oral at bedtime  bisacodyl Suppository 10 milliGRAM(s) Rectal once  budesonide 160 MICROgram(s)/formoterol 4.5 MICROgram(s) Inhaler 2 Puff(s) Inhalation two times a day  chlorhexidine 4% Liquid 1 Application(s) Topical <User Schedule>  dexAMETHasone     Tablet   Oral   dexAMETHasone     Tablet 4 milliGRAM(s) Oral every 6 hours  escitalopram 10 milliGRAM(s) Oral daily  famotidine    Tablet 10 milliGRAM(s) Oral daily  heparin   Injectable 5000 Unit(s) SubCutaneous every 8 hours  influenza  Vaccine (HIGH DOSE) 0.7 milliLiter(s) IntraMuscular once  levETIRAcetam 250 milliGRAM(s) Oral every 12 hours  nicotine -  14 mG/24Hr(s) Patch 1 Patch Transdermal daily  polyethylene glycol 3350 17 Gram(s) Oral two times a day  senna 2 Tablet(s) Oral at bedtime      PAST MEDICAL & SURGICAL HISTORY:  Arthritis  left knee recently, right knee arthritis for several years  HTN (hypertension)  controlled with meds for several years  Depression  Regurgitation  tricuspid valve  Osteoarthritis of multiple joints, unspecified osteoarthritis type  COPD (chronic obstructive pulmonary disease)  Solitary pulmonary nodule  Received last dose of chemo 2019  History of lung cancer  S/P appendectomy  in   S/P breast biopsy  bilateral  breast  - benign  S/P D&C  for missed  about 25 years ago  Female bladder prolapse  bladder sling -     FAMILY HISTORY:  Family history of pancreatic cancer (Father)  Family history of COPD (chronic obstructive pulmonary disease) (Mother)  Family history of heart disease (Mother)  Family history of leukemia (Sibling)  brother  Family history of hepatitis C (Sibling)  brother  Family history of rheumatic fever (Sibling)    SOCIAL HISTORY:  unchanged    REVIEW OF SYSTEMS:  CONSTITUTIONAL: No fever  EYES: No eye pain  ENT:  No throat pain  NECK: No pain   RESPIRATORY: No SOB   CARDIOVASCULAR: No C/P  GASTROINTESTINAL: No abdominal pain  GENITOURINARY: No hematuria  SKIN: No LE wounds  LYMPH Nodes: No enlarged glands noted  ENDOCRINE: No heat or cold intolerance noted  MUSCULOSKELETAL: No LE edema   PSYCHIATRIC: No depression, anxiety  HEME/LYMPH: No bleeding gums  ALLERGY AND IMMUNOLOGIC: No hives    [ x] All others negative	    PHYSICAL EXAM:  Vital Signs Last 24 Hrs  T(C): 36.9 (13 Dec 2023 08:00), Max: 36.9 (13 Dec 2023 08:00)  T(F): 98.4 (13 Dec 2023 08:00), Max: 98.4 (13 Dec 2023 08:00)  HR: 70 (13 Dec 2023 08:00) (70 - 75)  BP: 156/88 (13 Dec 2023 08:00) (116/79 - 177/84)  BP(mean): --  RR: 18 (13 Dec 2023 08:00) (18 - 18)  SpO2: 97% (13 Dec 2023 08:00) (96% - 98%)    Parameters below as of 13 Dec 2023 08:00  Patient On (Oxygen Delivery Method): room air      Appearance: NAD  	  HEENT: Dressing intact to scalp  Cardiovascular: RRR, S1 and S2  Respiratory: CTA B/L  Psychiatry:  AAO x 3  Gastrointestinal:  Soft, Non-tender, + BS	  Skin: No cyanosis	  Extremities: No LE edema, bilateral calves soft      LABS:	 	                        11.4   14.94 )-----------( 208      ( 12 Dec 2023 05:03 )             33.6     12-12    136  |  101  |  39<H>  ----------------------------<  120<H>  3.9   |  23  |  1.20    Ca    7.9<L>      12 Dec 2023 05:02  Phos  2.7     12-  Mg     1.7     12-12      Urinalysis Basic - ( 12 Dec 2023 05:02 )    Color: x / Appearance: x / SG: x / pH: x  Gluc: 120 mg/dL / Ketone: x  / Bili: x / Urobili: x   Blood: x / Protein: x / Nitrite: x   Leuk Esterase: x / RBC: x / WBC x   Sq Epi: x / Non Sq Epi: x / Bacteria: x        Assessment:  1. Bilateral Soleal DVT  2. S/p right craniotomy for tumor resection on .  3. History of Lung CA  4. Abdominal Aorta Dilated 4 cm on CT .      Plan:  1. Continue Heparin Sub. Cut. 5000 units TID for below knee DVT with recent neurosurgery.      Recommend repeat LE venous duplex on 12/15.  2. Patient hemodynamically stable on RA and denies any cardiopulmonary symptoms.   3. Appreciate pulmonary follow-up for lung nodule on CT.  4. Outpatient follow-up for dilated abdominal aorta.     5. Appreciate excellent Neurosurgical acre.        Thank you  GINNY Navarro, MS, [facility]  Vascular Cardiology Service    Please call with any questions:   DIRECT SERVICE NUMBER: [phone] / Available on TEAMS

## 2023-12-13 NOTE — PROGRESS NOTE ADULT - SUBJECTIVE AND OBJECTIVE BOX
HPI:  74 year old female with history of lung cancer s/p chemotherapy 5 years ago previously in remission (oncologist Dr. Fredis Solomon at [facility]), COPD, HTN presented to [facility] with unsteady gait for a few weeks. Patient's  found her on he floor prompting him to call 911. Went to [place] and CT brain was remarkable for brain mass with midline shift, cyst vs necrotic mass. Transferred to [facility] for neurosurgery evaluation. Neurosurgery was consulted and reccomended MRI brain, CTAP and CT chest to look for possible malignancies, Keppra 500 mg BID, and Decadron 8 mg once in the ED and then Decadron 4mg q 6 hours. Reports shortness of breath when going up staurs, No fevers, chest pain, palpitations, vomiting, abdominal pain, diarrhea, constipation, dysuria, hematuria, melena, hematochezia.  (04 Dec 2023 13:50)    Right craniotomy for tumor      PAST MEDICAL & SURGICAL HISTORY:  Arthritis  left knee recently, right knee arthritis for several years      HTN (hypertension)  controlled with meds for several years      Depression      Regurgitation  tricuspid valve      Osteoarthritis of multiple joints, unspecified osteoarthritis type      COPD (chronic obstructive pulmonary disease)      Solitary pulmonary nodule  Received last dose of chemo 2019      History of lung cancer      S/P appendectomy  in       S/P breast biopsy  bilateral  breast  - benign      S/P D&C  for missed  about 25 years ago      Female bladder prolapse  bladder sling -         Vital Signs Last 24 Hrs  T(C): 36.6 (13 Dec 2023 05:13), Max: 36.8 (13 Dec 2023 00:38)  T(F): 97.8 (13 Dec 2023 05:13), Max: 98.2 (13 Dec 2023 00:38)  HR: 70 (13 Dec 2023 05:13) (55 - 75)  BP: 144/77 (13 Dec 2023 05:13) (116/79 - 177/84)  BP(mean): --  RR: 18 (13 Dec 2023 05:13) (18 - 18)  SpO2: 96% (13 Dec 2023 05:13) (96% - 99%)    Parameters below as of 13 Dec 2023 05:13  Patient On (Oxygen Delivery Method): room air                              11.4   14.94 )-----------( 208      ( 12 Dec 2023 05:03 )             33.6    12-12    136  |  101  |  39<H>  ----------------------------<  120<H>  3.9   |  23  |  1.20    Ca    7.9<L>      12 Dec 2023 05:02  Phos  2.7     12-12  Mg     1.7     12-12       Stroke Core Measures      DRAIN OUTPUT:     NEUROIMAGING:     PHYSICAL EXAM:    General: No Acute Distress     Neurological: Awake, alert oriented to person, place and time, Following Commands, PERRL, EOMI, Face Symmetrical, Speech Fluent, Moving all extremities, Muscle Strength normal in all four extremities, No Drift, Sensation to Light Touch Intact    Pulmonary: Clear to Auscultation, No Rales, No Rhonchi, No Wheezes     Cardiovascular: S1, S2, Regular Rate and Rhythm     Gastrointestinal: Soft, Nontender, Nondistended     Incision:     MEDICATIONS:   Antibiotics:    Neuro:  acetaminophen     Tablet .. 650 milliGRAM(s) Oral every 6 hours PRN Temp greater or equal to 38C (100.4F), Mild Pain (1 - 3)  escitalopram 10 milliGRAM(s) Oral daily  levETIRAcetam 250 milliGRAM(s) Oral every 12 hours  ondansetron Injectable 4 milliGRAM(s) IV Push every 6 hours PRN Nausea and/or Vomiting  oxyCODONE    IR 5 milliGRAM(s) Oral every 4 hours PRN Moderate Pain (4 - 6)    Anticoagulation:  heparin   Injectable 5000 Unit(s) SubCutaneous every 8 hours    Cardiology:    Endo:   dexAMETHasone     Tablet   Oral   dexAMETHasone     Tablet 4 milliGRAM(s) Oral every 6 hours    Pulm:  albuterol    90 MICROgram(s) HFA Inhaler 2 Puff(s) Inhalation every 6 hours PRN  budesonide 160 MICROgram(s)/formoterol 4.5 MICROgram(s) Inhaler 2 Puff(s) Inhalation two times a day    GI/:  bisacodyl 5 milliGRAM(s) Oral at bedtime  famotidine    Tablet 10 milliGRAM(s) Oral daily  polyethylene glycol 3350 17 Gram(s) Oral two times a day  senna 2 Tablet(s) Oral at bedtime    Other:  chlorhexidine 4% Liquid 1 Application(s) Topical <User Schedule>  influenza  Vaccine (HIGH DOSE) 0.7 milliLiter(s) IntraMuscular once  nicotine -  14 mG/24Hr(s) Patch 1 Patch Transdermal daily    albuterol    90 MICROgram(s) HFA Inhaler 2 Puff(s) Inhalation every 6 hours PRN  budesonide 160 MICROgram(s)/formoterol 4.5 MICROgram(s) Inhaler 2 Puff(s) Inhalation two times a day   bisacodyl 5 milliGRAM(s) Oral at bedtime  famotidine    Tablet 10 milliGRAM(s) Oral daily  polyethylene glycol 3350 17 Gram(s) Oral two times a day  senna 2 Tablet(s) Oral at bedtime   s/p     HPI:  74 year old female with history of lung cancer s/p chemotherapy 5 years ago previously in remission (oncologist Dr. Fredis Solomon at [facility]), COPD, HTN presented to [facility] with unsteady gait for a few weeks. Patient's  found her on he floor prompting him to call 911. Went to [place] and CT brain was remarkable for brain mass with midline shift, cyst vs necrotic mass. Transferred to [facility] for neurosurgery evaluation. Neurosurgery was consulted and reccomended MRI brain, CTAP and CT chest to look for possible malignancies, Keppra 500 mg BID, and Decadron 8 mg once in the ED and then Decadron 4mg q 6 hours. Reports shortness of breath when going up staurs, No fevers, chest pain, palpitations, vomiting, abdominal pain, diarrhea, constipation, dysuria, hematuria, melena, hematochezia.  (04 Dec 2023 13:50)    Right craniotomy for tumor      PAST MEDICAL & SURGICAL HISTORY:  Arthritis  left knee recently, right knee arthritis for several years      HTN (hypertension)  controlled with meds for several years      Depression      Regurgitation  tricuspid valve      Osteoarthritis of multiple joints, unspecified osteoarthritis type      COPD (chronic obstructive pulmonary disease)      Solitary pulmonary nodule  Received last dose of chemo 2019      History of lung cancer      S/P appendectomy  in       S/P breast biopsy  bilateral  breast  - benign      S/P D&C  for missed  about 25 years ago      Female bladder prolapse  bladder sling -         Vital Signs Last 24 Hrs  T(C): 36.6 (13 Dec 2023 05:13), Max: 36.8 (13 Dec 2023 00:38)  T(F): 97.8 (13 Dec 2023 05:13), Max: 98.2 (13 Dec 2023 00:38)  HR: 70 (13 Dec 2023 05:13) (55 - 75)  BP: 144/77 (13 Dec 2023 05:13) (116/79 - 177/84)  BP(mean): --  RR: 18 (13 Dec 2023 05:13) (18 - 18)  SpO2: 96% (13 Dec 2023 05:13) (96% - 99%)    Parameters below as of 13 Dec 2023 05:13  Patient On (Oxygen Delivery Method): room air                              11.4   14.94 )-----------( 208      ( 12 Dec 2023 05:03 )             33.6    12-12    136  |  101  |  39<H>  ----------------------------<  120<H>  3.9   |  23  |  1.20    Ca    7.9<L>      12 Dec 2023 05:02  Phos  2.7     12-12  Mg     1.7     12-12       Stroke Core Measures      DRAIN OUTPUT:     NEUROIMAGING:     PHYSICAL EXAM:    General: No Acute Distress     Neurological: Awake, alert oriented to person, place and time, Following Commands, PERRL, EOMI, Face Symmetrical, Speech Fluent, Moving all extremities, Muscle Strength normal in all four extremities, No Drift, Sensation to Light Touch Intact    Pulmonary: Clear to Auscultation, No Rales, No Rhonchi, No Wheezes     Cardiovascular: S1, S2, Regular Rate and Rhythm     Gastrointestinal: Soft, Nontender, Nondistended     Incision:     MEDICATIONS:   Antibiotics:    Neuro:  acetaminophen     Tablet .. 650 milliGRAM(s) Oral every 6 hours PRN Temp greater or equal to 38C (100.4F), Mild Pain (1 - 3)  escitalopram 10 milliGRAM(s) Oral daily  levETIRAcetam 250 milliGRAM(s) Oral every 12 hours  ondansetron Injectable 4 milliGRAM(s) IV Push every 6 hours PRN Nausea and/or Vomiting  oxyCODONE    IR 5 milliGRAM(s) Oral every 4 hours PRN Moderate Pain (4 - 6)    Anticoagulation:  heparin   Injectable 5000 Unit(s) SubCutaneous every 8 hours    Cardiology:    Endo:   dexAMETHasone     Tablet   Oral   dexAMETHasone     Tablet 4 milliGRAM(s) Oral every 6 hours    Pulm:  albuterol    90 MICROgram(s) HFA Inhaler 2 Puff(s) Inhalation every 6 hours PRN  budesonide 160 MICROgram(s)/formoterol 4.5 MICROgram(s) Inhaler 2 Puff(s) Inhalation two times a day    GI/:  bisacodyl 5 milliGRAM(s) Oral at bedtime  famotidine    Tablet 10 milliGRAM(s) Oral daily  polyethylene glycol 3350 17 Gram(s) Oral two times a day  senna 2 Tablet(s) Oral at bedtime    Other:  chlorhexidine 4% Liquid 1 Application(s) Topical <User Schedule>  influenza  Vaccine (HIGH DOSE) 0.7 milliLiter(s) IntraMuscular once  nicotine -  14 mG/24Hr(s) Patch 1 Patch Transdermal daily    albuterol    90 MICROgram(s) HFA Inhaler 2 Puff(s) Inhalation every 6 hours PRN  budesonide 160 MICROgram(s)/formoterol 4.5 MICROgram(s) Inhaler 2 Puff(s) Inhalation two times a day   bisacodyl 5 milliGRAM(s) Oral at bedtime  famotidine    Tablet 10 milliGRAM(s) Oral daily  polyethylene glycol 3350 17 Gram(s) Oral two times a day  senna 2 Tablet(s) Oral at bedtime   s/p        74 year old female with history of lung cancer s/p chemotherapy 5 years ago previously in remission (oncologist Dr. Fredis Solomon at [facility]), COPD, HTN presented to [facility] with unsteady gait for a few weeks. Patient's  found her on he floor prompting him to call 911. Went to [place] and CT brain was remarkable for brain mass with midline shift, cyst vs necrotic mass. Transferred to [facility] for neurosurgery evaluation. Neurosurgery was consulted and reccomended MRI brain, CTAP and CT chest to look for possible malignancies, Keppra 500 mg BID, and Decadron 8 mg once in the ED and then Decadron 4mg q 6 hours. Reports shortness of breath when going up staurs, No fevers, chest pain, palpitations, vomiting, abdominal pain, diarrhea, constipation, dysuria, hematuria, melena, hematochezia.  (04 Dec 2023 13:50)    Post-op day # 4 s/p Right craniotomy for tumor    Overnight event: no acute event    Vital Signs Last 24 Hrs  T(C): 36.6 (13 Dec 2023 05:13), Max: 36.8 (13 Dec 2023 00:38)  T(F): 97.8 (13 Dec 2023 05:13), Max: 98.2 (13 Dec 2023 00:38)  HR: 70 (13 Dec 2023 05:13) (55 - 75)  BP: 144/77 (13 Dec 2023 05:13) (116/79 - 177/84)  BP(mean): --  RR: 18 (13 Dec 2023 05:13) (18 - 18)  SpO2: 96% (13 Dec 2023 05:13) (96% - 99%)    Parameters below as of 13 Dec 2023 05:13  Patient On (Oxygen Delivery Method): room air                              11.4   14.94 )-----------( 208      ( 12 Dec 2023 05:03 )             33.6    12-12    136  |  101  |  39<H>  ----------------------------<  120<H>  3.9   |  23  |  1.20    Ca    7.9<L>      12 Dec 2023 05:02  Phos  2.7     12-12  Mg     1.7     12-12       Stroke Core Measures      DRAIN OUTPUT:     NEUROIMAGING:     PHYSICAL EXAM:    General: No Acute Distress     Neurological: Awake, alert oriented to person, place and time, Following Commands, PERRL, EOMI, Face Symmetrical, Speech Fluent, Moving all extremities, Muscle Strength normal in all four extremities, No Drift, Sensation to Light Touch Intact    Pulmonary: Clear to Auscultation, No Rales, No Rhonchi, No Wheezes     Cardiovascular: S1, S2, Regular Rate and Rhythm     Gastrointestinal: Soft, Nontender, Nondistended     Incision:     MEDICATIONS:   Antibiotics:    Neuro:  acetaminophen     Tablet .. 650 milliGRAM(s) Oral every 6 hours PRN Temp greater or equal to 38C (100.4F), Mild Pain (1 - 3)  escitalopram 10 milliGRAM(s) Oral daily  levETIRAcetam 250 milliGRAM(s) Oral every 12 hours  ondansetron Injectable 4 milliGRAM(s) IV Push every 6 hours PRN Nausea and/or Vomiting  oxyCODONE    IR 5 milliGRAM(s) Oral every 4 hours PRN Moderate Pain (4 - 6)    Anticoagulation:  heparin   Injectable 5000 Unit(s) SubCutaneous every 8 hours    Cardiology:    Endo:   dexAMETHasone     Tablet   Oral   dexAMETHasone     Tablet 4 milliGRAM(s) Oral every 6 hours    Pulm:  albuterol    90 MICROgram(s) HFA Inhaler 2 Puff(s) Inhalation every 6 hours PRN  budesonide 160 MICROgram(s)/formoterol 4.5 MICROgram(s) Inhaler 2 Puff(s) Inhalation two times a day    GI/:  bisacodyl 5 milliGRAM(s) Oral at bedtime  famotidine    Tablet 10 milliGRAM(s) Oral daily  polyethylene glycol 3350 17 Gram(s) Oral two times a day  senna 2 Tablet(s) Oral at bedtime    Other:  chlorhexidine 4% Liquid 1 Application(s) Topical <User Schedule>  influenza  Vaccine (HIGH DOSE) 0.7 milliLiter(s) IntraMuscular once  nicotine -  14 mG/24Hr(s) Patch 1 Patch Transdermal daily    albuterol    90 MICROgram(s) HFA Inhaler 2 Puff(s) Inhalation every 6 hours PRN  budesonide 160 MICROgram(s)/formoterol 4.5 MICROgram(s) Inhaler 2 Puff(s) Inhalation two times a day   bisacodyl 5 milliGRAM(s) Oral at bedtime  famotidine    Tablet 10 milliGRAM(s) Oral daily  polyethylene glycol 3350 17 Gram(s) Oral two times a day  senna 2 Tablet(s) Oral at bedtime   s/p        74 year old female with history of lung cancer s/p chemotherapy 5 years ago previously in remission (oncologist Dr. Fredis Solomon at [facility]), COPD, HTN presented to [facility] with unsteady gait for a few weeks. Patient's  found her on he floor prompting him to call 911. Went to [place] and CT brain was remarkable for brain mass with midline shift, cyst vs necrotic mass. Transferred to [facility] for neurosurgery evaluation. Neurosurgery was consulted and reccomended MRI brain, CTAP and CT chest to look for possible malignancies, Keppra 500 mg BID, and Decadron 8 mg once in the ED and then Decadron 4mg q 6 hours. Reports shortness of breath when going up staurs, No fevers, chest pain, palpitations, vomiting, abdominal pain, diarrhea, constipation, dysuria, hematuria, melena, hematochezia.  (04 Dec 2023 13:50)    Post-op day # 4 s/p Right craniotomy for tumor    Overnight event: no acute event    Vital Signs Last 24 Hrs  T(C): 36.6 (13 Dec 2023 05:13), Max: 36.8 (13 Dec 2023 00:38)  T(F): 97.8 (13 Dec 2023 05:13), Max: 98.2 (13 Dec 2023 00:38)  HR: 70 (13 Dec 2023 05:13) (55 - 75)  BP: 144/77 (13 Dec 2023 05:13) (116/79 - 177/84)  BP(mean): --  RR: 18 (13 Dec 2023 05:13) (18 - 18)  SpO2: 96% (13 Dec 2023 05:13) (96% - 99%)    Parameters below as of 13 Dec 2023 05:13  Patient On (Oxygen Delivery Method): room air                              11.4   14.94 )-----------( 208      ( 12 Dec 2023 05:03 )             33.6    12-12    136  |  101  |  39<H>  ----------------------------<  120<H>  3.9   |  23  |  1.20    Ca    7.9<L>      12 Dec 2023 05:02  Phos  2.7     12-12  Mg     1.7     12-12       Stroke Core Measures      DRAIN OUTPUT:     NEUROIMAGING:     PHYSICAL EXAM:    General: No Acute Distress     Neurological: Awake, alert oriented to person, place and time, Following Commands, PERRL, EOMI, Face Symmetrical, Speech Fluent, Moving all extremities, Muscle Strength normal in all four extremities, No Drift, Sensation to Light Touch Intact    Pulmonary: Clear to Auscultation, No Rales, No Rhonchi, No Wheezes     Cardiovascular: S1, S2, Regular Rate and Rhythm     Gastrointestinal: Soft, Nontender, Nondistended     Incision:     MEDICATIONS:   Antibiotics:    Neuro:  acetaminophen     Tablet .. 650 milliGRAM(s) Oral every 6 hours PRN Temp greater or equal to 38C (100.4F), Mild Pain (1 - 3)  escitalopram 10 milliGRAM(s) Oral daily  levETIRAcetam 250 milliGRAM(s) Oral every 12 hours  ondansetron Injectable 4 milliGRAM(s) IV Push every 6 hours PRN Nausea and/or Vomiting  oxyCODONE    IR 5 milliGRAM(s) Oral every 4 hours PRN Moderate Pain (4 - 6)    Anticoagulation:  heparin   Injectable 5000 Unit(s) SubCutaneous every 8 hours    Cardiology:    Endo:   dexAMETHasone     Tablet   Oral   dexAMETHasone     Tablet 4 milliGRAM(s) Oral every 6 hours    Pulm:  albuterol    90 MICROgram(s) HFA Inhaler 2 Puff(s) Inhalation every 6 hours PRN  budesonide 160 MICROgram(s)/formoterol 4.5 MICROgram(s) Inhaler 2 Puff(s) Inhalation two times a day    GI/:  bisacodyl 5 milliGRAM(s) Oral at bedtime  famotidine    Tablet 10 milliGRAM(s) Oral daily  polyethylene glycol 3350 17 Gram(s) Oral two times a day  senna 2 Tablet(s) Oral at bedtime    Other:  chlorhexidine 4% Liquid 1 Application(s) Topical <User Schedule>  influenza  Vaccine (HIGH DOSE) 0.7 milliLiter(s) IntraMuscular once  nicotine -  14 mG/24Hr(s) Patch 1 Patch Transdermal daily    albuterol    90 MICROgram(s) HFA Inhaler 2 Puff(s) Inhalation every 6 hours PRN  budesonide 160 MICROgram(s)/formoterol 4.5 MICROgram(s) Inhaler 2 Puff(s) Inhalation two times a day   bisacodyl 5 milliGRAM(s) Oral at bedtime  famotidine    Tablet 10 milliGRAM(s) Oral daily  polyethylene glycol 3350 17 Gram(s) Oral two times a day  senna 2 Tablet(s) Oral at bedtime   s/p

## 2023-12-13 NOTE — PROGRESS NOTE ADULT - ATTENDING COMMENTS
Continue Heparin Sub. Cut. 5000 units TID for below knee DVT with recent neurosurgery.      Recommend repeat LE venous duplex on 12/15.

## 2023-12-14 PROCEDURE — 99232 SBSQ HOSP IP/OBS MODERATE 35: CPT

## 2023-12-14 PROCEDURE — 99223 1ST HOSP IP/OBS HIGH 75: CPT | Mod: GC

## 2023-12-14 PROCEDURE — 99233 SBSQ HOSP IP/OBS HIGH 50: CPT

## 2023-12-14 RX ORDER — AMLODIPINE BESYLATE 2.5 MG/1
5 TABLET ORAL DAILY
Refills: 0 | Status: DISCONTINUED | OUTPATIENT
Start: 2023-12-14 | End: 2023-12-14

## 2023-12-14 RX ORDER — NIFEDIPINE 30 MG
30 TABLET, EXTENDED RELEASE 24 HR ORAL DAILY
Refills: 0 | Status: DISCONTINUED | OUTPATIENT
Start: 2023-12-14 | End: 2023-12-15

## 2023-12-14 RX ADMIN — Medication 1 PATCH: at 12:00

## 2023-12-14 RX ADMIN — OXYCODONE HYDROCHLORIDE 5 MILLIGRAM(S): 5 TABLET ORAL at 02:08

## 2023-12-14 RX ADMIN — Medication 1 PATCH: at 19:00

## 2023-12-14 RX ADMIN — ESCITALOPRAM OXALATE 10 MILLIGRAM(S): 10 TABLET, FILM COATED ORAL at 11:16

## 2023-12-14 RX ADMIN — POLYETHYLENE GLYCOL 3350 17 GRAM(S): 17 POWDER, FOR SOLUTION ORAL at 05:47

## 2023-12-14 RX ADMIN — FAMOTIDINE 10 MILLIGRAM(S): 10 INJECTION INTRAVENOUS at 11:15

## 2023-12-14 RX ADMIN — BUDESONIDE AND FORMOTEROL FUMARATE DIHYDRATE 2 PUFF(S): 160; 4.5 AEROSOL RESPIRATORY (INHALATION) at 18:09

## 2023-12-14 RX ADMIN — BUDESONIDE AND FORMOTEROL FUMARATE DIHYDRATE 2 PUFF(S): 160; 4.5 AEROSOL RESPIRATORY (INHALATION) at 05:47

## 2023-12-14 RX ADMIN — HEPARIN SODIUM 5000 UNIT(S): 5000 INJECTION INTRAVENOUS; SUBCUTANEOUS at 21:10

## 2023-12-14 RX ADMIN — Medication 30 MILLIGRAM(S): at 11:17

## 2023-12-14 RX ADMIN — CHLORHEXIDINE GLUCONATE 1 APPLICATION(S): 213 SOLUTION TOPICAL at 21:51

## 2023-12-14 RX ADMIN — Medication 4 MILLIGRAM(S): at 05:47

## 2023-12-14 RX ADMIN — LEVETIRACETAM 250 MILLIGRAM(S): 250 TABLET, FILM COATED ORAL at 05:47

## 2023-12-14 RX ADMIN — Medication 4 MILLIGRAM(S): at 21:10

## 2023-12-14 RX ADMIN — Medication 650 MILLIGRAM(S): at 09:00

## 2023-12-14 RX ADMIN — OXYCODONE HYDROCHLORIDE 5 MILLIGRAM(S): 5 TABLET ORAL at 01:38

## 2023-12-14 RX ADMIN — HEPARIN SODIUM 5000 UNIT(S): 5000 INJECTION INTRAVENOUS; SUBCUTANEOUS at 13:19

## 2023-12-14 RX ADMIN — Medication 1 PATCH: at 11:16

## 2023-12-14 RX ADMIN — LEVETIRACETAM 250 MILLIGRAM(S): 250 TABLET, FILM COATED ORAL at 18:09

## 2023-12-14 RX ADMIN — Medication 650 MILLIGRAM(S): at 08:49

## 2023-12-14 RX ADMIN — HEPARIN SODIUM 5000 UNIT(S): 5000 INJECTION INTRAVENOUS; SUBCUTANEOUS at 05:46

## 2023-12-14 RX ADMIN — Medication 4 MILLIGRAM(S): at 13:19

## 2023-12-14 NOTE — PROGRESS NOTE ADULT - NUTRITIONAL ASSESSMENT
This patient has been assessed with a concern for Malnutrition and has been determined to have a diagnosis/diagnoses of Moderate protein-calorie malnutrition.    This patient is being managed with:   Diet Minced and Moist-  1000mL Fluid Restriction ([id number])  Supplement Feeding Modality:  Oral  Ensure Enlive Cans or Servings Per Day:  1       Frequency:  Daily  Entered: Dec 11 2023  2:59PM   This patient has been assessed with a concern for Malnutrition and has been determined to have a diagnosis/diagnoses of Moderate protein-calorie malnutrition.    This patient is being managed with:   Diet Minced and Moist-  1000mL Fluid Restriction ([id number])  Supplement Feeding Modality:  Oral  Ensure Enlive Cans or Servings Per Day:  1       Frequency:  Daily  Entered: Dec 11 2023  2:59PM

## 2023-12-14 NOTE — CONSULT NOTE ADULT - SUBJECTIVE AND OBJECTIVE BOX
HPI:  74 year old female with history of lung cancer s/p chemotherapy 5 years ago previously in remission (oncologist Dr. Fredis Solomon at [facility]), COPD, HTN presented to [place] [facility] with unsteady gait for a few weeks. Patient's  found her on he floor prompting him to call 911. Went to [place] and CT brain was remarkable for brain mass with midline shift, cyst vs necrotic mass. Transferred to [facility] for neurosurgery evaluation. Neurosurgery was consulted and reccomended MRI brain, CTAP and CT chest to look for possible malignancies, Keppra 500 mg BID, and Decadron 8 mg once in the ED and then Decadron 4mg q 6 hours. Reports shortness of breath when going up staurs, No fevers, chest pain, palpitations, vomiting, abdominal pain, diarrhea, constipation, dysuria, hematuria, melena, hematochezia.  (04 Dec 2023 13:50)      PAST MEDICAL & SURGICAL HISTORY:  Arthritis  left knee recently, right knee arthritis for several years      HTN (hypertension)  controlled with meds for several years      Depression      Regurgitation  tricuspid valve      Osteoarthritis of multiple joints, unspecified osteoarthritis type      COPD (chronic obstructive pulmonary disease)      Solitary pulmonary nodule  Received last dose of chemo 2019      History of lung cancer      S/P appendectomy  in       S/P breast biopsy  bilateral  breast  - benign      S/P D&C  for missed  about 25 years ago      Female bladder prolapse  bladder sling -           Allergies    penicillin (Short breath; Hives)    Intolerances        MEDICATIONS  (STANDING):  bisacodyl 5 milliGRAM(s) Oral at bedtime  budesonide 160 MICROgram(s)/formoterol 4.5 MICROgram(s) Inhaler 2 Puff(s) Inhalation two times a day  chlorhexidine 4% Liquid 1 Application(s) Topical <User Schedule>  dexAMETHasone     Tablet   Oral   dexAMETHasone     Tablet 4 milliGRAM(s) Oral every 8 hours  escitalopram 10 milliGRAM(s) Oral daily  famotidine    Tablet 10 milliGRAM(s) Oral daily  heparin   Injectable 5000 Unit(s) SubCutaneous every 8 hours  influenza  Vaccine (HIGH DOSE) 0.7 milliLiter(s) IntraMuscular once  levETIRAcetam 250 milliGRAM(s) Oral every 12 hours  nicotine -  14 mG/24Hr(s) Patch 1 Patch Transdermal daily  NIFEdipine XL 30 milliGRAM(s) Oral daily  polyethylene glycol 3350 17 Gram(s) Oral two times a day  senna 2 Tablet(s) Oral at bedtime    MEDICATIONS  (PRN):  acetaminophen     Tablet .. 650 milliGRAM(s) Oral every 6 hours PRN Temp greater or equal to 38C (100.4F), Mild Pain (1 - 3)  albuterol    90 MICROgram(s) HFA Inhaler 2 Puff(s) Inhalation every 6 hours PRN Shortness of Breath and/or Wheezing  ondansetron Injectable 4 milliGRAM(s) IV Push every 6 hours PRN Nausea and/or Vomiting  oxyCODONE    IR 5 milliGRAM(s) Oral every 4 hours PRN Moderate Pain (4 - 6)      FAMILY HISTORY:  Family history of pancreatic cancer (Father)    Family history of COPD (chronic obstructive pulmonary disease) (Mother)    Family history of heart disease (Mother)    Family history of leukemia (Sibling)  brother    Family history of hepatitis C (Sibling)  brother    Family history of rheumatic fever (Sibling)        SOCIAL HISTORY: No EtOH, current smoker     REVIEW OF SYSTEMS:    CONSTITUTIONAL: No weakness, fevers or chills  EYES/ENT: No visual changes;  No vertigo or throat pain   NECK: No pain or stiffness  RESPIRATORY: No cough, wheezing, hemoptysis; No shortness of breath  CARDIOVASCULAR: No chest pain or palpitations  GASTROINTESTINAL: No abdominal or epigastric pain. No nausea, vomiting, or hematemesis; No diarrhea or constipation. No melena or hematochezia.  GENITOURINARY: No dysuria, frequency or hematuria  NEUROLOGICAL: No numbness or weakness  SKIN: No itching, burning, rashes, or lesions   All other review of systems is negative unless indicated above.        T(F): 97.5 (23 @ 05:04), Max: 98.1 (23 @ 16:40)  HR: 62 (23 @ 05:04)  BP: 164/99 (23 @ 05:04)  RR: 18 (23 @ 05:04)  SpO2: 97% (23 @ 05:04)  Wt(kg): --    GENERAL: NAD, well-developed  HEAD:  Atraumatic, Normocephalic  EYES: EOMI, PERRLA, conjunctiva and sclera clear  NECK: Supple, No JVD  CHEST/LUNG: Clear to auscultation bilaterally; No wheeze  HEART: Regular rate and rhythm; No murmurs, rubs, or gallops  ABDOMEN: Soft, Nontender, Nondistended; Bowel sounds present  EXTREMITIES:  2+ Peripheral Pulses, No clubbing, cyanosis, or edema  NEUROLOGY: non-focal  SKIN: No rashes or lesions                        Clean Catch Clean Catch (Midstream)   @ 23:10   <10,000 CFU/mL Normal Urogenital Isabella  --  --      < from: CT Head No Cont (23 @ 21:48) >  IMPRESSION:  Approximately 2.3 x 1.5 x 1.7 cm cyst versus centrally necrotic mass in   the right parietal-temporal region.  Diffuse vasogenic edema throughout   the right frontal, parietal and temporal lobes.  Mass effect in the right   cerebral hemisphere with 4.5 cm of midline shift to the left.  Follow-up   contrast enhanced brain MRI is recommended for further evaluation.    Chronic and nonemergent findings as discussed above.      --- End of Report ---    < end of copied text >    < from: CT Chest w/ IV Cont (23 @ 06:25) >  IMPRESSION:  Motion artifact.    18 mm groundglass nodule in the superior segment of the left lower lobe,   which may reflect malignancy.    Additional nodule along the right oblique fissure not well assessed   secondary to motion.    No definite metastatic lesions are seen in the abdomen/pelvis.    Aneurysmal dilatation of the infrarenal abdominal aorta is slightly   increased compared with MRI of 2021.        --- End of Report ---    < end of copied text >    < from: MR Head w/wo IV Cont (12.10.23 @ 15:50) >  IMPRESSION: Right parietal craniotomy with postoperative changes after   resection of right parietal ring-enhancing mass. No significant   enhancement. No change in vasogenic edema compared with 2023.    --- End of Report ---    < end of copied text >    < from: [facility] Duplex Lower Ext Vein Scan, Bilat (12.10.23 @ 15:57) >    IMPRESSION:  Acute deep venous thrombosis: below the knee.    Bilateral soleal veins acute deep vein thrombosis.    P.A. Eslis was notified.    --- End of Report ---    < end of copied text >   HPI:  74 year old female with history of lung cancer s/p chemotherapy 5 years ago previously in remission (oncologist Dr. Fredis Solomon at [facility]), COPD, HTN presented to [place] with unsteady gait for a few weeks. Patient's  found her on he floor prompting him to call 911. Went to [place] and CT brain was remarkable for brain mass with midline shift, cyst vs necrotic mass. Transferred to [facility] for neurosurgery evaluation. Neurosurgery was consulted and reccomended MRI brain, CTAP and CT chest to look for possible malignancies, Keppra 500 mg BID, and Decadron 8 mg once in the ED and then Decadron 4mg q 6 hours. Reports shortness of breath when going up staurs, No fevers, chest pain, palpitations, vomiting, abdominal pain, diarrhea, constipation, dysuria, hematuria, melena, hematochezia.  (04 Dec 2023 13:50)      PAST MEDICAL & SURGICAL HISTORY:  Arthritis  left knee recently, right knee arthritis for several years      HTN (hypertension)  controlled with meds for several years      Depression      Regurgitation  tricuspid valve      Osteoarthritis of multiple joints, unspecified osteoarthritis type      COPD (chronic obstructive pulmonary disease)      Solitary pulmonary nodule  Received last dose of chemo 2019      History of lung cancer      S/P appendectomy  in       S/P breast biopsy  bilateral  breast  - benign      S/P D&C  for missed  about 25 years ago      Female bladder prolapse  bladder sling -           Allergies    penicillin (Short breath; Hives)    Intolerances        MEDICATIONS  (STANDING):  bisacodyl 5 milliGRAM(s) Oral at bedtime  budesonide 160 MICROgram(s)/formoterol 4.5 MICROgram(s) Inhaler 2 Puff(s) Inhalation two times a day  chlorhexidine 4% Liquid 1 Application(s) Topical <User Schedule>  dexAMETHasone     Tablet   Oral   dexAMETHasone     Tablet 4 milliGRAM(s) Oral every 8 hours  escitalopram 10 milliGRAM(s) Oral daily  famotidine    Tablet 10 milliGRAM(s) Oral daily  heparin   Injectable 5000 Unit(s) SubCutaneous every 8 hours  influenza  Vaccine (HIGH DOSE) 0.7 milliLiter(s) IntraMuscular once  levETIRAcetam 250 milliGRAM(s) Oral every 12 hours  nicotine -  14 mG/24Hr(s) Patch 1 Patch Transdermal daily  NIFEdipine XL 30 milliGRAM(s) Oral daily  polyethylene glycol 3350 17 Gram(s) Oral two times a day  senna 2 Tablet(s) Oral at bedtime    MEDICATIONS  (PRN):  acetaminophen     Tablet .. 650 milliGRAM(s) Oral every 6 hours PRN Temp greater or equal to 38C (100.4F), Mild Pain (1 - 3)  albuterol    90 MICROgram(s) HFA Inhaler 2 Puff(s) Inhalation every 6 hours PRN Shortness of Breath and/or Wheezing  ondansetron Injectable 4 milliGRAM(s) IV Push every 6 hours PRN Nausea and/or Vomiting  oxyCODONE    IR 5 milliGRAM(s) Oral every 4 hours PRN Moderate Pain (4 - 6)      FAMILY HISTORY:  Family history of pancreatic cancer (Father)    Family history of COPD (chronic obstructive pulmonary disease) (Mother)    Family history of heart disease (Mother)    Family history of leukemia (Sibling)  brother    Family history of hepatitis C (Sibling)  brother    Family history of rheumatic fever (Sibling)        SOCIAL HISTORY: No EtOH, current smoker     REVIEW OF SYSTEMS:    CONSTITUTIONAL: No weakness, fevers or chills  EYES/ENT: No visual changes;  No vertigo or throat pain   NECK: No pain or stiffness  RESPIRATORY: No cough, wheezing, hemoptysis; No shortness of breath  CARDIOVASCULAR: No chest pain or palpitations  GASTROINTESTINAL: No abdominal or epigastric pain. No nausea, vomiting, or hematemesis; No diarrhea or constipation. No melena or hematochezia.  GENITOURINARY: No dysuria, frequency or hematuria  NEUROLOGICAL: No numbness or weakness  SKIN: No itching, burning, rashes, or lesions   All other review of systems is negative unless indicated above.        T(F): 97.5 (23 @ 05:04), Max: 98.1 (23 @ 16:40)  HR: 62 (23 @ 05:04)  BP: 164/99 (23 @ 05:04)  RR: 18 (23 @ 05:04)  SpO2: 97% (23 @ 05:04)  Wt(kg): --    GENERAL: NAD, well-developed  HEAD:  Atraumatic, Normocephalic  EYES: EOMI, PERRLA, conjunctiva and sclera clear  NECK: Supple, No JVD  CHEST/LUNG: Clear to auscultation bilaterally; No wheeze  HEART: Regular rate and rhythm; No murmurs, rubs, or gallops  ABDOMEN: Soft, Nontender, Nondistended; Bowel sounds present  EXTREMITIES:  2+ Peripheral Pulses, No clubbing, cyanosis, or edema  NEUROLOGY: non-focal  SKIN: No rashes or lesions                        Clean Catch Clean Catch (Midstream)   @ 23:10   <10,000 CFU/mL Normal Urogenital Isabella  --  --      < from: CT Head No Cont (23 @ 21:48) >  IMPRESSION:  Approximately 2.3 x 1.5 x 1.7 cm cyst versus centrally necrotic mass in   the right parietal-temporal region.  Diffuse vasogenic edema throughout   the right frontal, parietal and temporal lobes.  Mass effect in the right   cerebral hemisphere with 4.5 cm of midline shift to the left.  Follow-up   contrast enhanced brain MRI is recommended for further evaluation.    Chronic and nonemergent findings as discussed above.      --- End of Report ---    < end of copied text >    < from: CT Chest w/ IV Cont (23 @ 06:25) >  IMPRESSION:  Motion artifact.    18 mm groundglass nodule in the superior segment of the left lower lobe,   which may reflect malignancy.    Additional nodule along the right oblique fissure not well assessed   secondary to motion.    No definite metastatic lesions are seen in the abdomen/pelvis.    Aneurysmal dilatation of the infrarenal abdominal aorta is slightly   increased compared with MRI of 2021.        --- End of Report ---    < end of copied text >    < from: MR Head w/wo IV Cont (12.10.23 @ 15:50) >  IMPRESSION: Right parietal craniotomy with postoperative changes after   resection of right parietal ring-enhancing mass. No significant   enhancement. No change in vasogenic edema compared with 2023.    --- End of Report ---    < end of copied text >    < from: [facility] Duplex Lower Ext Vein Scan, Bilat (12.10.23 @ 15:57) >    IMPRESSION:  Acute deep venous thrombosis: below the knee.    Bilateral soleal veins acute deep vein thrombosis.    P.A. Eslis was notified.    --- End of Report ---    < end of copied text >   HPI:  74 year old female with history of lung cancer s/p chemotherapy 5 years ago previously in remission (oncologist Dr. Fredis Solomon at [facility]), COPD, HTN presented to [place] with unsteady gait for a few weeks. Patient's  found her on he floor prompting him to call 911. Went to [place] and CT brain was remarkable for brain mass with midline shift, cyst vs necrotic mass. Transferred to [facility] for neurosurgery evaluation. Neurosurgery was consulted and reccomended MRI brain, CTAP and CT chest to look for possible malignancies, Keppra 500 mg BID, and Decadron 8 mg once in the ED and then Decadron 4mg q 6 hours. Reports shortness of breath when going up staurs, No fevers, chest pain, palpitations, vomiting, abdominal pain, diarrhea, constipation, dysuria, hematuria, melena, hematochezia.  (04 Dec 2023 13:50)      PAST MEDICAL & SURGICAL HISTORY:  Arthritis  left knee recently, right knee arthritis for several years    HTN (hypertension)  controlled with meds for several years    Depression    Regurgitation  tricuspid valve    Osteoarthritis of multiple joints, unspecified osteoarthritis type    COPD (chronic obstructive pulmonary disease)    Solitary pulmonary nodule  Received last dose of chemo 2019    History of lung cancer    S/P appendectomy  in     S/P breast biopsy  bilateral  breast  - benign    S/P D&C  for missed  about 25 years ago    Female bladder prolapse  bladder sling -         Allergies  penicillin (Short breath; Hives)    Intolerances        MEDICATIONS  (STANDING):  bisacodyl 5 milliGRAM(s) Oral at bedtime  budesonide 160 MICROgram(s)/formoterol 4.5 MICROgram(s) Inhaler 2 Puff(s) Inhalation two times a day  chlorhexidine 4% Liquid 1 Application(s) Topical <User Schedule>  dexAMETHasone     Tablet   Oral   dexAMETHasone     Tablet 4 milliGRAM(s) Oral every 8 hours  escitalopram 10 milliGRAM(s) Oral daily  famotidine    Tablet 10 milliGRAM(s) Oral daily  heparin   Injectable 5000 Unit(s) SubCutaneous every 8 hours  influenza  Vaccine (HIGH DOSE) 0.7 milliLiter(s) IntraMuscular once  levETIRAcetam 250 milliGRAM(s) Oral every 12 hours  nicotine -  14 mG/24Hr(s) Patch 1 Patch Transdermal daily  NIFEdipine XL 30 milliGRAM(s) Oral daily  polyethylene glycol 3350 17 Gram(s) Oral two times a day  senna 2 Tablet(s) Oral at bedtime    MEDICATIONS  (PRN):  acetaminophen     Tablet .. 650 milliGRAM(s) Oral every 6 hours PRN Temp greater or equal to 38C (100.4F), Mild Pain (1 - 3)  albuterol    90 MICROgram(s) HFA Inhaler 2 Puff(s) Inhalation every 6 hours PRN Shortness of Breath and/or Wheezing  ondansetron Injectable 4 milliGRAM(s) IV Push every 6 hours PRN Nausea and/or Vomiting  oxyCODONE    IR 5 milliGRAM(s) Oral every 4 hours PRN Moderate Pain (4 - 6)      FAMILY HISTORY:  Family history of pancreatic cancer (Father)    Family history of COPD (chronic obstructive pulmonary disease) (Mother)    Family history of heart disease (Mother)    Family history of leukemia (Sibling)  brother    Family history of hepatitis C (Sibling)  brother    Family history of rheumatic fever (Sibling)      SOCIAL HISTORY: No EtOH, current smoker     REVIEW OF SYSTEMS:  CONSTITUTIONAL: No weakness, fevers or chills  EYES/ENT: No visual changes;  No vertigo or throat pain   NECK: No pain or stiffness  RESPIRATORY: No cough, wheezing, hemoptysis; No shortness of breath  CARDIOVASCULAR: No chest pain or palpitations  GASTROINTESTINAL: No abdominal or epigastric pain. No nausea, vomiting, or hematemesis; No diarrhea or constipation. No melena or hematochezia.  GENITOURINARY: No dysuria, frequency or hematuria  NEUROLOGICAL: +unstable gait  SKIN: +rash in the past  All other review of systems is negative unless indicated above.        T(F): 97.5 (23 @ 05:04), Max: 98.1 (23 @ 16:40)  HR: 62 (23 @ 05:04)  BP: 164/99 (23 @ 05:04)  RR: 18 (23 @ 05:04)  SpO2: 97% (23 @ 05:04)  Wt(kg): --    GENERAL: NAD, sitting in chair  EYES: EOMI,, conjunctiva and sclera clear  NECK: Supple  CHEST/LUNG: Clear to auscultation bilaterally  HEART: Regular rate and rhythm; No murmurs  ABDOMEN: Soft, Nontender, Nondistended  EXTREMITIES:  2+ Peripheral Pulses, No LE edema  NEUROLOGY: non-focal  SKIN: +ecchymoses on upper extremities        Clean Catch Clean Catch (Midstream)   @ 23:10   <10,000 CFU/mL Normal Urogenital Isabella  --  --      < from: CT Head No Cont (23 @ 21:48) >  IMPRESSION:  Approximately 2.3 x 1.5 x 1.7 cm cyst versus centrally necrotic mass in   the right parietal-temporal region.  Diffuse vasogenic edema throughout   the right frontal, parietal and temporal lobes.  Mass effect in the right   cerebral hemisphere with 4.5 cm of midline shift to the left.  Follow-up   contrast enhanced brain MRI is recommended for further evaluation.    Chronic and nonemergent findings as discussed above.      --- End of Report ---    < end of copied text >    < from: CT Chest w/ IV Cont (23 @ 06:25) >  IMPRESSION:  Motion artifact.    18 mm groundglass nodule in the superior segment of the left lower lobe,   which may reflect malignancy.    Additional nodule along the right oblique fissure not well assessed   secondary to motion.    No definite metastatic lesions are seen in the abdomen/pelvis.    Aneurysmal dilatation of the infrarenal abdominal aorta is slightly   increased compared with MRI of 2021.        --- End of Report ---    < end of copied text >    < from: MR Head w/wo IV Cont (12.10.23 @ 15:50) >  IMPRESSION: Right parietal craniotomy with postoperative changes after   resection of right parietal ring-enhancing mass. No significant   enhancement. No change in vasogenic edema compared with 2023.    --- End of Report ---    < end of copied text >    < from: [facility] Duplex Lower Ext Vein Scan, Bilat (12.10.23 @ 15:57) >    IMPRESSION:  Acute deep venous thrombosis: below the knee.    Bilateral soleal veins acute deep vein thrombosis.    P.AJay Carter was notified.    --- End of Report ---    < end of copied text >   HPI:  74 year old female with history of lung cancer s/p chemotherapy 5 years ago previously in remission (oncologist Dr. Fredis Solomon at [facility]), COPD, HTN presented to [place] with unsteady gait for a few weeks. Patient's  found her on he floor prompting him to call 911. Went to [place] and CT brain was remarkable for brain mass with midline shift, cyst vs necrotic mass. Transferred to [facility] for neurosurgery evaluation. Neurosurgery was consulted and reccomended MRI brain, CTAP and CT chest to look for possible malignancies, Keppra 500 mg BID, and Decadron 8 mg once in the ED and then Decadron 4mg q 6 hours. Reports shortness of breath when going up staurs, No fevers, chest pain, palpitations, vomiting, abdominal pain, diarrhea, constipation, dysuria, hematuria, melena, hematochezia.  (04 Dec 2023 13:50)      PAST MEDICAL & SURGICAL HISTORY:  Arthritis  left knee recently, right knee arthritis for several years    HTN (hypertension)  controlled with meds for several years    Depression    Regurgitation  tricuspid valve    Osteoarthritis of multiple joints, unspecified osteoarthritis type    COPD (chronic obstructive pulmonary disease)    Solitary pulmonary nodule  Received last dose of chemo 2019    History of lung cancer    S/P appendectomy  in     S/P breast biopsy  bilateral  breast  - benign    S/P D&C  for missed  about 25 years ago    Female bladder prolapse  bladder sling -         Allergies  penicillin (Short breath; Hives)    Intolerances        MEDICATIONS  (STANDING):  bisacodyl 5 milliGRAM(s) Oral at bedtime  budesonide 160 MICROgram(s)/formoterol 4.5 MICROgram(s) Inhaler 2 Puff(s) Inhalation two times a day  chlorhexidine 4% Liquid 1 Application(s) Topical <User Schedule>  dexAMETHasone     Tablet   Oral   dexAMETHasone     Tablet 4 milliGRAM(s) Oral every 8 hours  escitalopram 10 milliGRAM(s) Oral daily  famotidine    Tablet 10 milliGRAM(s) Oral daily  heparin   Injectable 5000 Unit(s) SubCutaneous every 8 hours  influenza  Vaccine (HIGH DOSE) 0.7 milliLiter(s) IntraMuscular once  levETIRAcetam 250 milliGRAM(s) Oral every 12 hours  nicotine -  14 mG/24Hr(s) Patch 1 Patch Transdermal daily  NIFEdipine XL 30 milliGRAM(s) Oral daily  polyethylene glycol 3350 17 Gram(s) Oral two times a day  senna 2 Tablet(s) Oral at bedtime    MEDICATIONS  (PRN):  acetaminophen     Tablet .. 650 milliGRAM(s) Oral every 6 hours PRN Temp greater or equal to 38C (100.4F), Mild Pain (1 - 3)  albuterol    90 MICROgram(s) HFA Inhaler 2 Puff(s) Inhalation every 6 hours PRN Shortness of Breath and/or Wheezing  ondansetron Injectable 4 milliGRAM(s) IV Push every 6 hours PRN Nausea and/or Vomiting  oxyCODONE    IR 5 milliGRAM(s) Oral every 4 hours PRN Moderate Pain (4 - 6)      FAMILY HISTORY:  Family history of pancreatic cancer (Father)    Family history of COPD (chronic obstructive pulmonary disease) (Mother)    Family history of heart disease (Mother)    Family history of leukemia (Sibling)  brother    Family history of hepatitis C (Sibling)  brother    Family history of rheumatic fever (Sibling)      SOCIAL HISTORY: No EtOH, current smoker     REVIEW OF SYSTEMS:  CONSTITUTIONAL: No weakness, fevers or chills  EYES/ENT: No visual changes;  No vertigo or throat pain   NECK: No pain or stiffness  RESPIRATORY: No cough, wheezing, hemoptysis; No shortness of breath  CARDIOVASCULAR: No chest pain or palpitations  GASTROINTESTINAL: No abdominal or epigastric pain. No nausea, vomiting, or hematemesis; No diarrhea or constipation. No melena or hematochezia.  GENITOURINARY: No dysuria, frequency or hematuria  NEUROLOGICAL: +unstable gait  SKIN: +rash in the past  All other review of systems is negative unless indicated above.        T(F): 97.5 (23 @ 05:04), Max: 98.1 (23 @ 16:40)  HR: 62 (23 @ 05:04)  BP: 164/99 (23 @ 05:04)  RR: 18 (23 @ 05:04)  SpO2: 97% (23 @ 05:04)  Wt(kg): --    GENERAL: NAD, sitting in chair  EYES: EOMI,, conjunctiva and sclera clear  NECK: Supple  CHEST/LUNG: Clear to auscultation bilaterally  HEART: Regular rate and rhythm; No murmurs  ABDOMEN: Soft, Nontender, Nondistended  EXTREMITIES:  2+ Peripheral Pulses, No LE edema  NEUROLOGY: non-focal  SKIN: +ecchymoses on upper extremities        Clean Catch Clean Catch (Midstream)   @ 23:10   <10,000 CFU/mL Normal Urogenital Isabella  --  --      < from: CT Head No Cont (23 @ 21:48) >  IMPRESSION:  Approximately 2.3 x 1.5 x 1.7 cm cyst versus centrally necrotic mass in   the right parietal-temporal region.  Diffuse vasogenic edema throughout   the right frontal, parietal and temporal lobes.  Mass effect in the right   cerebral hemisphere with 4.5 cm of midline shift to the left.  Follow-up   contrast enhanced brain MRI is recommended for further evaluation.    Chronic and nonemergent findings as discussed above.      --- End of Report ---    < end of copied text >    < from: CT Chest w/ IV Cont (23 @ 06:25) >  IMPRESSION:  Motion artifact.    18 mm groundglass nodule in the superior segment of the left lower lobe,   which may reflect malignancy.    Additional nodule along the right oblique fissure not well assessed   secondary to motion.    No definite metastatic lesions are seen in the abdomen/pelvis.    Aneurysmal dilatation of the infrarenal abdominal aorta is slightly   increased compared with MRI of 2021.        --- End of Report ---    < end of copied text >    < from: MR Head w/wo IV Cont (12.10.23 @ 15:50) >  IMPRESSION: Right parietal craniotomy with postoperative changes after   resection of right parietal ring-enhancing mass. No significant   enhancement. No change in vasogenic edema compared with 2023.    --- End of Report ---    < end of copied text >    < from: [facility] Duplex Lower Ext Vein Scan, Bilat (12.10.23 @ 15:57) >    IMPRESSION:  Acute deep venous thrombosis: below the knee.    Bilateral soleal veins acute deep vein thrombosis.    P.AJay Carter was notified.    --- End of Report ---    < end of copied text >

## 2023-12-14 NOTE — PROGRESS NOTE ADULT - NSPROGADDITIONALINFOA_GEN_ALL_CORE
.  Jeanie Adams MD  Division of Hospital Medicine  [facility]   Spectra: [phone]    Plan discussed with patient and neurosurgery GINNY Mckinley. .  Jeanie Adams MD  Division of Hospital Medicine  [facility]   Spectra: [phone]    Plan discussed with patient and neurosurgery GINNY Mckinley.

## 2023-12-14 NOTE — CONSULT NOTE ADULT - ASSESSMENT
74F with PMH of lung cancer s/p chemotherapy 5 years ago previously in remission, COPD, HTN presents with unstable gait, found to have lung lesion with suspected brain metastasis concerning for metastatic lung cancer. Oncology consulted for suspected lung cancer recurrence.     #History of Lung cancer   - History of lung adenocarcinoma   - completed 4 cycles of Carbo/Alimta/Keytruda, then Alimta and Keytruda maintenance c/b skin rash, now off systemic therapy on surveillance per patient  - Follows Dr. Fredis Solomon at [facility]   - CT Head findings of 2.3 x 1.5 x 1.7 cm cyst versus centrally necrotic mass in the right parietal-temporal region.  Diffuse vasogenic edema throughout the right frontal, parietal and temporal lobes.  Mass effect in the right cerebral hemisphere with 4.5 cm of midline shift to the left.    -CT chest showing 18mm ground glass opacity in the superior segment of the left lower lobe concerning for malignancy.    - s/p right parietal crani for 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 on 12/9/23   - Dexamethasone taper for vasogenic edema in brain   - Imaging concerning for possible lung cancer recurrence with mets to brain, awaiting pathology results     #DVTs   - Duplex US 12/10 showed acute b/l soleal DVTs   - On prophylactic heparin post op  - pending repeat LE venous duplex on 12/15.     Recommend:   - Follow up surgical pathology (12/9) to confirm diagnosis   - continue with dexamethasone taper for vasogenic edema   - Upon discharge will need close follow up with Dr. Fredis Solomon at [facility] for ongoing Oncology management and decision regarding referring to Radiation oncology    Case discussed w/ Dr. Sarbjit Rivas, PGY-4  Fellow Hematology/Oncology  pager [phone]  Available on TEAMS  After 5pm or on weekends please contact  to page on-call fellow  74F with PMH of lung cancer s/p chemotherapy 5 years ago previously in remission, COPD, HTN presents with unstable gait, found to have lung lesion with suspected brain metastasis concerning for metastatic lung cancer. Oncology consulted for suspected lung cancer recurrence.     #History of Lung cancer   - History of lung adenocarcinoma   - completed 4 cycles of Carbo/Alimta/Keytruda, then Alimta and Keytruda maintenance c/b skin rash, now off systemic therapy on surveillance per patient  - Follows Dr. Fredis Solomon at [facility]   - CT Head findings of 2.3 x 1.5 x 1.7 cm cyst versus centrally necrotic mass in the right parietal-temporal region.  Diffuse vasogenic edema throughout the right frontal, parietal and temporal lobes.  Mass effect in the right cerebral hemisphere with 4.5 cm of midline shift to the left.    -CT chest showing 18mm ground glass opacity in the superior segment of the left lower lobe concerning for malignancy.    - s/p right parietal crani for 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 on 12/9/23   - Dexamethasone taper for vasogenic edema in brain   - Imaging concerning for possible lung cancer recurrence with mets to brain, awaiting pathology results     #DVTs   - Duplex US 12/10 showed acute b/l soleal DVTs   - On prophylactic heparin post op  - pending repeat LE venous duplex on 12/15.     Recommend:   - Follow up surgical pathology (12/9) to confirm diagnosis   - continue with dexamethasone taper for vasogenic edema   - Upon discharge will need close follow up with Dr. Fredis Solomon at [facility] for ongoing Oncology management and decision regarding referring to Radiation oncology    Case discussed w/ Dr. Sarbjit Rivas, PGY-4  Fellow Hematology/Oncology  pager [phone]  Available on TEAMS  After 5pm or on weekends please contact  to page on-call fellow

## 2023-12-14 NOTE — PROGRESS NOTE ADULT - ASSESSMENT
74 year old female with history of lung cancer s/p chemotherapy 5 years ago previously in remission (oncologist Dr. Fredis Solomon at [facility]), COPD, HTN presented to [place] with unsteady gait for a few weeks. Patient's  found her on he floor prompting him to call 911. Went to [place] and CT brain was remarkable for brain mass with midline shift, cyst vs necrotic mass. Transferred to [facility] for neurosurgery evaluation. Neurosurgery was consulted and reccomended MRI brain, CTAP and CT chest to look for possible malignancies, Keppra 500 mg BID, and Decadron 8 mg once in the ED and then Decadron 4mg q 6 hours. Reports shortness of breath when going up staurs, No fevers, chest pain, palpitations, vomiting, abdominal pain, diarrhea, constipation, dysuria, hematuria, melena, hematochezia.      Procedure:  Right craniotomy for tumor 12/9  POD#5    PLAN:  NEURO:  C/w neuro checks q 4 hrs  c/w Decadron taper over 2 weeks  increase activity as tolerated   Await transfer to rehab    CARDIOVASCULAR:  Hemodynamically stable  H/o dilated Abdominal aorta: out patient f/u after rehab  C/w respiratory treatment    PULMONARY:  History of lung cancer, Solitary pulmonary nodule  Received last dose of chemo 5/2019    RENAL:  creat improved, continue to hold diuretics  Voiding    GI:  Mince and Moist diet  Bowel regimen: Miralax and Senna  PPX: pepcid  Last BM: needs a BM, Dulcolax ordered    HEME: H/H stable    ID: Afebrile    ENDO: no issue    DVT PROPHYLAXIS:  [x] Venodynes                                [x] Heparin/Lovenox   H/o soleal DVT, c/w SQH    FALL RISK:  [x] Low Risk                                    [] Impulsive    Will discuss with Dr Barcenas  [id number] 74 year old female with history of lung cancer s/p chemotherapy 5 years ago previously in remission (oncologist Dr. Fredis Solomon at [facility]), COPD, HTN presented to [place] with unsteady gait for a few weeks. Patient's  found her on he floor prompting him to call 911. Went to [place] and CT brain was remarkable for brain mass with midline shift, cyst vs necrotic mass. Transferred to [facility] for neurosurgery evaluation. Neurosurgery was consulted and reccomended MRI brain, CTAP and CT chest to look for possible malignancies, Keppra 500 mg BID, and Decadron 8 mg once in the ED and then Decadron 4mg q 6 hours. Reports shortness of breath when going up staurs, No fevers, chest pain, palpitations, vomiting, abdominal pain, diarrhea, constipation, dysuria, hematuria, melena, hematochezia.      Procedure:  Right craniotomy for tumor 12/9  POD#5    PLAN:  NEURO:  C/w neuro checks q 4 hrs  c/w Decadron taper over 2 weeks  increase activity as tolerated   Await transfer to rehab    CARDIOVASCULAR:  Hemodynamically stable  H/o dilated Abdominal aorta: out patient f/u after rehab  C/w respiratory treatment    PULMONARY:  History of lung cancer, Solitary pulmonary nodule  Received last dose of chemo 5/2019    RENAL:  creat improved, continue to hold diuretics  Voiding    GI:  Mince and Moist diet  Bowel regimen: Miralax and Senna  PPX: pepcid  Last BM: needs a BM, Dulcolax ordered    HEME: H/H stable    ID: Afebrile    ENDO: no issue    DVT PROPHYLAXIS:  [x] Venodynes                                [x] Heparin/Lovenox   H/o soleal DVT, c/w SQH    FALL RISK:  [x] Low Risk                                    [] Impulsive    Will discuss with Dr Barcenas  [id number] 74 year old female with history of lung cancer s/p chemotherapy 5 years ago previously in remission (oncologist Dr. Fredis Solomon at [facility]), COPD, HTN presented to [place] with unsteady gait for a few weeks. Patient's  found her on he floor prompting him to call 911. Went to [place] and CT brain was remarkable for brain mass with midline shift, cyst vs necrotic mass. Transferred to [facility] for neurosurgery evaluation. Neurosurgery was consulted and reccomended MRI brain, CTAP and CT chest to look for possible malignancies, Keppra 500 mg BID, and Decadron 8 mg once in the ED and then Decadron 4mg q 6 hours. Reports shortness of breath when going up staurs, No fevers, chest pain, palpitations, vomiting, abdominal pain, diarrhea, constipation, dysuria, hematuria, melena, hematochezia.      Procedure:  Right craniotomy for tumor 12/9  POD#5    PLAN:  NEURO:  POD#5 Right crani for tumor  C/w neuro checks q 4 hrs  c/w Decadron taper over 2 weeks to Dex 1 BID daily   increase activity as tolerated   Await transfer to [facility], pending path, denied at [facility]    CARDIOVASCULAR:  Hemodynamically stable  H/o dilated Abdominal aorta: out patient f/u after rehab  H/o HTN, start amlodipine 5mg daily, diuretics held, hospitalist recs appreciated    PULMONARY:  History of COPD, lung cancer, LLL pulmonary nodule and ?Right pulm nodule near Rt oblique fissure  - COPD;  c/w Budesonide 80 mcg/Formoterol 4.5 mcg 2 puffs BID, c/w albuterol PRN.  Received last dose of chemo 5/2019  Pulmonology recommending repeat CT/PET scan in 4-6 weeks, f/u outpatient with pulmonologist and oncologist    RENAL:  TEREZA on CKD improved, f/u BMP in AM, continue to hold diuretics  Hyponatremia, 1L Fluid restriction, now resolved  Nephrology recs appreciated, signed off, f/u outpatient after discharge  Voiding    GI:  Mince and Moist diet  Bowel regimen: Miralax and Senna  PPX: pepcid  Last BM: needs a BM, Dulcolax suppository ordered    ID: Afebrile  mild leukocytosis improving    ENDO:  euglycemia    HEME: H/H stable  DVT PROPHYLAXIS:  [x] Venodynes                                [x] Heparin/Lovenox  Found to have bilateral soleal DVT, c/w SQH   Repeat LE dopplers 12/15, Vasc cards following       Will discuss with Dr Barcenas  [id number] 74 year old female with history of lung cancer s/p chemotherapy 5 years ago previously in remission (oncologist Dr. Fredis Solomon at [facility]), COPD, HTN presented to [place] with unsteady gait for a few weeks. Patient's  found her on he floor prompting him to call 911. Went to [place] and CT brain was remarkable for brain mass with midline shift, cyst vs necrotic mass. Transferred to [facility] for neurosurgery evaluation. Neurosurgery was consulted and reccomended MRI brain, CTAP and CT chest to look for possible malignancies, Keppra 500 mg BID, and Decadron 8 mg once in the ED and then Decadron 4mg q 6 hours. Reports shortness of breath when going up staurs, No fevers, chest pain, palpitations, vomiting, abdominal pain, diarrhea, constipation, dysuria, hematuria, melena, hematochezia.      Procedure:  Right craniotomy for tumor 12/9  POD#5    PLAN:  NEURO:  POD#5 Right crani for tumor  C/w neuro checks q 4 hrs  c/w Decadron taper over 2 weeks to Dex 1 BID daily   increase activity as tolerated   Await transfer to [facility], pending path, denied at [facility]    CARDIOVASCULAR:  Hemodynamically stable  H/o dilated Abdominal aorta: out patient f/u after rehab  H/o HTN, start amlodipine 5mg daily, diuretics held, hospitalist recs appreciated    PULMONARY:  History of COPD, lung cancer, LLL pulmonary nodule and ?Right pulm nodule near Rt oblique fissure  - COPD;  c/w Budesonide 80 mcg/Formoterol 4.5 mcg 2 puffs BID, c/w albuterol PRN.  Received last dose of chemo 5/2019  Pulmonology recommending repeat CT/PET scan in 4-6 weeks, f/u outpatient with pulmonologist and oncologist    RENAL:  TEREZA on CKD improved, f/u BMP in AM, continue to hold diuretics  Hyponatremia, 1L Fluid restriction, now resolved  Nephrology recs appreciated, signed off, f/u outpatient after discharge  Voiding    GI:  Mince and Moist diet  Bowel regimen: Miralax and Senna  PPX: pepcid  Last BM: needs a BM, Dulcolax suppository ordered    ID: Afebrile  mild leukocytosis improving    ENDO:  euglycemia    HEME: H/H stable  DVT PROPHYLAXIS:  [x] Venodynes                                [x] Heparin/Lovenox  Found to have bilateral soleal DVT, c/w SQH   Repeat LE dopplers 12/15, Vasc cards following       Will discuss with Dr Barcenas  [id number] 74 year old female with history of lung cancer s/p chemotherapy 5 years ago previously in remission (oncologist Dr. Fredis Solomon at [facility]), COPD, HTN presented to [place] with unsteady gait for a few weeks. Patient's  found her on he floor prompting him to call 911. Went to [place] and CT brain was remarkable for brain mass with midline shift, cyst vs necrotic mass. Transferred to [facility] for neurosurgery evaluation. Neurosurgery was consulted and reccomended MRI brain, CTAP and CT chest to look for possible malignancies, Keppra 500 mg BID, and Decadron 8 mg once in the ED and then Decadron 4mg q 6 hours. Reports shortness of breath when going up staurs, No fevers, chest pain, palpitations, vomiting, abdominal pain, diarrhea, constipation, dysuria, hematuria, melena, hematochezia.      Procedure:  Right craniotomy for tumor 12/9  POD#5    PLAN:  NEURO:  POD#5 Right crani for tumor  C/w neuro checks q 4 hrs  c/w Decadron taper over 2 weeks to Dex 1 BID daily   increase activity as tolerated   Await transfer to [facility], pending path, denied at [facility]    CARDIOVASCULAR:  Hemodynamically stable  H/o dilated Abdominal aorta: out patient f/u after rehab  H/o HTN, start nifedipine 30mg daily, diuretics held, hospitalist recs appreciated    PULMONARY:  History of COPD, lung cancer, LLL pulmonary nodule and ?Right pulm nodule near Rt oblique fissure  - COPD;  c/w Budesonide 80 mcg/Formoterol 4.5 mcg 2 puffs BID, c/w albuterol PRN.  Received last dose of chemo 5/2019  Pulmonology recommending repeat CT/PET scan in 4-6 weeks, f/u outpatient with pulmonologist and oncologist    RENAL:  TEREZA on CKD improved, f/u BMP in AM, continue to hold diuretics  Hyponatremia, 1L Fluid restriction, now resolved  Nephrology recs appreciated, signed off, f/u outpatient after discharge  Voiding    GI:  Mince and Moist diet  Bowel regimen: Miralax and Senna  PPX: pepcid  Last BM: needs a BM, Dulcolax suppository ordered    ID: Afebrile  mild leukocytosis improving    ENDO:  euglycemia    HEME: H/H stable  DVT PROPHYLAXIS:  [x] Venodynes                                [x] Heparin/Lovenox  Found to have bilateral soleal DVT, c/w SQH   Repeat LE dopplers 12/15, Vasc cards following       Will discuss with Dr Barcenas  [id number] 74 year old female with history of lung cancer s/p chemotherapy 5 years ago previously in remission (oncologist Dr. Fredis Solomon at [facility]), COPD, HTN presented to [place] with unsteady gait for a few weeks. Patient's  found her on he floor prompting him to call 911. Went to [place] and CT brain was remarkable for brain mass with midline shift, cyst vs necrotic mass. Transferred to [facility] for neurosurgery evaluation. Neurosurgery was consulted and reccomended MRI brain, CTAP and CT chest to look for possible malignancies, Keppra 500 mg BID, and Decadron 8 mg once in the ED and then Decadron 4mg q 6 hours. Reports shortness of breath when going up staurs, No fevers, chest pain, palpitations, vomiting, abdominal pain, diarrhea, constipation, dysuria, hematuria, melena, hematochezia.      Procedure:  Right craniotomy for tumor 12/9  POD#5    PLAN:  NEURO:  POD#5 Right crani for tumor  C/w neuro checks q 4 hrs  c/w Decadron taper over 2 weeks to Dex 1 BID daily   increase activity as tolerated   Await transfer to [facility], pending path, denied at [facility]    CARDIOVASCULAR:  Hemodynamically stable  H/o dilated Abdominal aorta: out patient f/u after rehab  H/o HTN, start nifedipine 30mg daily, diuretics held, hospitalist recs appreciated    PULMONARY:  History of COPD, lung cancer, LLL pulmonary nodule and ?Right pulm nodule near Rt oblique fissure  - COPD;  c/w Budesonide 80 mcg/Formoterol 4.5 mcg 2 puffs BID, c/w albuterol PRN.  Received last dose of chemo 5/2019  Pulmonology recommending repeat CT/PET scan in 4-6 weeks, f/u outpatient with pulmonologist and oncologist    RENAL:  TEREZA on CKD improved, f/u BMP in AM, continue to hold diuretics  Hyponatremia, 1L Fluid restriction, now resolved  Nephrology recs appreciated, signed off, f/u outpatient after discharge  Voiding    GI:  Mince and Moist diet  Bowel regimen: Miralax and Senna  PPX: pepcid  Last BM: needs a BM, Dulcolax suppository ordered    ID: Afebrile  mild leukocytosis improving    ENDO:  euglycemia    HEME: H/H stable  DVT PROPHYLAXIS:  [x] Venodynes                                [x] Heparin/Lovenox  Found to have bilateral soleal DVT, c/w SQH   Repeat LE dopplers 12/15, Vasc cards following       Will discuss with Dr Barcenas  [id number] 74 year old female with history of lung cancer s/p chemotherapy 5 years ago previously in remission (oncologist Dr. Fredis Solomon at [facility]), COPD, HTN presented to [place] with unsteady gait for a few weeks. Patient's  found her on he floor prompting him to call 911. Went to [place] and CT brain was remarkable for brain mass with midline shift, cyst vs necrotic mass. Transferred to [facility] for neurosurgery evaluation. Neurosurgery was consulted and reccomended MRI brain, CTAP and CT chest to look for possible malignancies, Keppra 500 mg BID, and Decadron 8 mg once in the ED and then Decadron 4mg q 6 hours. Reports shortness of breath when going up staurs, No fevers, chest pain, palpitations, vomiting, abdominal pain, diarrhea, constipation, dysuria, hematuria, melena, hematochezia.      Procedure:  Right craniotomy for tumor 12/9  POD#5    PLAN:  NEURO:  POD#5 Right crani for tumor likely mets 2/2 lung cancer  Path pending to confirm diagnosis  C/w neuro checks q 4 hrs  c/w Decadron taper over 2 weeks to Dex 1 BID daily   increase activity as tolerated   Await transfer to [facility], pending path, denied at [facility]    CARDIOVASCULAR:  Hemodynamically stable  H/o dilated Abdominal aorta: out patient f/u after rehab  H/o HTN, start nifedipine 30mg daily, diuretics held, hospitalist recs appreciated    PULMONARY:  History of COPD, lung cancer, LLL pulmonary nodule and ?Right pulm nodule near Rt oblique fissure  - COPD;  c/w Budesonide 80 mcg/Formoterol 4.5 mcg 2 puffs BID, c/w albuterol PRN.  Received last dose of chemo 5/2019  Pulmonology recommending repeat CT/PET scan in 4-6 weeks, f/u outpatient with pulmonologist and oncologist.   - Upon discharge will need close follow up with Dr. Fredis Solomon at [facility] for ongoing Oncology management and decision regarding referring to Radiation oncology. No current plans for inpatient treatment chemo/radiation.    RENAL:  TEREZA on CKD improved, f/u BMP in AM, continue to hold diuretics  Hyponatremia, 1L Fluid restriction, now resolved  Nephrology recs appreciated, signed off, f/u outpatient after discharge  Voiding    GI:  Mince and Moist diet  Bowel regimen: Miralax and Senna  PPX: pepcid  Last BM: needs a BM, Dulcolax suppository ordered    ID: Afebrile  mild leukocytosis improving    ENDO:  euglycemia    HEME: H/H stable  DVT PROPHYLAXIS:  [x] Venodynes                                [x] Heparin/Lovenox  Found to have bilateral soleal DVT, c/w SQH   Repeat LE dopplers 12/15, Vasc cards following       Will discuss with Dr Barcenas  [id number] 74 year old female with history of lung cancer s/p chemotherapy 5 years ago previously in remission (oncologist Dr. rFedis Solomon at [facility]), COPD, HTN presented to [place] with unsteady gait for a few weeks. Patient's  found her on he floor prompting him to call 911. Went to [place] and CT brain was remarkable for brain mass with midline shift, cyst vs necrotic mass. Transferred to [facility] for neurosurgery evaluation. Neurosurgery was consulted and reccomended MRI brain, CTAP and CT chest to look for possible malignancies, Keppra 500 mg BID, and Decadron 8 mg once in the ED and then Decadron 4mg q 6 hours. Reports shortness of breath when going up staurs, No fevers, chest pain, palpitations, vomiting, abdominal pain, diarrhea, constipation, dysuria, hematuria, melena, hematochezia.      Procedure:  Right craniotomy for tumor 12/9  POD#5    PLAN:  NEURO:  POD#5 Right crani for tumor likely mets 2/2 lung cancer  Path pending to confirm diagnosis  C/w neuro checks q 4 hrs  c/w Decadron taper over 2 weeks to Dex 1 BID daily   increase activity as tolerated   Await transfer to [facility], pending path, denied at [facility]    CARDIOVASCULAR:  Hemodynamically stable  H/o dilated Abdominal aorta: out patient f/u after rehab  H/o HTN, start nifedipine 30mg daily, diuretics held, hospitalist recs appreciated    PULMONARY:  History of COPD, lung cancer, LLL pulmonary nodule and ?Right pulm nodule near Rt oblique fissure  - COPD;  c/w Budesonide 80 mcg/Formoterol 4.5 mcg 2 puffs BID, c/w albuterol PRN.  Received last dose of chemo 5/2019  Pulmonology recommending repeat CT/PET scan in 4-6 weeks, f/u outpatient with pulmonologist and oncologist.   - Upon discharge will need close follow up with Dr. Fredis Solomon at [facility] for ongoing Oncology management and decision regarding referring to Radiation oncology. No current plans for inpatient treatment chemo/radiation.    RENAL:  TEREZA on CKD improved, f/u BMP in AM, continue to hold diuretics  Hyponatremia, 1L Fluid restriction, now resolved  Nephrology recs appreciated, signed off, f/u outpatient after discharge  Voiding    GI:  Mince and Moist diet  Bowel regimen: Miralax and Senna  PPX: pepcid  Last BM: needs a BM, Dulcolax suppository ordered    ID: Afebrile  mild leukocytosis improving    ENDO:  euglycemia    HEME: H/H stable  DVT PROPHYLAXIS:  [x] Venodynes                                [x] Heparin/Lovenox  Found to have bilateral soleal DVT, c/w SQH   Repeat LE dopplers 12/15, Vasc cards following       Will discuss with Dr Barcenas  [phone]

## 2023-12-14 NOTE — PROGRESS NOTE ADULT - NUTRITIONAL ASSESSMENT
This patient has been assessed with a concern for Malnutrition and has been determined to have a diagnosis/diagnoses of Moderate protein-calorie malnutrition.    This patient is being managed with:   Diet NPO after Midnight-     NPO Start Date: 08-Dec-2023   NPO Start Time: 23:59  Except Medications  Entered: Dec  8 2023  6:50PM    Diet Regular-  1000mL Fluid Restriction ([id number])  Supplement Feeding Modality:  Oral  Ensure Plus High Protein Cans or Servings Per Day:  1       Frequency:  Daily  Entered: Dec  8 2023  6:18PM    Diet Regular-  Supplement Feeding Modality:  Oral  Ensure Plus High Protein Cans or Servings Per Day:  1       Frequency:  Daily  Entered: Dec  8 2023  8:11AM    The following pending diet order is being considered for treatment of Moderate protein-calorie malnutrition:null This patient has been assessed with a concern for Malnutrition and has been determined to have a diagnosis/diagnoses of Moderate protein-calorie malnutrition.    This patient is being managed with:   Diet NPO after Midnight-     NPO Start Date: 08-Dec-2023   NPO Start Time: 23:59  Except Medications  Entered: Dec  8 2023  6:50PM    Diet Regular-  1000mL Fluid Restriction ([id number])  Supplement Feeding Modality:  Oral  Ensure Plus High Protein Cans or Servings Per Day:  1       Frequency:  Daily  Entered: Dec  8 2023  6:18PM    Diet Regular-  Supplement Feeding Modality:  Oral  Ensure Plus High Protein Cans or Servings Per Day:  1       Frequency:  Daily  Entered: Dec  8 2023  8:11AM    The following pending diet order is being considered for treatment of Moderate protein-calorie malnutrition:null

## 2023-12-14 NOTE — CONSULT NOTE ADULT - CONSULT REQUESTED DATE/TIME
04-Dec-2023 04:17
05-Dec-2023 10:16
05-Dec-2023 17:39
11-Dec-2023 14:51
14-Dec-2023 10:35
08-Dec-2023 17:09
11-Dec-2023

## 2023-12-14 NOTE — PROGRESS NOTE ADULT - SUBJECTIVE AND OBJECTIVE BOX
SUBJECTIVE: Patient seen and examined at bedside. Patient without complaints. Denies any headaches, CXP, SOB.    OVERNIGHT EVENTS: none    Vital Signs Last 24 Hrs  T(C): 36.4 (14 Dec 2023 05:04), Max: 36.7 (13 Dec 2023 16:40)  T(F): 97.5 (14 Dec 2023 05:04), Max: 98.1 (13 Dec 2023 16:40)  HR: 62 (14 Dec 2023 05:04) (60 - 74)  BP: 164/99 (14 Dec 2023 05:04) (145/86 - 165/88)  BP(mean): --  RR: 18 (14 Dec 2023 05:04) (18 - 18)  SpO2: 97% (14 Dec 2023 05:04) (93% - 97%)    Parameters below as of 14 Dec 2023 05:04  Patient On (Oxygen Delivery Method): room air        PHYSICAL EXAM:    Constitutional: No Acute Distress     Neurological: AOx3, Following Commands, Moving all Extremities     Motor exam:          Upper extremity                         Delt     Bicep     Tricep    HG                                                 R         5/5        5/5        5/5       5/5                                               L          5/5        5/5        5/5       5/5          Lower extremity                        HF         KF        KE       DF         PF                                                  R        5/5        5/5        5/5       5/5         5/5                                               L         5/5        5/5       5/5       5/5          5/5                                                 Sensation: [] intact to light touch  [] decreased:     Pulmonary: Clear to Auscultation, No rales, No rhonchi, No wheezes     Cardiovascular: S1, S2, Regular rate and rhythm     Gastrointestinal: Soft, Non-tender, Non-distended     Extremities: No calf tenderness     Incision: staples, C/D/I    LABS:        DRAINS:  none    MEDICATIONS:  Anticoagulation:   heparin   Injectable 5000 Unit(s) SubCutaneous every 8 hours    Antibiotics:    Endo:  dexAMETHasone     Tablet   Oral   dexAMETHasone     Tablet 4 milliGRAM(s) Oral every 8 hours    Neuro:  acetaminophen     Tablet .. 650 milliGRAM(s) Oral every 6 hours PRN Temp greater or equal to 38C (100.4F), Mild Pain (1 - 3)  escitalopram 10 milliGRAM(s) Oral daily  levETIRAcetam 250 milliGRAM(s) Oral every 12 hours  ondansetron Injectable 4 milliGRAM(s) IV Push every 6 hours PRN Nausea and/or Vomiting  oxyCODONE    IR 5 milliGRAM(s) Oral every 4 hours PRN Moderate Pain (4 - 6)    Cardiac:    Pulm:  albuterol    90 MICROgram(s) HFA Inhaler 2 Puff(s) Inhalation every 6 hours PRN Shortness of Breath and/or Wheezing  budesonide 160 MICROgram(s)/formoterol 4.5 MICROgram(s) Inhaler 2 Puff(s) Inhalation two times a day    GI/:  bisacodyl 5 milliGRAM(s) Oral at bedtime  bisacodyl Suppository 10 milliGRAM(s) Rectal daily PRN Constipation  famotidine    Tablet 10 milliGRAM(s) Oral daily  polyethylene glycol 3350 17 Gram(s) Oral two times a day  senna 2 Tablet(s) Oral at bedtime    Other:   chlorhexidine 4% Liquid 1 Application(s) Topical <User Schedule>  influenza  Vaccine (HIGH DOSE) 0.7 milliLiter(s) IntraMuscular once  nicotine -  14 mG/24Hr(s) Patch 1 Patch Transdermal daily    DIET: Minced/moist diet    IMAGING:     < from: [facility] Duplex Lower Ext Vein Scan, Bilat (12.10.23 @ 15:57) >  IMPRESSION:  Acute deep venous thrombosis: below the knee.    Bilateral soleal veins acute deep vein thrombosis.    P.KATHE Carter was notified.    --- End of Report ---    < end of copied text >  < from: MR Head w/wo IV Cont (12.10.23 @ 15:50) >  IMPRESSION: Right parietal craniotomy with postoperative changes after   resection of right parietal ring-enhancing mass. No significant   enhancement. No change in vasogenic edema compared with 12/4/2023.    --- End of Report ---    < end of copied text >  < from: CT Head No Cont (12.10.23 @ 09:31) >  IMPRESSION:    1.  Patient status post right parietal craniotomy for resection of a   right parietal ring-enhancing lesion. Postoperative changes as discussed   above. Recommend MRI of the brain for further evaluation of the operative   bed.  2.  Small midline shift to the left of approximately 0.4 cm.    --- End of Report ---    < end of copied text >     SUBJECTIVE: Patient seen and examined at bedside. Patient without complaints. Denies any headaches, CXP, SOB.    OVERNIGHT EVENTS: none    Vital Signs Last 24 Hrs  T(C): 36.4 (14 Dec 2023 05:04), Max: 36.7 (13 Dec 2023 16:40)  T(F): 97.5 (14 Dec 2023 05:04), Max: 98.1 (13 Dec 2023 16:40)  HR: 62 (14 Dec 2023 05:04) (60 - 74)  BP: 164/99 (14 Dec 2023 05:04) (145/86 - 165/88)  BP(mean): --  RR: 18 (14 Dec 2023 05:04) (18 - 18)  SpO2: 97% (14 Dec 2023 05:04) (93% - 97%)    Parameters below as of 14 Dec 2023 05:04  Patient On (Oxygen Delivery Method): room air        PHYSICAL EXAM:    Constitutional: No Acute Distress     Neurological: AOx3, Following Commands, Moving all Extremities, + dysmetria    Motor exam:          Upper extremity                         Delt     Bicep     Tricep    HG                                                 R         5/5        5/5        5/5       5/5                                               L          5/5        5/5        5/5       5/5          Lower extremity                        HF         KF        KE       DF         PF                                                  R        5/5        5/5        5/5       5/5         5/5                                               L         5/5        5/5       5/5       5/5          5/5                                                 Sensation: [x] intact to light touch  [] decreased:     Pulmonary: Clear to Auscultation, No rales, No rhonchi, No wheezes     Cardiovascular: S1, S2, Regular rate and rhythm     Gastrointestinal: Soft, Non-tender, Non-distended     Extremities: No calf tenderness     Incision: staples, C/D/I    LABS:        DRAINS:  none    MEDICATIONS:  Anticoagulation:   heparin   Injectable 5000 Unit(s) SubCutaneous every 8 hours    Antibiotics:    Endo:  dexAMETHasone     Tablet   Oral   dexAMETHasone     Tablet 4 milliGRAM(s) Oral every 8 hours    Neuro:  acetaminophen     Tablet .. 650 milliGRAM(s) Oral every 6 hours PRN Temp greater or equal to 38C (100.4F), Mild Pain (1 - 3)  escitalopram 10 milliGRAM(s) Oral daily  levETIRAcetam 250 milliGRAM(s) Oral every 12 hours  ondansetron Injectable 4 milliGRAM(s) IV Push every 6 hours PRN Nausea and/or Vomiting  oxyCODONE    IR 5 milliGRAM(s) Oral every 4 hours PRN Moderate Pain (4 - 6)    Cardiac:    Pulm:  albuterol    90 MICROgram(s) HFA Inhaler 2 Puff(s) Inhalation every 6 hours PRN Shortness of Breath and/or Wheezing  budesonide 160 MICROgram(s)/formoterol 4.5 MICROgram(s) Inhaler 2 Puff(s) Inhalation two times a day    GI/:  bisacodyl 5 milliGRAM(s) Oral at bedtime  bisacodyl Suppository 10 milliGRAM(s) Rectal daily PRN Constipation  famotidine    Tablet 10 milliGRAM(s) Oral daily  polyethylene glycol 3350 17 Gram(s) Oral two times a day  senna 2 Tablet(s) Oral at bedtime    Other:   chlorhexidine 4% Liquid 1 Application(s) Topical <User Schedule>  influenza  Vaccine (HIGH DOSE) 0.7 milliLiter(s) IntraMuscular once  nicotine -  14 mG/24Hr(s) Patch 1 Patch Transdermal daily    DIET: Minced/moist diet    IMAGING:     < from: [facility] Duplex Lower Ext Vein Scan, Bilat (12.10.23 @ 15:57) >  IMPRESSION:  Acute deep venous thrombosis: below the knee.    Bilateral soleal veins acute deep vein thrombosis.    P.AJay Carter was notified.    --- End of Report ---    < end of copied text >  < from: MR Head w/wo IV Cont (12.10.23 @ 15:50) >  IMPRESSION: Right parietal craniotomy with postoperative changes after   resection of right parietal ring-enhancing mass. No significant   enhancement. No change in vasogenic edema compared with 12/4/2023.    --- End of Report ---    < end of copied text >  < from: CT Head No Cont (12.10.23 @ 09:31) >  IMPRESSION:    1.  Patient status post right parietal craniotomy for resection of a   right parietal ring-enhancing lesion. Postoperative changes as discussed   above. Recommend MRI of the brain for further evaluation of the operative   bed.  2.  Small midline shift to the left of approximately 0.4 cm.    --- End of Report ---    < end of copied text >    < from: CT Chest w/ IV Cont (12.04.23 @ 06:25) >  IMPRESSION:  Motion artifact.    18 mm groundglass nodule in the superior segment of the left lower lobe,   which may reflect malignancy.    Additional nodule along the right oblique fissure not well assessed   secondary to motion.    No definite metastatic lesions are seen in the abdomen/pelvis.    Aneurysmal dilatation of the infrarenal abdominal aorta is slightly   increased compared with MRI of 8/13/2021.        --- End of Report ---    < end of copied text >

## 2023-12-14 NOTE — PROGRESS NOTE ADULT - ASSESSMENT
1. Bilateral Soleal DVT  2. S/p right craniotomy for tumor resection on 12/9.  3. History of Lung CA  4. Abdominal Aorta Dilated 4 cm on CT 12/4.      Plan:  1. Continue Heparin Sub. Cut. 5000 units TID for below knee DVT with recent neurosurgery.      Recommend repeat LE venous duplex tomorrow 12/15.  2. Patient hemodynamically stable on RA and denies any cardiopulmonary symptoms.   3. Appreciate pulmonary follow-up for lung nodule on CT.  4. Outpatient follow-up for dilated abdominal aorta.     5. Appreciate excellent Neurosurgical care    Jeremy Domínguez MD  Cardiology fellow

## 2023-12-14 NOTE — CONSULT NOTE ADULT - CONSULT REASON
Below the knee DVT
lung cancer
Evaluate Rehabilitation Needs
R parietal mass
lung mass
lung nodule
TEREZA on CKD

## 2023-12-14 NOTE — PROGRESS NOTE ADULT - PROBLEM SELECTOR PLAN 5
baseline Cr 1.7 in 2019  - renal US demonstrating parenchymal disease  - Cr improving now better than reported previous baseline  - continue to hold triamterene-HCTZ  - Nephrology consult recs appreciated  - renally dose meds to GFR, avoid nephrotoxic agents  - repeat BMP in AM (ordered)

## 2023-12-14 NOTE — PROGRESS NOTE ADULT - SUBJECTIVE AND OBJECTIVE BOX
Patient seen and examined at bedside.    Overnight Events: No overnight events. Denies chest pain, shortness of breath. Seen walking in the hallway with appropriate gait.     Review Of Systems: No chest pain, shortness of breath, or palpitations            Current Meds:  acetaminophen     Tablet .. 650 milliGRAM(s) Oral every 6 hours PRN  albuterol    90 MICROgram(s) HFA Inhaler 2 Puff(s) Inhalation every 6 hours PRN  bisacodyl 5 milliGRAM(s) Oral at bedtime  budesonide 160 MICROgram(s)/formoterol 4.5 MICROgram(s) Inhaler 2 Puff(s) Inhalation two times a day  chlorhexidine 4% Liquid 1 Application(s) Topical <User Schedule>  dexAMETHasone     Tablet   Oral   dexAMETHasone     Tablet 4 milliGRAM(s) Oral every 8 hours  escitalopram 10 milliGRAM(s) Oral daily  famotidine    Tablet 10 milliGRAM(s) Oral daily  heparin   Injectable 5000 Unit(s) SubCutaneous every 8 hours  influenza  Vaccine (HIGH DOSE) 0.7 milliLiter(s) IntraMuscular once  levETIRAcetam 250 milliGRAM(s) Oral every 12 hours  nicotine -  14 mG/24Hr(s) Patch 1 Patch Transdermal daily  NIFEdipine XL 30 milliGRAM(s) Oral daily  ondansetron Injectable 4 milliGRAM(s) IV Push every 6 hours PRN  oxyCODONE    IR 5 milliGRAM(s) Oral every 4 hours PRN  polyethylene glycol 3350 17 Gram(s) Oral two times a day  senna 2 Tablet(s) Oral at bedtime      Vitals:  T(F): 97.3 (12-14), Max: 98.1 (12-13)  HR: 64 (12-14) (60 - 91)  BP: 156/79 (12-14) (147/86 - 165/88)  RR: 18 (12-14)  SpO2: 97% (12-14)  I&O's Summary      Physical Exam:  Appearance: NAD  	  HEENT: Dressing intact to scalp  Cardiovascular: RRR, S1 and S2  Respiratory: CTA B/L  Psychiatry:  AAO x 3  Gastrointestinal:  Soft, Non-tender, + BS	  Skin: No cyanosis	  Extremities: No LE edema, bilateral calves soft                            New ECG(s): Personally reviewed    Echo:    Stress Testing:     Cath:    Imaging:    Interpretation of Telemetry:

## 2023-12-14 NOTE — CONSULT NOTE ADULT - ATTENDING COMMENTS
74F with PMH of lung cancer s/p chemotherapy 5 years ago previously in remission, COPD, HTN presents with unstable gait, found to have lung lesion with suspected brain metastasis concerning for metastatic lung cancer.  S/p right parietal crani for 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 on 12/9/23. Will f/u path. Continue dex per neurosx. Pt will f/u with Dr Solomon after discharge

## 2023-12-14 NOTE — PROGRESS NOTE ADULT - PROBLEM SELECTOR PLAN 4
BP above goal  - hold home Triamterene-Hydrochlorothiazide 37.5-25 mg daily given had significant TEREZA on CKD earlier this admission  - was going to start amlodipine, but she states she had significant LE swelling when previously trial'ed on amlodipine, so will defer  - start nifedipine XL 30mg daily  - monitor vitals

## 2023-12-14 NOTE — PROGRESS NOTE ADULT - SUBJECTIVE AND OBJECTIVE BOX
Patient is a 78y old  Female who presents with a chief complaint of unsteady gait / mass noted on CT head (11 Dec 2023 12:22)    Patient resting in bed and comfortable.    MEDICATIONS  (STANDING):  bisacodyl 5 milliGRAM(s) Oral at bedtime  budesonide 160 MICROgram(s)/formoterol 4.5 MICROgram(s) Inhaler 2 Puff(s) Inhalation two times a day  chlorhexidine 4% Liquid 1 Application(s) Topical <User Schedule>  dexAMETHasone     Tablet   Oral   dexAMETHasone     Tablet 4 milliGRAM(s) Oral every 8 hours  escitalopram 10 milliGRAM(s) Oral daily  famotidine    Tablet 10 milliGRAM(s) Oral daily  heparin   Injectable 5000 Unit(s) SubCutaneous every 8 hours  influenza  Vaccine (HIGH DOSE) 0.7 milliLiter(s) IntraMuscular once  levETIRAcetam 250 milliGRAM(s) Oral every 12 hours  nicotine -  14 mG/24Hr(s) Patch 1 Patch Transdermal daily  NIFEdipine XL 30 milliGRAM(s) Oral daily  polyethylene glycol 3350 17 Gram(s) Oral two times a day  senna 2 Tablet(s) Oral at bedtime    MEDICATIONS  (PRN):  acetaminophen     Tablet .. 650 milliGRAM(s) Oral every 6 hours PRN Temp greater or equal to 38C (100.4F), Mild Pain (1 - 3)  albuterol    90 MICROgram(s) HFA Inhaler 2 Puff(s) Inhalation every 6 hours PRN Shortness of Breath and/or Wheezing  ondansetron Injectable 4 milliGRAM(s) IV Push every 6 hours PRN Nausea and/or Vomiting  oxyCODONE    IR 5 milliGRAM(s) Oral every 4 hours PRN Moderate Pain (4 - 6)    Vital Signs Last 24 Hrs  T(C): 36.7 (14 Dec 2023 08:47), Max: 36.7 (13 Dec 2023 16:40)  T(F): 98 (14 Dec 2023 08:47), Max: 98.1 (13 Dec 2023 16:40)  HR: 91 (14 Dec 2023 08:47) (60 - 91)  BP: 147/86 (14 Dec 2023 08:47) (145/86 - 165/88)  BP(mean): --  RR: 18 (14 Dec 2023 08:47) (18 - 18)  SpO2: 97% (14 Dec 2023 08:47) (93% - 97%)    PHYSICAL EXAM  Constitutional - NAD, Comfortable  HEENT - Incision intact, EOMI  Neck - Supple  Chest - No distress, no use of accessory muscles for respiration  Cardiovascular -Well perfused  Abdomen - BS+, Soft, NTND  Extremities - No C/C/E, No calf tenderness   Neurologic Exam -                 AAO x 3  Speech clear/fluent/intelligible  Motor RUE 4/5, LUE 4+/5, bl LEs 4+/5  No clonus    Psychiatric - Mood stable, Affect WNL    Impression:  75 yo with functional deficits secondary to diagnosis of brain mass    Plan:  PT- ROM, Bed Mob, Transfers, Amb w AD and bracing as needed  OT- ADLs, bracing  SLP- Dysphagia eval and treat  Prec- Falls, Cardiac  DVT Prophylaxis- On Heparin  Skin- Turn q2 h  Dispo- Acute Rehab- patient requires active and ongoing therapeutic interventions of multiple disciplines and can tolerate 3 hours of therapies x 4wks. Can actively participate and benefit from  an intensive rehabilitation program. Requires supervision by a rehabilitation physician and a coordinated interdisciplinary approach to providing rehabilitation.

## 2023-12-14 NOTE — PROGRESS NOTE ADULT - SUBJECTIVE AND OBJECTIVE BOX
Jeanie Adams MD  Division of Hospital Medicine  [facility]  Spectra: [phone]      Patient is a 78y old  Female who presents with a chief complaint of Unsteady gai, mass noted on CT head (14 Dec 2023 11:03)      SUBJECTIVE / OVERNIGHT EVENTS: no acute events overnight. no fever, chills, chest pain, nor dyspnea. headache improved. had BM this morning she states. BP above goal, so will start nifedipine for BP control. we discussed avoiding her prior home regimen which consisted of diuretics given her significant TEREZA on presentation  ADDITIONAL REVIEW OF SYSTEMS:    MEDICATIONS  (STANDING):  bisacodyl 5 milliGRAM(s) Oral at bedtime  budesonide 160 MICROgram(s)/formoterol 4.5 MICROgram(s) Inhaler 2 Puff(s) Inhalation two times a day  chlorhexidine 4% Liquid 1 Application(s) Topical <User Schedule>  dexAMETHasone     Tablet   Oral   dexAMETHasone     Tablet 4 milliGRAM(s) Oral every 8 hours  escitalopram 10 milliGRAM(s) Oral daily  famotidine    Tablet 10 milliGRAM(s) Oral daily  heparin   Injectable 5000 Unit(s) SubCutaneous every 8 hours  influenza  Vaccine (HIGH DOSE) 0.7 milliLiter(s) IntraMuscular once  levETIRAcetam 250 milliGRAM(s) Oral every 12 hours  nicotine -  14 mG/24Hr(s) Patch 1 Patch Transdermal daily  NIFEdipine XL 30 milliGRAM(s) Oral daily  polyethylene glycol 3350 17 Gram(s) Oral two times a day  senna 2 Tablet(s) Oral at bedtime    MEDICATIONS  (PRN):  acetaminophen     Tablet .. 650 milliGRAM(s) Oral every 6 hours PRN Temp greater or equal to 38C (100.4F), Mild Pain (1 - 3)  albuterol    90 MICROgram(s) HFA Inhaler 2 Puff(s) Inhalation every 6 hours PRN Shortness of Breath and/or Wheezing  ondansetron Injectable 4 milliGRAM(s) IV Push every 6 hours PRN Nausea and/or Vomiting  oxyCODONE    IR 5 milliGRAM(s) Oral every 4 hours PRN Moderate Pain (4 - 6)      CAPILLARY BLOOD GLUCOSE        I&O's Summary      PHYSICAL EXAM:  Vital Signs Last 24 Hrs  T(C): 36.7 (14 Dec 2023 08:47), Max: 36.7 (13 Dec 2023 16:40)  T(F): 98 (14 Dec 2023 08:47), Max: 98.1 (13 Dec 2023 16:40)  HR: 91 (14 Dec 2023 08:47) (60 - 91)  BP: 147/86 (14 Dec 2023 08:47) (145/86 - 165/88)  BP(mean): --  RR: 18 (14 Dec 2023 08:47) (18 - 18)  SpO2: 97% (14 Dec 2023 08:47) (93% - 97%)    Parameters below as of 14 Dec 2023 08:47  Patient On (Oxygen Delivery Method): room air    CONSTITUTIONAL: NAD, well-developed, well-groomed  EYES: PERRLA; conjunctiva and sclera clear  ENMT: Moist oral mucosa, no pharyngeal injection or exudates; normal dentition  NECK: Supple, no palpable masses; no thyromegaly  RESPIRATORY: Normal respiratory effort; lungs are clear to auscultation bilaterally  CARDIOVASCULAR: Regular rate and rhythm, normal S1 and S2, no murmur/rub/gallop; No lower extremity edema  ABDOMEN: Soft, Nondistended, Nontender to palpation, normoactive bowel sounds  MUSCULOSKELETAL: No clubbing or cyanosis of digits; no joint swelling or tenderness to palpation  PSYCH: A+O to person, place, and time; affect appropriate  NEUROLOGY: CN 2-12 are intact and symmetric; no gross sensory deficits, moving all extremities strong  SKIN: No rashes; no palpable lesions, +L crani site with staples c/d/i    LABS:                RADIOLOGY & ADDITIONAL TESTS:  Results Reviewed:   Imaging Personally Reviewed:  Electrocardiogram Personally Reviewed:    COORDINATION OF CARE:  Care Discussed with Consultants/Other Providers [Y]: Neurosurgery GINNY Mckinley  Prior or Outpatient Records Reviewed [Y/N]:   Jeanie Adams MD  Division of Hospital Medicine  [facility]  Spectra: [phone]      Patient is a 78y old  Female who presents with a chief complaint of Unsteady gai, mass noted on CT head (14 Dec 2023 11:03)      SUBJECTIVE / OVERNIGHT EVENTS: no acute events overnight. no fever, chills, chest pain, nor dyspnea. headache improved. had BM this morning she states. BP above goal, so will start nifedipine for BP control. we discussed avoiding her prior home regimen which consisted of diuretics given her significant TEREZA on presentation  ADDITIONAL REVIEW OF SYSTEMS:    MEDICATIONS  (STANDING):  bisacodyl 5 milliGRAM(s) Oral at bedtime  budesonide 160 MICROgram(s)/formoterol 4.5 MICROgram(s) Inhaler 2 Puff(s) Inhalation two times a day  chlorhexidine 4% Liquid 1 Application(s) Topical <User Schedule>  dexAMETHasone     Tablet   Oral   dexAMETHasone     Tablet 4 milliGRAM(s) Oral every 8 hours  escitalopram 10 milliGRAM(s) Oral daily  famotidine    Tablet 10 milliGRAM(s) Oral daily  heparin   Injectable 5000 Unit(s) SubCutaneous every 8 hours  influenza  Vaccine (HIGH DOSE) 0.7 milliLiter(s) IntraMuscular once  levETIRAcetam 250 milliGRAM(s) Oral every 12 hours  nicotine -  14 mG/24Hr(s) Patch 1 Patch Transdermal daily  NIFEdipine XL 30 milliGRAM(s) Oral daily  polyethylene glycol 3350 17 Gram(s) Oral two times a day  senna 2 Tablet(s) Oral at bedtime    MEDICATIONS  (PRN):  acetaminophen     Tablet .. 650 milliGRAM(s) Oral every 6 hours PRN Temp greater or equal to 38C (100.4F), Mild Pain (1 - 3)  albuterol    90 MICROgram(s) HFA Inhaler 2 Puff(s) Inhalation every 6 hours PRN Shortness of Breath and/or Wheezing  ondansetron Injectable 4 milliGRAM(s) IV Push every 6 hours PRN Nausea and/or Vomiting  oxyCODONE    IR 5 milliGRAM(s) Oral every 4 hours PRN Moderate Pain (4 - 6)      CAPILLARY BLOOD GLUCOSE        I&O's Summary      PHYSICAL EXAM:  Vital Signs Last 24 Hrs  T(C): 36.7 (14 Dec 2023 08:47), Max: 36.7 (13 Dec 2023 16:40)  T(F): 98 (14 Dec 2023 08:47), Max: 98.1 (13 Dec 2023 16:40)  HR: 91 (14 Dec 2023 08:47) (60 - 91)  BP: 147/86 (14 Dec 2023 08:47) (145/86 - 165/88)  BP(mean): --  RR: 18 (14 Dec 2023 08:47) (18 - 18)  SpO2: 97% (14 Dec 2023 08:47) (93% - 97%)    Parameters below as of 14 Dec 2023 08:47  Patient On (Oxygen Delivery Method): room air    CONSTITUTIONAL: NAD, well-developed, well-groomed  EYES: PERRLA; conjunctiva and sclera clear  ENMT: Moist oral mucosa, no pharyngeal injection or exudates; normal dentition  NECK: Supple, no palpable masses; no thyromegaly  RESPIRATORY: Normal respiratory effort; lungs are clear to auscultation bilaterally  CARDIOVASCULAR: Regular rate and rhythm, normal S1 and S2, no murmur/rub/gallop; No lower extremity edema  ABDOMEN: Soft, Nondistended, Nontender to palpation, normoactive bowel sounds  MUSCULOSKELETAL: No clubbing or cyanosis of digits; no joint swelling or tenderness to palpation  PSYCH: A+O to person, place, and time; affect appropriate  NEUROLOGY: CN 2-12 are intact and symmetric; no gross sensory deficits, moving all extremities strong  SKIN: No rashes; no palpable lesions, +L crani site with staples c/d/i    LABS:                RADIOLOGY & ADDITIONAL TESTS:  Results Reviewed:   Imaging Personally Reviewed:  Electrocardiogram Personally Reviewed:    COORDINATION OF CARE:  Care Discussed with Consultants/Other Providers [Y]: Neurosurgery GINNY Mckinley  Prior or Outpatient Records Reviewed [Y/N]:

## 2023-12-15 ENCOUNTER — TRANSCRIPTION ENCOUNTER (OUTPATIENT)
Age: 78
End: 2023-12-15

## 2023-12-15 ENCOUNTER — NON-APPOINTMENT (OUTPATIENT)
Age: 78
End: 2023-12-15

## 2023-12-15 VITALS
TEMPERATURE: 98 F | HEART RATE: 72 BPM | DIASTOLIC BLOOD PRESSURE: 72 MMHG | OXYGEN SATURATION: 97 % | RESPIRATION RATE: 18 BRPM | SYSTOLIC BLOOD PRESSURE: 119 MMHG

## 2023-12-15 LAB
ANION GAP SERPL CALC-SCNC: 13 MMOL/L — SIGNIFICANT CHANGE UP (ref 5–17)
ANION GAP SERPL CALC-SCNC: 13 MMOL/L — SIGNIFICANT CHANGE UP (ref 5–17)
BASOPHILS # BLD AUTO: 0.03 K/UL — SIGNIFICANT CHANGE UP (ref 0–0.2)
BASOPHILS # BLD AUTO: 0.03 K/UL — SIGNIFICANT CHANGE UP (ref 0–0.2)
BASOPHILS NFR BLD AUTO: 0.2 % — SIGNIFICANT CHANGE UP (ref 0–2)
BASOPHILS NFR BLD AUTO: 0.2 % — SIGNIFICANT CHANGE UP (ref 0–2)
BUN SERPL-MCNC: 39 MG/DL — HIGH (ref 7–23)
BUN SERPL-MCNC: 39 MG/DL — HIGH (ref 7–23)
CALCIUM SERPL-MCNC: 8.8 MG/DL — SIGNIFICANT CHANGE UP (ref 8.4–10.5)
CALCIUM SERPL-MCNC: 8.8 MG/DL — SIGNIFICANT CHANGE UP (ref 8.4–10.5)
CHLORIDE SERPL-SCNC: 99 MMOL/L — SIGNIFICANT CHANGE UP (ref 96–108)
CHLORIDE SERPL-SCNC: 99 MMOL/L — SIGNIFICANT CHANGE UP (ref 96–108)
CO2 SERPL-SCNC: 23 MMOL/L — SIGNIFICANT CHANGE UP (ref 22–31)
CO2 SERPL-SCNC: 23 MMOL/L — SIGNIFICANT CHANGE UP (ref 22–31)
CREAT SERPL-MCNC: 1.17 MG/DL — SIGNIFICANT CHANGE UP (ref 0.5–1.3)
CREAT SERPL-MCNC: 1.17 MG/DL — SIGNIFICANT CHANGE UP (ref 0.5–1.3)
EGFR: 48 ML/MIN/1.73M2 — LOW
EGFR: 48 ML/MIN/1.73M2 — LOW
EOSINOPHIL # BLD AUTO: 0 K/UL — SIGNIFICANT CHANGE UP (ref 0–0.5)
EOSINOPHIL # BLD AUTO: 0 K/UL — SIGNIFICANT CHANGE UP (ref 0–0.5)
EOSINOPHIL NFR BLD AUTO: 0 % — SIGNIFICANT CHANGE UP (ref 0–6)
EOSINOPHIL NFR BLD AUTO: 0 % — SIGNIFICANT CHANGE UP (ref 0–6)
GLUCOSE SERPL-MCNC: 103 MG/DL — HIGH (ref 70–99)
GLUCOSE SERPL-MCNC: 103 MG/DL — HIGH (ref 70–99)
HCT VFR BLD CALC: 33.7 % — LOW (ref 34.5–45)
HCT VFR BLD CALC: 33.7 % — LOW (ref 34.5–45)
HGB BLD-MCNC: 11.6 G/DL — SIGNIFICANT CHANGE UP (ref 11.5–15.5)
HGB BLD-MCNC: 11.6 G/DL — SIGNIFICANT CHANGE UP (ref 11.5–15.5)
IMM GRANULOCYTES NFR BLD AUTO: 4.3 % — HIGH (ref 0–0.9)
IMM GRANULOCYTES NFR BLD AUTO: 4.3 % — HIGH (ref 0–0.9)
LYMPHOCYTES # BLD AUTO: 1.19 K/UL — SIGNIFICANT CHANGE UP (ref 1–3.3)
LYMPHOCYTES # BLD AUTO: 1.19 K/UL — SIGNIFICANT CHANGE UP (ref 1–3.3)
LYMPHOCYTES # BLD AUTO: 8.5 % — LOW (ref 13–44)
LYMPHOCYTES # BLD AUTO: 8.5 % — LOW (ref 13–44)
MAGNESIUM SERPL-MCNC: 1.8 MG/DL — SIGNIFICANT CHANGE UP (ref 1.6–2.6)
MAGNESIUM SERPL-MCNC: 1.8 MG/DL — SIGNIFICANT CHANGE UP (ref 1.6–2.6)
MCHC RBC-ENTMCNC: 30.1 PG — SIGNIFICANT CHANGE UP (ref 27–34)
MCHC RBC-ENTMCNC: 30.1 PG — SIGNIFICANT CHANGE UP (ref 27–34)
MCHC RBC-ENTMCNC: 34.4 GM/DL — SIGNIFICANT CHANGE UP (ref 32–36)
MCHC RBC-ENTMCNC: 34.4 GM/DL — SIGNIFICANT CHANGE UP (ref 32–36)
MCV RBC AUTO: 87.3 FL — SIGNIFICANT CHANGE UP (ref 80–100)
MCV RBC AUTO: 87.3 FL — SIGNIFICANT CHANGE UP (ref 80–100)
MONOCYTES # BLD AUTO: 0.58 K/UL — SIGNIFICANT CHANGE UP (ref 0–0.9)
MONOCYTES # BLD AUTO: 0.58 K/UL — SIGNIFICANT CHANGE UP (ref 0–0.9)
MONOCYTES NFR BLD AUTO: 4.1 % — SIGNIFICANT CHANGE UP (ref 2–14)
MONOCYTES NFR BLD AUTO: 4.1 % — SIGNIFICANT CHANGE UP (ref 2–14)
NEUTROPHILS # BLD AUTO: 11.62 K/UL — HIGH (ref 1.8–7.4)
NEUTROPHILS # BLD AUTO: 11.62 K/UL — HIGH (ref 1.8–7.4)
NEUTROPHILS NFR BLD AUTO: 82.9 % — HIGH (ref 43–77)
NEUTROPHILS NFR BLD AUTO: 82.9 % — HIGH (ref 43–77)
NRBC # BLD: 0 /100 WBCS — SIGNIFICANT CHANGE UP (ref 0–0)
NRBC # BLD: 0 /100 WBCS — SIGNIFICANT CHANGE UP (ref 0–0)
PHOSPHATE SERPL-MCNC: 2.7 MG/DL — SIGNIFICANT CHANGE UP (ref 2.5–4.5)
PHOSPHATE SERPL-MCNC: 2.7 MG/DL — SIGNIFICANT CHANGE UP (ref 2.5–4.5)
PLATELET # BLD AUTO: 213 K/UL — SIGNIFICANT CHANGE UP (ref 150–400)
PLATELET # BLD AUTO: 213 K/UL — SIGNIFICANT CHANGE UP (ref 150–400)
POTASSIUM SERPL-MCNC: 4.1 MMOL/L — SIGNIFICANT CHANGE UP (ref 3.5–5.3)
POTASSIUM SERPL-MCNC: 4.1 MMOL/L — SIGNIFICANT CHANGE UP (ref 3.5–5.3)
POTASSIUM SERPL-SCNC: 4.1 MMOL/L — SIGNIFICANT CHANGE UP (ref 3.5–5.3)
POTASSIUM SERPL-SCNC: 4.1 MMOL/L — SIGNIFICANT CHANGE UP (ref 3.5–5.3)
RBC # BLD: 3.86 M/UL — SIGNIFICANT CHANGE UP (ref 3.8–5.2)
RBC # BLD: 3.86 M/UL — SIGNIFICANT CHANGE UP (ref 3.8–5.2)
RBC # FLD: 13.3 % — SIGNIFICANT CHANGE UP (ref 10.3–14.5)
RBC # FLD: 13.3 % — SIGNIFICANT CHANGE UP (ref 10.3–14.5)
SODIUM SERPL-SCNC: 135 MMOL/L — SIGNIFICANT CHANGE UP (ref 135–145)
SODIUM SERPL-SCNC: 135 MMOL/L — SIGNIFICANT CHANGE UP (ref 135–145)
WBC # BLD: 14.03 K/UL — HIGH (ref 3.8–10.5)
WBC # BLD: 14.03 K/UL — HIGH (ref 3.8–10.5)
WBC # FLD AUTO: 14.03 K/UL — HIGH (ref 3.8–10.5)
WBC # FLD AUTO: 14.03 K/UL — HIGH (ref 3.8–10.5)

## 2023-12-15 PROCEDURE — 94640 AIRWAY INHALATION TREATMENT: CPT

## 2023-12-15 PROCEDURE — 86803 HEPATITIS C AB TEST: CPT

## 2023-12-15 PROCEDURE — 80053 COMPREHEN METABOLIC PANEL: CPT

## 2023-12-15 PROCEDURE — 86850 RBC ANTIBODY SCREEN: CPT

## 2023-12-15 PROCEDURE — 82570 ASSAY OF URINE CREATININE: CPT

## 2023-12-15 PROCEDURE — 85027 COMPLETE CBC AUTOMATED: CPT

## 2023-12-15 PROCEDURE — 97162 PT EVAL MOD COMPLEX 30 MIN: CPT

## 2023-12-15 PROCEDURE — 86901 BLOOD TYPING SEROLOGIC RH(D): CPT

## 2023-12-15 PROCEDURE — 84156 ASSAY OF PROTEIN URINE: CPT

## 2023-12-15 PROCEDURE — A9585: CPT

## 2023-12-15 PROCEDURE — 74177 CT ABD & PELVIS W/CONTRAST: CPT | Mod: MA

## 2023-12-15 PROCEDURE — 96374 THER/PROPH/DIAG INJ IV PUSH: CPT

## 2023-12-15 PROCEDURE — 80048 BASIC METABOLIC PNL TOTAL CA: CPT

## 2023-12-15 PROCEDURE — 85610 PROTHROMBIN TIME: CPT

## 2023-12-15 PROCEDURE — 88360 TUMOR IMMUNOHISTOCHEM/MANUAL: CPT

## 2023-12-15 PROCEDURE — C1769: CPT

## 2023-12-15 PROCEDURE — C1889: CPT

## 2023-12-15 PROCEDURE — 97116 GAIT TRAINING THERAPY: CPT

## 2023-12-15 PROCEDURE — 96375 TX/PRO/DX INJ NEW DRUG ADDON: CPT

## 2023-12-15 PROCEDURE — 99285 EMERGENCY DEPT VISIT HI MDM: CPT | Mod: 25

## 2023-12-15 PROCEDURE — 83935 ASSAY OF URINE OSMOLALITY: CPT

## 2023-12-15 PROCEDURE — 97166 OT EVAL MOD COMPLEX 45 MIN: CPT

## 2023-12-15 PROCEDURE — 88342 IMHCHEM/IMCYTCHM 1ST ANTB: CPT

## 2023-12-15 PROCEDURE — 97168 OT RE-EVAL EST PLAN CARE: CPT

## 2023-12-15 PROCEDURE — 97164 PT RE-EVAL EST PLAN CARE: CPT

## 2023-12-15 PROCEDURE — 86777 TOXOPLASMA ANTIBODY: CPT

## 2023-12-15 PROCEDURE — 70553 MRI BRAIN STEM W/O & W/DYE: CPT

## 2023-12-15 PROCEDURE — 97530 THERAPEUTIC ACTIVITIES: CPT

## 2023-12-15 PROCEDURE — 84100 ASSAY OF PHOSPHORUS: CPT

## 2023-12-15 PROCEDURE — 86778 TOXOPLASMA ANTIBODY IGM: CPT

## 2023-12-15 PROCEDURE — 93970 EXTREMITY STUDY: CPT

## 2023-12-15 PROCEDURE — 85730 THROMBOPLASTIN TIME PARTIAL: CPT

## 2023-12-15 PROCEDURE — 71260 CT THORAX DX C+: CPT | Mod: MA

## 2023-12-15 PROCEDURE — 84133 ASSAY OF URINE POTASSIUM: CPT

## 2023-12-15 PROCEDURE — 36415 COLL VENOUS BLD VENIPUNCTURE: CPT

## 2023-12-15 PROCEDURE — 81003 URINALYSIS AUTO W/O SCOPE: CPT

## 2023-12-15 PROCEDURE — 83930 ASSAY OF BLOOD OSMOLALITY: CPT

## 2023-12-15 PROCEDURE — 97130 THER IVNTJ EA ADDL 15 MIN: CPT

## 2023-12-15 PROCEDURE — 76770 US EXAM ABDO BACK WALL COMP: CPT

## 2023-12-15 PROCEDURE — 99233 SBSQ HOSP IP/OBS HIGH 50: CPT

## 2023-12-15 PROCEDURE — 87640 STAPH A DNA AMP PROBE: CPT

## 2023-12-15 PROCEDURE — 84300 ASSAY OF URINE SODIUM: CPT

## 2023-12-15 PROCEDURE — 86900 BLOOD TYPING SEROLOGIC ABO: CPT

## 2023-12-15 PROCEDURE — 88341 IMHCHEM/IMCYTCHM EA ADD ANTB: CPT

## 2023-12-15 PROCEDURE — 94010 BREATHING CAPACITY TEST: CPT

## 2023-12-15 PROCEDURE — 87389 HIV-1 AG W/HIV-1&-2 AB AG IA: CPT

## 2023-12-15 PROCEDURE — 87641 MR-STAPH DNA AMP PROBE: CPT

## 2023-12-15 PROCEDURE — 85025 COMPLETE CBC W/AUTO DIFF WBC: CPT

## 2023-12-15 PROCEDURE — 97129 THER IVNTJ 1ST 15 MIN: CPT

## 2023-12-15 PROCEDURE — 84540 ASSAY OF URINE/UREA-N: CPT

## 2023-12-15 PROCEDURE — 93970 EXTREMITY STUDY: CPT | Mod: 26

## 2023-12-15 PROCEDURE — 88307 TISSUE EXAM BY PATHOLOGIST: CPT

## 2023-12-15 PROCEDURE — C1713: CPT

## 2023-12-15 PROCEDURE — 83735 ASSAY OF MAGNESIUM: CPT

## 2023-12-15 PROCEDURE — 70450 CT HEAD/BRAIN W/O DYE: CPT

## 2023-12-15 RX ORDER — OXYCODONE HYDROCHLORIDE 5 MG/1
1 TABLET ORAL
Qty: 0 | Refills: 0 | DISCHARGE
Start: 2023-12-15

## 2023-12-15 RX ORDER — ALBUTEROL 90 UG/1
2 AEROSOL, METERED ORAL
Qty: 0 | Refills: 0 | DISCHARGE
Start: 2023-12-15

## 2023-12-15 RX ORDER — LEVETIRACETAM 250 MG/1
1 TABLET, FILM COATED ORAL
Qty: 0 | Refills: 0 | DISCHARGE
Start: 2023-12-15

## 2023-12-15 RX ORDER — OXYMETAZOLINE HYDROCHLORIDE 0.5 MG/ML
1 SPRAY NASAL
Refills: 0 | DISCHARGE

## 2023-12-15 RX ORDER — CETIRIZINE HYDROCHLORIDE 10 MG/1
1 TABLET ORAL
Refills: 0 | DISCHARGE

## 2023-12-15 RX ORDER — MAGNESIUM SULFATE 500 MG/ML
2 VIAL (ML) INJECTION ONCE
Refills: 0 | Status: COMPLETED | OUTPATIENT
Start: 2023-12-15 | End: 2023-12-15

## 2023-12-15 RX ORDER — ACETAMINOPHEN 500 MG
2 TABLET ORAL
Qty: 0 | Refills: 0 | DISCHARGE
Start: 2023-12-15

## 2023-12-15 RX ORDER — NICOTINE POLACRILEX 2 MG
1 GUM BUCCAL
Qty: 0 | Refills: 0 | DISCHARGE
Start: 2023-12-15

## 2023-12-15 RX ORDER — FLUTICASONE FUROATE AND VILANTEROL TRIFENATATE 100; 25 UG/1; UG/1
1 POWDER RESPIRATORY (INHALATION)
Qty: 0 | Refills: 0 | DISCHARGE

## 2023-12-15 RX ORDER — VIBEGRON 75 MG/1
1 TABLET, FILM COATED ORAL
Refills: 0 | DISCHARGE

## 2023-12-15 RX ORDER — ESCITALOPRAM OXALATE 10 MG/1
1 TABLET, FILM COATED ORAL
Refills: 0 | DISCHARGE

## 2023-12-15 RX ORDER — NIFEDIPINE 30 MG
1 TABLET, EXTENDED RELEASE 24 HR ORAL
Qty: 0 | Refills: 0 | DISCHARGE
Start: 2023-12-15

## 2023-12-15 RX ORDER — ESCITALOPRAM OXALATE 10 MG/1
1 TABLET, FILM COATED ORAL
Qty: 0 | Refills: 0 | DISCHARGE
Start: 2023-12-15

## 2023-12-15 RX ORDER — DEXAMETHASONE 0.5 MG/5ML
4 ELIXIR ORAL
Qty: 0 | Refills: 0 | DISCHARGE
Start: 2023-12-15

## 2023-12-15 RX ORDER — HEPARIN SODIUM 5000 [USP'U]/ML
5000 INJECTION INTRAVENOUS; SUBCUTANEOUS
Qty: 0 | Refills: 0 | DISCHARGE
Start: 2023-12-15

## 2023-12-15 RX ORDER — SENNA PLUS 8.6 MG/1
2 TABLET ORAL
Qty: 0 | Refills: 0 | DISCHARGE
Start: 2023-12-15

## 2023-12-15 RX ORDER — POLYETHYLENE GLYCOL 3350 17 G/17G
17 POWDER, FOR SOLUTION ORAL
Qty: 0 | Refills: 0 | DISCHARGE
Start: 2023-12-15

## 2023-12-15 RX ORDER — FAMOTIDINE 10 MG/ML
1 INJECTION INTRAVENOUS
Qty: 0 | Refills: 0 | DISCHARGE
Start: 2023-12-15

## 2023-12-15 RX ORDER — FOLIC ACID 0.8 MG
1 TABLET ORAL
Qty: 0 | Refills: 0 | DISCHARGE

## 2023-12-15 RX ORDER — BUDESONIDE AND FORMOTEROL FUMARATE DIHYDRATE 160; 4.5 UG/1; UG/1
2 AEROSOL RESPIRATORY (INHALATION)
Qty: 0 | Refills: 0 | DISCHARGE
Start: 2023-12-15

## 2023-12-15 RX ADMIN — OXYCODONE HYDROCHLORIDE 5 MILLIGRAM(S): 5 TABLET ORAL at 13:21

## 2023-12-15 RX ADMIN — ESCITALOPRAM OXALATE 10 MILLIGRAM(S): 10 TABLET, FILM COATED ORAL at 11:05

## 2023-12-15 RX ADMIN — HEPARIN SODIUM 5000 UNIT(S): 5000 INJECTION INTRAVENOUS; SUBCUTANEOUS at 12:53

## 2023-12-15 RX ADMIN — FAMOTIDINE 10 MILLIGRAM(S): 10 INJECTION INTRAVENOUS at 11:05

## 2023-12-15 RX ADMIN — Medication 25 GRAM(S): at 11:04

## 2023-12-15 RX ADMIN — Medication 4 MILLIGRAM(S): at 05:33

## 2023-12-15 RX ADMIN — OXYCODONE HYDROCHLORIDE 5 MILLIGRAM(S): 5 TABLET ORAL at 12:25

## 2023-12-15 RX ADMIN — Medication 4 MILLIGRAM(S): at 12:53

## 2023-12-15 RX ADMIN — Medication 1 PATCH: at 10:00

## 2023-12-15 RX ADMIN — LEVETIRACETAM 250 MILLIGRAM(S): 250 TABLET, FILM COATED ORAL at 05:32

## 2023-12-15 RX ADMIN — Medication 650 MILLIGRAM(S): at 00:25

## 2023-12-15 RX ADMIN — BUDESONIDE AND FORMOTEROL FUMARATE DIHYDRATE 2 PUFF(S): 160; 4.5 AEROSOL RESPIRATORY (INHALATION) at 05:33

## 2023-12-15 RX ADMIN — Medication 30 MILLIGRAM(S): at 05:33

## 2023-12-15 RX ADMIN — Medication 1 PATCH: at 11:05

## 2023-12-15 RX ADMIN — Medication 650 MILLIGRAM(S): at 00:55

## 2023-12-15 RX ADMIN — Medication 1 PATCH: at 11:23

## 2023-12-15 RX ADMIN — HEPARIN SODIUM 5000 UNIT(S): 5000 INJECTION INTRAVENOUS; SUBCUTANEOUS at 05:33

## 2023-12-15 NOTE — DISCHARGE NOTE NURSING/CASE MANAGEMENT/SOCIAL WORK - PATIENT PORTAL LINK FT
You can access the FollowMyHealth Patient Portal offered by [facility] by registering at the following website: [email or web address]. By joining [employer]’s FollowMyHealth portal, you will also be able to view your health information using other applications (apps) compatible with our system. You can access the FollowMyHealth Patient Portal offered by [facility] by registering at the following website: [email or web address]. By joining [employer]’s FollowMyHealth portal, you will also be able to view your health information using other applications (apps) compatible with our system.

## 2023-12-15 NOTE — PROGRESS NOTE ADULT - NSPROGADDITIONALINFOA_GEN_ALL_CORE
.  Jeanie Adams MD  Division of Hospital Medicine  [facility]   Spectra: [phone]    Plan discussed with patient and neurosurgery GINNY Chacon. .  Jeanie Adams MD  Division of Hospital Medicine  [facility]   Spectra: [phone]    Plan discussed with patient and neurosurgery GINNY Chacon.

## 2023-12-15 NOTE — PROGRESS NOTE ADULT - PROBLEM SELECTOR PROBLEM 2
Ground glass opacity present on imaging of lung
Hyponatremia
Ground glass opacity present on imaging of lung

## 2023-12-15 NOTE — PROGRESS NOTE ADULT - PROBLEM SELECTOR PLAN 4
BP above goal  - hold home Triamterene-Hydrochlorothiazide 37.5-25 mg daily given had significant TEREZA on CKD earlier this admission - continue to hold on discharge  - was going to start amlodipine, but she states she had significant LE swelling when previously trial'ed on amlodipine, so will defer  - c/w nifedipine XL 30mg daily on d/c  - monitor vitals

## 2023-12-15 NOTE — PROGRESS NOTE ADULT - NUTRITIONAL ASSESSMENT
This patient has been assessed with a concern for Malnutrition and has been determined to have a diagnosis/diagnoses of Moderate protein-calorie malnutrition.    This patient is being managed with:   Diet NPO after Midnight-     NPO Start Date: 08-Dec-2023   NPO Start Time: 23:59  Except Medications  Entered: Dec  8 2023  6:50PM    Diet Regular-  1000mL Fluid Restriction ([id number])  Supplement Feeding Modality:  Oral  Ensure Plus High Protein Cans or Servings Per Day:  1       Frequency:  Daily  Entered: Dec  8 2023  6:18PM    Diet Regular-  Supplement Feeding Modality:  Oral  Ensure Plus High Protein Cans or Servings Per Day:  1       Frequency:  Daily  Entered: Dec  8 2023  8:11AM    The following pending diet order is being considered for treatment of Moderate protein-calorie malnutrition:null This patient has been assessed with a concern for Malnutrition and has been determined to have a diagnosis/diagnoses of Moderate protein-calorie malnutrition.    This patient is being managed with:   Diet NPO after Midnight-     NPO Start Date: 08-Dec-2023   NPO Start Time: 23:59  Except Medications  Entered: Dec  8 2023  6:50PM    Diet Regular-  1000mL Fluid Restriction ([id number])  Supplement Feeding Modality:  Oral  Ensure Plus High Protein Cans or Servings Per Day:  1       Frequency:  Daily  Entered: Dec  8 2023  6:18PM    Diet Regular-  Supplement Feeding Modality:  Oral  Ensure Plus High Protein Cans or Servings Per Day:  1       Frequency:  Daily  Entered: Dec  8 2023  8:11AM    The following pending diet order is being considered for treatment of Moderate protein-calorie malnutrition:null

## 2023-12-15 NOTE — PROGRESS NOTE ADULT - PROBLEM SELECTOR PLAN 7
DVT ppx: hep subc  last BM 12/14. continue bowel regimen    Dispo: Acute Rehab
DVT ppx: hep subc
DVT ppx: hep subc  last BM 12/14. continue bowel regimen    Dispo: Acute Rehab
DVT ppx: hep subc  last BM 12/9. continue bowel regimen. if no BM by this afternoon, would give suppository or enema.    Dispo: Acute Rehab

## 2023-12-15 NOTE — DISCHARGE NOTE NURSING/CASE MANAGEMENT/SOCIAL WORK - NSDCFUADDAPPT_GEN_ALL_CORE_FT
Please call your Neurosurgeon for an appointment after Rehab for wound check and further recommendations.    Follow up with your Private MD after Rehab    Follow up with your Pulmonologist for Lung nodule    Follow up Vascular/Dr Jarad Aparicio for Lower ext soleal DVT    Upon discharge will need close follow up with Dr. Fredis Solomon at [facility] for ongoing Oncology management and decision regarding referring to Radiation oncology    Upon discharge please have patient follow up with Nephrology in 2-3wks. For appointment scheduling with one of our physicians please email Nephrology at [email or web address]. Our address is [address] [place] [address], and office number .   Please call your Neurosurgeon for an appointment after Rehab for wound check and further recommendations.    Follow up with your Private MD after Rehab    Follow up with your Pulmonologist for Lung nodule    Follow up Vascular/Dr Jarad Aparicio for Lower ext soleal DVT    Upon discharge will need close follow up with Dr. Fredis Solomon at [facility] for ongoing Oncology management and decision regarding referring to Radiation oncology    Upon discharge please have patient follow up with Nephrology in 2-3wks. For appointment scheduling with one of our physicians please email Nephrology at [email or web address]. Our address is [address] [place] [address], and office number .

## 2023-12-15 NOTE — PROGRESS NOTE ADULT - ASSESSMENT
HPI:  74 year old female with history of lung cancer s/p chemotherapy 5 years ago previously in remission (oncologist Dr. Fredis Solomon at [facility]), COPD, HTN presented to [place] with unsteady gait for a few weeks. Patient's  found her on he floor prompting him to call 911. Went to [place] and CT brain was remarkable for brain mass with midline shift, cyst vs necrotic mass. Transferred to [facility] for neurosurgery evaluation. Neurosurgery was consulted and reccomended MRI brain, CTAP and CT chest to look for possible malignancies, Keppra 500 mg BID, and Decadron 8 mg once in the ED and then Decadron 4mg q 6 hours. Reports shortness of breath when going up staurs, No fevers, chest pain, palpitations, vomiting, abdominal pain, diarrhea, constipation, dysuria, hematuria, melena, hematochezia.  (04 Dec 2023 13:50)    PROCEDURE: Right craniotomy for tumor     s/p right parietal craniotomy and resection of brain tumor on 12/9   POD#  6   PLAN:  Neuro:   1 Out of bed   2 continue pt/ot   3 prn pain meds   4 keppra to prevent seizure   5 decadron taper to control cerebral edema     Respiratory:   1 room air   2 incentive spirometry   3 copd- continue on proventil and symbicort     CV:  1 blood pressure stable   2 continue on nifedipine     Endocrine:   1 stable   Heme/Onc:             DVT ppx: sqh and scds   1 outpatient oncology follow up   2 pathology pending   3 has hx of lung nodule -had chemo in the past.     Renal:   1 voiding   2 hyponatremia- resolved -on 1l fluid restriction     ID:   1 afebrile     GI:   1 minced and moist   2 last bm 12/14     Social/Family:   Discharge planning: marley auth :p     -will discuss with Dr. Barcenas   -spectra [id number]      HPI:  74 year old female with history of lung cancer s/p chemotherapy 5 years ago previously in remission (oncologist Dr. Fredis Solomon at [facility]), COPD, HTN presented to [place] with unsteady gait for a few weeks. Patient's  found her on he floor prompting him to call 911. Went to [place] and CT brain was remarkable for brain mass with midline shift, cyst vs necrotic mass. Transferred to [facility] for neurosurgery evaluation. Neurosurgery was consulted and reccomended MRI brain, CTAP and CT chest to look for possible malignancies, Keppra 500 mg BID, and Decadron 8 mg once in the ED and then Decadron 4mg q 6 hours. Reports shortness of breath when going up staurs, No fevers, chest pain, palpitations, vomiting, abdominal pain, diarrhea, constipation, dysuria, hematuria, melena, hematochezia.  (04 Dec 2023 13:50)    PROCEDURE: Right craniotomy for tumor     s/p right parietal craniotomy and resection of brain tumor on 12/9   POD#  6   PLAN:  Neuro:   1 Out of bed   2 continue pt/ot   3 prn pain meds   4 keppra to prevent seizure   5 decadron taper to control cerebral edema     Respiratory:   1 room air   2 incentive spirometry   3 copd- continue on proventil and symbicort     CV:  1 blood pressure stable   2 continue on nifedipine     Endocrine:   1 stable   Heme/Onc:             DVT ppx: sqh and scds   1 outpatient oncology follow up   2 pathology pending   3 has hx of lung nodule -had chemo in the past.     Renal:   1 voiding   2 hyponatremia- resolved -on 1l fluid restriction     ID:   1 afebrile     GI:   1 minced and moist   2 last bm 12/14     Social/Family:   Discharge planning: marley auth :p     -will discuss with Dr. Barcenas   -spectra [id number]      HPI:  74 year old female with history of lung cancer s/p chemotherapy 5 years ago previously in remission (oncologist Dr. Fredis Solomon at [facility]), COPD, HTN presented to [place] with unsteady gait for a few weeks. Patient's  found her on he floor prompting him to call 911. Went to [place] and CT brain was remarkable for brain mass with midline shift, cyst vs necrotic mass. Transferred to [facility] for neurosurgery evaluation. Neurosurgery was consulted and reccomended MRI brain, CTAP and CT chest to look for possible malignancies, Keppra 500 mg BID, and Decadron 8 mg once in the ED and then Decadron 4mg q 6 hours. Reports shortness of breath when going up staurs, No fevers, chest pain, palpitations, vomiting, abdominal pain, diarrhea, constipation, dysuria, hematuria, melena, hematochezia.  (04 Dec 2023 13:50)    PROCEDURE: Right craniotomy for tumor     s/p right parietal craniotomy and resection of brain tumor on 12/9   POD#  6   PLAN:  Neuro:   1 Out of bed   2 continue pt/ot   3 prn pain meds   4 keppra to prevent seizure   5 decadron taper to control cerebral edema     Respiratory:   1 room air   2 incentive spirometry   3 copd- continue on proventil and symbicort     CV:  1 blood pressure stable   2 continue on nifedipine     Endocrine:   1 stable   Heme/Onc:             DVT ppx: sqh and scds   1 outpatient oncology follow up   2 pathology pending   3 has hx of lung nodule -had chemo in the past.     Renal:   1 voiding   2 hyponatremia- resolved -on 1l fluid restriction     ID:   1 afebrile     GI:   1 minced and moist   2 last bm 12/14     Social/Family:   Discharge planning: marley auth :p     -will discuss with Dr. Barcenas   -spectra [id number]     *** No plan for chemotherapy or radiation while at rehab.  HPI:  74 year old female with history of lung cancer s/p chemotherapy 5 years ago previously in remission (oncologist Dr. Fredis Solomon at [facility]), COPD, HTN presented to [place] with unsteady gait for a few weeks. Patient's  found her on he floor prompting him to call 911. Went to [place] and CT brain was remarkable for brain mass with midline shift, cyst vs necrotic mass. Transferred to [facility] for neurosurgery evaluation. Neurosurgery was consulted and reccomended MRI brain, CTAP and CT chest to look for possible malignancies, Keppra 500 mg BID, and Decadron 8 mg once in the ED and then Decadron 4mg q 6 hours. Reports shortness of breath when going up staurs, No fevers, chest pain, palpitations, vomiting, abdominal pain, diarrhea, constipation, dysuria, hematuria, melena, hematochezia.  (04 Dec 2023 13:50)    PROCEDURE: Right craniotomy for tumor     s/p right parietal craniotomy and resection of brain tumor on 12/9   POD#  6   PLAN:  Neuro:   1 Out of bed   2 continue pt/ot   3 prn pain meds   4 keppra to prevent seizure   5 decadron taper to control cerebral edema     Respiratory:   1 room air   2 incentive spirometry   3 copd- continue on proventil and symbicort     CV:  1 blood pressure stable   2 continue on nifedipine     Endocrine:   1 stable   Heme/Onc:             DVT ppx: sqh and scds   1 outpatient oncology follow up   2 pathology pending   3 has hx of lung nodule -had chemo in the past.     Renal:   1 voiding   2 hyponatremia- resolved -on 1l fluid restriction     ID:   1 afebrile     GI:   1 minced and moist   2 last bm 12/14     Social/Family:   Discharge planning: marley auth :p     -will discuss with Dr. Barcenas   -spectra [id number]     *** No plan for chemotherapy or radiation while at rehab.  HPI:  74 year old female with history of lung cancer s/p chemotherapy 5 years ago previously in remission (oncologist Dr. Fredis Solomon at [facility]), COPD, HTN presented to [place] with unsteady gait for a few weeks. Patient's  found her on he floor prompting him to call 911. Went to [place] and CT brain was remarkable for brain mass with midline shift, cyst vs necrotic mass. Transferred to [facility] for neurosurgery evaluation. Neurosurgery was consulted and reccomended MRI brain, CTAP and CT chest to look for possible malignancies, Keppra 500 mg BID, and Decadron 8 mg once in the ED and then Decadron 4mg q 6 hours. Reports shortness of breath when going up staurs, No fevers, chest pain, palpitations, vomiting, abdominal pain, diarrhea, constipation, dysuria, hematuria, melena, hematochezia.  (04 Dec 2023 13:50)    PROCEDURE: Right craniotomy for tumor     s/p right parietal craniotomy and resection of brain tumor on 12/9   POD#  6   PLAN:  Neuro:   1 Out of bed   2 continue pt/ot   3 prn pain meds   4 keppra to prevent seizure   5 decadron taper to control cerebral edema     Respiratory:   1 room air   2 incentive spirometry   3 copd- continue on proventil and symbicort     CV:  1 blood pressure stable   2 continue on nifedipine     Endocrine:   1 stable   Heme/Onc:             DVT ppx: sqh and scds   1 outpatient oncology follow up   2 pathology pending   3 has hx of lung nodule -had chemo in the past.   4 b/l below knee dvt- f/u doppler today   Renal:   1 voiding   2 hyponatremia- resolved -on 1l fluid restriction     ID:   1 afebrile     GI:   1 minced and moist   2 last bm 12/14     Social/Family:   Discharge planning: marley auth :p     -will discuss with Dr. Barcenas   -spectra [id number]     *** No plan for chemotherapy or radiation while at rehab.  HPI:  74 year old female with history of lung cancer s/p chemotherapy 5 years ago previously in remission (oncologist Dr. Fredis Solomon at [facility]), COPD, HTN presented to [place] with unsteady gait for a few weeks. Patient's  found her on he floor prompting him to call 911. Went to [place] and CT brain was remarkable for brain mass with midline shift, cyst vs necrotic mass. Transferred to [facility] for neurosurgery evaluation. Neurosurgery was consulted and reccomended MRI brain, CTAP and CT chest to look for possible malignancies, Keppra 500 mg BID, and Decadron 8 mg once in the ED and then Decadron 4mg q 6 hours. Reports shortness of breath when going up staurs, No fevers, chest pain, palpitations, vomiting, abdominal pain, diarrhea, constipation, dysuria, hematuria, melena, hematochezia.  (04 Dec 2023 13:50)    PROCEDURE: Right craniotomy for tumor     s/p right parietal craniotomy and resection of brain tumor on 12/9   POD#  6   PLAN:  Neuro:   1 Out of bed   2 continue pt/ot   3 prn pain meds   4 keppra to prevent seizure   5 decadron taper to control cerebral edema     Respiratory:   1 room air   2 incentive spirometry   3 copd- continue on proventil and symbicort     CV:  1 blood pressure stable   2 continue on nifedipine     Endocrine:   1 stable   Heme/Onc:             DVT ppx: sqh and scds   1 outpatient oncology follow up   2 pathology pending   3 has hx of lung nodule -had chemo in the past.   4 b/l below knee dvt- f/u doppler today   Renal:   1 voiding   2 hyponatremia- resolved -on 1l fluid restriction     ID:   1 afebrile     GI:   1 minced and moist   2 last bm 12/14     Social/Family:   Discharge planning: marley auth :p     -will discuss with Dr. Barcenas   -spectra [id number]     *** No plan for chemotherapy or radiation while at rehab.  HPI:  74 year old female with history of lung cancer s/p chemotherapy 5 years ago previously in remission (oncologist Dr. Fredis Solomon at [facility]), COPD, HTN presented to [place] with unsteady gait for a few weeks. Patient's  found her on he floor prompting him to call 911. Went to [place] and CT brain was remarkable for brain mass with midline shift, cyst vs necrotic mass. Transferred to [facility] for neurosurgery evaluation. Neurosurgery was consulted and reccomended MRI brain, CTAP and CT chest to look for possible malignancies, Keppra 500 mg BID, and Decadron 8 mg once in the ED and then Decadron 4mg q 6 hours. Reports shortness of breath when going up staurs, No fevers, chest pain, palpitations, vomiting, abdominal pain, diarrhea, constipation, dysuria, hematuria, melena, hematochezia.  (04 Dec 2023 13:50)    PROCEDURE: Right craniotomy for tumor     s/p right parietal craniotomy and resection of brain tumor on 12/9   POD#  6   PLAN:  Neuro:   1 Out of bed   2 continue pt/ot   3 prn pain meds   4 keppra to prevent seizure   5 decadron taper to control cerebral edema     Respiratory:   1 room air   2 incentive spirometry   3 copd- continue on proventil and symbicort     CV:  1 blood pressure stable   2 continue on nifedipine     Endocrine:   1 stable   Heme/Onc:             DVT ppx: sqh and scds   1 outpatient oncology follow up   2 pathology pending   3 has hx of lung nodule -had chemo in the past.   4 b/l below knee dvt- f/u doppler today   Renal:   1 voiding   2 hyponatremia- resolved -on 1l fluid restriction   3 ckd- stable creatinine     ID:   1 afebrile     GI:   1 minced and moist   2 last bm 12/14     Social/Family:   Discharge planning: marley auth :p     -will discuss with Dr. Barcenas   -[email or web address] [id number]     *** No plan for chemotherapy or radiation while at rehab.  HPI:  74 year old female with history of lung cancer s/p chemotherapy 5 years ago previously in remission (oncologist Dr. Fredis Solomon at [facility]), COPD, HTN presented to [place] with unsteady gait for a few weeks. Patient's  found her on he floor prompting him to call 911. Went to [place] and CT brain was remarkable for brain mass with midline shift, cyst vs necrotic mass. Transferred to [facility] for neurosurgery evaluation. Neurosurgery was consulted and reccomended MRI brain, CTAP and CT chest to look for possible malignancies, Keppra 500 mg BID, and Decadron 8 mg once in the ED and then Decadron 4mg q 6 hours. Reports shortness of breath when going up staurs, No fevers, chest pain, palpitations, vomiting, abdominal pain, diarrhea, constipation, dysuria, hematuria, melena, hematochezia.  (04 Dec 2023 13:50)    PROCEDURE: Right craniotomy for tumor     s/p right parietal craniotomy and resection of brain tumor on 12/9   POD#  6   PLAN:  Neuro:   1 Out of bed   2 continue pt/ot   3 prn pain meds   4 keppra to prevent seizure   5 decadron taper to control cerebral edema     Respiratory:   1 room air   2 incentive spirometry   3 copd- continue on proventil and symbicort     CV:  1 blood pressure stable   2 continue on nifedipine     Endocrine:   1 stable   Heme/Onc:             DVT ppx: sqh and scds   1 outpatient oncology follow up   2 pathology pending   3 has hx of lung nodule -had chemo in the past.   4 b/l below knee dvt- f/u doppler today   Renal:   1 voiding   2 hyponatremia- resolved -on 1l fluid restriction   3 ckd- stable creatinine     ID:   1 afebrile     GI:   1 minced and moist   2 last bm 12/14     Social/Family:   Discharge planning: marley auth :p     -will discuss with Dr. Barcenas   -[email or web address] [phone]     *** No plan for chemotherapy or radiation while at rehab.

## 2023-12-15 NOTE — PROGRESS NOTE ADULT - PROBLEM SELECTOR PLAN 3
- duplex of LE on 12/10 with b/l acute soleal DVT  - Vascular Cardiology consulted, recs appreciated  - c/w with hep subc for ppx  - serial duplex as per Vasc cards - repeat duplex on 12/15 with no evidence of progression

## 2023-12-15 NOTE — PROGRESS NOTE ADULT - SUBJECTIVE AND OBJECTIVE BOX
Jeanie Adams MD  Division of Hospital Medicine  [facility]  Spectra: [phone]      Patient is a 78y old  Female who presents with a chief complaint of Unsteady gai, mass noted on CT head (15 Dec 2023 08:40)    SUBJECTIVE / OVERNIGHT EVENTS: no fever, dizziness, headache, chest pain nor shortness of breath. constipation resolved. Had 2 BMs yesterday with good relief. BP better controlled today.  ADDITIONAL REVIEW OF SYSTEMS:    MEDICATIONS  (STANDING):  bisacodyl 5 milliGRAM(s) Oral at bedtime  budesonide 160 MICROgram(s)/formoterol 4.5 MICROgram(s) Inhaler 2 Puff(s) Inhalation two times a day  chlorhexidine 4% Liquid 1 Application(s) Topical <User Schedule>  dexAMETHasone     Tablet   Oral   dexAMETHasone     Tablet 4 milliGRAM(s) Oral every 8 hours  escitalopram 10 milliGRAM(s) Oral daily  famotidine    Tablet 10 milliGRAM(s) Oral daily  heparin   Injectable 5000 Unit(s) SubCutaneous every 8 hours  influenza  Vaccine (HIGH DOSE) 0.7 milliLiter(s) IntraMuscular once  levETIRAcetam 250 milliGRAM(s) Oral every 12 hours  nicotine -  14 mG/24Hr(s) Patch 1 Patch Transdermal daily  NIFEdipine XL 30 milliGRAM(s) Oral daily  polyethylene glycol 3350 17 Gram(s) Oral two times a day  senna 2 Tablet(s) Oral at bedtime    MEDICATIONS  (PRN):  acetaminophen     Tablet .. 650 milliGRAM(s) Oral every 6 hours PRN Temp greater or equal to 38C (100.4F), Mild Pain (1 - 3)  albuterol    90 MICROgram(s) HFA Inhaler 2 Puff(s) Inhalation every 6 hours PRN Shortness of Breath and/or Wheezing  ondansetron Injectable 4 milliGRAM(s) IV Push every 6 hours PRN Nausea and/or Vomiting  oxyCODONE    IR 5 milliGRAM(s) Oral every 4 hours PRN Moderate Pain (4 - 6)      CAPILLARY BLOOD GLUCOSE        I&O's Summary      PHYSICAL EXAM:  Vital Signs Last 24 Hrs  T(C): 36.8 (15 Dec 2023 04:33), Max: 36.8 (15 Dec 2023 04:33)  T(F): 98.2 (15 Dec 2023 04:33), Max: 98.2 (15 Dec 2023 04:33)  HR: 63 (15 Dec 2023 04:33) (63 - 68)  BP: 147/79 (15 Dec 2023 04:33) (127/72 - 156/79)  BP(mean): --  RR: 18 (15 Dec 2023 04:33) (18 - 18)  SpO2: 96% (15 Dec 2023 04:33) (96% - 99%)    Parameters below as of 15 Dec 2023 04:33  Patient On (Oxygen Delivery Method): room air    CONSTITUTIONAL: NAD, well-developed, well-groomed  EYES: PERRLA; conjunctiva and sclera clear  ENMT: Moist oral mucosa, no pharyngeal injection or exudates; normal dentition  NECK: Supple, no palpable masses; no thyromegaly  RESPIRATORY: Normal respiratory effort; lungs are clear to auscultation bilaterally  CARDIOVASCULAR: Regular rate and rhythm, normal S1 and S2, no murmur/rub/gallop; No lower extremity edema  ABDOMEN: Soft, Nondistended, Nontender to palpation, normoactive bowel sounds  MUSCULOSKELETAL: No clubbing or cyanosis of digits; no joint swelling or tenderness to palpation  PSYCH: A+O to person, place, and time; affect appropriate  NEUROLOGY: CN 2-12 are intact and symmetric; no gross sensory deficits, moving all extremities strong  SKIN: No rashes; no palpable lesions, +L crani site with staples c/d/i    LABS:                        11.6   14.03 )-----------( 213      ( 15 Dec 2023 06:38 )             33.7     12-15    135  |  99  |  39<H>  ----------------------------<  103<H>  4.1   |  23  |  1.17    Ca    8.8      15 Dec 2023 06:38  Phos  2.7     12-15  Mg     1.8     12-15        Urinalysis Basic - ( 15 Dec 2023 06:38 )    Color: x / Appearance: x / SG: x / pH: x  Gluc: 103 mg/dL / Ketone: x  / Bili: x / Urobili: x   Blood: x / Protein: x / Nitrite: x   Leuk Esterase: x / RBC: x / WBC x   Sq Epi: x / Non Sq Epi: x / Bacteria: x        RADIOLOGY & ADDITIONAL TESTS:  Results Reviewed: Cr improved, stable WBC  Imaging Personally Reviewed:  12/15/23 Duplex of LE:  FINDINGS:    RIGHT:  Normal compressibility of the RIGHT common femoral, femoral and popliteal   veins. Doppler examination of these vessels shows normal spontaneous and   phasic flow.  Redemonstrated occlusion of the soleal vein. The rest of the visualized   calf veins are unremarkable.    LEFT:  Normal compressibility of the LEFT common femoral, femoral and popliteal   veins.Doppler examination of these vessels shows normal spontaneous and   phasic flow.  Redemonstrated occlusion of the soleal vein. The rest of the visualized   calf veins are unremarkable.    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    Electrocardiogram Personally Reviewed:    COORDINATION OF CARE:  Care Discussed with Consultants/Other Providers [Y]: Neurosurgery GINNY Chacon  Prior or Outpatient Records Reviewed [Y/N]:   Jeanie Adams MD  Division of Hospital Medicine  [facility]  Spectra: [phone]      Patient is a 78y old  Female who presents with a chief complaint of Unsteady gai, mass noted on CT head (15 Dec 2023 08:40)    SUBJECTIVE / OVERNIGHT EVENTS: no fever, dizziness, headache, chest pain nor shortness of breath. constipation resolved. Had 2 BMs yesterday with good relief. BP better controlled today.  ADDITIONAL REVIEW OF SYSTEMS:    MEDICATIONS  (STANDING):  bisacodyl 5 milliGRAM(s) Oral at bedtime  budesonide 160 MICROgram(s)/formoterol 4.5 MICROgram(s) Inhaler 2 Puff(s) Inhalation two times a day  chlorhexidine 4% Liquid 1 Application(s) Topical <User Schedule>  dexAMETHasone     Tablet   Oral   dexAMETHasone     Tablet 4 milliGRAM(s) Oral every 8 hours  escitalopram 10 milliGRAM(s) Oral daily  famotidine    Tablet 10 milliGRAM(s) Oral daily  heparin   Injectable 5000 Unit(s) SubCutaneous every 8 hours  influenza  Vaccine (HIGH DOSE) 0.7 milliLiter(s) IntraMuscular once  levETIRAcetam 250 milliGRAM(s) Oral every 12 hours  nicotine -  14 mG/24Hr(s) Patch 1 Patch Transdermal daily  NIFEdipine XL 30 milliGRAM(s) Oral daily  polyethylene glycol 3350 17 Gram(s) Oral two times a day  senna 2 Tablet(s) Oral at bedtime    MEDICATIONS  (PRN):  acetaminophen     Tablet .. 650 milliGRAM(s) Oral every 6 hours PRN Temp greater or equal to 38C (100.4F), Mild Pain (1 - 3)  albuterol    90 MICROgram(s) HFA Inhaler 2 Puff(s) Inhalation every 6 hours PRN Shortness of Breath and/or Wheezing  ondansetron Injectable 4 milliGRAM(s) IV Push every 6 hours PRN Nausea and/or Vomiting  oxyCODONE    IR 5 milliGRAM(s) Oral every 4 hours PRN Moderate Pain (4 - 6)      CAPILLARY BLOOD GLUCOSE        I&O's Summary      PHYSICAL EXAM:  Vital Signs Last 24 Hrs  T(C): 36.8 (15 Dec 2023 04:33), Max: 36.8 (15 Dec 2023 04:33)  T(F): 98.2 (15 Dec 2023 04:33), Max: 98.2 (15 Dec 2023 04:33)  HR: 63 (15 Dec 2023 04:33) (63 - 68)  BP: 147/79 (15 Dec 2023 04:33) (127/72 - 156/79)  BP(mean): --  RR: 18 (15 Dec 2023 04:33) (18 - 18)  SpO2: 96% (15 Dec 2023 04:33) (96% - 99%)    Parameters below as of 15 Dec 2023 04:33  Patient On (Oxygen Delivery Method): room air    CONSTITUTIONAL: NAD, well-developed, well-groomed  EYES: PERRLA; conjunctiva and sclera clear  ENMT: Moist oral mucosa, no pharyngeal injection or exudates; normal dentition  NECK: Supple, no palpable masses; no thyromegaly  RESPIRATORY: Normal respiratory effort; lungs are clear to auscultation bilaterally  CARDIOVASCULAR: Regular rate and rhythm, normal S1 and S2, no murmur/rub/gallop; No lower extremity edema  ABDOMEN: Soft, Nondistended, Nontender to palpation, normoactive bowel sounds  MUSCULOSKELETAL: No clubbing or cyanosis of digits; no joint swelling or tenderness to palpation  PSYCH: A+O to person, place, and time; affect appropriate  NEUROLOGY: CN 2-12 are intact and symmetric; no gross sensory deficits, moving all extremities strong  SKIN: No rashes; no palpable lesions, +L crani site with staples c/d/i    LABS:                        11.6   14.03 )-----------( 213      ( 15 Dec 2023 06:38 )             33.7     12-15    135  |  99  |  39<H>  ----------------------------<  103<H>  4.1   |  23  |  1.17    Ca    8.8      15 Dec 2023 06:38  Phos  2.7     12-15  Mg     1.8     12-15        Urinalysis Basic - ( 15 Dec 2023 06:38 )    Color: x / Appearance: x / SG: x / pH: x  Gluc: 103 mg/dL / Ketone: x  / Bili: x / Urobili: x   Blood: x / Protein: x / Nitrite: x   Leuk Esterase: x / RBC: x / WBC x   Sq Epi: x / Non Sq Epi: x / Bacteria: x        RADIOLOGY & ADDITIONAL TESTS:  Results Reviewed: Cr improved, stable WBC  Imaging Personally Reviewed:  12/15/23 Duplex of LE:  FINDINGS:    RIGHT:  Normal compressibility of the RIGHT common femoral, femoral and popliteal   veins. Doppler examination of these vessels shows normal spontaneous and   phasic flow.  Redemonstrated occlusion of the soleal vein. The rest of the visualized   calf veins are unremarkable.    LEFT:  Normal compressibility of the LEFT common femoral, femoral and popliteal   veins.Doppler examination of these vessels shows normal spontaneous and   phasic flow.  Redemonstrated occlusion of the soleal vein. The rest of the visualized   calf veins are unremarkable.    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    Electrocardiogram Personally Reviewed:    COORDINATION OF CARE:  Care Discussed with Consultants/Other Providers [Y]: Neurosurgery GINNY Chacon  Prior or Outpatient Records Reviewed [Y/N]:

## 2023-12-15 NOTE — PROGRESS NOTE ADULT - PROBLEM SELECTOR PROBLEM 1
CKD (chronic kidney disease)
Brain mass

## 2023-12-15 NOTE — PROGRESS NOTE ADULT - TIME BILLING
- Ordering, reviewing, and interpreting labs, testing, and imaging.  - Independently obtaining a review of systems and performing a physical exam  - Reviewing consultant documentation/recommendations.  - Counselling and educating patient regarding interpretation of aforementioned items and plan of care.
chart and data review, clinical assessment, and coordination of care. This excludes any time spent on separate procedures or teaching.
Review of medical records, labs, imaging and coordination of care and did not include time spent teaching.
brain mass with vasogenic edema and brain compression POD1 from right parietal crani   neuro checks q 4 hr,  CT head with expected post-op changes   decadron for vasogenic edema  follow official path   Keppra 250 mg BID- renally dosed for seizure prophylaxis   MRI brain with and w/o contrast done reviewed , discussed with Dr Gasca, no residual pending official read   PT/OT  SIADH, IVL, restrict free water intake < 1 L, salt tablet 2 g q 6 hr, sodium goal 135-140  heparin 5000 units q 8 hr was clared by Dr Gasca   d/c lamberto  RA  Regular diet   bilateral soleal DVT  repeat venous dopplers in 5 days, contraindicated to fully anticoagulate     40 minutes
- Ordering, reviewing, and interpreting labs, testing, and imaging.  - Independently obtaining a review of systems and performing a physical exam  - Reviewing consultant documentation/recommendations.  - Counselling and educating patient regarding interpretation of aforementioned items and plan of care.

## 2023-12-15 NOTE — DISCHARGE NOTE NURSING/CASE MANAGEMENT/SOCIAL WORK - NSDCPEFALRISK_GEN_ALL_CORE
For information on Fall & Injury Prevention, visit: [email or web address] OR  [email or web address] OR  https://www.cdc.gov/steadi/patient.html For information on Fall & Injury Prevention, visit: [email or web address] OR  [email or web address] OR  https://www.cdc.gov/steadi/patient.html

## 2023-12-15 NOTE — PROGRESS NOTE ADULT - ASSESSMENT
74 year old female with history of lung cancer s/p chemotherapy 5 years ago previously in remission (oncologist Dr. Fredis Solomon at [facility]), COPD, HTN presents with unstable gait with CT head findings of y 2.3 x 1.5 x 1.7 cm cyst versus centrally necrotic mass in the right parietal-temporal region. CT chest showing 18 mm ground glass opacity concerning for malignancy. Now s/p right parietal crani for 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 on 12/9/23. NSCU course c/b acute b/l soleal DVTs found on duplex. Transferred out of NSCU on 12/11/23. Course c/b HTN now improved.

## 2023-12-15 NOTE — PROGRESS NOTE ADULT - NUTRITIONAL ASSESSMENT
This patient has been assessed with a concern for Malnutrition and has been determined to have a diagnosis/diagnoses of Moderate protein-calorie malnutrition.    This patient is being managed with:   Diet Minced and Moist-  1000mL Fluid Restriction ([id number])  Supplement Feeding Modality:  Oral  Ensure Enlive Cans or Servings Per Day:  1       Frequency:  Daily  Entered: Dec 11 2023  2:59PM   This patient has been assessed with a concern for Malnutrition and has been determined to have a diagnosis/diagnoses of Moderate protein-calorie malnutrition.    This patient is being managed with:   Diet Minced and Moist-  1000mL Fluid Restriction ([id number])  Supplement Feeding Modality:  Oral  Ensure Enlive Cans or Servings Per Day:  1       Frequency:  Daily  Entered: Dec 11 2023  2:59PM

## 2023-12-15 NOTE — PROGRESS NOTE ADULT - SUBJECTIVE AND OBJECTIVE BOX
SUBJECTIVE:   Patient was seen and evaluated at bedside. Patient is resting in bed and is in no new acute distress.   OVERNIGHT EVENTS:   none   Vital Signs Last 24 Hrs  T(C): 36.8 (15 Dec 2023 04:33), Max: 36.8 (15 Dec 2023 04:33)  T(F): 98.2 (15 Dec 2023 04:33), Max: 98.2 (15 Dec 2023 04:33)  HR: 63 (15 Dec 2023 04:33) (63 - 91)  BP: 147/79 (15 Dec 2023 04:33) (127/72 - 156/79)  BP(mean): --  RR: 18 (15 Dec 2023 04:33) (18 - 18)  SpO2: 96% (15 Dec 2023 04:33) (96% - 99%)    Parameters below as of 15 Dec 2023 04:33  Patient On (Oxygen Delivery Method): room air        PHYSICAL EXAM:    General: No Acute Distress     Neurological: Awake, alert oriented to person, place and time, Following Commands, PERRL, EOMI, Face Symmetrical, Speech Fluent, Moving all extremities, Muscle Strength normal in all four extremities, No Drift, Sensation to Light Touch Intact    Pulmonary: Clear to Auscultation, No Rales, No Rhonchi, No Wheezes     Cardiovascular: S1, S2, Regular Rate and Rhythm     Gastrointestinal: Soft, Nontender, Nondistended     Incision:   clean and dry   LABS:                        11.6   14.03 )-----------( 213      ( 15 Dec 2023 06:38 )             33.7    12-15    135  |  99  |  39<H>  ----------------------------<  103<H>  4.1   |  23  |  1.17    Ca    8.8      15 Dec 2023 06:38  Phos  2.7     12-15  Mg     1.8     12-15          DRAINS:     MEDICATIONS:  Antibiotics:    Neuro:  acetaminophen     Tablet .. 650 milliGRAM(s) Oral every 6 hours PRN Temp greater or equal to 38C (100.4F), Mild Pain (1 - 3)  escitalopram 10 milliGRAM(s) Oral daily  levETIRAcetam 250 milliGRAM(s) Oral every 12 hours  ondansetron Injectable 4 milliGRAM(s) IV Push every 6 hours PRN Nausea and/or Vomiting  oxyCODONE    IR 5 milliGRAM(s) Oral every 4 hours PRN Moderate Pain (4 - 6)    Cardiac:  NIFEdipine XL 30 milliGRAM(s) Oral daily    Pulm:  albuterol    90 MICROgram(s) HFA Inhaler 2 Puff(s) Inhalation every 6 hours PRN Shortness of Breath and/or Wheezing  budesonide 160 MICROgram(s)/formoterol 4.5 MICROgram(s) Inhaler 2 Puff(s) Inhalation two times a day    GI/:  bisacodyl 5 milliGRAM(s) Oral at bedtime  famotidine    Tablet 10 milliGRAM(s) Oral daily  polyethylene glycol 3350 17 Gram(s) Oral two times a day  senna 2 Tablet(s) Oral at bedtime    Other:   chlorhexidine 4% Liquid 1 Application(s) Topical <User Schedule>  dexAMETHasone     Tablet   Oral   dexAMETHasone     Tablet 4 milliGRAM(s) Oral every 8 hours  heparin   Injectable 5000 Unit(s) SubCutaneous every 8 hours  influenza  Vaccine (HIGH DOSE) 0.7 milliLiter(s) IntraMuscular once  magnesium sulfate  IVPB 2 Gram(s) IV Intermittent once  nicotine -  14 mG/24Hr(s) Patch 1 Patch Transdermal daily    DIET: [] Regular [] CCD [] Renal [] Puree [] Dysphagia [] Tube Feeds:   minced and moist   IMAGING:   ACC: [id number] EXAM:  MR BRAIN WAW IC   ORDERED BY: ADRIANA YEE     PROCEDURE DATE:  12/10/2023          INTERPRETATION:  CLINICAL INDICATION: Post right parietal craniotomy for   brain metastasis      Magnetic resonance imaging of the brain wascarried out with transaxial   SPGR, FLAIR, fast spin echo T2 weighted images, axial susceptibility   weighted series, diffusion weighted series and sagittal T1 weighted   series on a 1.5 Katia magnet. Post contrast axial, coronal and sagittal   T1 weighted images were obtained. 7 cc of Gadavist were intravenously   injected, 0.5 cc were discarded.    Comparison is made with the prior MRI of 12/4/2023 and brain CT obtained   today at 8:43 AM.        Since the prior examination there has been a right parietal craniotomy.   There is a small amount of postoperative hemorrhage and postsurgical   material in the right parietal postoperative bed. The ring-enhancing mass   previously identified measuring 2.9 cm in AP diameter by 2.0 cm   transverselyhas been removed. The postoperative bed demonstrates   peripheral signal hyperintensity measuring 2.2 cm in AP diameter by 1.8   cm transversely by 2.6 cm in craniocaudal diameter.There is no abnormal   enhancement. There is residual vasogenic edema and mass effect on the   right lateral ventricle. There is mild midline shift to the left    Fairly extensive periventricular white matter changes are identified   bilaterally which are nonspecific but could be related to ischemia or   post therapy change. Old right cerebellar infarct. Nonspecific old   ischemic changes involving the thalami and brainstem.      IMPRESSION: Right parietal craniotomy with postoperative changes after   resection of right parietal ring-enhancing mass. No significant   enhancement. No change in vasogenic edema compared with 12/4/2023.    --- End of Report ---            ISABELLE HUMPHRIES MD; Attending Radiologist  This document has been electronically signed. Dec 10 2023  4:04PM   SUBJECTIVE:   Patient was seen and evaluated at bedside. Patient is resting in bed and is in no new acute distress.   OVERNIGHT EVENTS:   none   Vital Signs Last 24 Hrs  T(C): 36.8 (15 Dec 2023 04:33), Max: 36.8 (15 Dec 2023 04:33)  T(F): 98.2 (15 Dec 2023 04:33), Max: 98.2 (15 Dec 2023 04:33)  HR: 63 (15 Dec 2023 04:33) (63 - 91)  BP: 147/79 (15 Dec 2023 04:33) (127/72 - 156/79)  BP(mean): --  RR: 18 (15 Dec 2023 04:33) (18 - 18)  SpO2: 96% (15 Dec 2023 04:33) (96% - 99%)    Parameters below as of 15 Dec 2023 04:33  Patient On (Oxygen Delivery Method): room air        PHYSICAL EXAM:    General: No Acute Distress     Neurological: Awake, alert oriented to person, place and time, Following Commands, PERRL, EOMI, Face Symmetrical, Speech Fluent, Moving all extremities, Muscle Strength normal in all four extremities, No Drift, Sensation to Light Touch Intact    Pulmonary: Clear to Auscultation, No Rales, No Rhonchi, No Wheezes     Cardiovascular: S1, S2, Regular Rate and Rhythm     Gastrointestinal: Soft, Nontender, Nondistended     Incision:   clean and dry   LABS:                        11.6   14.03 )-----------( 213      ( 15 Dec 2023 06:38 )             33.7    12-15    135  |  99  |  39<H>  ----------------------------<  103<H>  4.1   |  23  |  1.17    Ca    8.8      15 Dec 2023 06:38  Phos  2.7     12-15  Mg     1.8     12-15          DRAINS:     MEDICATIONS:  Antibiotics:    Neuro:  acetaminophen     Tablet .. 650 milliGRAM(s) Oral every 6 hours PRN Temp greater or equal to 38C (100.4F), Mild Pain (1 - 3)  escitalopram 10 milliGRAM(s) Oral daily  levETIRAcetam 250 milliGRAM(s) Oral every 12 hours  ondansetron Injectable 4 milliGRAM(s) IV Push every 6 hours PRN Nausea and/or Vomiting  oxyCODONE    IR 5 milliGRAM(s) Oral every 4 hours PRN Moderate Pain (4 - 6)    Cardiac:  NIFEdipine XL 30 milliGRAM(s) Oral daily    Pulm:  albuterol    90 MICROgram(s) HFA Inhaler 2 Puff(s) Inhalation every 6 hours PRN Shortness of Breath and/or Wheezing  budesonide 160 MICROgram(s)/formoterol 4.5 MICROgram(s) Inhaler 2 Puff(s) Inhalation two times a day    GI/:  bisacodyl 5 milliGRAM(s) Oral at bedtime  famotidine    Tablet 10 milliGRAM(s) Oral daily  polyethylene glycol 3350 17 Gram(s) Oral two times a day  senna 2 Tablet(s) Oral at bedtime    Other:   chlorhexidine 4% Liquid 1 Application(s) Topical <User Schedule>  dexAMETHasone     Tablet   Oral   dexAMETHasone     Tablet 4 milliGRAM(s) Oral every 8 hours  heparin   Injectable 5000 Unit(s) SubCutaneous every 8 hours  influenza  Vaccine (HIGH DOSE) 0.7 milliLiter(s) IntraMuscular once  magnesium sulfate  IVPB 2 Gram(s) IV Intermittent once  nicotine -  14 mG/24Hr(s) Patch 1 Patch Transdermal daily    DIET: [] Regular [] CCD [] Renal [] Puree [] Dysphagia [] Tube Feeds:   minced and moist   IMAGING:   ACC: [id number] EXAM:  MR BRAIN WAW IC   ORDERED BY: ADRIANA YEE     PROCEDURE DATE:  12/10/2023          INTERPRETATION:  CLINICAL INDICATION: Post right parietal craniotomy for   brain metastasis      Magnetic resonance imaging of the brain wascarried out with transaxial   SPGR, FLAIR, fast spin echo T2 weighted images, axial susceptibility   weighted series, diffusion weighted series and sagittal T1 weighted   series on a 1.5 Katia magnet. Post contrast axial, coronal and sagittal   T1 weighted images were obtained. 7 cc of Gadavist were intravenously   injected, 0.5 cc were discarded.    Comparison is made with the prior MRI of 12/4/2023 and brain CT obtained   today at 8:43 AM.        Since the prior examination there has been a right parietal craniotomy.   There is a small amount of postoperative hemorrhage and postsurgical   material in the right parietal postoperative bed. The ring-enhancing mass   previously identified measuring 2.9 cm in AP diameter by 2.0 cm   transverselyhas been removed. The postoperative bed demonstrates   peripheral signal hyperintensity measuring 2.2 cm in AP diameter by 1.8   cm transversely by 2.6 cm in craniocaudal diameter.There is no abnormal   enhancement. There is residual vasogenic edema and mass effect on the   right lateral ventricle. There is mild midline shift to the left    Fairly extensive periventricular white matter changes are identified   bilaterally which are nonspecific but could be related to ischemia or   post therapy change. Old right cerebellar infarct. Nonspecific old   ischemic changes involving the thalami and brainstem.      IMPRESSION: Right parietal craniotomy with postoperative changes after   resection of right parietal ring-enhancing mass. No significant   enhancement. No change in vasogenic edema compared with 12/4/2023.    --- End of Report ---            ISABELLE HUMPHIRES MD; Attending Radiologist  This document has been electronically signed. Dec 10 2023  4:04PM

## 2023-12-15 NOTE — PROGRESS NOTE ADULT - PROVIDER SPECIALTY LIST ADULT
Neurosurgery
Pulmonology
Vascular Cardiology
Vascular Cardiology
Hospitalist
Neurosurgery
Rehab Medicine
Internal Medicine
Internal Medicine
Neurosurgery
Rehab Medicine
Vascular Cardiology
Vascular Cardiology
NSICU
Neurosurgery
Hospitalist
NSICU
Neurosurgery
Pulmonology
NSICU
Nephrology
Neurosurgery
NSICU
Internal Medicine
Hospitalist
Hospitalist
Internal Medicine
Internal Medicine

## 2023-12-15 NOTE — PROGRESS NOTE ADULT - PROBLEM SELECTOR PROBLEM 6
Acute kidney injury superimposed on CKD
Prophylactic measure
History of COPD
History of COPD
Prophylactic measure
Acute kidney injury superimposed on CKD
Prophylactic measure
Prophylactic measure

## 2023-12-15 NOTE — PROGRESS NOTE ADULT - REASON FOR ADMISSION
Unsteady gai, mass noted on CT head
Unsteady gait, mass noted on CT head
unsteady gait
Unsteady gai, mass noted on CT head
Unsteady gait, mass noted on CT head
Unsteady gai, mass noted on CT head
Unsteady gai, mass noted on CT head
Unsteady gait, mass noted on CT head
Unsteady gai, mass noted on CT head
Unsteady gai, mass noted on CT head
Unsteady gait, mass noted on CT head
Unsteady gait, mass noted on CT head
Unsteady gai, mass noted on CT head
Unsteady gait, mass noted on CT head
Unsteady gai, mass noted on CT head

## 2023-12-18 DIAGNOSIS — Z86.718 PERSONAL HISTORY OF OTHER VENOUS THROMBOSIS AND EMBOLISM: ICD-10-CM

## 2023-12-21 LAB
SURGICAL PATHOLOGY STUDY: SIGNIFICANT CHANGE UP
SURGICAL PATHOLOGY STUDY: SIGNIFICANT CHANGE UP

## 2023-12-27 ENCOUNTER — APPOINTMENT (OUTPATIENT)
Dept: NEUROSURGERY | Facility: CLINIC | Age: 78
End: 2023-12-27
Payer: MEDICARE

## 2023-12-27 VITALS
WEIGHT: 156 LBS | BODY MASS INDEX: 26.63 KG/M2 | HEART RATE: 95 BPM | SYSTOLIC BLOOD PRESSURE: 118 MMHG | TEMPERATURE: 97.7 F | DIASTOLIC BLOOD PRESSURE: 81 MMHG | HEIGHT: 64 IN | OXYGEN SATURATION: 99 %

## 2023-12-27 PROCEDURE — 99024 POSTOP FOLLOW-UP VISIT: CPT

## 2024-01-02 NOTE — REASON FOR VISIT
[de-identified] : s/p craniotomy for tumor removal [de-identified] : 12/9 2023 [de-identified] : 18 [de-identified] : 2

## 2024-01-02 NOTE — ASSESSMENT
[FreeTextEntry1] : Impression 74 year old female with history of lung cancer s/p chemotherapy 5 years ago previously in remission (oncologist Dr. Fredis Solomon at [facility]), COPD, HTN presented to [place] with unsteady gait for a few weeks. Went to [place] and CT brain was remarkable for brain mass with midline shift, cyst vs necrotic mass.  Adm 12/4 tto [facility]  revealed right parietal brain mass on CTH and MRI scan. Patient underwent 12/9 Rt Parietal Crani Rsxn of Tumor. Monitored in the NSCU and transferred to surgical floor. Also, CT chest showing 18 mm ground glass opacity concerning for malignancy being followed by pulmonology and oncology. Oncology recommending close follow up with private oncologist Dr. Fredis Solomon at [facility] after discharge from rehab. Pulmonology recommending repeat CT/PET scan in 4-6 weeks post discharge. Patient found to have bilateral soleal DVTs on SQH for DVT ppx. Vascular cardiology following recommended surveillance lower extremity dopplers. Today at post op visi, pt doing well at rehab, staple wound intact with out signs of any infection. Dr. Barcenas discussed pathology and the need of adjuvant therapy in detail with the pt.  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 Radiation treatment is recommended at this time. Mask radiation procedure explained and  Possible s/e including radiation necrosis, hair loss, lethargy, explained.  -Will arrange appointment with Dr. Wetzel for radiation therapy. Dr. Wetzel will explain again about the procedure  --Immediate F/U with Neha Mcneal oncologist on lung lesion- copy of the MRI Lung and brain report was given to pt. --MRI Head w+ w/o contrast in 2 months -F/U in 1 month

## 2024-01-02 NOTE — HISTORY OF PRESENT ILLNESS
[FreeTextEntry1] : Hospital Course:  Discharge Date 15-Dec-2023  Admission Date 04-Dec-2023 08:19  Reason for Admission Unsteady gai, mass noted on CT head  Hospital Course   74 year old female with history of lung cancer s/p chemotherapy 5 years ago  previously in remission (oncologist Dr. Fredis Solomon at [facility]),  COPD, HTN presented to [place] with unsteady gait for a few weeks. Patient's   found her on he floor prompting him to call 911. Went to [place] and  CT brain was remarkable for brain mass with midline shift, cyst vs necrotic  mass. Transferred to [facility] for neurosurgery evaluation. Neurosurgery was  consulted and recommended MRI brain, CTAP and CT chest to look for possible  malignancies, Keppra 500 mg BID, and Decadron 8 mg once in the ED and then  Decadron 4mg q 6 hours. Reports shortness of breath when going up stairs, No  fevers, chest pain, palpitations, vomiting, abdominal pain, diarrhea,  constipation, dysuria, hematuria, melena, hematochezia.  Adm 12/4. Hospital  course noted right parietal brain mass on CTH and MRI scan. Patient underwent  12/9 Rt Parietal Crani Rsxn of Tumor. Pathology pending. Monitored in the NSCU  and transferred to surgical floor. Also, CT chest showing 18 mm ground glass  opacity concerning for malignancy being followed by pulmonology and oncology.  Oncology recommending close follow up with private oncologist Dr. Fredis Solomon at [facility] after discharge from rehab. Pulmonology recommending  repeat CT/PET scan in 4-6 weeks post discharge. Nephrology following for TEREZA on  CKD and hyponatremia now resolved. Patient found to have bilateral soleal DVTs  on H for DVT ppx. Vascular cardiology following recommended surveillance  lower extremity dopplers. Physical therapy/ Occupational therapy / PMR  recommended patient for rehab  Post op visit: She comes to the office today 2 weeks after surgery (Rt Parietal Crani Rsxn of Tumor)  reporting that she feels well. [facility].  Her surgical wound healing well with no signs of infection, staples are intact.  She has not seen any other providers yet. She is getting PT/OT at rehab.

## 2024-01-10 ENCOUNTER — TRANSCRIPTION ENCOUNTER (OUTPATIENT)
Age: 79
End: 2024-01-10

## 2024-01-29 ENCOUNTER — APPOINTMENT (OUTPATIENT)
Dept: NEUROSURGERY | Facility: CLINIC | Age: 79
End: 2024-01-29
Payer: MEDICARE

## 2024-01-29 VITALS
BODY MASS INDEX: 26.63 KG/M2 | SYSTOLIC BLOOD PRESSURE: 135 MMHG | DIASTOLIC BLOOD PRESSURE: 83 MMHG | OXYGEN SATURATION: 96 % | HEIGHT: 64 IN | WEIGHT: 156 LBS | HEART RATE: 81 BPM

## 2024-01-29 PROCEDURE — 99024 POSTOP FOLLOW-UP VISIT: CPT

## 2024-01-29 NOTE — REVIEW OF SYSTEMS
[Arm Weakness] : arm weakness [Leg Weakness] : leg weakness [Cluster Headache] : cluster headaches [Joint Pain] : joint pain [Limb Pain] : limb pain [Negative] : Integumentary

## 2024-01-30 ENCOUNTER — OUTPATIENT (OUTPATIENT)
Dept: OUTPATIENT SERVICES | Facility: HOSPITAL | Age: 79
LOS: 1 days | Discharge: HOME | End: 2024-01-30

## 2024-01-30 ENCOUNTER — APPOINTMENT (OUTPATIENT)
Dept: FAMILY MEDICINE | Facility: CLINIC | Age: 79
End: 2024-01-30

## 2024-01-30 DIAGNOSIS — N81.10 CYSTOCELE, UNSPECIFIED: Chronic | ICD-10-CM

## 2024-02-01 ENCOUNTER — APPOINTMENT (OUTPATIENT)
Dept: RADIATION ONCOLOGY | Facility: CLINIC | Age: 79
End: 2024-02-01

## 2024-02-03 NOTE — ASSESSMENT
[FreeTextEntry1] : IMPRESSION: 74 year old female with history of lung cancer s/p chemotherapy 5 years ago previously in remission (oncologist Dr. Fredis Solomon at [facility]), COPD, HTN presented to [place] with unsteady gait  and CT brain was remarkable for brain mass with midline shift, cyst vs necrotic mass. Adm 12/4 tto [facility] revealed right parietal brain mass on CTH and MRI scan. Patient underwent 12/9 Rt Parietal Crani Rsxn of Tumor.  Surgical wound healing well with no signs of infection. . CT chest showing 18 mm ground glass opacity concerning for malignancy pt rescheduled appointment with oncologist Dr. Fredis Solomon at [facility]. Pt to see pulmonology, vascular cardiology (bilateral Solea DVTs was on Heparin at rehab) and radiation oncology along with the oncologist.   PLAN:  MRI Head w+ w/o contrast in 2 weeks  Immediate follow up with oncologist advised, CT Chest/Pathology report provided for that visit Will arrange for radiation oncology appointment (task sent) Follow up with Dr. Aparicio for DVT - Contact info provided. F/U in 3 weeks after the scan

## 2024-02-03 NOTE — PHYSICAL EXAM
[General Appearance - Alert] : alert [General Appearance - In No Acute Distress] : in no acute distress [General Appearance - Well Nourished] : well nourished [General Appearance - Well-Appearing] : healthy appearing [Oriented To Time, Place, And Person] : oriented to person, place, and time [Impaired Insight] : insight and judgment were intact [Affect] : the affect was normal [Memory Recent] : recent memory was not impaired [Person] : oriented to person [Place] : oriented to place [Time] : oriented to time [Short Term Intact] : short term memory intact [Cranial Nerves Optic (II)] : visual acuity intact bilaterally,  pupils equal round and reactive to light [Cranial Nerves Oculomotor (III)] : extraocular motion intact [Cranial Nerves Trigeminal (V)] : facial sensation intact symmetrically [Cranial Nerves Facial (VII)] : face symmetrical [Cranial Nerves Vestibulocochlear (VIII)] : hearing was intact bilaterally [Cranial Nerves Accessory (XI - Cranial And Spinal)] : head turning and shoulder shrug symmetric [Cranial Nerves Hypoglossal (XII)] : there was no tongue deviation with protrusion [Motor Tone] : muscle tone was normal in all four extremities [Motor Strength] : muscle strength was normal in all four extremities [Sensation Tactile Decrease] : light touch was intact [Sensation Pain / Temperature Decrease] : pain and temperature was intact [Balance] : balance was intact [Sclera] : the sclera and conjunctiva were normal [PERRL With Normal Accommodation] : pupils were equal in size, round, reactive to light, with normal accommodation [Outer Ear] : the ears and nose were normal in appearance [Both Tympanic Membranes Were Examined] : both tympanic membranes were normal [Neck Appearance] : the appearance of the neck was normal [] : no respiratory distress [Respiration, Rhythm And Depth] : normal respiratory rhythm and effort [Apical Impulse] : the apical impulse was normal [Heart Rate And Rhythm] : heart rate was normal and rhythm regular [No CVA Tenderness] : no ~M costovertebral angle tenderness [No Spinal Tenderness] : no spinal tenderness [Abnormal Walk] : normal gait [Involuntary Movements] : no involuntary movements were seen [Irregular] : irregular [Healing Well] : healing well [No Drainage] : without drainage [Normal Skin] : normal [FreeTextEntry1] : Right frontoparietal [FreeTextEntry8] : no drift,

## 2024-02-03 NOTE — HISTORY OF PRESENT ILLNESS
[FreeTextEntry1] : 74 year old female with history of lung cancer s/p chemotherapy 5 years ago previously in remission (oncologist Dr. Fredis Solomon at [facility]), COPD, HTN presented to [place] with unsteady gait for a few weeks. Went to [place] and CT brain was remarkable for brain mass with midline shift, cyst vs necrotic mass. Adm 12/4 tto [facility] revealed right parietal brain mass on CTH and MRI scan. Patient underwent 12/9 Rt Parietal Crani Rsxn of Tumor. Monitored in the NSCU and transferred to surgical floor. Also, CT chest showing 18 mm ground glass opacity concerning for malignancy being followed by pulmonology and oncology. Oncology recommending close follow up with private oncologist Dr. Fredis Solomon at [facility] after discharge from rehab. Pulmonology recommending repeat CT/PET scan in 4-6 weeks post discharge. Patient found to have bilateral soleal DVTs on SQH for DVT ppx. Vascular cardiology following recommended surveillance lower extremity dopplers. Today at post op visi, pt doing well at rehab, staple wound intact with out signs of any infection. Dr. Barcenas discussed pathology and the need of adjuvant therapy in detail with the pt.   She comes to the office today 6 weeks after surgery (Rt Parietal Crani Rsxn of Tumor) reporting that she feels well. She is home from [facility].  Her surgical wound healed well with no signs of infection. She has seen PCP and she was ordered PT but has not started yet. Pt had an appointment with oncologist, but she could not make it for the appointment, so that was rescheduled. Pt did not have appointment with Dr. Wetzel yet. Pt walking with walker outside can walk without support at home.

## 2024-02-05 ENCOUNTER — NON-APPOINTMENT (OUTPATIENT)
Age: 79
End: 2024-02-05

## 2024-02-05 RX ORDER — LEVETIRACETAM 500 MG/1
500 TABLET, FILM COATED ORAL
Qty: 60 | Refills: 1 | Status: ACTIVE | COMMUNITY
Start: 2024-02-05 | End: 1900-01-01

## 2024-02-08 ENCOUNTER — APPOINTMENT (OUTPATIENT)
Dept: FAMILY MEDICINE | Facility: CLINIC | Age: 79
End: 2024-02-08
Payer: MEDICARE

## 2024-02-08 VITALS
HEART RATE: 76 BPM | BODY MASS INDEX: 27.31 KG/M2 | WEIGHT: 160 LBS | SYSTOLIC BLOOD PRESSURE: 130 MMHG | RESPIRATION RATE: 20 BRPM | HEIGHT: 64 IN | DIASTOLIC BLOOD PRESSURE: 75 MMHG

## 2024-02-08 DIAGNOSIS — E78.5 HYPERLIPIDEMIA, UNSPECIFIED: ICD-10-CM

## 2024-02-08 DIAGNOSIS — I10 ESSENTIAL (PRIMARY) HYPERTENSION: ICD-10-CM

## 2024-02-08 DIAGNOSIS — R60.9 EDEMA, UNSPECIFIED: ICD-10-CM

## 2024-02-08 PROCEDURE — 36415 COLL VENOUS BLD VENIPUNCTURE: CPT

## 2024-02-08 PROCEDURE — 99214 OFFICE O/P EST MOD 30 MIN: CPT

## 2024-02-08 NOTE — HISTORY OF PRESENT ILLNESS
[de-identified] : Presents for BP check, labs, and general follow-up.  Reviewed all recent events.  Pt states "I'm sore all over but I'm OK."

## 2024-02-08 NOTE — PHYSICAL EXAM
[No Acute Distress] : no acute distress [Normal] : normal rate, regular rhythm, normal S1 and S2 and no murmur heard [Soft] : abdomen soft [Non Tender] : non-tender [Normal Posterior Cervical Nodes] : no posterior cervical lymphadenopathy [Normal Anterior Cervical Nodes] : no anterior cervical lymphadenopathy [Motor Strength Lower Extremities Bilaterally] : there was weakness in both lower extremities [No Focal Deficits] : no focal deficits [Alert and Oriented x3] : oriented to person, place, and time [de-identified] : 1+ edema LEs [de-identified] : using rollator

## 2024-02-08 NOTE — REVIEW OF SYSTEMS
[Fatigue] : fatigue [Chest Pain] : no chest pain [Lower Ext Edema] : lower extremity edema [Muscle Weakness] : muscle weakness [Negative] : Genitourinary

## 2024-02-08 NOTE — ASSESSMENT
[FreeTextEntry1] : Hemodynamically stable with acceptable BP Neuro status stable LE edema - exacerbated by steroids; advised limiting salt intake; elevate legs whenever possible Lab profiles drawn in office and sent

## 2024-02-09 LAB
ALBUMIN SERPL ELPH-MCNC: 3.7 G/DL
ALP BLD-CCNC: 109 U/L
ALT SERPL-CCNC: 19 U/L
ANION GAP SERPL CALC-SCNC: 12 MMOL/L
AST SERPL-CCNC: 18 U/L
BILIRUB SERPL-MCNC: 0.3 MG/DL
BUN SERPL-MCNC: 31 MG/DL
CALCIUM SERPL-MCNC: 9.9 MG/DL
CHLORIDE SERPL-SCNC: 100 MMOL/L
CHOLEST SERPL-MCNC: 200 MG/DL
CO2 SERPL-SCNC: 26 MMOL/L
CREAT SERPL-MCNC: 1.27 MG/DL
EGFR: 44 ML/MIN/1.73M2
FOLATE SERPL-MCNC: 6.6 NG/ML
GLUCOSE SERPL-MCNC: 72 MG/DL
HCT VFR BLD CALC: 34.2 %
HDLC SERPL-MCNC: 86 MG/DL
HGB BLD-MCNC: 11.2 G/DL
LDLC SERPL CALC-MCNC: 89 MG/DL
MCHC RBC-ENTMCNC: 30.1 PG
MCHC RBC-ENTMCNC: 32.7 GM/DL
MCV RBC AUTO: 91.9 FL
NONHDLC SERPL-MCNC: 113 MG/DL
PLATELET # BLD AUTO: 284 K/UL
POTASSIUM SERPL-SCNC: 3.5 MMOL/L
PROT SERPL-MCNC: 5.8 G/DL
RBC # BLD: 3.72 M/UL
RBC # FLD: 15.6 %
SODIUM SERPL-SCNC: 138 MMOL/L
T4 FREE SERPL-MCNC: 1.3 NG/DL
TRIGL SERPL-MCNC: 144 MG/DL
TSH SERPL-ACNC: 1.27 UIU/ML
VIT B12 SERPL-MCNC: 628 PG/ML
WBC # FLD AUTO: 11.18 K/UL

## 2024-02-15 ENCOUNTER — APPOINTMENT (OUTPATIENT)
Dept: RADIATION ONCOLOGY | Facility: CLINIC | Age: 79
End: 2024-02-15
Payer: MEDICARE

## 2024-02-15 DIAGNOSIS — C79.31 SECONDARY MALIGNANT NEOPLASM OF BRAIN: ICD-10-CM

## 2024-02-15 PROCEDURE — 99443: CPT | Mod: 93,95

## 2024-02-16 DIAGNOSIS — C79.31 SECONDARY MALIGNANT NEOPLASM OF BRAIN: ICD-10-CM

## 2024-02-20 ENCOUNTER — APPOINTMENT (OUTPATIENT)
Dept: MRI IMAGING | Facility: HOSPITAL | Age: 79
End: 2024-02-20
Payer: MEDICARE

## 2024-02-20 ENCOUNTER — OUTPATIENT (OUTPATIENT)
Dept: OUTPATIENT SERVICES | Facility: HOSPITAL | Age: 79
LOS: 1 days | End: 2024-02-20
Payer: MEDICARE

## 2024-02-20 DIAGNOSIS — G93.9 DISORDER OF BRAIN, UNSPECIFIED: ICD-10-CM

## 2024-02-20 DIAGNOSIS — N81.10 CYSTOCELE, UNSPECIFIED: Chronic | ICD-10-CM

## 2024-02-20 PROCEDURE — 70553 MRI BRAIN STEM W/O & W/DYE: CPT | Mod: 26

## 2024-02-20 PROCEDURE — 70553 MRI BRAIN STEM W/O & W/DYE: CPT

## 2024-02-20 PROCEDURE — A9579: CPT

## 2024-02-20 NOTE — VITALS
[Maximal Pain Intensity: 6/10] : 6/10 [Least Pain Intensity: 0/10] : 0/10 [Pain Location: ___] : Pain Location: [unfilled] [OTC] : OTC [80: Normal activity with effort; some signs or symptoms of disease.] : 80: Normal activity with effort; some signs or symptoms of disease.  [Date: ____________] : Patient's last distress assessment performed on [unfilled]. [7 - Distress Level] : Distress Level: 7 [FreeTextEntry7] : has a counsellor.

## 2024-02-20 NOTE — PHYSICAL EXAM
[General Appearance - Well Developed] : well developed [No Focal Deficits] : no focal deficits [de-identified] : LIMITED vist transitioned to telephonic

## 2024-02-20 NOTE — REVIEW OF SYSTEMS
[Fatigue] : fatigue [Cough] : cough [Difficulty Walking] : difficulty walking [Negative] : Psychiatric [FreeTextEntry3] : blurry vison [FreeTextEntry6] : non productive [FreeTextEntry9] : bilateral lower extremity [de-identified] : completely healed per patient

## 2024-02-20 NOTE — HISTORY OF PRESENT ILLNESS
[Home] : at home, [unfilled] , at the time of the visit. [Medical Office: ([facility])___] : at the medical office located in  [Partner] : partner [Verbal consent obtained from patient] : the patient, [unfilled] [FreeTextEntry1] : This is a 79 y/o F h/o HTN, asthma, osteoarthritis who present to discuss adjuvant radiation.  Brief Onc. History: Patient was treated for a URI in 2018 with minimal improvement in symptoms completed CXR which showed an opacity and CT scan of the Chest which showed extensive adenopathy in addition to rt lung mass. She underwent EBUS and biopsy on 10/25/18. Pathology: adenocarcinoma. 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 Land treated with Carbo/Alimta+ Keytruda then Alimta Keytruda maintenance. Patient transitioned care to Dr. Fredis Solomon () @ [facility] and was seemly in remission. (last chemo about 5 years ago pers patient she stopped on her own recognizance d/t being anemic, requiring multiple transfusion and "I was just tired"). Continued radiological imaging and was told she was in remission.  December 2023 Patient was found on the floor by her  which prompted a 911 call after which she was taken to [facility] CT brain was noted for brain mass with midline shift, cyst vs necrotic mass. Recalls a few months prior to her hospitalization she was dropped things, and her gait was off. She would spill tea/soda onto herself. Transferred to [facility] for neurosurgery evaluation. Also, a metastatic workup was completed CT C/ A/ P 12/4/2023 18 mm ground glass nodule in the superior segment of the left lower lobe, which may reflect malignancy. Additional nodule along the right oblique fissure not well assessed secondary to motion. No definite metastatic lesions are seen in the abdomen/pelvis. Aneurysmal dilatation of the infrarenal abdominal aorta is slightly increased compared with MRI of 8/13/2021.  Ultimately underwent 12/9/2023 RIGHT parietal craniotomy and removal of tumor by Dr. Ny Barcenas PATH: Metastatic adenocarcinoma, compatible with lung primary High PD-L1 expression  MRI brain w w/o contrast 12/10/2023 IMPRESSION: Right parietal craniotomy with postoperative changes after resection of right parietal ring-enhancing mass. No significant enhancement. No change in vasogenic edema compared with 12/4/2023.  Discharged to rehab and has been home since 1/5/2024. "I am here for a radiation consultation". Reports she is with difficulty walking d/t bilateral lower extremity edema despite decreasing DEX as recommended by neurosurgery. Restarted Triamterene per recs form PCP Also, with visual disturbance + blurry vision and fatigue. has new prescriptions still awaiting glasses.  Reports she is now able to participate in ADLs she is no longer dropping things and is able to cook a meal. Remains imbalanced but uses a rollator.  Does get mild headaches but these are self-limiting.   Scheduled with heme/onc 2/21/2024. Next MRI head scheduled for 2/20/2024.

## 2024-02-26 ENCOUNTER — APPOINTMENT (OUTPATIENT)
Dept: NEUROSURGERY | Facility: CLINIC | Age: 79
End: 2024-02-26
Payer: MEDICARE

## 2024-03-12 ENCOUNTER — APPOINTMENT (OUTPATIENT)
Dept: FAMILY MEDICINE | Facility: CLINIC | Age: 79
End: 2024-03-12

## 2024-03-13 ENCOUNTER — APPOINTMENT (OUTPATIENT)
Dept: RADIATION ONCOLOGY | Facility: CLINIC | Age: 79
End: 2024-03-13

## 2024-03-13 ENCOUNTER — NON-APPOINTMENT (OUTPATIENT)
Age: 79
End: 2024-03-13

## 2024-03-13 NOTE — HISTORY OF PRESENT ILLNESS
[Home] : at home, [unfilled] , at the time of the visit. 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  Brief Onc. History: Patient was treated for a URI in 2018 with minimal improvement in symptoms completed CXR which showed an opacity and CT scan of the Chest which showed extensive adenopathy in addition to rt lung mass. She underwent EBUS and biopsy on 10/25/18. Pathology: adenocarcinoma. 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 Land treated with Carbo/Alimta+ Keytruda then Alimta Keytruda maintenance. Patient transitioned care to Dr. Fredis Solomon () @ [facility] and was seemly in remission. (last chemo about 5 years ago pers patient she stopped on her own recognizance d/t being anemic, requiring multiple transfusion and "I was just tired"). Continued radiological imaging and was told she was in remission.  December 2023 Patient was found on the floor by her  which prompted a 911 call after which she was taken to [facility] CT brain was noted for brain mass with midline shift, cyst vs necrotic mass. Recalls a few months prior to her hospitalization she was dropped things, and her gait was off. She would spill tea/soda onto herself. Transferred to [facility] for neurosurgery evaluation. Also, a metastatic workup was completed CT C/ A/ P 12/4/2023 18 mm ground glass nodule in the superior segment of the left lower lobe, which may reflect malignancy. Additional nodule along the right oblique fissure not well assessed secondary to motion. No definite metastatic lesions are seen in the abdomen/pelvis. Aneurysmal dilatation of the infrarenal abdominal aorta is slightly increased compared with MRI of 8/13/2021.  Ultimately underwent 12/9/2023 RIGHT parietal craniotomy and removal of tumor by Dr. Ny Barcenas PATH: Metastatic adenocarcinoma, compatible with lung primary High PD-L1 expression  MRI brain w w/o contrast 12/10/2023 IMPRESSION: Right parietal craniotomy with postoperative changes after resection of right parietal ring-enhancing mass. No significant enhancement. No change in vasogenic edema compared with 12/4/2023.  Discharged to rehab and has been home since 1/5/2024. "I am here for a radiation consultation". Reports she is with difficulty walking d/t bilateral lower extremity edema despite decreasing DEX as recommended by neurosurgery. Restarted Triamterene per recs form PCP Also, with visual disturbance + blurry vision and fatigue. has new prescriptions still awaiting glasses.  Reports she is now able to participate in ADLs she is no longer dropping things and is able to cook a meal. Remains imbalanced but uses a rollator.  Does get mild headaches but these are self-limiting.   2/20/24: MRI head  Status post right parietal craniotomy for prior resection of tumor. There is stable enhancement surrounding the rim of the surgical cavity within the right high posterior parietal lobe, as seen on the immediate postoperative MRI brain, most compatible with granulation tissue. No new areas of abnormal enhancement to suggest new metastases. Extensive diffuse abnormal T2/FLAIR hyperintense signal throughout the periventricular white matter, bilateral basal ganglia and pontine white matter, may reflect posttreatment/post radiation changes and/or chronic microvascular ischemic changes.   Visit dated 3/13/2024 Patient returns for continuation of care

## 2024-03-13 NOTE — REVIEW OF SYSTEMS
[Fatigue] : fatigue [Difficulty Walking] : difficulty walking [Cough] : cough [Negative] : Psychiatric [FreeTextEntry3] : blurry vison [FreeTextEntry6] : non productive [FreeTextEntry9] : bilateral lower extremity [de-identified] : completely healed per patient

## 2024-03-15 ENCOUNTER — APPOINTMENT (OUTPATIENT)
Dept: NEUROSURGERY | Facility: CLINIC | Age: 79
End: 2024-03-15
Payer: MEDICARE

## 2024-03-15 VITALS
WEIGHT: 150 LBS | DIASTOLIC BLOOD PRESSURE: 85 MMHG | OXYGEN SATURATION: 96 % | TEMPERATURE: 98.9 F | HEART RATE: 84 BPM | BODY MASS INDEX: 25.61 KG/M2 | SYSTOLIC BLOOD PRESSURE: 130 MMHG | HEIGHT: 64 IN

## 2024-03-15 DIAGNOSIS — Z78.9 OTHER SPECIFIED HEALTH STATUS: ICD-10-CM

## 2024-03-15 PROCEDURE — 99214 OFFICE O/P EST MOD 30 MIN: CPT

## 2024-03-15 RX ORDER — DEXAMETHASONE 1 MG/1
1 TABLET ORAL
Qty: 30 | Refills: 3 | Status: DISCONTINUED | COMMUNITY
Start: 2024-02-05 | End: 2024-03-15

## 2024-03-15 NOTE — REVIEW OF SYSTEMS
[Arm Weakness] : arm weakness [Leg Weakness] : leg weakness [Joint Pain] : joint pain [Cluster Headache] : cluster headaches [Limb Pain] : limb pain [Negative] : Integumentary

## 2024-03-17 NOTE — ASSESSMENT
[FreeTextEntry1] : IMPRESSION: 74 year old female with history of lung cancer s/p chemotherapy 5 years ago previously in remission (oncologist Dr. Fredis Solomon at [facility]), COPD, HTN presented to [place] with unsteady gait  and CT brain was remarkable for brain mass with midline shift, cyst vs necrotic mass. Adm 12/4 tto [facility] revealed right parietal brain mass on CTH and MRI scan. Patient underwent craniotomy for  Rt Parietal  Rsxn of Tumor on12/9/23. Pathology revealed metastatic CA from lung. CT chest showing 18 mm ground glass opacity concerning for malignancy pt seen  oncologist Dr. Fredis Solomon at [facility] and had PET scans done.  Pt has not seen vascular cardiology (bilateral Solea DVTs was on Heparin at rehab) and has not discussed treatment plan with  radiation oncology.   PLAN:  PT to see Dr. Wetzel - radiation oncology for  reviewing MRI and planning treatment.  Follow up with Dr. Aparicio for DVT - Contact info provided again Pt to bring PET scan report from oncologist  Continue PT at home(no available transportation for )- ordered home therapy.  F/U in 2 weeks with Dr. Barcenas to discuss imaging and further plan.

## 2024-03-17 NOTE — PHYSICAL EXAM
[General Appearance - Well Nourished] : well nourished [General Appearance - In No Acute Distress] : in no acute distress [General Appearance - Alert] : alert [Irregular] : irregular [General Appearance - Well-Appearing] : healthy appearing [No Drainage] : without drainage [Normal Skin] : normal [Oriented To Time, Place, And Person] : oriented to person, place, and time [Impaired Insight] : insight and judgment were intact [Affect] : the affect was normal [Memory Recent] : recent memory was not impaired [Person] : oriented to person [Place] : oriented to place [Time] : oriented to time [Short Term Intact] : short term memory intact [Cranial Nerves Oculomotor (III)] : extraocular motion intact [Cranial Nerves Optic (II)] : visual acuity intact bilaterally,  pupils equal round and reactive to light [Cranial Nerves Trigeminal (V)] : facial sensation intact symmetrically [Cranial Nerves Facial (VII)] : face symmetrical [Cranial Nerves Vestibulocochlear (VIII)] : hearing was intact bilaterally [Cranial Nerves Accessory (XI - Cranial And Spinal)] : head turning and shoulder shrug symmetric [Cranial Nerves Hypoglossal (XII)] : there was no tongue deviation with protrusion [Motor Strength] : muscle strength was normal in all four extremities [Motor Tone] : muscle tone was normal in all four extremities [Sensation Tactile Decrease] : light touch was intact [Sensation Pain / Temperature Decrease] : pain and temperature was intact [Balance] : balance was intact [Sclera] : the sclera and conjunctiva were normal [PERRL With Normal Accommodation] : pupils were equal in size, round, reactive to light, with normal accommodation [Outer Ear] : the ears and nose were normal in appearance [Neck Appearance] : the appearance of the neck was normal [Both Tympanic Membranes Were Examined] : both tympanic membranes were normal [Respiration, Rhythm And Depth] : normal respiratory rhythm and effort [] : no respiratory distress [Apical Impulse] : the apical impulse was normal [Heart Rate And Rhythm] : heart rate was normal and rhythm regular [No CVA Tenderness] : no ~M costovertebral angle tenderness [No Spinal Tenderness] : no spinal tenderness [Abnormal Walk] : normal gait [Involuntary Movements] : no involuntary movements were seen [Well-Healed] : well-healed [Healing Well] : healing poorly [FreeTextEntry8] : no drift, able to walk well  [FreeTextEntry1] : mild imbalance at turns

## 2024-03-17 NOTE — HISTORY OF PRESENT ILLNESS
[FreeTextEntry1] : 74 year old female with history of lung cancer s/p chemotherapy 5 years ago previously in remission (oncologist Dr. Fredis Solomon at [facility]), COPD, HTN presented to [place] with unsteady gait for a few weeks. Went to [place] and CT brain was remarkable for brain mass with midline shift, cyst vs necrotic mass. Adm 12/4 tto [facility] revealed right parietal brain mass on CTH and MRI scan. Patient underwent 12/9/23 Rt Parietal Crani Rsxn of Tumor. Monitored in the NSCU and transferred to surgical floor. Also, CT chest showing 18 mm ground glass opacity concerning for malignancy being followed by pulmonology and oncology. Oncology recommending close follow up with private oncologist Dr. Fredis Solomon at [facility] after discharge from rehab. Pulmonology recommending repeat CT/PET scan in 4-6 weeks post discharge. Patient found to have bilateral soleal DVTs on SQH for DVT ppx. Vascular cardiology following recommended surveillance lower extremity dopplers. Today at post op visi, pt doing well at rehab, staple wound intact with out signs of any infection. Dr. Barcenas discussed pathology and the need of adjuvant therapy in detail with the pt.   She comes to the office today 3 months after surgery (Rt Parietal Crani Rsxn of Tumor) reporting that she feels well. She is home and catching up with all her appointments. She missed many appointments due to transportation issue, as her son went back to work.  Her surgical wound healed well with no signs of infection. She is doing home therapy and needs to do more sessions. Pt had an appointment with oncologist, who did a PET scan, but result will be discussed in the upcoming appointment on 3/18/24. Pt did a TEB with Dr. Wetzel, but she does not remember what was the plan discussed.  Pt was referred to Dr. Solis, radiation oncologist at [place] while at oncology appointment. However pt still has not decide whom should she follow further. Pt has also not consulter Dr. Aparicio for DVT. Pt walking with walker outside can walk without support at home.

## 2024-03-19 ENCOUNTER — RX RENEWAL (OUTPATIENT)
Age: 79
End: 2024-03-19

## 2024-03-19 RX ORDER — HYDROXYZINE HYDROCHLORIDE 50 MG/1
50 TABLET ORAL 3 TIMES DAILY
Qty: 90 | Refills: 3 | Status: ACTIVE | COMMUNITY
Start: 2019-05-02 | End: 1900-01-01

## 2024-03-19 RX ORDER — TRIAMTERENE AND HYDROCHLOROTHIAZIDE 37.5; 25 MG/1; MG/1
37.5-25 CAPSULE ORAL
Qty: 90 | Refills: 0 | Status: ACTIVE | COMMUNITY
Start: 2023-08-24 | End: 1900-01-01

## 2024-03-26 ENCOUNTER — APPOINTMENT (OUTPATIENT)
Dept: FAMILY MEDICINE | Facility: CLINIC | Age: 79
End: 2024-03-26

## 2024-03-27 PROBLEM — G93.9 BRAIN LESION: Status: ACTIVE | Noted: 2023-12-27

## 2024-03-27 PROBLEM — Z98.890 S/P CRANIOTOMY: Status: ACTIVE | Noted: 2024-02-05

## 2024-03-29 ENCOUNTER — APPOINTMENT (OUTPATIENT)
Dept: NEUROSURGERY | Facility: CLINIC | Age: 79
End: 2024-03-29
Payer: MEDICARE

## 2024-03-29 DIAGNOSIS — Z98.890 OTHER SPECIFIED POSTPROCEDURAL STATES: ICD-10-CM

## 2024-03-29 DIAGNOSIS — G93.9 DISORDER OF BRAIN, UNSPECIFIED: ICD-10-CM

## 2024-03-29 PROCEDURE — 99442: CPT

## 2024-03-29 NOTE — ASSESSMENT
[FreeTextEntry1] : 74-year-old female with history of lung cancer s/p chemotherapy 5 years ago previously in remission (oncologist Dr. Fredis Solomon at [facility]), COPD, HTN presented to [place] with unsteady gait and CT brain was remarkable for brain mass with midline shift, cyst vs necrotic mass. Admitted 12/4 to [facility] revealed right parietal brain mass on CTH and MRI scan. Patient underwent craniotomy for Rt Parietal  Rsxn of Tumor on12/9/23. Pathology revealed metastatic CA from lung. Pt slated for SRS at [place] with Dr. Solis.  She takes Keppra and dexamethasone (once a day)  Return in 2 months. TTM: 11 min

## 2024-03-29 NOTE — HISTORY OF PRESENT ILLNESS
[FreeTextEntry1] : 74 year old female with history of lung cancer s/p chemotherapy 5 years ago previously in remission (oncologist Dr. Fredis Solomon at [facility]), COPD, HTN presented to [place] with unsteady gait for a few weeks. Went to [place] and CT brain was remarkable for brain mass with midline shift, cyst vs necrotic mass. Adm 12/4 tto [facility] revealed right parietal brain mass on CTH and MRI scan. Patient underwent 12/9/23 Rt Parietal Crani Rsxn of Tumor. Monitored in the NSCU and transferred to surgical floor. Also, CT chest showing 18 mm ground glass opacity concerning for malignancy being followed by pulmonology and oncology. Oncology recommending close follow up with private oncologist Dr. Fredis Solomon at [facility] after discharge from rehab. Pulmonology recommending repeat CT/PET scan in 4-6 weeks post discharge. Patient found to have bilateral soleal DVTs on SQH for DVT ppx. Vascular cardiology following recommended surveillance lower extremity dopplers. Today at post op visi, pt doing well at rehab, staple wound intact with out signs of any infection. Dr. Barcenas discussed pathology and the need of adjuvant therapy in detail with the pt.  She missed many appointments due to transportation issue, as her son went back to work.  Her surgical wound healed well with no signs of infection. She is doing home therapy and needs to do more sessions. Pt had an appointment with oncologist, who did a PET scan, but result will be discussed in the upcoming appointment on 3/18/24. Pt did a TEB with Dr. Wetzel, but she does not remember what was the plan discussed.  Pt was referred to Dr. Solis, radiation oncologist at [place] while at oncology appointment. However pt still has not decide whom should she follow further. Pt has also not consulter Dr. Aparicio for DVT. Pt walking with walker outside can walk without support at home.  Today pt states that some days she feels better than others.  Went to [place] and got some infusion of potassium and electrolytes.  She has appt with Dr. Solis next week April 1.  She is seeing him for mapping for radiation (SRS).  PT gets occasional headache and occasional neck pain. Takes Tylenol which helps.  Kimberly from PT still wants her to have PT.  Saw the vascular surgeon who said that the blood clots are clearing up.  She had an ultrasound yesterday.  Pt is on one pill a day dex and Keppra 2x/day.

## 2024-03-29 NOTE — REASON FOR VISIT
[Home] : at home, [unfilled] , at the time of the visit. [Medical Office: ([facility])___] : at the medical office located in  [Verbal consent obtained from patient] : the patient, [unfilled]

## 2024-03-29 NOTE — RESULTS/DATA
[FreeTextEntry1] : ACC: [id number] EXAM: MR BRAIN WAW IC ORDERED BY: KELECHI NEGRON  PROCEDURE DATE: 02/20/2024    INTERPRETATION: Exam Date: 2/20/2024 1:07 PM  MR brain with and without gadolinium  CLINICAL INFORMATION: Status post craniotomy for prior tumor resection.  TECHNIQUE: Multiplanar imaging of the brain was performed pre- and post-IV contrast. 7.5 cc of Gadavist was administered. 0 cc was discarded.  COMPARISON: MRI brain 12/10/2023 and 12/4/2023  FINDINGS:  Status post right parietal craniotomy for prior resection of tumor. There is stable enhancement surrounding the rim of the surgical cavity within the right high posterior parietal lobe, as seen on the immediate postoperative MRI brain, most compatible with granulation tissue. There is a small amount of increased fluid within the surgical cavity as compared to the prior exam. There is a stable small amount of hemorrhagic blood products within the surgical cavity. No new areas of abnormal enhancement to suggest new metastases. There is extensive diffuse abnormal T2/FLAIR hyperintense signal throughout the periventricular white matter, bilateral basal ganglia and pontine white matter, may reflect posttreatment/post radiation changes and/or chronic microvascular ischemic changes. 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   IMPRESSION:  Status post right parietal craniotomy for prior resection of tumor. There is stable enhancement surrounding the rim of the surgical cavity within the right high posterior parietal lobe, as seen on the immediate postoperative MRI brain, most compatible with granulation tissue. No new areas of abnormal enhancement to suggest new metastases.  Extensive diffuse abnormal T2/FLAIR hyperintense signal throughout the periventricular white matter, bilateral basal ganglia and pontine white matter, may reflect posttreatment/post radiation changes and/or chronic microvascular ischemic changes.

## 2024-04-08 DIAGNOSIS — J40 BRONCHITIS, NOT SPECIFIED AS ACUTE OR CHRONIC: ICD-10-CM

## 2024-04-08 RX ORDER — DOXYCYCLINE HYCLATE 100 MG/1
100 CAPSULE ORAL TWICE DAILY
Qty: 14 | Refills: 0 | Status: ACTIVE | COMMUNITY
Start: 2024-04-08 | End: 1900-01-01

## 2024-04-10 ENCOUNTER — APPOINTMENT (OUTPATIENT)
Dept: FAMILY MEDICINE | Facility: CLINIC | Age: 79
End: 2024-04-10

## 2024-06-21 ENCOUNTER — APPOINTMENT (OUTPATIENT)
Dept: NEUROSURGERY | Facility: CLINIC | Age: 79
End: 2024-06-21

## 2024-06-21 RX ORDER — LEVETIRACETAM 500 MG/1
500 TABLET, FILM COATED ORAL
Qty: 180 | Refills: 2 | Status: ACTIVE | COMMUNITY
Start: 2024-06-21 | End: 1900-01-01

## 2024-06-24 ENCOUNTER — RX RENEWAL (OUTPATIENT)
Age: 79
End: 2024-06-24

## 2024-06-24 RX ORDER — ESCITALOPRAM OXALATE 10 MG/1
10 TABLET ORAL DAILY
Qty: 90 | Refills: 0 | Status: ACTIVE | COMMUNITY
Start: 2018-06-01 | End: 1900-01-01

## 2024-07-05 ENCOUNTER — APPOINTMENT (OUTPATIENT)
Dept: NEUROSURGERY | Facility: CLINIC | Age: 79
End: 2024-07-05

## 2024-08-04 ENCOUNTER — INPATIENT (INPATIENT)
Facility: HOSPITAL | Age: 79
LOS: 4 days | Discharge: HOME | DRG: 682 | End: 2024-08-09
Attending: HOSPITALIST | Admitting: INTERNAL MEDICINE
Payer: MEDICARE

## 2024-08-04 VITALS
SYSTOLIC BLOOD PRESSURE: 122 MMHG | TEMPERATURE: 97 F | RESPIRATION RATE: 17 BRPM | HEART RATE: 95 BPM | OXYGEN SATURATION: 99 % | HEIGHT: 64 IN | DIASTOLIC BLOOD PRESSURE: 75 MMHG | WEIGHT: 160.06 LBS

## 2024-08-04 DIAGNOSIS — R41.82 ALTERED MENTAL STATUS, UNSPECIFIED: ICD-10-CM

## 2024-08-04 DIAGNOSIS — N81.10 CYSTOCELE, UNSPECIFIED: Chronic | ICD-10-CM

## 2024-08-04 LAB
ALBUMIN SERPL ELPH-MCNC: 3.4 G/DL — SIGNIFICANT CHANGE UP (ref 3.3–5)
ALP SERPL-CCNC: 74 U/L — SIGNIFICANT CHANGE UP (ref 40–120)
ALT FLD-CCNC: 13 U/L — SIGNIFICANT CHANGE UP (ref 10–45)
ANION GAP SERPL CALC-SCNC: 11 MMOL/L — SIGNIFICANT CHANGE UP (ref 5–17)
ANION GAP SERPL CALC-SCNC: 9 MMOL/L — SIGNIFICANT CHANGE UP (ref 5–17)
APPEARANCE UR: CLEAR — SIGNIFICANT CHANGE UP
APTT BLD: 25.4 SEC — SIGNIFICANT CHANGE UP (ref 24.5–35.6)
AST SERPL-CCNC: 18 U/L — SIGNIFICANT CHANGE UP (ref 10–40)
BACTERIA # UR AUTO: ABNORMAL /HPF
BASOPHILS # BLD AUTO: 0.04 K/UL — SIGNIFICANT CHANGE UP (ref 0–0.2)
BASOPHILS NFR BLD AUTO: 0.5 % — SIGNIFICANT CHANGE UP (ref 0–2)
BILIRUB SERPL-MCNC: 0.5 MG/DL — SIGNIFICANT CHANGE UP (ref 0.2–1.2)
BILIRUB UR-MCNC: NEGATIVE — SIGNIFICANT CHANGE UP
BUN SERPL-MCNC: 36 MG/DL — HIGH (ref 7–23)
BUN SERPL-MCNC: 42 MG/DL — HIGH (ref 7–23)
CALCIUM SERPL-MCNC: 10.6 MG/DL — HIGH (ref 8.4–10.5)
CALCIUM SERPL-MCNC: 11.4 MG/DL — HIGH (ref 8.4–10.5)
CHLORIDE SERPL-SCNC: 102 MMOL/L — SIGNIFICANT CHANGE UP (ref 96–108)
CHLORIDE SERPL-SCNC: 96 MMOL/L — SIGNIFICANT CHANGE UP (ref 96–108)
CO2 SERPL-SCNC: 27 MMOL/L — SIGNIFICANT CHANGE UP (ref 22–31)
CO2 SERPL-SCNC: 28 MMOL/L — SIGNIFICANT CHANGE UP (ref 22–31)
COLOR SPEC: YELLOW — SIGNIFICANT CHANGE UP
CREAT SERPL-MCNC: 2.38 MG/DL — HIGH (ref 0.5–1.3)
CREAT SERPL-MCNC: 2.75 MG/DL — HIGH (ref 0.5–1.3)
DIFF PNL FLD: NEGATIVE — SIGNIFICANT CHANGE UP
EGFR: 17 ML/MIN/1.73M2 — LOW
EGFR: 20 ML/MIN/1.73M2 — LOW
EOSINOPHIL # BLD AUTO: 0.32 K/UL — SIGNIFICANT CHANGE UP (ref 0–0.5)
EOSINOPHIL NFR BLD AUTO: 4 % — SIGNIFICANT CHANGE UP (ref 0–6)
EPI CELLS # UR: 3 — SIGNIFICANT CHANGE UP
GLUCOSE SERPL-MCNC: 116 MG/DL — HIGH (ref 70–99)
GLUCOSE SERPL-MCNC: 119 MG/DL — HIGH (ref 70–99)
GLUCOSE UR QL: NEGATIVE MG/DL — SIGNIFICANT CHANGE UP
HCT VFR BLD CALC: 34.5 % — SIGNIFICANT CHANGE UP (ref 34.5–45)
HGB BLD-MCNC: 12 G/DL — SIGNIFICANT CHANGE UP (ref 11.5–15.5)
IMM GRANULOCYTES NFR BLD AUTO: 0.4 % — SIGNIFICANT CHANGE UP (ref 0–0.9)
INR BLD: 1.14 RATIO — SIGNIFICANT CHANGE UP (ref 0.85–1.18)
KETONES UR-MCNC: NEGATIVE MG/DL — SIGNIFICANT CHANGE UP
LACTATE SERPL-SCNC: 1.1 MMOL/L — SIGNIFICANT CHANGE UP (ref 0.7–2)
LEUKOCYTE ESTERASE UR-ACNC: ABNORMAL
LYMPHOCYTES # BLD AUTO: 2.07 K/UL — SIGNIFICANT CHANGE UP (ref 1–3.3)
LYMPHOCYTES # BLD AUTO: 26 % — SIGNIFICANT CHANGE UP (ref 13–44)
MAGNESIUM SERPL-MCNC: 1.4 MG/DL — LOW (ref 1.6–2.6)
MCHC RBC-ENTMCNC: 29.6 PG — SIGNIFICANT CHANGE UP (ref 27–34)
MCHC RBC-ENTMCNC: 34.8 GM/DL — SIGNIFICANT CHANGE UP (ref 32–36)
MCV RBC AUTO: 85 FL — SIGNIFICANT CHANGE UP (ref 80–100)
MONOCYTES # BLD AUTO: 0.93 K/UL — HIGH (ref 0–0.9)
MONOCYTES NFR BLD AUTO: 11.7 % — SIGNIFICANT CHANGE UP (ref 2–14)
NEUTROPHILS # BLD AUTO: 4.56 K/UL — SIGNIFICANT CHANGE UP (ref 1.8–7.4)
NEUTROPHILS NFR BLD AUTO: 57.4 % — SIGNIFICANT CHANGE UP (ref 43–77)
NITRITE UR-MCNC: NEGATIVE — SIGNIFICANT CHANGE UP
NRBC # BLD: 0 /100 WBCS — SIGNIFICANT CHANGE UP (ref 0–0)
PH UR: 5.5 — SIGNIFICANT CHANGE UP (ref 5–8)
PLATELET # BLD AUTO: 252 K/UL — SIGNIFICANT CHANGE UP (ref 150–400)
POTASSIUM SERPL-MCNC: 2.6 MMOL/L — CRITICAL LOW (ref 3.5–5.3)
POTASSIUM SERPL-MCNC: 3.1 MMOL/L — LOW (ref 3.5–5.3)
POTASSIUM SERPL-SCNC: 2.6 MMOL/L — CRITICAL LOW (ref 3.5–5.3)
POTASSIUM SERPL-SCNC: 3.1 MMOL/L — LOW (ref 3.5–5.3)
PROT SERPL-MCNC: 7.4 G/DL — SIGNIFICANT CHANGE UP (ref 6–8.3)
PROT UR-MCNC: NEGATIVE MG/DL — SIGNIFICANT CHANGE UP
PROTHROM AB SERPL-ACNC: 13.3 SEC — HIGH (ref 9.5–13)
RBC # BLD: 4.06 M/UL — SIGNIFICANT CHANGE UP (ref 3.8–5.2)
RBC # FLD: 12.7 % — SIGNIFICANT CHANGE UP (ref 10.3–14.5)
RBC CASTS # UR COMP ASSIST: 0 /HPF — SIGNIFICANT CHANGE UP (ref 0–4)
SARS-COV-2 RNA SPEC QL NAA+PROBE: SIGNIFICANT CHANGE UP
SODIUM SERPL-SCNC: 135 MMOL/L — SIGNIFICANT CHANGE UP (ref 135–145)
SODIUM SERPL-SCNC: 138 MMOL/L — SIGNIFICANT CHANGE UP (ref 135–145)
SP GR SPEC: 1.01 — SIGNIFICANT CHANGE UP (ref 1–1.03)
UROBILINOGEN FLD QL: 0.2 MG/DL — SIGNIFICANT CHANGE UP (ref 0.2–1)
WBC # BLD: 7.95 K/UL — SIGNIFICANT CHANGE UP (ref 3.8–10.5)
WBC # FLD AUTO: 7.95 K/UL — SIGNIFICANT CHANGE UP (ref 3.8–10.5)
WBC UR QL: 2 /HPF — SIGNIFICANT CHANGE UP (ref 0–5)

## 2024-08-04 PROCEDURE — 99223 1ST HOSP IP/OBS HIGH 75: CPT | Mod: GC

## 2024-08-04 PROCEDURE — 71045 X-RAY EXAM CHEST 1 VIEW: CPT | Mod: 26

## 2024-08-04 PROCEDURE — 99285 EMERGENCY DEPT VISIT HI MDM: CPT

## 2024-08-04 PROCEDURE — 70450 CT HEAD/BRAIN W/O DYE: CPT | Mod: 26,MC

## 2024-08-04 PROCEDURE — 93010 ELECTROCARDIOGRAM REPORT: CPT

## 2024-08-04 RX ORDER — MAGNESIUM OXIDE 253 MG/1
400 TABLET ORAL
Refills: 0 | Status: DISCONTINUED | OUTPATIENT
Start: 2024-08-04 | End: 2024-08-09

## 2024-08-04 RX ORDER — MAGNESIUM SULFATE 500 MG/ML
1 VIAL (ML) INJECTION
Refills: 0 | Status: DISCONTINUED | OUTPATIENT
Start: 2024-08-04 | End: 2024-08-04

## 2024-08-04 RX ORDER — POTASSIUM CHLORIDE 1500 MG/1
10 TABLET, EXTENDED RELEASE ORAL ONCE
Refills: 0 | Status: COMPLETED | OUTPATIENT
Start: 2024-08-04 | End: 2024-08-04

## 2024-08-04 RX ORDER — MAGNESIUM, ALUMINUM HYDROXIDE 200-225/5
30 SUSPENSION, ORAL (FINAL DOSE FORM) ORAL EVERY 4 HOURS
Refills: 0 | Status: DISCONTINUED | OUTPATIENT
Start: 2024-08-04 | End: 2024-08-09

## 2024-08-04 RX ORDER — ACETAMINOPHEN 500 MG
650 TABLET ORAL ONCE
Refills: 0 | Status: COMPLETED | OUTPATIENT
Start: 2024-08-04 | End: 2024-08-04

## 2024-08-04 RX ORDER — MELATONIN 3 MG
3 TABLET ORAL AT BEDTIME
Refills: 0 | Status: DISCONTINUED | OUTPATIENT
Start: 2024-08-04 | End: 2024-08-09

## 2024-08-04 RX ORDER — ONDANSETRON HCL/PF 4 MG/2 ML
4 VIAL (ML) INJECTION EVERY 8 HOURS
Refills: 0 | Status: DISCONTINUED | OUTPATIENT
Start: 2024-08-04 | End: 2024-08-09

## 2024-08-04 RX ORDER — MAGNESIUM GLUCONATE 27 MG(500)
1000 TABLET ORAL ONCE
Refills: 0 | Status: DISCONTINUED | OUTPATIENT
Start: 2024-08-04 | End: 2024-08-04

## 2024-08-04 RX ORDER — POTASSIUM CHLORIDE 1500 MG/1
40 TABLET, EXTENDED RELEASE ORAL ONCE
Refills: 0 | Status: COMPLETED | OUTPATIENT
Start: 2024-08-04 | End: 2024-08-04

## 2024-08-04 RX ORDER — ACETAMINOPHEN 500 MG
650 TABLET ORAL EVERY 6 HOURS
Refills: 0 | Status: DISCONTINUED | OUTPATIENT
Start: 2024-08-04 | End: 2024-08-09

## 2024-08-04 RX ORDER — BACTERIOSTATIC SODIUM CHLORIDE 0.9 %
2300 VIAL (ML) INJECTION ONCE
Refills: 0 | Status: COMPLETED | OUTPATIENT
Start: 2024-08-04 | End: 2024-08-04

## 2024-08-04 RX ORDER — LEVETIRACETAM 1000 MG/1
1 TABLET, FILM COATED ORAL
Refills: 0 | DISCHARGE

## 2024-08-04 RX ADMIN — MAGNESIUM OXIDE 400 MILLIGRAM(S): 253 TABLET ORAL at 22:10

## 2024-08-04 RX ADMIN — POTASSIUM CHLORIDE 100 MILLIEQUIVALENT(S): 1500 TABLET, EXTENDED RELEASE ORAL at 17:32

## 2024-08-04 RX ADMIN — Medication 2300 MILLILITER(S): at 16:49

## 2024-08-04 RX ADMIN — POTASSIUM CHLORIDE 10 MILLIEQUIVALENT(S): 1500 TABLET, EXTENDED RELEASE ORAL at 19:28

## 2024-08-04 RX ADMIN — Medication 650 MILLIGRAM(S): at 16:44

## 2024-08-04 RX ADMIN — Medication 650 MILLIGRAM(S): at 17:14

## 2024-08-04 RX ADMIN — POTASSIUM CHLORIDE 100 MILLIEQUIVALENT(S): 1500 TABLET, EXTENDED RELEASE ORAL at 18:28

## 2024-08-04 RX ADMIN — POTASSIUM CHLORIDE 10 MILLIEQUIVALENT(S): 1500 TABLET, EXTENDED RELEASE ORAL at 18:32

## 2024-08-04 RX ADMIN — POTASSIUM CHLORIDE 40 MILLIEQUIVALENT(S): 1500 TABLET, EXTENDED RELEASE ORAL at 16:44

## 2024-08-04 RX ADMIN — Medication 2300 MILLILITER(S): at 15:49

## 2024-08-04 NOTE — ED PROVIDER NOTE - PHYSICAL EXAMINATION
Gen:  alert, awake, no acute distress  Head:  atraumatic, normocephalic  HEENT: PERRLA, EOMI, normal nose, normal oropharynx, no tonsillar edema, erythema, or exudate  CV:  rrr, nl S1, S2, no m/r/g  Pulm:  lungs CTA b/l  Abd: s/nt/nd, +BS  MSK:  moving all extremities, no back midline ttp, no stepoffs, no cva TTP  Neuro:  grossly intact, no focal deficits  Skin:  clear, dry, intact, warm  Psych:  awake and alert oriented to self and place and year, states month is July, normal affect, no apparent risk to self or others

## 2024-08-04 NOTE — ED ADULT TRIAGE NOTE - CHIEF COMPLAINT QUOTE
Patient brought to ED bty friend for increased confusion x 1 week, was diagnosed with UTI 1 week prior. Alert and oriented to person, place and situation. Confused to time.

## 2024-08-04 NOTE — H&P ADULT - HISTORY OF PRESENT ILLNESS
· HPI Objective Statement: With history of COPD brain mass status postresection December 2023,  history of lung cancer smoker presents with 1 week history of generalized weakness fatigue and increased confusion.   No significant change today. Patient's friend providing part of the history states patient unsteady on her feet and walked into a wall today and almost fell.  Patient was seen earlier this week and was diagnosed with a UTI and started on Cipro.  Patient denies any complaints except abdominal pain.  No fevers vomiting chest pain shortness of breath or back pain.   As per friend patient is usually A&O x 3.   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 History limited to altered mental status. Reports recent fall injury. States that she hurt herself by running into a wall. Prior to admission, pt states that she tripped over her cat obtained scratches and bites. She states that she was unable to get up from the floor after her fall. She called her boyfriend for help, which prompted her admission to the ED. Of note, pt reports recent diagnosis of UTI a week ago, started on cipro. Additional symptoms include headaches, cough, pain in LEs, nausea, vomiting and low belly pain. Has chronic gait instability and uses rollator to ambulate. Denies recent use of steroids. Denies dysuria, urinary frequency, urinary urgency. States that she recent received IV contrast for imaging studies including PET/CT and MRI. Most recent MRI with IV contrast on 2/2024, however pt reports being followed at [facility].      In the ED, VSS. Labs significant for K: 2.6, Crt: 2.75, Calcium of 11.4, Mg 1.4, . UA+ moderate bacteria. 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 Blood cultures x2 and urine culture collected. Given 2.3L IVFs, potassium repletion and IV levaquin. Admitted for further evaluation.

## 2024-08-04 NOTE — H&P ADULT - NSHPREVIEWOFSYSTEMS_GEN_ALL_CORE
REVIEW OF SYSTEMS:  CONSTITUTIONAL: +weakness, No fevers or chills  EYES/ENT: No visual changes;  No vertigo or throat pain   NECK: No pain or stiffness  RESPIRATORY: +cough, No wheezing, hemoptysis; No shortness of breath  CARDIOVASCULAR: No chest pain or palpitations  GASTROINTESTINAL: No abdominal or epigastric pain. No nausea, vomiting, or hematemesis; No diarrhea or constipation. No melena or hematochezia.  GENITOURINARY: No dysuria, frequency or hematuria  NEUROLOGICAL: No numbness or weakness

## 2024-08-04 NOTE — H&P ADULT - ATTENDING COMMENTS
I have personally seen and examined patient on the above date.  I discussed the case with Dr Saravia and I agree with findings and plan as detailed per note above, which I have amended where appropriate.    Superseding the above.   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 Pt is poor historian and tangential but related recent dx of UTI and treated with cipro and had several episodes of vomiting this past week but was able to tolerate diet. Denied any NSAID use. Related had multiple studies recently with CT/MRI and PET scans with IV contrast. Denied any fevers, chills, cough, SOB, CP, abd pain or dysuria. Related had several falls sec to gait disturbance and 'clumsy' Endorses some lower abdominal sxs and some bl flank discomfort for the past week.   In ED afebrile P: 95 BP: 122/75 sat 99% on RA   PE;   NAD AOx 3 pleasant and conversant but tangential  CV:S1S2, RRR, no mgr  CL: CTA bl   Abd; soft NT, ND +BS  Ext:   labs:   WBC: 7.95 hgb: 12 57%N lact; 1.1 Na; 135 K: 2.6 M.4 BUN/cr: 42/2.75 Ca; 11.9 alb: 3.4 cr; 1.17 2023 UA trace LE neg wbc +mod bacteria.   EKG: personally rev. NSR at 91bpm no acute ST changes.   CXR: personally rev. wet read. NAPD  CT head:   IMPRESSION:    1.  No significant change, without acute intracranial hemorrhage.  2.  Postsurgical and treatment related changes, with mild chronic blood   products.  3.  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   CT head was neg for any acute neuro event.   Repeat BMP after 2.3L of IVFs with near normalization of Ca and improving cr.   Continue IVFs   check PVRs.  check renal us or CTAP to exclude obstructive uropathy.  renal consult  avoid nephrotoxins.   complete antibx course for UTI - renal dose quinolone.   replete K and Mag.   Hold Triam/HCTZ  cont keppra and lexapro.   was recently tapered off decadron, check serum cortisol to be complete.   hypercalcemia - check PTH, PTHrp, Po4, TSH, SPEP, SIF. vit D,  cont IVFs    [facility] rev.   D/W DR Tubbs

## 2024-08-04 NOTE — PATIENT PROFILE ADULT - FALL HARM RISK - HARM RISK INTERVENTIONS

## 2024-08-04 NOTE — ED PROVIDER NOTE - CARE PLAN
Principal Discharge DX:	Altered mental status  Secondary Diagnosis:	TEREZA (acute kidney injury)   1

## 2024-08-04 NOTE — ED PROVIDER NOTE - CLINICAL SUMMARY MEDICAL DECISION MAKING FREE TEXT BOX
With history of COPD brain mass status postresection December 2023,  history of lung cancer smoker presents with 1 week history of generalized weakness fatigue and increased confusion.  no significant change today.  Patient's friend providing part of the history states patient unsteady on her feet and walked into a wall today and almost fell.  Patient was seen earlier this week and was diagnosed with a UTI and started on Cipro.  Patient denies any complaints except abdominal pain.  No fevers vomiting chest pain shortness of breath or back pain.     with 1 week history confusion and abnormal gait, concern for cerebral versus metabolic cause  CT brain chest x-ray UA and culture CBC CMP blood cultures IV fluids, patient on Cipro for  UTI will give IV antibiotics, reassess With history of COPD brain mass status postresection December 2023,  history of lung cancer smoker presents with 1 week history of generalized weakness fatigue and increased confusion.  no significant change today.  Patient's friend providing part of the history states patient unsteady on her feet and walked into a wall today and almost fell.  Patient was seen earlier this week and was diagnosed with a UTI and started on Cipro.  Patient denies any complaints except abdominal pain.  No fevers vomiting chest pain shortness of breath or back pain.     with 1 week history confusion and abnormal gait, concern for cerebral versus metabolic cause  CT brain chest x-ray UA and culture CBC CMP blood cultures IV fluids, patient on Cipro for  UTI will give IV antibiotics, reassess    pt with ams, bloodwok unremarkable, suspect partially treated uti causing symptoms pt with mihaela, fluids given, abx given, ct negative  will admit, d/w dr leroy mercado

## 2024-08-04 NOTE — ED ADULT NURSE NOTE - OBJECTIVE STATEMENT
Pt presents to the ER complaining of increased confusion as per her friend. Pt states she was treated at [place] and was diagnosed with a UTI a week ago. A&OX4. Pt denies SOB, chest pain, fever, nausea, vomiting, dizziness or headache.

## 2024-08-04 NOTE — ED PROVIDER NOTE - ADMIT DISPOSITION PRESENT ON ADMISSION SEPSIS
Order placed for CT Chest Lung Screening will  soon. If this test is still needed, please extend the expiration date on the order and we will call to get it rescheduled. Due to the CT being a screening exam, we are scheduling 6/15/20 or after due to COVID. So, please extend the order past this date.  Thanks     Yes

## 2024-08-04 NOTE — H&P ADULT - NSHPPHYSICALEXAM_GEN_ALL_CORE
GENERAL: NAD, lying in bed comfortably  HEAD:  Atraumatic, Normocephalic  EYES: EOMI, conjunctiva and sclera clear  ENT: Moist mucous membranes  NECK: Supple  CHEST/LUNG: Clear to auscultation bilaterally; No rales, rhonchi, wheezing, or rubs. Unlabored respirations  HEART: Regular rate and rhythm; No murmurs, rubs, or gallops  ABDOMEN: BSx4; Soft, +TTP of suprapubic region, nondistended  EXTREMITIES:  2+ Peripheral Pulses, No edema  NERVOUS SYSTEM:  A&Ox3

## 2024-08-04 NOTE — ED PROVIDER NOTE - OBJECTIVE STATEMENT
With history of COPD brain mass status postresection December 2023,  history of lung cancer smoker presents with 1 week history of generalized weakness fatigue and increased confusion.   No significant change today. Patient's friend providing part of the history states patient unsteady on her feet and walked into a wall today and almost fell.  Patient was seen earlier this week and was diagnosed with a UTI and started on Cipro.  Patient denies any complaints except abdominal pain.  No fevers vomiting chest pain shortness of breath or back pain.   As per friend patient is usually A&O x 3.

## 2024-08-04 NOTE — ED ADULT TRIAGE NOTE - PRO INTERPRETER NEED 2
Pt called and spoke to pt daughter Aysha and informed her regarding message below.  Per Aysha the medication have already been picked up.     English

## 2024-08-04 NOTE — H&P ADULT - ASSESSMENT
#Encephalopathy       Problem: Hypertension.   ·  Plan: BP above goal  - hold home Triamterene-Hydrochlorothiazide 37.5-25 mg daily given had significant TEREZA on CKD earlier this admission - continue to hold on discharge  - was going to start amlodipine, but she states she had significant LE swelling when previously trial'ed on amlodipine, so will defer  - c/w nifedipine XL 30mg daily on d/c  - monitor vitals.    Problem/Plan - 5:  ·  Problem: Acute kidney injury superimposed on CKD.   ·  Plan: baseline Cr 1.7 in 2019  - renal US demonstrating parenchymal disease  - Cr improving now better than reported previous baseline  - continue to hold triamterene-HCTZ  - Nephrology consult recs appreciated  - renally dose meds to GFR, avoid nephrotoxic agents.    Problem/Plan - 6:  ·  Problem: History of COPD.   ·  Plan: not in acute exacerbation.   - c/w Budesonide 80 mcg/Formoterol 4.5 mcg 2 puffs BID  - c/w albuterol PRN.    Problem/Plan - 7:  ·  Problem: Prophylactic measure.   ·  Plan: DVT ppx: hep subc  last BM 12/14. continue bowel regimen     79 year old female with a PMH of COPD, 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 History limited to altered mental status. Reports recent fall injury. States that she hurt herself by running into a wall. Prior to admission, pt states that she tripped over her cat obtained scratches and bites. She states that she was unable to get up from the floor after her fall. She called her boyfriend for help, which prompted her admission to the ED. Of note, pt reports recent diagnosis of UTI a week ago, started on cipro. Additional symptoms include headaches, cough, pain in LEs, nausea, vomiting and low belly pain. Has chronic gait instability and uses rollator to ambulate. Denies recent use of steroids. Denies dysuria, urinary frequency, urinary urgency. States that she recent received IV contrast for imaging studies including PET/CT and MRI. Most recent MRI with IV contrast on 2/2024, however pt reports being followed at [facility].      In the ED, VSS. Labs significant for K: 2.6, Crt: 2.75, Calcium of 11.4, Mg 1.4, . UA+ moderate bacteria. 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 Blood cultures x2 and urine culture collected. Given 2.3L IVFs, potassium repletion and IV levaquin. Admitted for further evaluation.     #Encephalopathy       Problem: Hypertension.   ·  Plan: BP above goal  - hold home Triamterene-Hydrochlorothiazide 37.5-25 mg daily given had significant TEREZA on CKD earlier this admission - continue to hold on discharge  - was going to start amlodipine, but she states she had significant LE swelling when previously trial'ed on amlodipine, so will defer  - c/w nifedipine XL 30mg daily on d/c  - monitor vitals.    Problem/Plan - 5:  ·  Problem: Acute kidney injury superimposed on CKD.   ·  Plan: baseline Cr 1.7 in 2019  - renal US demonstrating parenchymal disease  - Cr improving now better than reported previous baseline  - continue to hold triamterene-HCTZ  - Nephrology consult recs appreciated  - renally dose meds to GFR, avoid nephrotoxic agents.    Problem/Plan - 6:  ·  Problem: History of COPD.   ·  Plan: not in acute exacerbation.   - c/w Budesonide 80 mcg/Formoterol 4.5 mcg 2 puffs BID  - c/w albuterol PRN.    Problem/Plan - 7:  ·  Problem: Prophylactic measure.   ·  Plan: DVT ppx: hep subc  last BM 12/14. continue bowel regimen     79 year old female with a PMH of COPD, 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 History limited to altered mental status. Admitted for further evaluation.     #Encephalopathy   -Admit to Medicine  -likely 2/2 contrast nephropathy vs. medication induced vs. UTI vs. hypercalemia  -Pt reports recent use of contrast for imaging studies  -Crt: 2.75, baseline WNL   -Ca: 11.4, hold home Triamterene-HCTZ  -s/p 2.3L IVF bolus in the ED  -CTH: negative for acute ICH; post surgical and treatment changes on imaging; Stable chronic infarcts in R cerebellum.  -UA + moderate bacteria, Urine culture pending; Blood cultures x2   -on course of ciprofloxacin. c/w Levaquin  -Nephrology consult  -f/u PTH, PTHrp, Vit D, SIP, SPEP, TSH, phosphate  -f/u cortisol level in AM  -bladder scans Q6H  -Fall precautions    #TEREZA on superimposed CKD stage likely 2/2 contrast induced nephropathy  -Crt: 2.75, baseline WNL   -Renal US in AM  -Avoid nephrotoxic agents  -f/u AM labs  -c/w IVFs    #Electrolyte imbalance  #Hypercalcemia  #Hypomagnesemia   #Hypokalemia  -K: 2.6 and Mg 1.4  -Potassium and Magnesium repletion given  -s/p 2.3 L IVFs   -f/u  hypercalcemia w/u: PTH, PTHrp, Vit D, SIP, SPEP, TSH, phosphate  -hold home triamterene -HCTZ  -PRN PO home dose of potassium     #HTN  -hold home triamterene-HCTZ  -c/w nifedipine 30 XL    #COPD  - c/w Budesonide 80 mcg/Formoterol 4.5 mcg 2 puffs BID  - c/w albuterol PRN    #Seizures  #Hx of R parietal/temporal brain mass s/p resection in R parietal-occipital region  -c/w Keppra 500 BID    #Lung Cancer  #Smoking history  -30-40 ppy  -c/w nicotine patch    #Hx of DVT  #DVT ppx  -SQ Heparin    #FULL CODE

## 2024-08-04 NOTE — ED PROVIDER NOTE - NS ED ROS FT
Except as otherwise indicated in HPI:  CONSTITUTIONAL: Neg  HEENT: neg  CV: neg  Resp: neg  GI: Neg  : Neg  MSK: Neg  SKIN: Neg  NEURO: confusion  PSYCHIATRIC: Neg  Heme/Onc: Neg

## 2024-08-05 LAB
A1C WITH ESTIMATED AVERAGE GLUCOSE RESULT: 5.5 % — SIGNIFICANT CHANGE UP (ref 4–5.6)
ALBUMIN SERPL ELPH-MCNC: 2.8 G/DL — LOW (ref 3.3–5)
ALP SERPL-CCNC: 72 U/L — SIGNIFICANT CHANGE UP (ref 40–120)
ALT FLD-CCNC: 13 U/L — SIGNIFICANT CHANGE UP (ref 10–45)
ANION GAP SERPL CALC-SCNC: 8 MMOL/L — SIGNIFICANT CHANGE UP (ref 5–17)
AST SERPL-CCNC: 15 U/L — SIGNIFICANT CHANGE UP (ref 10–40)
BASOPHILS # BLD AUTO: 0.05 K/UL — SIGNIFICANT CHANGE UP (ref 0–0.2)
BASOPHILS NFR BLD AUTO: 0.8 % — SIGNIFICANT CHANGE UP (ref 0–2)
BILIRUB SERPL-MCNC: 0.3 MG/DL — SIGNIFICANT CHANGE UP (ref 0.2–1.2)
BUN SERPL-MCNC: 35 MG/DL — HIGH (ref 7–23)
CALCIUM SERPL-MCNC: 10.2 MG/DL — SIGNIFICANT CHANGE UP (ref 8.4–10.5)
CHLORIDE SERPL-SCNC: 104 MMOL/L — SIGNIFICANT CHANGE UP (ref 96–108)
CHOLEST SERPL-MCNC: 134 MG/DL — SIGNIFICANT CHANGE UP
CO2 SERPL-SCNC: 27 MMOL/L — SIGNIFICANT CHANGE UP (ref 22–31)
CORTIS AM PEAK SERPL-MCNC: 7.6 UG/DL — SIGNIFICANT CHANGE UP (ref 6–18.4)
CREAT SERPL-MCNC: 2.25 MG/DL — HIGH (ref 0.5–1.3)
EGFR: 22 ML/MIN/1.73M2 — LOW
EOSINOPHIL # BLD AUTO: 0.28 K/UL — SIGNIFICANT CHANGE UP (ref 0–0.5)
EOSINOPHIL NFR BLD AUTO: 4.3 % — SIGNIFICANT CHANGE UP (ref 0–6)
ESTIMATED AVERAGE GLUCOSE: 111 MG/DL — SIGNIFICANT CHANGE UP (ref 68–114)
GLUCOSE SERPL-MCNC: 77 MG/DL — SIGNIFICANT CHANGE UP (ref 70–99)
HCT VFR BLD CALC: 31.6 % — LOW (ref 34.5–45)
HDLC SERPL-MCNC: 37 MG/DL — LOW
HGB BLD-MCNC: 10.8 G/DL — LOW (ref 11.5–15.5)
IMM GRANULOCYTES NFR BLD AUTO: 0.3 % — SIGNIFICANT CHANGE UP (ref 0–0.9)
LIPID PNL WITH DIRECT LDL SERPL: 73 MG/DL — SIGNIFICANT CHANGE UP
LYMPHOCYTES # BLD AUTO: 1.88 K/UL — SIGNIFICANT CHANGE UP (ref 1–3.3)
LYMPHOCYTES # BLD AUTO: 28.8 % — SIGNIFICANT CHANGE UP (ref 13–44)
MCHC RBC-ENTMCNC: 29.6 PG — SIGNIFICANT CHANGE UP (ref 27–34)
MCHC RBC-ENTMCNC: 34.2 GM/DL — SIGNIFICANT CHANGE UP (ref 32–36)
MCV RBC AUTO: 86.6 FL — SIGNIFICANT CHANGE UP (ref 80–100)
MONOCYTES # BLD AUTO: 0.91 K/UL — HIGH (ref 0–0.9)
MONOCYTES NFR BLD AUTO: 14 % — SIGNIFICANT CHANGE UP (ref 2–14)
NEUTROPHILS # BLD AUTO: 3.38 K/UL — SIGNIFICANT CHANGE UP (ref 1.8–7.4)
NEUTROPHILS NFR BLD AUTO: 51.8 % — SIGNIFICANT CHANGE UP (ref 43–77)
NON HDL CHOLESTEROL: 97 MG/DL — SIGNIFICANT CHANGE UP
NRBC # BLD: 0 /100 WBCS — SIGNIFICANT CHANGE UP (ref 0–0)
PLATELET # BLD AUTO: 246 K/UL — SIGNIFICANT CHANGE UP (ref 150–400)
POTASSIUM SERPL-MCNC: 3.2 MMOL/L — LOW (ref 3.5–5.3)
POTASSIUM SERPL-SCNC: 3.2 MMOL/L — LOW (ref 3.5–5.3)
PROT SERPL-MCNC: 6.5 G/DL — SIGNIFICANT CHANGE UP (ref 6–8.3)
RBC # BLD: 3.65 M/UL — LOW (ref 3.8–5.2)
RBC # FLD: 13 % — SIGNIFICANT CHANGE UP (ref 10.3–14.5)
SODIUM SERPL-SCNC: 139 MMOL/L — SIGNIFICANT CHANGE UP (ref 135–145)
TRIGL SERPL-MCNC: 134 MG/DL — SIGNIFICANT CHANGE UP
WBC # BLD: 6.52 K/UL — SIGNIFICANT CHANGE UP (ref 3.8–10.5)
WBC # FLD AUTO: 6.52 K/UL — SIGNIFICANT CHANGE UP (ref 3.8–10.5)

## 2024-08-05 PROCEDURE — 93970 EXTREMITY STUDY: CPT | Mod: 26

## 2024-08-05 PROCEDURE — 99233 SBSQ HOSP IP/OBS HIGH 50: CPT

## 2024-08-05 PROCEDURE — 76770 US EXAM ABDO BACK WALL COMP: CPT | Mod: 26

## 2024-08-05 RX ORDER — BUDESONIDE AND FORMOTEROL FUMARATE DIHYDRATE 80; 4.5 UG/1; UG/1
2 AEROSOL RESPIRATORY (INHALATION)
Refills: 0 | Status: DISCONTINUED | OUTPATIENT
Start: 2024-08-05 | End: 2024-08-09

## 2024-08-05 RX ORDER — BACTERIOSTATIC SODIUM CHLORIDE 0.9 %
1000 VIAL (ML) INJECTION
Refills: 0 | Status: DISCONTINUED | OUTPATIENT
Start: 2024-08-05 | End: 2024-08-05

## 2024-08-05 RX ORDER — POTASSIUM CHLORIDE 1500 MG/1
40 TABLET, EXTENDED RELEASE ORAL EVERY 4 HOURS
Refills: 0 | Status: COMPLETED | OUTPATIENT
Start: 2024-08-05 | End: 2024-08-05

## 2024-08-05 RX ORDER — SENNOSIDES 8.6 MG/1
2 TABLET ORAL AT BEDTIME
Refills: 0 | Status: DISCONTINUED | OUTPATIENT
Start: 2024-08-05 | End: 2024-08-09

## 2024-08-05 RX ORDER — LEVETIRACETAM 1000 MG/1
500 TABLET, FILM COATED ORAL ONCE
Refills: 0 | Status: COMPLETED | OUTPATIENT
Start: 2024-08-05 | End: 2024-08-05

## 2024-08-05 RX ORDER — ESCITALOPRAM OXALATE 20 MG
10 TABLET ORAL DAILY
Refills: 0 | Status: DISCONTINUED | OUTPATIENT
Start: 2024-08-05 | End: 2024-08-09

## 2024-08-05 RX ORDER — ALBUTEROL 90 MCG
2 AEROSOL REFILL (GRAM) INHALATION EVERY 6 HOURS
Refills: 0 | Status: DISCONTINUED | OUTPATIENT
Start: 2024-08-05 | End: 2024-08-09

## 2024-08-05 RX ORDER — HEPARIN SODIUM 1000 [USP'U]/ML
5000 INJECTION, SOLUTION INTRAVENOUS; SUBCUTANEOUS EVERY 12 HOURS
Refills: 0 | Status: DISCONTINUED | OUTPATIENT
Start: 2024-08-05 | End: 2024-08-09

## 2024-08-05 RX ORDER — TRAMADOL HCL 50 MG
50 TABLET ORAL EVERY 8 HOURS
Refills: 0 | Status: DISCONTINUED | OUTPATIENT
Start: 2024-08-05 | End: 2024-08-09

## 2024-08-05 RX ORDER — POTASSIUM CHLORIDE 1500 MG/1
40 TABLET, EXTENDED RELEASE ORAL ONCE
Refills: 0 | Status: COMPLETED | OUTPATIENT
Start: 2024-08-05 | End: 2024-08-05

## 2024-08-05 RX ORDER — VIBEGRON 75 MG/1
1 TABLET, FILM COATED ORAL
Refills: 0 | DISCHARGE

## 2024-08-05 RX ORDER — BACTERIOSTATIC SODIUM CHLORIDE 0.9 %
1000 VIAL (ML) INJECTION
Refills: 0 | Status: DISCONTINUED | OUTPATIENT
Start: 2024-08-05 | End: 2024-08-09

## 2024-08-05 RX ORDER — NICOTINE 21 MG/24HR
1 PATCH, TRANSDERMAL 24 HOURS TRANSDERMAL DAILY
Refills: 0 | Status: DISCONTINUED | OUTPATIENT
Start: 2024-08-05 | End: 2024-08-09

## 2024-08-05 RX ORDER — LORATADINE 10 MG
17 TABLET,DISINTEGRATING ORAL
Refills: 0 | Status: DISCONTINUED | OUTPATIENT
Start: 2024-08-05 | End: 2024-08-09

## 2024-08-05 RX ORDER — LEVETIRACETAM 1000 MG/1
500 TABLET, FILM COATED ORAL
Refills: 0 | Status: DISCONTINUED | OUTPATIENT
Start: 2024-08-05 | End: 2024-08-09

## 2024-08-05 RX ORDER — TRAMADOL HCL 50 MG
50 TABLET ORAL EVERY 8 HOURS
Refills: 0 | Status: DISCONTINUED | OUTPATIENT
Start: 2024-08-05 | End: 2024-08-05

## 2024-08-05 RX ADMIN — Medication 80 MILLILITER(S): at 05:07

## 2024-08-05 RX ADMIN — Medication 50 MILLIGRAM(S): at 09:35

## 2024-08-05 RX ADMIN — Medication 80 MILLILITER(S): at 16:03

## 2024-08-05 RX ADMIN — Medication 17 GRAM(S): at 05:07

## 2024-08-05 RX ADMIN — LEVETIRACETAM 500 MILLIGRAM(S): 1000 TABLET, FILM COATED ORAL at 01:04

## 2024-08-05 RX ADMIN — LEVETIRACETAM 500 MILLIGRAM(S): 1000 TABLET, FILM COATED ORAL at 05:07

## 2024-08-05 RX ADMIN — POTASSIUM CHLORIDE 40 MILLIEQUIVALENT(S): 1500 TABLET, EXTENDED RELEASE ORAL at 17:44

## 2024-08-05 RX ADMIN — BUDESONIDE AND FORMOTEROL FUMARATE DIHYDRATE 2 PUFF(S): 80; 4.5 AEROSOL RESPIRATORY (INHALATION) at 20:46

## 2024-08-05 RX ADMIN — Medication 80 MILLILITER(S): at 04:56

## 2024-08-05 RX ADMIN — Medication 10 MILLIGRAM(S): at 13:19

## 2024-08-05 RX ADMIN — MAGNESIUM OXIDE 400 MILLIGRAM(S): 253 TABLET ORAL at 17:44

## 2024-08-05 RX ADMIN — POTASSIUM CHLORIDE 40 MILLIEQUIVALENT(S): 1500 TABLET, EXTENDED RELEASE ORAL at 04:52

## 2024-08-05 RX ADMIN — Medication 17 GRAM(S): at 18:35

## 2024-08-05 RX ADMIN — POTASSIUM CHLORIDE 40 MILLIEQUIVALENT(S): 1500 TABLET, EXTENDED RELEASE ORAL at 11:09

## 2024-08-05 RX ADMIN — LEVETIRACETAM 500 MILLIGRAM(S): 1000 TABLET, FILM COATED ORAL at 18:35

## 2024-08-05 RX ADMIN — BUDESONIDE AND FORMOTEROL FUMARATE DIHYDRATE 2 PUFF(S): 80; 4.5 AEROSOL RESPIRATORY (INHALATION) at 09:15

## 2024-08-05 RX ADMIN — Medication 1 PATCH: at 11:11

## 2024-08-05 RX ADMIN — MAGNESIUM OXIDE 400 MILLIGRAM(S): 253 TABLET ORAL at 13:19

## 2024-08-05 RX ADMIN — HEPARIN SODIUM 5000 UNIT(S): 1000 INJECTION, SOLUTION INTRAVENOUS; SUBCUTANEOUS at 05:06

## 2024-08-05 RX ADMIN — Medication 1 PATCH: at 19:08

## 2024-08-05 RX ADMIN — POTASSIUM CHLORIDE 40 MILLIEQUIVALENT(S): 1500 TABLET, EXTENDED RELEASE ORAL at 13:19

## 2024-08-05 RX ADMIN — HEPARIN SODIUM 5000 UNIT(S): 1000 INJECTION, SOLUTION INTRAVENOUS; SUBCUTANEOUS at 18:35

## 2024-08-05 NOTE — PROGRESS NOTE ADULT - ASSESSMENT
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 History limited to altered mental status. Admitted for further evaluation.     #Metabolic Encephalopathy   -Resolved   -Unclear etiology. 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

## 2024-08-05 NOTE — CONSULT NOTE ADULT - SUBJECTIVE AND OBJECTIVE BOX
NEPHROLOGY CONSULTATION    CHIEF COMPLAINT:  TEREZA    HPI:  Admitted with 1 week of weakness, confusion, unsteady gait.  She had been put on cipro for a UTI.  Labs reflect a moderate TEREZA which is improving today with IVF.  Associated marked hypokalemia on diuretic.        ROS:  denies nausea, vomiting or diarrhea      PAST MEDICAL & SURGICAL HISTORY:  Arthritis  left knee recently, right knee arthritis for several years      HTN (hypertension)  controlled with meds for several years      Depression      Regurgitation  tricuspid valve      Osteoarthritis of multiple joints, unspecified osteoarthritis type      COPD (chronic obstructive pulmonary disease)      Solitary pulmonary nodule  Received last dose of chemo 2019      History of lung cancer      S/P appendectomy  in       S/P breast biopsy  bilateral  breast  - benign      S/P D&C  for missed  about 25 years ago      Female bladder prolapse  bladder sling -         FAMILY HISTORY:  Family history of pancreatic cancer (Father)    Family history of COPD (chronic obstructive pulmonary disease) (Mother)    Family history of heart disease (Mother)    Family history of leukemia (Sibling)  brother    Family history of hepatitis C (Sibling)  brother    Family history of rheumatic fever (Sibling)        Home Medications:  albuterol 90 mcg/inh inhalation aerosol: 2 puff(s) inhaled every 6 hours As needed Shortness of Breath and/or Wheezing (04 Aug 2024 22:19)  budesonide-formoterol 160 mcg-4.5 mcg/inh inhalation aerosol: 2 puff(s) inhaled 2 times a day (04 Aug 2024 22:19)  escitalopram 10 mg oral tablet: 1 tab(s) orally once a day (04 Aug 2024 22:19)  Gemtesa 75 mg oral tablet: 1 tab(s) orally once a day (05 Aug 2024 02:12)  Keppra 500 mg oral tablet: 1 tab(s) orally every 12 hours (04 Aug 2024 22:18)  nicotine 14 mg/24 hr transdermal film, extended release: 1 patch transdermal once a day (04 Aug 2024 22:19)  polyethylene glycol 3350 oral powder for reconstitution: 17 gram(s) orally 2 times a day (04 Aug 2024 22:19)  senna leaf extract oral tablet: 2 tab(s) orally once a day (at bedtime) (04 Aug 2024 22:19)  traMADol 50 mg oral tablet: 1 tab(s) orally every 8 hours as needed for pain (05 Aug 2024 01:55)  triamterene-hydrochlorothiazide 37.5 mg-25 mg oral tablet: 1 tab(s) orally once a day (05 Aug 2024 02:11)      MEDICATIONS  (STANDING):  budesonide 160 MICROgram(s)/formoterol 4.5 MICROgram(s) Inhaler 2 Puff(s) Inhalation two times a day  escitalopram 10 milliGRAM(s) Oral daily  heparin   Injectable 5000 Unit(s) SubCutaneous every 12 hours  levETIRAcetam 500 milliGRAM(s) Oral two times a day  magnesium oxide 400 milliGRAM(s) Oral three times a day with meals  nicotine -  14 mG/24Hr(s) Patch 1 Patch Transdermal daily  polyethylene glycol 3350 17 Gram(s) Oral two times a day  potassium chloride    Tablet ER 40 milliEquivalent(s) Oral every 4 hours  senna 2 Tablet(s) Oral at bedtime  sodium chloride 0.9%. 1000 milliLiter(s) (80 mL/Hr) IV Continuous <Continuous>      PHYSICAL EXAMINATION:  /93    HR 86   Afebrile  Conversant, no apparent distress  PERRLA, pink conjunctivae, no ptosis  Good dentition, no pharyngeal erythema  Neck non tender, no mass, no thyromegaly or nodules  Normal respiratory effort, lungs clear to auscultation  Heart with RRR, no murmurs or rubs, no peripheral edema  Abdomen soft, no masses, no organomegaly  Skin no rashes, ulcers or lesions, normal turgor and temperature  Appropriate affect, AO x 3    LABS:                        10.8   6.52  )-----------( 246      ( 05 Aug 2024 06:05 )             31.6     Eos 4.3%      08-    139  |  104  |  35<H>  ----------------------------<  77  3.2<L>   |  27  |  2.25<H>    Ca    10.2      05 Aug 2024 06:05  Mg     1.4     08-04    TPro  6.5  /  Alb  2.8<L>  /  TBili  0.3  /  DBili  x   /  AST  15  /  ALT  13  /  AlkPhos  72  08-05    Urinalysis Basic - ( 05 Aug 2024 06:05 )  no protein or blood, trace LE, 2 WBC, bacteria      RADIOLOGY:  Chest X-Ray personally reviewed and shows NAPD        ASSESSMENT:  1.  TEREZA - ddx includes prerenal azotemia >> ATIN  2.  Hypokalemia, hypomagnesemia, diuretic related  3.  Hypercalcemia, probably volume depletion related    PLAN:  Continue IVF as ordered  KCl supplemented orally  Magnesium sulfate 2 grams IVPB over 4 hrs x 1  Check urine lytes, renal sonogram  Hold diuretic  BMP, Mg in AM

## 2024-08-05 NOTE — CHART NOTE - NSCHARTNOTEFT_GEN_A_CORE
Contacted son Krunal about his mother being admitted to [facility]. Krunal was not available during phonecall. Left voicemail.

## 2024-08-05 NOTE — PROGRESS NOTE ADULT - SUBJECTIVE AND OBJECTIVE BOX
Patient is a 79y old  Female who presents with a chief complaint of weakness with fall (04 Aug 2024 19:43)      SUBJECTIVE / OVERNIGHT EVENTS:  Pt seen and examined at bedside. No acute events overnight.  Pt denies cp, palpitations, sob, abd pain, N/V, fever, chills.    ROS:  All other review of systems negative    Allergies    penicillin (Short breath; Hives)    Intolerances        MEDICATIONS  (STANDING):  budesonide 160 MICROgram(s)/formoterol 4.5 MICROgram(s) Inhaler 2 Puff(s) Inhalation two times a day  escitalopram 10 milliGRAM(s) Oral daily  heparin   Injectable 5000 Unit(s) SubCutaneous every 12 hours  levETIRAcetam 500 milliGRAM(s) Oral two times a day  magnesium oxide 400 milliGRAM(s) Oral three times a day with meals  nicotine -  14 mG/24Hr(s) Patch 1 Patch Transdermal daily  polyethylene glycol 3350 17 Gram(s) Oral two times a day  potassium chloride    Tablet ER 40 milliEquivalent(s) Oral every 4 hours  senna 2 Tablet(s) Oral at bedtime  sodium chloride 0.9%. 1000 milliLiter(s) (80 mL/Hr) IV Continuous <Continuous>    MEDICATIONS  (PRN):  acetaminophen     Tablet .. 650 milliGRAM(s) Oral every 6 hours PRN Temp greater or equal to 38C (100.4F), Mild Pain (1 - 3)  albuterol    90 MICROgram(s) HFA Inhaler 2 Puff(s) Inhalation every 6 hours PRN Shortness of Breath and/or Wheezing  aluminum hydroxide/magnesium hydroxide/simethicone Suspension 30 milliLiter(s) Oral every 4 hours PRN Dyspepsia  melatonin 3 milliGRAM(s) Oral at bedtime PRN Insomnia  ondansetron Injectable 4 milliGRAM(s) IV Push every 8 hours PRN Nausea and/or Vomiting  traMADol 50 milliGRAM(s) Oral every 8 hours PRN Severe Pain (7 - 10)      Vital Signs Last 24 Hrs  T(C): --  T(F): --  HR: 86 (05 Aug 2024 04:50) (78 - 89)  BP: 134/98 (05 Aug 2024 04:50) (118/79 - 137/84)  BP(mean): 102 (04 Aug 2024 22:20) (85 - 102)  RR: 16 (05 Aug 2024 04:50) (16 - 20)  SpO2: 100% (05 Aug 2024 04:50) (97% - 100%)    Parameters below as of 05 Aug 2024 04:50  Patient On (Oxygen Delivery Method): room air      CAPILLARY BLOOD GLUCOSE        I&O's Summary      PHYSICAL EXAM:  GENERAL: NAD, well-developed female   HEAD:  Atraumatic, Normocephalic  NECK: Supple, No JVD  CHEST/LUNG: Clear to auscultation bilaterally; No wheeze, nonlabored breathing  HEART: Regular rate and rhythm; No murmurs, rubs, or gallops  ABDOMEN: Soft, Nontender, Nondistended; Bowel sounds present  EXTREMITIES: No clubbing, cyanosis, or edema  NEUROLOGY: AAOx3, non-focal  PSYCH: calm, appropriate mood    LABS:                        10.8   6.52  )-----------( 246      ( 05 Aug 2024 06:05 )             31.6     08-05    139  |  104  |  35<H>  ----------------------------<  77  3.2<L>   |  27  |  2.25<H>    Ca    10.2      05 Aug 2024 06:05  Mg     1.4     08-04    TPro  6.5  /  Alb  2.8<L>  /  TBili  0.3  /  DBili  x   /  AST  15  /  ALT  13  /  AlkPhos  72  08-05    PT/INR - ( 04 Aug 2024 15:40 )   PT: 13.3 sec;   INR: 1.14 ratio         PTT - ( 04 Aug 2024 15:40 )  PTT:25.4 sec      Urinalysis Basic - ( 05 Aug 2024 06:05 )    Color: x / Appearance: x / SG: x / pH: x  Gluc: 77 mg/dL / Ketone: x  / Bili: x / Urobili: x   Blood: x / Protein: x / Nitrite: x   Leuk Esterase: x / RBC: x / WBC x   Sq Epi: x / Non Sq Epi: x / Bacteria: x        RADIOLOGY & ADDITIONAL TESTS:  Results Reviewed:   Imaging Personally Reviewed:  Electrocardiogram Personally Reviewed:    COORDINATION OF CARE:  Care Discussed with Consultants/Other Providers [Y/N]:  Prior or Outpatient Records Reviewed [Y/N]:

## 2024-08-06 LAB
24R-OH-CALCIDIOL SERPL-MCNC: 36.4 NG/ML — SIGNIFICANT CHANGE UP (ref 30–80)
ANION GAP SERPL CALC-SCNC: 7 MMOL/L — SIGNIFICANT CHANGE UP (ref 5–17)
BUN SERPL-MCNC: 23 MG/DL — SIGNIFICANT CHANGE UP (ref 7–23)
CALCIUM SERPL-MCNC: 10.6 MG/DL — HIGH (ref 8.4–10.5)
CALCIUM SERPL-MCNC: 10.7 MG/DL — HIGH (ref 8.4–10.5)
CHLORIDE SERPL-SCNC: 107 MMOL/L — SIGNIFICANT CHANGE UP (ref 96–108)
CO2 SERPL-SCNC: 28 MMOL/L — SIGNIFICANT CHANGE UP (ref 22–31)
CREAT SERPL-MCNC: 1.53 MG/DL — HIGH (ref 0.5–1.3)
CULTURE RESULTS: SIGNIFICANT CHANGE UP
EGFR: 35 ML/MIN/1.73M2 — LOW
GLUCOSE SERPL-MCNC: 93 MG/DL — SIGNIFICANT CHANGE UP (ref 70–99)
HCT VFR BLD CALC: 33.6 % — LOW (ref 34.5–45)
HGB BLD-MCNC: 11.3 G/DL — LOW (ref 11.5–15.5)
MAGNESIUM SERPL-MCNC: 1.3 MG/DL — LOW (ref 1.6–2.6)
MCHC RBC-ENTMCNC: 29.5 PG — SIGNIFICANT CHANGE UP (ref 27–34)
MCHC RBC-ENTMCNC: 33.6 GM/DL — SIGNIFICANT CHANGE UP (ref 32–36)
MCV RBC AUTO: 87.7 FL — SIGNIFICANT CHANGE UP (ref 80–100)
NRBC # BLD: 0 /100 WBCS — SIGNIFICANT CHANGE UP (ref 0–0)
PHOSPHATE SERPL-MCNC: 2.4 MG/DL — LOW (ref 2.5–4.5)
PLATELET # BLD AUTO: 264 K/UL — SIGNIFICANT CHANGE UP (ref 150–400)
POTASSIUM SERPL-MCNC: 4.2 MMOL/L — SIGNIFICANT CHANGE UP (ref 3.5–5.3)
POTASSIUM SERPL-SCNC: 4.2 MMOL/L — SIGNIFICANT CHANGE UP (ref 3.5–5.3)
PROT SERPL-MCNC: 5.6 G/DL — LOW (ref 6–8.3)
PTH-INTACT FLD-MCNC: 15 PG/ML — SIGNIFICANT CHANGE UP (ref 15–65)
RBC # BLD: 3.83 M/UL — SIGNIFICANT CHANGE UP (ref 3.8–5.2)
RBC # FLD: 12.9 % — SIGNIFICANT CHANGE UP (ref 10.3–14.5)
SODIUM SERPL-SCNC: 142 MMOL/L — SIGNIFICANT CHANGE UP (ref 135–145)
SPECIMEN SOURCE: SIGNIFICANT CHANGE UP
T3 SERPL-MCNC: 159 NG/DL — SIGNIFICANT CHANGE UP (ref 80–200)
T4 AB SER-ACNC: 10 UG/DL — SIGNIFICANT CHANGE UP (ref 4.6–12)
TSH SERPL-MCNC: 0.16 UIU/ML — LOW (ref 0.36–3.74)
WBC # BLD: 7.06 K/UL — SIGNIFICANT CHANGE UP (ref 3.8–10.5)
WBC # FLD AUTO: 7.06 K/UL — SIGNIFICANT CHANGE UP (ref 3.8–10.5)

## 2024-08-06 PROCEDURE — 99233 SBSQ HOSP IP/OBS HIGH 50: CPT

## 2024-08-06 RX ORDER — MAGNESIUM SULFATE 500 MG/ML
2 VIAL (ML) INJECTION ONCE
Refills: 0 | Status: COMPLETED | OUTPATIENT
Start: 2024-08-06 | End: 2024-08-06

## 2024-08-06 RX ADMIN — Medication 50 MILLIGRAM(S): at 02:59

## 2024-08-06 RX ADMIN — Medication 1 PATCH: at 19:00

## 2024-08-06 RX ADMIN — Medication 1 PATCH: at 06:56

## 2024-08-06 RX ADMIN — Medication 50 MILLIGRAM(S): at 01:59

## 2024-08-06 RX ADMIN — MAGNESIUM OXIDE 400 MILLIGRAM(S): 253 TABLET ORAL at 17:18

## 2024-08-06 RX ADMIN — HEPARIN SODIUM 5000 UNIT(S): 1000 INJECTION, SOLUTION INTRAVENOUS; SUBCUTANEOUS at 17:18

## 2024-08-06 RX ADMIN — LEVETIRACETAM 500 MILLIGRAM(S): 1000 TABLET, FILM COATED ORAL at 05:21

## 2024-08-06 RX ADMIN — MAGNESIUM OXIDE 400 MILLIGRAM(S): 253 TABLET ORAL at 12:21

## 2024-08-06 RX ADMIN — Medication 50 MILLIGRAM(S): at 23:04

## 2024-08-06 RX ADMIN — LEVETIRACETAM 500 MILLIGRAM(S): 1000 TABLET, FILM COATED ORAL at 17:17

## 2024-08-06 RX ADMIN — Medication 17 GRAM(S): at 05:22

## 2024-08-06 RX ADMIN — MAGNESIUM OXIDE 400 MILLIGRAM(S): 253 TABLET ORAL at 08:13

## 2024-08-06 RX ADMIN — BUDESONIDE AND FORMOTEROL FUMARATE DIHYDRATE 2 PUFF(S): 80; 4.5 AEROSOL RESPIRATORY (INHALATION) at 08:40

## 2024-08-06 RX ADMIN — Medication 1 PATCH: at 12:59

## 2024-08-06 RX ADMIN — HEPARIN SODIUM 5000 UNIT(S): 1000 INJECTION, SOLUTION INTRAVENOUS; SUBCUTANEOUS at 05:21

## 2024-08-06 RX ADMIN — BUDESONIDE AND FORMOTEROL FUMARATE DIHYDRATE 2 PUFF(S): 80; 4.5 AEROSOL RESPIRATORY (INHALATION) at 20:29

## 2024-08-06 RX ADMIN — Medication 1 PATCH: at 12:18

## 2024-08-06 RX ADMIN — Medication 17 GRAM(S): at 17:17

## 2024-08-06 RX ADMIN — Medication 25 GRAM(S): at 10:50

## 2024-08-06 RX ADMIN — Medication 10 MILLIGRAM(S): at 12:21

## 2024-08-06 NOTE — PHYSICAL THERAPY INITIAL EVALUATION ADULT - FOLLOWS COMMANDS/ANSWERS QUESTIONS, REHAB EVAL
K+ 2.9 (2/12/24)--pt on 20mg Lasix; pt takes 1 to 2 tablets daily PRN. Pt is not on a potassium supplement.   For information on Fall & Injury Prevention, visit [email or web address] able to follow single-step instructions

## 2024-08-06 NOTE — PROGRESS NOTE ADULT - ASSESSMENT
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 History limited to altered mental status. Admitted for further evaluation.     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

## 2024-08-06 NOTE — CHART NOTE - NSCHARTNOTEFT_GEN_A_CORE
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 Confirmed with tele tech. Patient has been stable with HR in 70s-90s.

## 2024-08-06 NOTE — PHYSICAL THERAPY INITIAL EVALUATION ADULT - PERTINENT HX OF CURRENT PROBLEM, REHAB EVAL
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 History limited to altered mental status. Reports recent fall injury. States that she hurt herself by running into a wall. Prior to admission, pt states that she tripped over her cat obtained scratches and bites. She states that she was unable to get up from the floor after her fall. She called her boyfriend for help, which prompted her admission to the ED. Of note, pt reports recent diagnosis of UTI a week ago, started on cipro. Additional symptoms include headaches, cough, pain in LEs, nausea, vomiting and low belly pain. Has chronic gait instability and uses rollator to ambulate. Denies recent use of steroids. Denies dysuria, urinary frequency, urinary urgency. States that she recent received IV contrast for imaging studies including PET/CT and MRI. Most recent MRI with IV contrast on 2/2024, however pt reports being followed at [facility].      I

## 2024-08-06 NOTE — PROGRESS NOTE ADULT - SUBJECTIVE AND OBJECTIVE BOX
NEPHROLOGY PROGRESS NOTE    CHIEF COMPLAINT:  TEREZA/CKD    HPI:  Renal function better.     EXAM:  Vital Signs Last 24 Hrs  T(C): 36.5 (06 Aug 2024 05:01), Max: 36.7 (06 Aug 2024 01:50)  T(F): 97.7 (06 Aug 2024 05:01), Max: 98 (06 Aug 2024 01:50)  HR: 79 (06 Aug 2024 08:42) (78 - 84)  BP: 146/86 (06 Aug 2024 05:01) (146/86 - 158/83)  BP(mean): --  RR: 18 (06 Aug 2024 05:01) (16 - 18)  SpO2: 96% (06 Aug 2024 08:42) (96% - 98%)    Parameters below as of 06 Aug 2024 08:42  Patient On (Oxygen Delivery Method): room air      I&O's Summary    06 Aug 2024 07:01  -  06 Aug 2024 12:27  --------------------------------------------------------  IN: 200 mL / OUT: 0 mL / NET: 200 mL      Daily Height in cm: 162.56 (05 Aug 2024 18:34)    Daily Weight in k (06 Aug 2024 05:01)    Conversant, in no apparent distress  Normal respiratory effort, lungs clear bilaterally  Heart RRR with no murmur, no peripheral edema    LABS                        11.3   7.06  )-----------( 264      ( 06 Aug 2024 07:27 )             33.6     08-06    142  |  107  |  23  ----------------------------<  93  4.2   |  28  |  1.53<H>    Ca    10.7<H>      06 Aug 2024 07:27  Phos  2.4     08-  Mg     1.3     08-    PTH 15    25-vitamin D 36.4      TPro  6.5  /  Alb  2.8<L>  /  TBili  0.3  /  DBili  x   /  AST  15  /  ALT  13  /  AlkPhos  72  -05    Radiology:  Renal sonograms show 9-12 cm multicystic kidneys        ASSESSMENT:  1.  TEREZA - ddx includes prerenal azotemia >> ATIN, better with IVF  2.  Hypokalemia, hypomagnesemia, diuretic related  3.  Hypercalcemia, mild, persistent, non PTH mediated    PLAN:  Continue IVF as ordered  More IV magnesium today  Hold diuretic still   Check PTHrp, active vitamin D and serum free light chains  BMP, Mg in AM

## 2024-08-06 NOTE — PHYSICAL THERAPY INITIAL EVALUATION ADULT - TRANSFER TRAINING, PT EVAL
Principal Discharge DX:	Chest pain in 1-3 treatments patient will perform sit to stand transfer with rw and cg

## 2024-08-06 NOTE — PROGRESS NOTE ADULT - SUBJECTIVE AND OBJECTIVE BOX
Patient is a 79y old  Female who presents with a chief complaint of weakness with fall (05 Aug 2024 15:54)      SUBJECTIVE / OVERNIGHT EVENTS:  Pt seen and examined at bedside. No acute events overnight.  Pt denies cp, palpitations, sob, abd pain, N/V, fever, chills.    ROS:  All other review of systems negative    Allergies    penicillin (Short breath; Hives)    Intolerances        MEDICATIONS  (STANDING):  budesonide 160 MICROgram(s)/formoterol 4.5 MICROgram(s) Inhaler 2 Puff(s) Inhalation two times a day  escitalopram 10 milliGRAM(s) Oral daily  heparin   Injectable 5000 Unit(s) SubCutaneous every 12 hours  levETIRAcetam 500 milliGRAM(s) Oral two times a day  magnesium oxide 400 milliGRAM(s) Oral three times a day with meals  magnesium sulfate  IVPB 2 Gram(s) IV Intermittent once  nicotine -  14 mG/24Hr(s) Patch 1 Patch Transdermal daily  polyethylene glycol 3350 17 Gram(s) Oral two times a day  senna 2 Tablet(s) Oral at bedtime  sodium chloride 0.9%. 1000 milliLiter(s) (80 mL/Hr) IV Continuous <Continuous>    MEDICATIONS  (PRN):  acetaminophen     Tablet .. 650 milliGRAM(s) Oral every 6 hours PRN Temp greater or equal to 38C (100.4F), Mild Pain (1 - 3)  albuterol    90 MICROgram(s) HFA Inhaler 2 Puff(s) Inhalation every 6 hours PRN Shortness of Breath and/or Wheezing  aluminum hydroxide/magnesium hydroxide/simethicone Suspension 30 milliLiter(s) Oral every 4 hours PRN Dyspepsia  melatonin 3 milliGRAM(s) Oral at bedtime PRN Insomnia  ondansetron Injectable 4 milliGRAM(s) IV Push every 8 hours PRN Nausea and/or Vomiting  traMADol 50 milliGRAM(s) Oral every 8 hours PRN Severe Pain (7 - 10)      Vital Signs Last 24 Hrs  T(C): 36.5 (06 Aug 2024 05:01), Max: 36.7 (06 Aug 2024 01:50)  T(F): 97.7 (06 Aug 2024 05:01), Max: 98 (06 Aug 2024 01:50)  HR: 79 (06 Aug 2024 08:42) (78 - 84)  BP: 146/86 (06 Aug 2024 05:01) (146/86 - 158/83)  BP(mean): --  RR: 18 (06 Aug 2024 05:01) (16 - 18)  SpO2: 96% (06 Aug 2024 08:42) (96% - 98%)    Parameters below as of 06 Aug 2024 08:42  Patient On (Oxygen Delivery Method): room air      CAPILLARY BLOOD GLUCOSE        I&O's Summary      PHYSICAL EXAM:  GENERAL: NAD, well-developed female   HEAD:  Atraumatic, Normocephalic  NECK: Supple, No JVD  CHEST/LUNG: Clear to auscultation bilaterally; No wheeze, nonlabored breathing  HEART: Regular rate and rhythm; No murmurs, rubs, or gallops  ABDOMEN: Soft, Nontender, Nondistended; Bowel sounds present  EXTREMITIES: No clubbing, cyanosis, or edema  NEUROLOGY: AAOx3, non-focal  PSYCH: calm, appropriate mood    LABS:                        11.3   7.06  )-----------( 264      ( 06 Aug 2024 07:27 )             33.6     08-05    139  |  104  |  35<H>  ----------------------------<  77  3.2<L>   |  27  |  2.25<H>    Ca    10.2      05 Aug 2024 06:05  Phos  2.4     08-06  Mg     1.3     08-06    TPro  6.5  /  Alb  2.8<L>  /  TBili  0.3  /  DBili  x   /  AST  15  /  ALT  13  /  AlkPhos  72  08-05    PT/INR - ( 04 Aug 2024 15:40 )   PT: 13.3 sec;   INR: 1.14 ratio         PTT - ( 04 Aug 2024 15:40 )  PTT:25.4 sec      Urinalysis Basic - ( 05 Aug 2024 06:05 )    Color: x / Appearance: x / SG: x / pH: x  Gluc: 77 mg/dL / Ketone: x  / Bili: x / Urobili: x   Blood: x / Protein: x / Nitrite: x   Leuk Esterase: x / RBC: x / WBC x   Sq Epi: x / Non Sq Epi: x / Bacteria: x        RADIOLOGY & ADDITIONAL TESTS:  Results Reviewed:   Imaging Personally Reviewed:  Electrocardiogram Personally Reviewed:    COORDINATION OF CARE:  Care Discussed with Consultants/Other Providers [Y/N]:  Prior or Outpatient Records Reviewed [Y/N]:

## 2024-08-06 NOTE — GOALS OF CARE CONVERSATION - ADVANCED CARE PLANNING - CONVERSATION DETAILS
I met with the patient at bedside to discuss GOC. Pt. is here s/p fall at home and AMS. However she is A&Ox2-3 right now. Oriented to self, year, place she answered [employer], but knew she lives in [facility]. States her son Krunal is her HCP.  Pt. has hx. brain ca. s/p resection 2023, lung ca. last chemo 5/2019. Pt. also has hx. HTN, COPD, UTI. Currently found to have persistent DVTs bilat. below the knee.  Pt. stated she would want attempted resuscitation and intubation in the event of cardiac arrest- Full Code.

## 2024-08-06 NOTE — PHYSICAL THERAPY INITIAL EVALUATION ADULT - ADDITIONAL COMMENTS
pt is a poor historian, hx per this and previous charts. pt lives in apt w/sig. other, 1 flt to enter w/rail. pt uses rollator to ambulate, s/o assists w/ADL's. pt states s/o "will be going away for a while"

## 2024-08-07 LAB
ANION GAP SERPL CALC-SCNC: 9 MMOL/L — SIGNIFICANT CHANGE UP (ref 5–17)
BUN SERPL-MCNC: 21 MG/DL — SIGNIFICANT CHANGE UP (ref 7–23)
CALCIUM SERPL-MCNC: 10.6 MG/DL — HIGH (ref 8.4–10.5)
CHLORIDE SERPL-SCNC: 102 MMOL/L — SIGNIFICANT CHANGE UP (ref 96–108)
CO2 SERPL-SCNC: 25 MMOL/L — SIGNIFICANT CHANGE UP (ref 22–31)
CREAT SERPL-MCNC: 1.42 MG/DL — HIGH (ref 0.5–1.3)
EGFR: 38 ML/MIN/1.73M2 — LOW
GLUCOSE SERPL-MCNC: 74 MG/DL — SIGNIFICANT CHANGE UP (ref 70–99)
HCT VFR BLD CALC: 34.2 % — LOW (ref 34.5–45)
HGB BLD-MCNC: 11.5 G/DL — SIGNIFICANT CHANGE UP (ref 11.5–15.5)
MAGNESIUM SERPL-MCNC: 1.9 MG/DL — SIGNIFICANT CHANGE UP (ref 1.6–2.6)
MCHC RBC-ENTMCNC: 29.6 PG — SIGNIFICANT CHANGE UP (ref 27–34)
MCHC RBC-ENTMCNC: 33.6 GM/DL — SIGNIFICANT CHANGE UP (ref 32–36)
MCV RBC AUTO: 88.1 FL — SIGNIFICANT CHANGE UP (ref 80–100)
NRBC # BLD: 0 /100 WBCS — SIGNIFICANT CHANGE UP (ref 0–0)
PHOSPHATE SERPL-MCNC: 2.9 MG/DL — SIGNIFICANT CHANGE UP (ref 2.5–4.5)
PLATELET # BLD AUTO: 261 K/UL — SIGNIFICANT CHANGE UP (ref 150–400)
POTASSIUM SERPL-MCNC: 3.9 MMOL/L — SIGNIFICANT CHANGE UP (ref 3.5–5.3)
POTASSIUM SERPL-SCNC: 3.9 MMOL/L — SIGNIFICANT CHANGE UP (ref 3.5–5.3)
RBC # BLD: 3.88 M/UL — SIGNIFICANT CHANGE UP (ref 3.8–5.2)
RBC # FLD: 13.1 % — SIGNIFICANT CHANGE UP (ref 10.3–14.5)
SODIUM SERPL-SCNC: 136 MMOL/L — SIGNIFICANT CHANGE UP (ref 135–145)
WBC # BLD: 7.55 K/UL — SIGNIFICANT CHANGE UP (ref 3.8–10.5)
WBC # FLD AUTO: 7.55 K/UL — SIGNIFICANT CHANGE UP (ref 3.8–10.5)

## 2024-08-07 PROCEDURE — 99232 SBSQ HOSP IP/OBS MODERATE 35: CPT

## 2024-08-07 RX ADMIN — Medication 50 MILLIGRAM(S): at 00:04

## 2024-08-07 RX ADMIN — BUDESONIDE AND FORMOTEROL FUMARATE DIHYDRATE 2 PUFF(S): 80; 4.5 AEROSOL RESPIRATORY (INHALATION) at 08:07

## 2024-08-07 RX ADMIN — Medication 1 PATCH: at 08:15

## 2024-08-07 RX ADMIN — Medication 50 MILLIGRAM(S): at 20:00

## 2024-08-07 RX ADMIN — Medication 3 MILLIGRAM(S): at 23:06

## 2024-08-07 RX ADMIN — MAGNESIUM OXIDE 400 MILLIGRAM(S): 253 TABLET ORAL at 12:35

## 2024-08-07 RX ADMIN — BUDESONIDE AND FORMOTEROL FUMARATE DIHYDRATE 2 PUFF(S): 80; 4.5 AEROSOL RESPIRATORY (INHALATION) at 21:31

## 2024-08-07 RX ADMIN — MAGNESIUM OXIDE 400 MILLIGRAM(S): 253 TABLET ORAL at 18:15

## 2024-08-07 RX ADMIN — Medication 1 PATCH: at 19:00

## 2024-08-07 RX ADMIN — HEPARIN SODIUM 5000 UNIT(S): 1000 INJECTION, SOLUTION INTRAVENOUS; SUBCUTANEOUS at 05:30

## 2024-08-07 RX ADMIN — HEPARIN SODIUM 5000 UNIT(S): 1000 INJECTION, SOLUTION INTRAVENOUS; SUBCUTANEOUS at 18:15

## 2024-08-07 RX ADMIN — Medication 650 MILLIGRAM(S): at 08:39

## 2024-08-07 RX ADMIN — Medication 1 PATCH: at 11:13

## 2024-08-07 RX ADMIN — MAGNESIUM OXIDE 400 MILLIGRAM(S): 253 TABLET ORAL at 08:39

## 2024-08-07 RX ADMIN — Medication 10 MILLIGRAM(S): at 11:13

## 2024-08-07 RX ADMIN — LEVETIRACETAM 500 MILLIGRAM(S): 1000 TABLET, FILM COATED ORAL at 18:15

## 2024-08-07 RX ADMIN — Medication 650 MILLIGRAM(S): at 09:24

## 2024-08-07 RX ADMIN — LEVETIRACETAM 500 MILLIGRAM(S): 1000 TABLET, FILM COATED ORAL at 05:30

## 2024-08-07 RX ADMIN — Medication 50 MILLIGRAM(S): at 20:30

## 2024-08-07 RX ADMIN — Medication 1 PATCH: at 11:12

## 2024-08-07 NOTE — PROGRESS NOTE ADULT - ASSESSMENT
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 History limited to altered mental status. Admitted for further evaluation.     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 History limited to altered mental status. Admitted for further evaluation.     #Metabolic Encephalopathy -Resolved   -Unclear etiology, likely electrolyte abnormalities   -Continue management as below     #Bacteruria  -Urine cx NGTD  -Discontinued Levaquin    #Hypokalemia  #Hypercalcemia  #Hypomag  #ATN on CKD II  -Improving w/ IVF   -SCr 1.1 Jan 2024  -Replete Electrolytes  -Nephrology on board     #Low TSH level likely Euthyroid sick syndrome  -T4/T3 levels WNL  -outpatient repeat of labs    #HTN  -hold home triamterene-HCTZ  -nifedipine 30 XL held    #COPD  -Not in exacerbation  -continue Budesonide 80 mcg/Formoterol 4.5 mcg 2 puffs BID  -continue albuterol PRN    #Seizures  #Hx of 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 Spoke to her at length about DOROTHY reccs from PT. She is considering since she states she needs help at home. She said her friend is helping her look into help at home as well.

## 2024-08-07 NOTE — PROGRESS NOTE ADULT - SUBJECTIVE AND OBJECTIVE BOX
[facility] NEPHROLOGY SERVICES, [facility]  NEPHROLOGY AND HYPERTENSION  [address]  [address]  [place] [address]  [phone]    MD PACO LUTHER MD YELENA ROSENBERG, MD BINNY KOSHY, MD CHRISTOPHER CAPUTO, MD EDWARD BOVER, MD          Patient events noted  No distress    MEDICATIONS  (STANDING):  budesonide 160 MICROgram(s)/formoterol 4.5 MICROgram(s) Inhaler 2 Puff(s) Inhalation two times a day  escitalopram 10 milliGRAM(s) Oral daily  heparin   Injectable 5000 Unit(s) SubCutaneous every 12 hours  levETIRAcetam 500 milliGRAM(s) Oral two times a day  magnesium oxide 400 milliGRAM(s) Oral three times a day with meals  nicotine -  14 mG/24Hr(s) Patch 1 Patch Transdermal daily  polyethylene glycol 3350 17 Gram(s) Oral two times a day  senna 2 Tablet(s) Oral at bedtime  sodium chloride 0.9%. 1000 milliLiter(s) (80 mL/Hr) IV Continuous <Continuous>    MEDICATIONS  (PRN):  acetaminophen     Tablet .. 650 milliGRAM(s) Oral every 6 hours PRN Temp greater or equal to 38C (100.4F), Mild Pain (1 - 3)  albuterol    90 MICROgram(s) HFA Inhaler 2 Puff(s) Inhalation every 6 hours PRN Shortness of Breath and/or Wheezing  aluminum hydroxide/magnesium hydroxide/simethicone Suspension 30 milliLiter(s) Oral every 4 hours PRN Dyspepsia  melatonin 3 milliGRAM(s) Oral at bedtime PRN Insomnia  ondansetron Injectable 4 milliGRAM(s) IV Push every 8 hours PRN Nausea and/or Vomiting  traMADol 50 milliGRAM(s) Oral every 8 hours PRN Severe Pain (7 - 10)      08-06-24 @ 07:01  -  08-07-24 @ 07:00  --------------------------------------------------------  IN: 400 mL / OUT: 0 mL / NET: 400 mL      PHYSICAL EXAM:      T(C): 36.3 (08-07-24 @ 12:05), Max: 36.7 (08-06-24 @ 20:48)  HR: 72 (08-07-24 @ 12:05) (72 - 82)  BP: 123/81 (08-07-24 @ 12:05) (123/81 - 148/73)  RR: 17 (08-07-24 @ 12:05) (17 - 18)  SpO2: 97% (08-07-24 @ 12:05) (94% - 97%)  Wt(kg): --  Lungs clear  Heart S1S2  Abd soft NT ND  Extremities:   tr edema                                    11.5   7.55  )-----------( 261      ( 07 Aug 2024 07:30 )             34.2     08-07    136  |  102  |  21  ----------------------------<  74  3.9   |  25  |  1.42<H>    Ca    10.6<H>      07 Aug 2024 07:30  Phos  2.9     08-07  Mg     1.9     08-07    TPro  5.6<L>  /  Alb  x   /  TBili  x   /  DBili  x   /  AST  x   /  ALT  x   /  AlkPhos  x   08-06      LIVER FUNCTIONS - ( 06 Aug 2024 07:27 )  Alb: x     / Pro: 5.6 g/dL / ALK PHOS: x     / ALT: x     / AST: x     / GGT: x           Creatinine Trend: 1.42<--, 1.53<--, 2.25<--, 2.38<--, 2.75<--    Parathyroid Hormone Intact, Serum (08.06.24 @ 07:27)    Calcium.: 10.6: Test Repeated mg/dL   Intact PTH: 15: PTH METHOD: Roche  Guide for Interpretation of PTH and Calcium Results                           Calcium             PTH                           MG/DL               PG/ML  Normal                   8.4-10.5            15-65  Primary  Hyperparathyroidism      >10.5               >50  Non-PTH Hypercalcemia    >10.5               0-20  Hypoparathyroidism       <8.4                0-20  Pseudohypoparathyroid    <8.4                >50  This is intended as a guide only. Factors such as sunlight exposure,  Vitamin D status and renal function should be evaluated along with  clinical presentation. pg/mL        ASSESSMENT:  1.  TEREZA - ddx includes prerenal azotemia >> ATIN, better with IVF  2.  Hypokalemia, hypomagnesemia, diuretic related  3.  Hypercalcemia, mild, persistent, non PTH mediated    PLAN:  Holding  diuretics  Clinically stable       Damion Ceja MD

## 2024-08-07 NOTE — ED ADULT NURSE NOTE - NSFALLRISKASMTTYPE_ED_ALL_ED
Ob/Gyn Office Visit:  2024    Chief Complaint: incision concern    History of Present Illness: Louise Butterfield is a 30 year old  who presents to the office for incision concern after PLTCS on 24.     She denies fever and chills. She has noted scant drainage from the right corner of the incision. Denies significant pain, n/v, and erythema.    Reports rash all over the body since using a new detergent. Denies SOB.    Nephrologist is managing her BP's. Currently on Labetalol 400 mg TID and hydrochlorothiazide 25 mg daily. She has an upcoming appt with new PCP for long term management.     Recent ED visit for CP with neg workup. Her CP has resolved.    Reports mood is ok but having anxiety.     PMH, PSH, FH, and SH are reviewed and updated.     Review of Systems: all systems reviewed and are negative other than pertinent positives and negatives in HPI    OB History    Para Term  AB Living   2 1 0 1 1 1   SAB IAB Ectopic Molar Multiple Live Births   1 0 0 0 0 1       ALLERGIES:   Allergen Reactions    Procardia Xl [Nifedipine Er] RASH and SWELLING    Vantin [Cefpodoxime] Other (See Comments)     States had reaction when she was a young child and unsure what reaction was.       Outpatient Medications Marked as Taking for the 24 encounter (Office Visit) with Brad Puri MD   Medication Sig Dispense Refill    acetaminophen (TYLENOL) 325 MG tablet Take 2 tablets by mouth every 6 hours as needed for Pain. 50 tablet 1    ibuprofen (MOTRIN) 600 MG tablet Take 1 tablet by mouth every 6 hours as needed for Pain. 50 tablet 1    oxyCODONE, IMM REL, (ROXICODONE) 5 MG immediate release tablet Take 1 tablet by mouth every 6 hours as needed for Pain. 10 tablet 0    labetalol (NORMODYNE) 200 MG tablet Take 4 tablets by mouth every 8 hours. (Patient taking differently: Take 400 mg by mouth every 8 hours.) 360 tablet 0    hydroCHLOROthiazide 25 MG tablet Take 1 tablet by mouth daily. 30 tablet 1     Prenatal Vit-Fe Fumarate-FA (multivitamin & mineral w/folic acid- PRENATAL) 27-1 MG Tab Take 1 tablet by mouth daily. Indications: Pregnancy      docusate sodium (Colace) 100 MG capsule Take 1 capsule by mouth 2 times daily as needed for Constipation. 20 capsule 0    montelukast (SINGULAIR) 10 MG tablet Take 10 mg by mouth nightly as needed. Indications: Hayfever          Objective:  Vitals:    24 1301   BP: 114/60   Temp: 97.9 °F (36.6 °C)       Physical Exam  Vitals reviewed. Exam conducted with a chaperone present (MITCHELL Evans).   Constitutional:       General: She is not in acute distress.     Appearance: Normal appearance. She is not ill-appearing, toxic-appearing or diaphoretic.   Abdominal:      General: Abdomen is flat. There is no distension.      Palpations: Abdomen is soft.      Tenderness: There is no abdominal tenderness. There is no guarding or rebound.      Comments: Pfannenstiel incision is clean/dry/intact and is healing appropriately. No induration and erythema. R corner of incision with 2-3 mm opening with yellow drainage, either scant pus or fibrinous exudate.    Skin:     Findings: Rash (diffuse raised pale pink rash over arms and abdomen) present.   Neurological:      Mental Status: She is alert.         Assessment/Plan: Louise Butterfield is a 30 year old  who presents for:  Postoperative abscess involving suture  (primary encounter diagnosis)  - suspect tiny suture abscess  S/P  section  Chronic hypertension in pregnancy (CMD)  Severe pre-eclampsia in third trimester (CMD) - BP well-controlled, continue current regimen   Postpartum anxiety (CMD)  Contact dermatitis - suspect due to detergent, recommend avoiding this detergent and can take Benadryl for SX relief     Orders Placed This Encounter    SERVICE TO BEHAVIORAL HEALTH    cephalexin (KEFLEX) 500 MG capsule     Additional maternal health mental health resources provided through [facility].     Return to clinic for PP  appt as scheduled. Advised to call with worsening drainage, redness, pain, fever or chills.     I spent a total of 20 minutes on the day of the visit.  This includes pre-charting, chart review, documenting, and time with pt .    Brad Puri MD  Obstetrics and Gynecology       Initial (On Arrival)

## 2024-08-07 NOTE — PROGRESS NOTE ADULT - SUBJECTIVE AND OBJECTIVE BOX
CC: Patient is a 79y old  Female who presents with a chief complaint of weakness with fall (07 Aug 2024 07:12)    Interval History:  Patient seen and examined at bedside.  No overnight events  Has pain in her legs this AM    ALLERGIES:  penicillin (Short breath; Hives)    MEDICATIONS  (STANDING):  budesonide 160 MICROgram(s)/formoterol 4.5 MICROgram(s) Inhaler 2 Puff(s) Inhalation two times a day  escitalopram 10 milliGRAM(s) Oral daily  heparin   Injectable 5000 Unit(s) SubCutaneous every 12 hours  levETIRAcetam 500 milliGRAM(s) Oral two times a day  magnesium oxide 400 milliGRAM(s) Oral three times a day with meals  nicotine -  14 mG/24Hr(s) Patch 1 Patch Transdermal daily  polyethylene glycol 3350 17 Gram(s) Oral two times a day  senna 2 Tablet(s) Oral at bedtime  sodium chloride 0.9%. 1000 milliLiter(s) (80 mL/Hr) IV Continuous <Continuous>    MEDICATIONS  (PRN):  acetaminophen     Tablet .. 650 milliGRAM(s) Oral every 6 hours PRN Temp greater or equal to 38C (100.4F), Mild Pain (1 - 3)  albuterol    90 MICROgram(s) HFA Inhaler 2 Puff(s) Inhalation every 6 hours PRN Shortness of Breath and/or Wheezing  aluminum hydroxide/magnesium hydroxide/simethicone Suspension 30 milliLiter(s) Oral every 4 hours PRN Dyspepsia  melatonin 3 milliGRAM(s) Oral at bedtime PRN Insomnia  ondansetron Injectable 4 milliGRAM(s) IV Push every 8 hours PRN Nausea and/or Vomiting  traMADol 50 milliGRAM(s) Oral every 8 hours PRN Severe Pain (7 - 10)    Vital Signs Last 24 Hrs  T(F): 97.7 (07 Aug 2024 05:13), Max: 98.1 (06 Aug 2024 20:48)  HR: 76 (07 Aug 2024 08:08) (76 - 82)  BP: 148/73 (07 Aug 2024 05:13) (133/76 - 148/73)  RR: 18 (07 Aug 2024 05:13) (18 - 18)  SpO2: 96% (07 Aug 2024 08:08) (94% - 98%)  I&O's Summary    06 Aug 2024 07:01  -  07 Aug 2024 07:00  --------------------------------------------------------  IN: 400 mL / OUT: 0 mL / NET: 400 mL    BMI (kg/m2): 27.8 (08-05-24 @ 18:34)    PHYSICAL EXAM:  GENERAL: NAD  CHEST/LUNG: Clear to percussion bilaterally; No rales, rhonchi, wheezing, or rubs; normal respiratory effort, no intercostal retractions  HEART: Regular rate and rhythm; No murmurs, rubs, or gallops; No pitting edema  ABDOMEN: Soft, Nontender, Nondistended; Bowel sounds present; No HSM or masses  MUSCULOSKELETAL/EXTREMITIES:  2+ Peripheral Pulses, No clubbing or digital cyanosis; FROM of extremities   PSYCH: Appropriate affect, Alert & Oriented x 3, Good Memory; Good insight    LABS:                        11.5   7.55  )-----------( 261      ( 07 Aug 2024 07:30 )             34.2       08-07    136  |  102  |  21  ----------------------------<  74  3.9   |  25  |  1.42    Ca    10.6      07 Aug 2024 07:30  Phos  2.4     08-06  Mg     1.3     08-06    TPro  5.6  /  Alb  x   /  TBili  x   /  DBili  x   /  AST  x   /  ALT  x   /  AlkPhos  x   08-06       PT/INR - ( 04 Aug 2024 15:40 )   PT: 13.3 sec;   INR: 1.14 ratio         PTT - ( 04 Aug 2024 15:40 )  PTT:25.4 sec   Lactate, Blood: 1.1 mmol/L (08-04 @ 15:40)    8-05 Chol 134 mg/dL LDL -- HDL 37 mg/dL Trig 134 mg/dL  TSH 0.161   TSH with FT4 reflex --  Total T3 159    Urinalysis Basic - ( 07 Aug 2024 07:30 )  Color: x / Appearance: x / SG: x / pH: x  Gluc: 74 mg/dL / Ketone: x  / Bili: x / Urobili: x   Blood: x / Protein: x / Nitrite: x   Leuk Esterase: x / RBC: x / WBC x   Sq Epi: x / Non Sq Epi: x / Bacteria: x    Culture - Urine (collected 04 Aug 2024 19:30)  Source: Clean Catch Clean Catch (Midstream)  Final Report (06 Aug 2024 08:20):    <10,000 CFU/mL Normal Urogenital Isabella    Culture - Blood (collected 04 Aug 2024 15:45)  Source: .Blood Blood-Peripheral  Preliminary Report (06 Aug 2024 22:01):    No growth at 48 Hours    Culture - Blood (collected 04 Aug 2024 15:40)  Source: .Blood Blood-Peripheral  Preliminary Report (06 Aug 2024 22:01):    No growth at 48 Hours    COVID-19 PCR: NotDetec (08-04-24 @ 15:40)  Care Discussed with Consultants/Other Providers: Yes

## 2024-08-08 ENCOUNTER — TRANSCRIPTION ENCOUNTER (OUTPATIENT)
Age: 79
End: 2024-08-08

## 2024-08-08 LAB
% ALBUMIN: 53.5 % — SIGNIFICANT CHANGE UP
% ALPHA 1: 6 % — SIGNIFICANT CHANGE UP
% ALPHA 2: 13.5 % — SIGNIFICANT CHANGE UP
% BETA: 14 % — SIGNIFICANT CHANGE UP
% GAMMA: 13 % — SIGNIFICANT CHANGE UP
ALBUMIN SERPL ELPH-MCNC: 2.9 G/DL — LOW (ref 3.3–5)
ALBUMIN SERPL ELPH-MCNC: 3 G/DL — LOW (ref 3.6–5.5)
ALBUMIN/GLOB SERPL ELPH: 1.2 RATIO — SIGNIFICANT CHANGE UP
ALP SERPL-CCNC: 67 U/L — SIGNIFICANT CHANGE UP (ref 40–120)
ALPHA1 GLOB SERPL ELPH-MCNC: 0.3 G/DL — SIGNIFICANT CHANGE UP (ref 0.1–0.4)
ALPHA2 GLOB SERPL ELPH-MCNC: 0.8 G/DL — SIGNIFICANT CHANGE UP (ref 0.5–1)
ALT FLD-CCNC: 15 U/L — SIGNIFICANT CHANGE UP (ref 10–45)
ANION GAP SERPL CALC-SCNC: 9 MMOL/L — SIGNIFICANT CHANGE UP (ref 5–17)
AST SERPL-CCNC: 18 U/L — SIGNIFICANT CHANGE UP (ref 10–40)
B-GLOBULIN SERPL ELPH-MCNC: 0.8 G/DL — SIGNIFICANT CHANGE UP (ref 0.5–1)
BILIRUB SERPL-MCNC: 0.3 MG/DL — SIGNIFICANT CHANGE UP (ref 0.2–1.2)
BUN SERPL-MCNC: 22 MG/DL — SIGNIFICANT CHANGE UP (ref 7–23)
CALCIUM SERPL-MCNC: 10.4 MG/DL — SIGNIFICANT CHANGE UP (ref 8.4–10.5)
CHLORIDE SERPL-SCNC: 103 MMOL/L — SIGNIFICANT CHANGE UP (ref 96–108)
CO2 SERPL-SCNC: 26 MMOL/L — SIGNIFICANT CHANGE UP (ref 22–31)
CREAT SERPL-MCNC: 1.42 MG/DL — HIGH (ref 0.5–1.3)
EGFR: 38 ML/MIN/1.73M2 — LOW
GAMMA GLOBULIN: 0.7 G/DL — SIGNIFICANT CHANGE UP (ref 0.6–1.6)
GLUCOSE SERPL-MCNC: 84 MG/DL — SIGNIFICANT CHANGE UP (ref 70–99)
HCT VFR BLD CALC: 32.9 % — LOW (ref 34.5–45)
HGB BLD-MCNC: 10.6 G/DL — LOW (ref 11.5–15.5)
INTERPRETATION SERPL IFE-IMP: SIGNIFICANT CHANGE UP
MAGNESIUM SERPL-MCNC: 1.9 MG/DL — SIGNIFICANT CHANGE UP (ref 1.6–2.6)
MCHC RBC-ENTMCNC: 28.9 PG — SIGNIFICANT CHANGE UP (ref 27–34)
MCHC RBC-ENTMCNC: 32.2 GM/DL — SIGNIFICANT CHANGE UP (ref 32–36)
MCV RBC AUTO: 89.6 FL — SIGNIFICANT CHANGE UP (ref 80–100)
NRBC # BLD: 0 /100 WBCS — SIGNIFICANT CHANGE UP (ref 0–0)
PHOSPHATE SERPL-MCNC: 3.7 MG/DL — SIGNIFICANT CHANGE UP (ref 2.5–4.5)
PLATELET # BLD AUTO: 274 K/UL — SIGNIFICANT CHANGE UP (ref 150–400)
POTASSIUM SERPL-MCNC: 3.8 MMOL/L — SIGNIFICANT CHANGE UP (ref 3.5–5.3)
POTASSIUM SERPL-SCNC: 3.8 MMOL/L — SIGNIFICANT CHANGE UP (ref 3.5–5.3)
PROT PATTERN SERPL ELPH-IMP: SIGNIFICANT CHANGE UP
PROT SERPL-MCNC: 5.6 G/DL — LOW (ref 6–8.3)
PROT SERPL-MCNC: 6.4 G/DL — SIGNIFICANT CHANGE UP (ref 6–8.3)
RBC # BLD: 3.67 M/UL — LOW (ref 3.8–5.2)
RBC # FLD: 13 % — SIGNIFICANT CHANGE UP (ref 10.3–14.5)
SODIUM SERPL-SCNC: 138 MMOL/L — SIGNIFICANT CHANGE UP (ref 135–145)
WBC # BLD: 7.79 K/UL — SIGNIFICANT CHANGE UP (ref 3.8–10.5)
WBC # FLD AUTO: 7.79 K/UL — SIGNIFICANT CHANGE UP (ref 3.8–10.5)

## 2024-08-08 PROCEDURE — 99232 SBSQ HOSP IP/OBS MODERATE 35: CPT

## 2024-08-08 RX ADMIN — MAGNESIUM OXIDE 400 MILLIGRAM(S): 253 TABLET ORAL at 08:50

## 2024-08-08 RX ADMIN — Medication 1 PATCH: at 20:52

## 2024-08-08 RX ADMIN — BUDESONIDE AND FORMOTEROL FUMARATE DIHYDRATE 2 PUFF(S): 80; 4.5 AEROSOL RESPIRATORY (INHALATION) at 09:00

## 2024-08-08 RX ADMIN — Medication 80 MILLILITER(S): at 18:31

## 2024-08-08 RX ADMIN — Medication 50 MILLIGRAM(S): at 08:49

## 2024-08-08 RX ADMIN — LEVETIRACETAM 500 MILLIGRAM(S): 1000 TABLET, FILM COATED ORAL at 18:17

## 2024-08-08 RX ADMIN — Medication 1 PATCH: at 07:45

## 2024-08-08 RX ADMIN — LEVETIRACETAM 500 MILLIGRAM(S): 1000 TABLET, FILM COATED ORAL at 05:39

## 2024-08-08 RX ADMIN — Medication 80 MILLILITER(S): at 05:38

## 2024-08-08 RX ADMIN — HEPARIN SODIUM 5000 UNIT(S): 1000 INJECTION, SOLUTION INTRAVENOUS; SUBCUTANEOUS at 05:39

## 2024-08-08 RX ADMIN — MAGNESIUM OXIDE 400 MILLIGRAM(S): 253 TABLET ORAL at 18:17

## 2024-08-08 RX ADMIN — HEPARIN SODIUM 5000 UNIT(S): 1000 INJECTION, SOLUTION INTRAVENOUS; SUBCUTANEOUS at 18:17

## 2024-08-08 NOTE — PROGRESS NOTE ADULT - ASSESSMENT
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 History limited to altered mental status. Admitted for further evaluation.     #Metabolic Encephalopathy -Resolved   -Unclear etiology, likely electrolyte abnormalities   -Continue management as below     #Bacteruria  -Urine cx NGTD  -Discontinued Levaquin    #Hypokalemia  #Hypercalcemia  #Hypomag  #ATN on CKD II  -Improving w/ IVF   -SCr 1.1 Jan 2024  -Replete Electrolytes  -Nephrology on board     #Low TSH level likely Euthyroid sick syndrome  -T4/T3 levels WNL  -outpatient repeat of labs    #HTN  -hold home triamterene-HCTZ  -nifedipine 30 XL held    #COPD  -Not in exacerbation  -continue Budesonide 80 mcg/Formoterol 4.5 mcg 2 puffs BID  -continue albuterol PRN    #Seizures  #Hx of 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 Spoke to patient at length about DOROTHY recs from PT. She is considering since she states she needs help at home. She said her friend is helping her look into help at home as well. 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 History limited to altered mental status. Admitted for further evaluation.     #Metabolic Encephalopathy -Resolved   -Unclear etiology, likely electrolyte abnormalities   -Continue management as below     #Bacteruria  -Urine cx NGTD  -Discontinued Levaquin    #Hypokalemia-resolved  #Hypercalcemia-resolved  #Hypomag-resolved  #ATN on CKD II  -Improving w/ IVF   -SCr 1.1 Jan 2024  -Replete Electrolytes prn  -Nephrology on board     #Low TSH level likely Euthyroid sick syndrome  -T4/T3 levels WNL  -outpatient repeat of labs    #HTN  -hold home triamterene-HCTZ due to renal function  -nifedipine 30 XL held    #COPD  -Not in exacerbation  -continue Budesonide 80 mcg/Formoterol 4.5 mcg 2 puffs BID  -continue albuterol PRN    #Seizures  #Hx of 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 Spoke to patient at length about DOROTHY recs from PT. She is considering since she states she needs help at home. She said her friend is helping her look into help at home as well. 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 History limited to altered mental status. Admitted for further evaluation.     #Metabolic Encephalopathy -Resolved   -Unclear etiology, likely electrolyte abnormalities   -Continue management as below     #Bacteruria  -Urine cx NGTD  -Discontinued Levaquin    #Hypokalemia-resolved  #Hypercalcemia-resolved  #Hypomag-resolved  #ATN on CKD II  -Improving w/ IVF   -SCr 1.1 Jan 2024  -Replete Electrolytes prn  -Nephrology on board     #Low TSH level likely Euthyroid sick syndrome  -T4/T3 levels WNL  -outpatient repeat of labs    #HTN  -hold home triamterene-HCTZ due to renal function  -nifedipine 30 XL held    #COPD  -Not in exacerbation  -continue Budesonide 80 mcg/Formoterol 4.5 mcg 2 puffs BID  -continue albuterol PRN    #Seizures  #Hx of 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 Spoke to patient at length about DOROTHY recs from PT. She is considering since she states she needs help at home. She said her friend is helping her look into help at home as well.

## 2024-08-08 NOTE — PROGRESS NOTE ADULT - SUBJECTIVE AND OBJECTIVE BOX
Patient is a 79y old  Female who presents with a chief complaint of weakness with fall (07 Aug 2024 20:14)      Patient seen and examined at bedside. No overnight events reported.     ALLERGIES:  penicillin (Short breath; Hives)    MEDICATIONS  (STANDING):  budesonide 160 MICROgram(s)/formoterol 4.5 MICROgram(s) Inhaler 2 Puff(s) Inhalation two times a day  escitalopram 10 milliGRAM(s) Oral daily  heparin   Injectable 5000 Unit(s) SubCutaneous every 12 hours  levETIRAcetam 500 milliGRAM(s) Oral two times a day  magnesium oxide 400 milliGRAM(s) Oral three times a day with meals  nicotine -  14 mG/24Hr(s) Patch 1 Patch Transdermal daily  polyethylene glycol 3350 17 Gram(s) Oral two times a day  senna 2 Tablet(s) Oral at bedtime  sodium chloride 0.9%. 1000 milliLiter(s) (80 mL/Hr) IV Continuous <Continuous>    MEDICATIONS  (PRN):  acetaminophen     Tablet .. 650 milliGRAM(s) Oral every 6 hours PRN Temp greater or equal to 38C (100.4F), Mild Pain (1 - 3)  albuterol    90 MICROgram(s) HFA Inhaler 2 Puff(s) Inhalation every 6 hours PRN Shortness of Breath and/or Wheezing  aluminum hydroxide/magnesium hydroxide/simethicone Suspension 30 milliLiter(s) Oral every 4 hours PRN Dyspepsia  melatonin 3 milliGRAM(s) Oral at bedtime PRN Insomnia  ondansetron Injectable 4 milliGRAM(s) IV Push every 8 hours PRN Nausea and/or Vomiting  traMADol 50 milliGRAM(s) Oral every 8 hours PRN Severe Pain (7 - 10)    Vital Signs Last 24 Hrs  T(F): 97.4 (08 Aug 2024 05:38), Max: 97.8 (07 Aug 2024 20:17)  HR: 75 (08 Aug 2024 09:01) (72 - 82)  BP: 150/70 (08 Aug 2024 05:38) (123/81 - 150/70)  RR: 18 (08 Aug 2024 05:38) (16 - 18)  SpO2: 94% (08 Aug 2024 09:01) (93% - 98%)  I&O's Summary    PHYSICAL EXAM:  General: NAD, A&O X 3  ENT: No gross hearing impairment, Moist mucous membranes, no thrush  Neck: Supple, No JVD  Lungs: Clear to auscultation bilaterally, good air entry, non-labored breathing  Cardio: RRR, S1/S2, No murmur  Abdomen: Soft, Nontender, Nondistended; Bowel sounds present  Extremities: FROM all extremities, No calf tenderness, No cyanosis  Psych: Appropriate mood and affect    LABS:                        10.6   7.79  )-----------( 274      ( 08 Aug 2024 07:27 )             32.9     08-07    136  |  102  |  21  ----------------------------<  74  3.9   |  25  |  1.42    Ca    10.6      07 Aug 2024 07:30  Phos  2.9     08-07  Mg     1.9     08-07    TPro  5.6  /  Alb  x   /  TBili  x   /  DBili  x   /  AST  x   /  ALT  x   /  AlkPhos  x   08-06                    08-05 Chol 134 mg/dL LDL -- HDL 37 mg/dL Trig 134 mg/dL  TSH 0.161   TSH with FT4 reflex --  Total T3 159                  Urinalysis Basic - ( 07 Aug 2024 07:30 )    Color: x / Appearance: x / SG: x / pH: x  Gluc: 74 mg/dL / Ketone: x  / Bili: x / Urobili: x   Blood: x / Protein: x / Nitrite: x   Leuk Esterase: x / RBC: x / WBC x   Sq Epi: x / Non Sq Epi: x / Bacteria: x        Culture - Urine (collected 04 Aug 2024 19:30)  Source: Clean Catch Clean Catch (Midstream)  Final Report (06 Aug 2024 08:20):    <10,000 CFU/mL Normal Urogenital Isabella    Culture - Blood (collected 04 Aug 2024 15:45)  Source: .Blood Blood-Peripheral  Preliminary Report (07 Aug 2024 22:01):    No growth at 72 Hours    Culture - Blood (collected 04 Aug 2024 15:40)  Source: .Blood Blood-Peripheral  Preliminary Report (07 Aug 2024 22:01):    No growth at 72 Hours      COVID-19 PCR: NotDetec (08-04-24 @ 15:40)    RADIOLOGY & ADDITIONAL TESTS:    Care Discussed with Consultants/Other Providers:

## 2024-08-08 NOTE — DISCHARGE NOTE PROVIDER - CARE PROVIDER_API CALL
Jostin Carter.  [facility]  [address] [place] [id number]  Phone: [phone]  Fax: [phone]  Follow Up Time:

## 2024-08-08 NOTE — DISCHARGE NOTE PROVIDER - ATTENDING DISCHARGE PHYSICAL EXAMINATION:
GENERAL: NAD  CHEST/LUNG: Clear to percussion bilaterally; No rales, rhonchi, wheezing, or rubs; normal respiratory effort, no intercostal retractions; No pitting edema  HEART: Regular rate and rhythm; No murmurs, rubs, or gallops  PSYCH: Appropriate affect, Alert

## 2024-08-08 NOTE — DISCHARGE NOTE PROVIDER - NSDCMRMEDTOKEN_GEN_ALL_CORE_FT
albuterol 90 mcg/inh inhalation aerosol: 2 puff(s) inhaled every 6 hours As needed Shortness of Breath and/or Wheezing  budesonide-formoterol 160 mcg-4.5 mcg/inh inhalation aerosol: 2 puff(s) inhaled 2 times a day  escitalopram 10 mg oral tablet: 1 tab(s) orally once a day  Gemtesa 75 mg oral tablet: 1 tab(s) orally once a day  Keppra 500 mg oral tablet: 1 tab(s) orally every 12 hours  nicotine 14 mg/24 hr transdermal film, extended release: 1 patch transdermal once a day  polyethylene glycol 3350 oral powder for reconstitution: 17 gram(s) orally 2 times a day  senna leaf extract oral tablet: 2 tab(s) orally once a day (at bedtime)  traMADol 50 mg oral tablet: 1 tab(s) orally every 8 hours as needed for pain  triamterene-hydrochlorothiazide 37.5 mg-25 mg oral tablet: 1 tab(s) orally once a day   albuterol 90 mcg/inh inhalation aerosol: 2 puff(s) inhaled every 6 hours As needed Shortness of Breath and/or Wheezing  amLODIPine 5 mg oral tablet: 1 tab(s) orally once a day  budesonide-formoterol 160 mcg-4.5 mcg/inh inhalation aerosol: 2 puff(s) inhaled 2 times a day  escitalopram 10 mg oral tablet: 1 tab(s) orally once a day  Gemtesa 75 mg oral tablet: 1 tab(s) orally once a day  Keppra 500 mg oral tablet: 1 tab(s) orally every 12 hours  nicotine 14 mg/24 hr transdermal film, extended release: 1 patch transdermal once a day  polyethylene glycol 3350 oral powder for reconstitution: 17 gram(s) orally 2 times a day  senna leaf extract oral tablet: 2 tab(s) orally once a day (at bedtime)

## 2024-08-08 NOTE — DISCHARGE NOTE PROVIDER - NSDCCPCAREPLAN_GEN_ALL_CORE_FT
PRINCIPAL DISCHARGE DIAGNOSIS  Diagnosis: Altered mental status  Assessment and Plan of Treatment: -you are back to baseline mental status  -you had no acute infection  -you had electrolyte abnormalities, follow up with your PCP within 1 week for repeat bloodwork      SECONDARY DISCHARGE DIAGNOSES  Diagnosis: TEREZA (acute kidney injury)  Assessment and Plan of Treatment: -you had abnormal kidney function, which is now improved  -stop Triamterene/HCTZ medication  -follow up with your PCP within 1 week    Diagnosis: HTN (hypertension)  Assessment and Plan of Treatment: -continue only Amlodipine 5 mg. daily

## 2024-08-08 NOTE — DISCHARGE NOTE PROVIDER - HOSPITAL COURSE
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 History limited to altered mental status. Reports recent fall injury. States that she hurt herself by running into a wall. Prior to admission, pt states that she tripped over her cat obtained scratches and bites. She states that she was unable to get up from the floor after her fall. She called her boyfriend for help, which prompted her admission to the ED. Of note, pt reports recent diagnosis of UTI a week ago, started on cipro. Additional symptoms include headaches, cough, pain in LEs, nausea, vomiting and low belly pain. Has chronic gait instability and uses rollator to ambulate. Denies recent use of steroids. Denies dysuria, urinary frequency, urinary urgency. States that she recent received IV contrast for imaging studies including PET/CT and MRI. Most recent MRI with IV contrast on 2/2024, however pt reports being followed at [facility].      In the ED, VSS. Labs significant for K: 2.6, Crt: 2.75, Calcium of 11.4, Mg 1.4, . UA+ moderate bacteria. 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 Blood cultures x2 and urine culture collected. Given 2.3L IVFs, potassium repletion and IV levaquin. Admitted for further evaluation.  (04 Aug 2024 19:43)    Upon admission, blood cultures were drawn and patient was started on levaquin. Patient with TEREZA on CKD, started IVF and nephrology was consulted. Renal US was performed with results shown below, negative for hydronephrosis.     < from: US Kidney and Bladder (08.05.24 @ 20:39) >    IMPRESSION:  Bilateral renal cysts and parenchymal disease.  No hydronephrosis    < end of copied text >          You were admitted for   You were diagnosed with   You were treated with   You were prescribed the following new medications:    You will need to follow up with your primary care physician.    Source of Infection:  Antibiotic / Last Day:    Palliative Care / Advanced Care Planning  Code Status:  Patient/Family agreeable to Hospice/Palliative (Y/N)?  Summary of Goals of Care Conversation:    Discharging Provider:  GINNY Boles  Contact Info: [phone] - Please call with any questions or concerns.    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 History limited to altered mental status. Reports recent fall injury. States that she hurt herself by running into a wall. Prior to admission, pt states that she tripped over her cat obtained scratches and bites. She states that she was unable to get up from the floor after her fall. She called her boyfriend for help, which prompted her admission to the ED. Of note, pt reports recent diagnosis of UTI a week ago, started on cipro. Additional symptoms include headaches, cough, pain in LEs, nausea, vomiting and low belly pain. Has chronic gait instability and uses rollator to ambulate. Denies recent use of steroids. Denies dysuria, urinary frequency, urinary urgency. States that she recent received IV contrast for imaging studies including PET/CT and MRI. Most recent MRI with IV contrast on 2/2024, however pt reports being followed at [facility].      In the ED, VSS. Labs significant for K: 2.6, Crt: 2.75, Calcium of 11.4, Mg 1.4, . UA+ moderate bacteria. 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 Blood cultures x2 and urine culture collected. Given 2.3L IVFs, potassium repletion and IV levaquin. Admitted for further evaluation.  (04 Aug 2024 19:43)  Upon admission, blood cultures were drawn and patient was started on levaquin. Patient with TEREZA on CKD, started IVF and nephrology was consulted. Renal US was performed with results shown below, negative for hydronephrosis.  Pt did not have an acute infection, had bacteruria, and antibx were stopped.    She had electrolyte abnormalities which were corrected and she was back to baseline mental status.  PT rec is for [facility], pt and family want her to go home in care of her Son.     < from: US Kidney and Bladder (08.05.24 @ 20:39) >    IMPRESSION:  Bilateral renal cysts and parenchymal disease.  No hydronephrosis    < end of copied text >      Discharging Provider:  GINNY Boles  Contact Info: [phone] - Please call with any questions or concerns.

## 2024-08-09 ENCOUNTER — TRANSCRIPTION ENCOUNTER (OUTPATIENT)
Age: 79
End: 2024-08-09

## 2024-08-09 VITALS
SYSTOLIC BLOOD PRESSURE: 124 MMHG | TEMPERATURE: 98 F | RESPIRATION RATE: 16 BRPM | DIASTOLIC BLOOD PRESSURE: 80 MMHG | HEART RATE: 82 BPM | OXYGEN SATURATION: 97 %

## 2024-08-09 LAB
CULTURE RESULTS: SIGNIFICANT CHANGE UP
CULTURE RESULTS: SIGNIFICANT CHANGE UP
SPECIMEN SOURCE: SIGNIFICANT CHANGE UP
SPECIMEN SOURCE: SIGNIFICANT CHANGE UP

## 2024-08-09 PROCEDURE — 96367 TX/PROPH/DG ADDL SEQ IV INF: CPT

## 2024-08-09 PROCEDURE — 99232 SBSQ HOSP IP/OBS MODERATE 35: CPT

## 2024-08-09 PROCEDURE — 83605 ASSAY OF LACTIC ACID: CPT

## 2024-08-09 PROCEDURE — 84165 PROTEIN E-PHORESIS SERUM: CPT

## 2024-08-09 PROCEDURE — 93970 EXTREMITY STUDY: CPT

## 2024-08-09 PROCEDURE — 96366 THER/PROPH/DIAG IV INF ADDON: CPT

## 2024-08-09 PROCEDURE — 85610 PROTHROMBIN TIME: CPT

## 2024-08-09 PROCEDURE — 94664 DEMO&/EVAL PT USE INHALER: CPT

## 2024-08-09 PROCEDURE — 87635 SARS-COV-2 COVID-19 AMP PRB: CPT

## 2024-08-09 PROCEDURE — 86334 IMMUNOFIX E-PHORESIS SERUM: CPT

## 2024-08-09 PROCEDURE — 71045 X-RAY EXAM CHEST 1 VIEW: CPT

## 2024-08-09 PROCEDURE — 87040 BLOOD CULTURE FOR BACTERIA: CPT

## 2024-08-09 PROCEDURE — 85730 THROMBOPLASTIN TIME PARTIAL: CPT

## 2024-08-09 PROCEDURE — 84436 ASSAY OF TOTAL THYROXINE: CPT

## 2024-08-09 PROCEDURE — 96365 THER/PROPH/DIAG IV INF INIT: CPT

## 2024-08-09 PROCEDURE — 80053 COMPREHEN METABOLIC PANEL: CPT

## 2024-08-09 PROCEDURE — 94760 N-INVAS EAR/PLS OXIMETRY 1: CPT

## 2024-08-09 PROCEDURE — 85025 COMPLETE CBC W/AUTO DIFF WBC: CPT

## 2024-08-09 PROCEDURE — 70450 CT HEAD/BRAIN W/O DYE: CPT | Mod: MC

## 2024-08-09 PROCEDURE — 99285 EMERGENCY DEPT VISIT HI MDM: CPT | Mod: 25

## 2024-08-09 PROCEDURE — 80061 LIPID PANEL: CPT

## 2024-08-09 PROCEDURE — 84100 ASSAY OF PHOSPHORUS: CPT

## 2024-08-09 PROCEDURE — 76770 US EXAM ABDO BACK WALL COMP: CPT

## 2024-08-09 PROCEDURE — 83036 HEMOGLOBIN GLYCOSYLATED A1C: CPT

## 2024-08-09 PROCEDURE — 82962 GLUCOSE BLOOD TEST: CPT

## 2024-08-09 PROCEDURE — 36415 COLL VENOUS BLD VENIPUNCTURE: CPT

## 2024-08-09 PROCEDURE — 83519 RIA NONANTIBODY: CPT

## 2024-08-09 PROCEDURE — 83970 ASSAY OF PARATHORMONE: CPT

## 2024-08-09 PROCEDURE — 82306 VITAMIN D 25 HYDROXY: CPT

## 2024-08-09 PROCEDURE — 85027 COMPLETE CBC AUTOMATED: CPT

## 2024-08-09 PROCEDURE — 84155 ASSAY OF PROTEIN SERUM: CPT

## 2024-08-09 PROCEDURE — 83735 ASSAY OF MAGNESIUM: CPT

## 2024-08-09 PROCEDURE — 93005 ELECTROCARDIOGRAM TRACING: CPT

## 2024-08-09 PROCEDURE — 94640 AIRWAY INHALATION TREATMENT: CPT

## 2024-08-09 PROCEDURE — 87086 URINE CULTURE/COLONY COUNT: CPT

## 2024-08-09 PROCEDURE — 81001 URINALYSIS AUTO W/SCOPE: CPT

## 2024-08-09 PROCEDURE — 84480 ASSAY TRIIODOTHYRONINE (T3): CPT

## 2024-08-09 PROCEDURE — 82533 TOTAL CORTISOL: CPT

## 2024-08-09 PROCEDURE — 84443 ASSAY THYROID STIM HORMONE: CPT

## 2024-08-09 PROCEDURE — 82310 ASSAY OF CALCIUM: CPT

## 2024-08-09 PROCEDURE — 80048 BASIC METABOLIC PNL TOTAL CA: CPT

## 2024-08-09 RX ORDER — TRIAMTERENE/HYDROCHLOROTHIAZID 75 MG-50MG
1 TABLET ORAL
Refills: 0 | DISCHARGE

## 2024-08-09 RX ORDER — TRAMADOL HCL 50 MG
1 TABLET ORAL
Refills: 0 | DISCHARGE

## 2024-08-09 RX ORDER — AMLODIPINE BESYLATE 2.5 MG/1
1 TABLET ORAL
Qty: 0 | Refills: 0 | DISCHARGE

## 2024-08-09 RX ADMIN — LEVETIRACETAM 500 MILLIGRAM(S): 1000 TABLET, FILM COATED ORAL at 05:10

## 2024-08-09 RX ADMIN — MAGNESIUM OXIDE 400 MILLIGRAM(S): 253 TABLET ORAL at 11:31

## 2024-08-09 RX ADMIN — Medication 1 PATCH: at 11:33

## 2024-08-09 RX ADMIN — Medication 1 PATCH: at 08:38

## 2024-08-09 RX ADMIN — HEPARIN SODIUM 5000 UNIT(S): 1000 INJECTION, SOLUTION INTRAVENOUS; SUBCUTANEOUS at 05:10

## 2024-08-09 RX ADMIN — BUDESONIDE AND FORMOTEROL FUMARATE DIHYDRATE 2 PUFF(S): 80; 4.5 AEROSOL RESPIRATORY (INHALATION) at 08:58

## 2024-08-09 RX ADMIN — MAGNESIUM OXIDE 400 MILLIGRAM(S): 253 TABLET ORAL at 09:08

## 2024-08-09 RX ADMIN — Medication 10 MILLIGRAM(S): at 11:24

## 2024-08-09 RX ADMIN — Medication 1 PATCH: at 11:23

## 2024-08-09 RX ADMIN — Medication 5 MILLIGRAM(S): at 16:34

## 2024-08-09 NOTE — PROGRESS NOTE ADULT - ASSESSMENT
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     #Metabolic Encephalopathy -Resolved   -Unclear etiology, likely electrolyte abnormalities   -Continue management as below     #Bacteruria  -Urine cx NGTD  -Discontinued Levaquin    #Hypokalemia-resolved  #Hypercalcemia-resolved  #Hypomagnesemia-resolved  #ATN on CKD II  -Improving after IVF  -SCr 1.1 Jan 2024  -Replete Electrolytes prn  -Nephrology on board     #Low TSH level likely Euthyroid sick syndrome  -T4/T3 levels WNL  -outpatient repeat of labs    #HTN  -hold home triamterene-HCTZ due to renal function  -nifedipine 30 XL held    #COPD  -Not in exacerbation  -continue Budesonide 80 mcg/Formoterol 4.5 mcg 2 puffs BID  -continue albuterol PRN    #Seizures  #Hx of 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 is considering [facility]-- recs from PT. She is considering since she states she needs help at home. She said her friend is helping her look into help at home as well. 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     #Metabolic Encephalopathy -Resolved   -Unclear etiology, likely electrolyte abnormalities   -pt is back to baseline mental status  -PT eval: pt is a 1-person assist and uses walker, rec is for [facility]    #Bacteruria  -Urine cx NGTD  -Discontinued Levaquin    #Hypokalemia-resolved  #Hypercalcemia-resolved  #Hypomagnesemia-resolved  #ATN on CKD II  -Improving after IVF, will stop fluids today  -SCr 1.1 Jan 2024  -follow bmp   -Nephrology on board     #Low TSH level likely Euthyroid sick syndrome  -T4/T3 levels WNL  -outpatient repeat of labs    #HTN  -hold home triamterene-HCTZ due to renal function  -monitor vitals    #COPD  -Not in exacerbation  -continue Budesonide 80 mcg/Formoterol 4.5 mcg 2 puffs BID  -continue albuterol PRN    #Seizures  #Hx of 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     Dispo:  pt. is considering [facility]-- per PT rec. 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     #Metabolic Encephalopathy -Resolved   -Unclear etiology, likely electrolyte abnormalities   -pt is back to baseline mental status  -PT eval: pt is a 1-person assist and uses walker, rec is for [facility]    #Bacteruria  -Urine cx NGTD  -Discontinued Levaquin    #Hypokalemia-resolved  #Hypercalcemia-resolved  #Hypomagnesemia-resolved  #ATN on CKD II  -Improving after IVF, will stop fluids today  -SCr 1.1 Jan 2024  -follow bmp   -Nephrology on board     #Low TSH level likely Euthyroid sick syndrome  -T4/T3 levels WNL  -outpatient repeat of labs    #HTN  -hold home triamterene-HCTZ due to renal function  -monitor vitals    #COPD  -Not in exacerbation  -continue Budesonide 80 mcg/Formoterol 4.5 mcg 2 puffs BID  -continue albuterol PRN    #Seizures  #Hx of 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     Dispo:  [facility]-- per PT rec. per CM team facility choice so far is [facility]

## 2024-08-09 NOTE — PROGRESS NOTE ADULT - SUBJECTIVE AND OBJECTIVE BOX
No distress    Vital Signs Last 24 Hrs  T(C): 36.4 (08-09-24 @ 11:43), Max: 36.7 (08-08-24 @ 21:00)  T(F): 97.6 (08-09-24 @ 11:43), Max: 98.1 (08-08-24 @ 21:00)  HR: 82 (08-09-24 @ 11:43) (82 - 92)  BP: 124/80 (08-09-24 @ 11:43) (124/80 - 185/94)  RR: 16 (08-09-24 @ 11:43) (16 - 18)  SpO2: 97% (08-09-24 @ 11:43) (95% - 98%)    Lungs b/l air entry  Heart S1S2  Abd soft ND  Extremities  tr edema                        10.6   7.79  )-----------( 274      ( 08 Aug 2024 07:27 )             32.9     08 Aug 2024 07:27    138    |  103    |  22     ----------------------------<  84     3.8     |  26     |  1.42     Ca    10.4       08 Aug 2024 07:27  Phos  3.7       08 Aug 2024 07:27  Mg     1.9       08 Aug 2024 07:27    TPro  6.4    /  Alb  2.9    /  TBili  0.3    /  DBili  x      /  AST  18     /  ALT  15     /  AlkPhos  67     08 Aug 2024 07:27    LIVER FUNCTIONS - ( 08 Aug 2024 07:27 )  Alb: 2.9 g/dL / Pro: 6.4 g/dL / ALK PHOS: 67 U/L / ALT: 15 U/L / AST: 18 U/L / GGT: x           acetaminophen     Tablet .. 650 milliGRAM(s) Oral every 6 hours PRN  albuterol    90 MICROgram(s) HFA Inhaler 2 Puff(s) Inhalation every 6 hours PRN  aluminum hydroxide/magnesium hydroxide/simethicone Suspension 30 milliLiter(s) Oral every 4 hours PRN  budesonide 160 MICROgram(s)/formoterol 4.5 MICROgram(s) Inhaler 2 Puff(s) Inhalation two times a day  escitalopram 10 milliGRAM(s) Oral daily  heparin   Injectable 5000 Unit(s) SubCutaneous every 12 hours  levETIRAcetam 500 milliGRAM(s) Oral two times a day  melatonin 3 milliGRAM(s) Oral at bedtime PRN  nicotine -  14 mG/24Hr(s) Patch 1 Patch Transdermal daily  ondansetron Injectable 4 milliGRAM(s) IV Push every 8 hours PRN  polyethylene glycol 3350 17 Gram(s) Oral two times a day  senna 2 Tablet(s) Oral at bedtime    Home Medications:  albuterol 90 mcg/inh inhalation aerosol: 2 puff(s) inhaled every 6 hours As needed Shortness of Breath and/or Wheezing (04 Aug 2024 22:19)  amLODIPine 5 mg oral tablet: 1 tab(s) orally once a day (09 Aug 2024 17:28)  budesonide-formoterol 160 mcg-4.5 mcg/inh inhalation aerosol: 2 puff(s) inhaled 2 times a day (04 Aug 2024 22:19)  escitalopram 10 mg oral tablet: 1 tab(s) orally once a day (04 Aug 2024 22:19)  Gemtesa 75 mg oral tablet: 1 tab(s) orally once a day (05 Aug 2024 02:12)  Keppra 500 mg oral tablet: 1 tab(s) orally every 12 hours (04 Aug 2024 22:18)  nicotine 14 mg/24 hr transdermal film, extended release: 1 patch transdermal once a day (04 Aug 2024 22:19)  polyethylene glycol 3350 oral powder for reconstitution: 17 gram(s) orally 2 times a day (04 Aug 2024 22:19)  senna leaf extract oral tablet: 2 tab(s) orally once a day (at bedtime) (04 Aug 2024 22:19)    ASSESSMENT/PLAN:    Pre-renal TEREZA/CKD 3 (Cr 1.27 - 2/8/24)  Improved w/IVF  No need for further IVF  Avoid nephrotoxins   For d/c on current meds w/op f/u  No NSAID's    [phone]

## 2024-08-09 NOTE — DISCHARGE NOTE NURSING/CASE MANAGEMENT/SOCIAL WORK - PATIENT PORTAL LINK FT
You can access the FollowMyHealth Patient Portal offered by [facility] by registering at the following website: [email or web address]. By joining [facility]’s FollowMyHealth portal, you will also be able to view your health information using other applications (apps) compatible with our system.

## 2024-08-09 NOTE — PROGRESS NOTE ADULT - NS ATTEND AMEND GEN_ALL_CORE FT
pt is medical stable. Awaiting auth for pt to go to [facility]
Patient picking [facility] choices. Understands she needs help at home. D/C planning to [facility] with outpatient nephrology F/U

## 2024-08-09 NOTE — PROGRESS NOTE ADULT - REASON FOR ADMISSION
weakness with fall

## 2024-08-09 NOTE — DISCHARGE NOTE NURSING/CASE MANAGEMENT/SOCIAL WORK - NSDCPEFALRISK_GEN_ALL_CORE
For information on Fall & Injury Prevention, visit: [email or web address] OR  [email or web address] OR  https://www.cdc.gov/steadi/patient.html

## 2024-08-09 NOTE — PROGRESS NOTE ADULT - SUBJECTIVE AND OBJECTIVE BOX
Patient is a 79y old  Female who presents with a chief complaint of weakness with fall (08 Aug 2024 14:39)      Patient seen and examined at bedside. No overnight events reported.     ALLERGIES:  penicillin (Short breath; Hives)    MEDICATIONS  (STANDING):  budesonide 160 MICROgram(s)/formoterol 4.5 MICROgram(s) Inhaler 2 Puff(s) Inhalation two times a day  escitalopram 10 milliGRAM(s) Oral daily  heparin   Injectable 5000 Unit(s) SubCutaneous every 12 hours  levETIRAcetam 500 milliGRAM(s) Oral two times a day  magnesium oxide 400 milliGRAM(s) Oral three times a day with meals  nicotine -  14 mG/24Hr(s) Patch 1 Patch Transdermal daily  polyethylene glycol 3350 17 Gram(s) Oral two times a day  senna 2 Tablet(s) Oral at bedtime  sodium chloride 0.9%. 1000 milliLiter(s) (80 mL/Hr) IV Continuous <Continuous>    MEDICATIONS  (PRN):  acetaminophen     Tablet .. 650 milliGRAM(s) Oral every 6 hours PRN Temp greater or equal to 38C (100.4F), Mild Pain (1 - 3)  albuterol    90 MICROgram(s) HFA Inhaler 2 Puff(s) Inhalation every 6 hours PRN Shortness of Breath and/or Wheezing  aluminum hydroxide/magnesium hydroxide/simethicone Suspension 30 milliLiter(s) Oral every 4 hours PRN Dyspepsia  melatonin 3 milliGRAM(s) Oral at bedtime PRN Insomnia  ondansetron Injectable 4 milliGRAM(s) IV Push every 8 hours PRN Nausea and/or Vomiting  traMADol 50 milliGRAM(s) Oral every 8 hours PRN Severe Pain (7 - 10)    Vital Signs Last 24 Hrs  T(F): 97.9 (09 Aug 2024 05:46), Max: 98.1 (08 Aug 2024 13:15)  HR: 92 (09 Aug 2024 05:46) (71 - 92)  BP: 185/94 (09 Aug 2024 05:46) (142/87 - 185/94)  RR: 18 (09 Aug 2024 05:46) (16 - 18)  SpO2: 95% (09 Aug 2024 05:46) (94% - 100%)  I&O's Summary    PHYSICAL EXAM:  General: NAD, A/O x 3  ENT: No gross hearing impairment, Moist mucous membranes, no thrush  Neck: Supple, No JVD  Lungs: Clear to auscultation bilaterally, good air entry, non-labored breathing  Cardio: RRR, S1/S2, No murmur  Abdomen: Soft, Nontender, Nondistended; Bowel sounds present  Extremities: No calf tenderness, No cyanosis, No pitting edema  Psych: Appropriate mood and affect    LABS:                        10.6   7.79  )-----------( 274      ( 08 Aug 2024 07:27 )             32.9     08-08    138  |  103  |  22  ----------------------------<  84  3.8   |  26  |  1.42    Ca    10.4      08 Aug 2024 07:27  Phos  3.7     08-08  Mg     1.9     08-08    TPro  6.4  /  Alb  2.9  /  TBili  0.3  /  DBili  x   /  AST  18  /  ALT  15  /  AlkPhos  67  08-08                    08-05 Chol 134 mg/dL LDL -- HDL 37 mg/dL Trig 134 mg/dL                  Urinalysis Basic - ( 08 Aug 2024 07:27 )    Color: x / Appearance: x / SG: x / pH: x  Gluc: 84 mg/dL / Ketone: x  / Bili: x / Urobili: x   Blood: x / Protein: x / Nitrite: x   Leuk Esterase: x / RBC: x / WBC x   Sq Epi: x / Non Sq Epi: x / Bacteria: x        Culture - Urine (collected 04 Aug 2024 19:30)  Source: Clean Catch Clean Catch (Midstream)  Final Report (06 Aug 2024 08:20):    <10,000 CFU/mL Normal Urogenital Isabella    Culture - Blood (collected 04 Aug 2024 15:45)  Source: .Blood Blood-Peripheral  Preliminary Report (08 Aug 2024 22:00):    No growth at 4 days    Culture - Blood (collected 04 Aug 2024 15:40)  Source: .Blood Blood-Peripheral  Preliminary Report (08 Aug 2024 22:00):    No growth at 4 days      COVID-19 PCR: NotDetec (08-04-24 @ 15:40)    RADIOLOGY & ADDITIONAL TESTS:    Care Discussed with Consultants/Other Providers:    Patient is a 79y old  Female who presents with a chief complaint of weakness with fall (08 Aug 2024 14:39)      Patient seen and examined at bedside. No overnight events reported.  She has no complaints today.     ALLERGIES:  penicillin (Short breath; Hives)    MEDICATIONS  (STANDING):  budesonide 160 MICROgram(s)/formoterol 4.5 MICROgram(s) Inhaler 2 Puff(s) Inhalation two times a day  escitalopram 10 milliGRAM(s) Oral daily  heparin   Injectable 5000 Unit(s) SubCutaneous every 12 hours  levETIRAcetam 500 milliGRAM(s) Oral two times a day  magnesium oxide 400 milliGRAM(s) Oral three times a day with meals  nicotine -  14 mG/24Hr(s) Patch 1 Patch Transdermal daily  polyethylene glycol 3350 17 Gram(s) Oral two times a day  senna 2 Tablet(s) Oral at bedtime  sodium chloride 0.9%. 1000 milliLiter(s) (80 mL/Hr) IV Continuous <Continuous>    MEDICATIONS  (PRN):  acetaminophen     Tablet .. 650 milliGRAM(s) Oral every 6 hours PRN Temp greater or equal to 38C (100.4F), Mild Pain (1 - 3)  albuterol    90 MICROgram(s) HFA Inhaler 2 Puff(s) Inhalation every 6 hours PRN Shortness of Breath and/or Wheezing  aluminum hydroxide/magnesium hydroxide/simethicone Suspension 30 milliLiter(s) Oral every 4 hours PRN Dyspepsia  melatonin 3 milliGRAM(s) Oral at bedtime PRN Insomnia  ondansetron Injectable 4 milliGRAM(s) IV Push every 8 hours PRN Nausea and/or Vomiting  traMADol 50 milliGRAM(s) Oral every 8 hours PRN Severe Pain (7 - 10)    Vital Signs Last 24 Hrs  T(F): 97.9 (09 Aug 2024 05:46), Max: 98.1 (08 Aug 2024 13:15)  HR: 92 (09 Aug 2024 05:46) (71 - 92)  BP: 185/94 (09 Aug 2024 05:46) (142/87 - 185/94)  RR: 18 (09 Aug 2024 05:46) (16 - 18)  SpO2: 95% (09 Aug 2024 05:46) (94% - 100%)  I&O's Summary    PHYSICAL EXAM:  General: NAD, A/O x 3  ENT: No gross hearing impairment, Moist mucous membranes, no thrush  Neck: Supple, No JVD  Lungs: Clear to auscultation bilaterally, good air entry, non-labored breathing  Cardio: RRR, S1/S2, No murmur  Abdomen: Soft, Nontender, Nondistended; Bowel sounds present  Extremities: No calf tenderness, No cyanosis, No pitting edema  Psych: Appropriate mood and affect    LABS:                        10.6   7.79  )-----------( 274      ( 08 Aug 2024 07:27 )             32.9     08-08    138  |  103  |  22  ----------------------------<  84  3.8   |  26  |  1.42    Ca    10.4      08 Aug 2024 07:27  Phos  3.7     08-08  Mg     1.9     08-08    TPro  6.4  /  Alb  2.9  /  TBili  0.3  /  DBili  x   /  AST  18  /  ALT  15  /  AlkPhos  67  08-08                    08-05 Chol 134 mg/dL LDL -- HDL 37 mg/dL Trig 134 mg/dL                  Urinalysis Basic - ( 08 Aug 2024 07:27 )    Color: x / Appearance: x / SG: x / pH: x  Gluc: 84 mg/dL / Ketone: x  / Bili: x / Urobili: x   Blood: x / Protein: x / Nitrite: x   Leuk Esterase: x / RBC: x / WBC x   Sq Epi: x / Non Sq Epi: x / Bacteria: x        Culture - Urine (collected 04 Aug 2024 19:30)  Source: Clean Catch Clean Catch (Midstream)  Final Report (06 Aug 2024 08:20):    <10,000 CFU/mL Normal Urogenital Isabella    Culture - Blood (collected 04 Aug 2024 15:45)  Source: .Blood Blood-Peripheral  Preliminary Report (08 Aug 2024 22:00):    No growth at 4 days    Culture - Blood (collected 04 Aug 2024 15:40)  Source: .Blood Blood-Peripheral  Preliminary Report (08 Aug 2024 22:00):    No growth at 4 days      COVID-19 PCR: NotDetec (08-04-24 @ 15:40)    RADIOLOGY & ADDITIONAL TESTS:    Care Discussed with Consultants/Other Providers:

## 2024-08-12 DIAGNOSIS — Z87.891 PERSONAL HISTORY OF NICOTINE DEPENDENCE: ICD-10-CM

## 2024-08-12 RX ORDER — ALBUTEROL SULFATE 90 UG/1
108 (90 BASE) INHALANT RESPIRATORY (INHALATION) EVERY 6 HOURS
Qty: 1 | Refills: 0 | Status: ACTIVE | COMMUNITY
Start: 2024-08-12

## 2024-08-12 RX ORDER — VIBEGRON 75 MG/1
75 TABLET, FILM COATED ORAL
Refills: 0 | Status: ACTIVE | COMMUNITY
Start: 2024-08-12

## 2024-08-12 RX ORDER — AMLODIPINE BESYLATE 5 MG/1
5 TABLET ORAL
Qty: 90 | Refills: 1 | Status: ACTIVE | COMMUNITY
Start: 2024-08-12

## 2024-08-12 RX ORDER — BUDESONIDE AND FORMOTEROL FUMARATE DIHYDRATE 160; 4.5 UG/1; UG/1
160-4.5 AEROSOL RESPIRATORY (INHALATION) TWICE DAILY
Refills: 0 | Status: ACTIVE | COMMUNITY
Start: 2024-08-12

## 2024-08-12 RX ORDER — NICOTINE 21 MG/24HR
14 PATCH, TRANSDERMAL 24 HOURS TRANSDERMAL
Refills: 0 | Status: ACTIVE | COMMUNITY
Start: 2024-08-12

## 2024-08-13 LAB — PTH RELATED PROT SERPL-MCNC: <2 PMOL/L — SIGNIFICANT CHANGE UP

## 2024-08-14 ENCOUNTER — APPOINTMENT (OUTPATIENT)
Dept: FAMILY MEDICINE | Facility: CLINIC | Age: 79
End: 2024-08-14
Payer: MEDICARE

## 2024-08-14 VITALS
SYSTOLIC BLOOD PRESSURE: 112 MMHG | HEIGHT: 64 IN | DIASTOLIC BLOOD PRESSURE: 70 MMHG | TEMPERATURE: 97.4 F | HEART RATE: 98 BPM | WEIGHT: 151 LBS | RESPIRATION RATE: 15 BRPM | BODY MASS INDEX: 25.78 KG/M2 | OXYGEN SATURATION: 98 %

## 2024-08-14 DIAGNOSIS — R06.09 OTHER FORMS OF DYSPNEA: ICD-10-CM

## 2024-08-14 DIAGNOSIS — Z86.718 PERSONAL HISTORY OF OTHER VENOUS THROMBOSIS AND EMBOLISM: ICD-10-CM

## 2024-08-14 DIAGNOSIS — Z91.81 HISTORY OF FALLING: ICD-10-CM

## 2024-08-14 DIAGNOSIS — E87.1 HYPO-OSMOLALITY AND HYPONATREMIA: ICD-10-CM

## 2024-08-14 DIAGNOSIS — G93.9 DISORDER OF BRAIN, UNSPECIFIED: ICD-10-CM

## 2024-08-14 DIAGNOSIS — E78.5 HYPERLIPIDEMIA, UNSPECIFIED: ICD-10-CM

## 2024-08-14 DIAGNOSIS — I10 ESSENTIAL (PRIMARY) HYPERTENSION: ICD-10-CM

## 2024-08-14 PROCEDURE — 99496 TRANSJ CARE MGMT HIGH F2F 7D: CPT

## 2024-08-14 NOTE — PHYSICAL EXAM
[No Acute Distress] : no acute distress [EOMI] : extraocular movements intact [Supple] : supple [Clear to Auscultation] : lungs were clear to auscultation bilaterally [Normal S1, S2] : normal S1 and S2 [No Edema] : there was no peripheral edema [Non Tender] : non-tender [No Rash] : no rash [Normal Affect] : the affect was normal [de-identified] : elderly female [de-identified] : pain with rom right arm, right lower extremity, lower back pain [de-identified] : walker bound

## 2024-08-14 NOTE — REVIEW OF SYSTEMS
[Fever] : no fever [Chills] : no chills [Fatigue] : fatigue [Joint Pain] : joint pain [Joint Stiffness] : no joint stiffness [Joint Swelling] : no joint swelling [Muscle Weakness] : muscle weakness [Muscle Pain] : muscle pain [Back Pain] : back pain [Negative] : Heme/Lymph

## 2024-08-14 NOTE — ASSESSMENT
[FreeTextEntry1] : Greater than 55 min of face to face time, reviewed discharge notes, hospital lab work, addressed various health concerns, reviewed necessary labs, imaging, advised to follow up with referral.  Repeat CBC/CMP ordered today. Answered all pt questions, coordinated care for patient. Stable since discharge from Hospital. advised to return for CPE visit with PMD advised to closely follow up with her respective specialists appointments.

## 2024-08-14 NOTE — HISTORY OF PRESENT ILLNESS
[Post-hospitalization from ___ Hospital] : Post-hospitalization from [unfilled] Hospital [Admitted on: ___] : The patient was admitted on [unfilled] [Discharged on ___] : discharged on [unfilled] [Discharge Summary] : discharge summary [Pertinent Labs] : pertinent labs [Radiology Findings] : radiology findings [Discharge Med List] : discharge medication list [FreeTextEntry2] : Ms Haily Neely is a 78 yo female multiple chronic medical hx as listed below, who presents, today for post discharge visit. Pt is a poor historian. 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 History limited to altered mental status. Reports recent fall injury. States that she hurt herself by running into a wall. Prior to admission, pt states that she tripped over her cat obtained scratches and bites. She states that she was unable to get up from the floor after her fall. She called her boyfriend for help, which prompted her admission to the ED. Of note, pt reports recent diagnosis of UTI a week ago, started on cipro. Additional symptoms include headaches, cough, pain in LEs, nausea, vomiting and low belly pain. Has chronic gait instability and uses rollator to ambulate. Denies recent use of steroids. Denies dysuria, urinary frequency, urinary urgency. States that she recent received IV contrast for imaging studies including PET/CT and MRI. Most recent MRI with IV contrast on 2/2024, however pt reports being followed at [facility]. (as per pt, incorrect, followed at [facility]) In the ED, VSS. Labs significant for K: 2.6, Crt: 2.75, Calcium of 11.4, Mg 1.4, . UA+ moderate bacteria. 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 Blood cultures x2 and urine culture collected. Given 2.3L IVFs, potassium repletion and IV levaquin. Admitted for further evaluation. (04 Aug 2024 19:43) Upon admission, blood cultures were drawn and patient was started on levaquin. Patient with TEREZA on CKD, started IVF and nephrology was consulted. Renal US was performed with results shown below, negative for hydronephrosis. Pt did not have an acute infection, had bacteriuria, and antibiotics were stopped. She had electrolyte abnormalities which were corrected and she was back to baseline mental status. PT rec is for [facility], pt and family want her to go home in care of her Son.  Interval Change: Pt denies any new falls, dizziness, head collisions or loc. Pt does admit still fatigue, and muscle soreness generalized. Pt reports has upcoming appointments with her hematologist, oncologist, at [place]. Pt also advised to return for CPE visit with her PMD. Pt advised will need to repeat new K levels, will order labs today. Pt currently maintained on Amlodipine only for the HTN, Triamterene/HCTZ stopped at hospital. BP today within range. No acute new health concerns today.

## 2024-08-15 DIAGNOSIS — N19 UNSPECIFIED KIDNEY FAILURE: ICD-10-CM

## 2024-08-15 DIAGNOSIS — E87.6 HYPOKALEMIA: ICD-10-CM

## 2024-08-15 LAB
ALBUMIN SERPL ELPH-MCNC: 3.8 G/DL
ALP BLD-CCNC: 72 U/L
ALT SERPL-CCNC: 11 U/L
ANION GAP SERPL CALC-SCNC: 13 MMOL/L
AST SERPL-CCNC: 15 U/L
BASOPHILS # BLD AUTO: 0.06 K/UL
BASOPHILS NFR BLD AUTO: 0.7 %
BILIRUB SERPL-MCNC: 0.2 MG/DL
BUN SERPL-MCNC: 24 MG/DL
CALCIUM SERPL-MCNC: 11.5 MG/DL
CHLORIDE SERPL-SCNC: 102 MMOL/L
CO2 SERPL-SCNC: 24 MMOL/L
CREAT SERPL-MCNC: 1.85 MG/DL
EGFR: 28 ML/MIN/1.73M2
EOSINOPHIL # BLD AUTO: 0.28 K/UL
EOSINOPHIL NFR BLD AUTO: 3.1 %
GLUCOSE SERPL-MCNC: 105 MG/DL
HCT VFR BLD CALC: 37.2 %
HGB BLD-MCNC: 11.7 G/DL
IMM GRANULOCYTES NFR BLD AUTO: 0.3 %
LYMPHOCYTES # BLD AUTO: 1.95 K/UL
LYMPHOCYTES NFR BLD AUTO: 21.9 %
MAN DIFF?: NORMAL
MCHC RBC-ENTMCNC: 29.5 PG
MCHC RBC-ENTMCNC: 31.5 GM/DL
MCV RBC AUTO: 93.9 FL
MONOCYTES # BLD AUTO: 0.83 K/UL
MONOCYTES NFR BLD AUTO: 9.3 %
NEUTROPHILS # BLD AUTO: 5.75 K/UL
NEUTROPHILS NFR BLD AUTO: 64.7 %
PLATELET # BLD AUTO: 337 K/UL
POTASSIUM SERPL-SCNC: 4.2 MMOL/L
PROT SERPL-MCNC: 6.2 G/DL
RBC # BLD: 3.96 M/UL
RBC # FLD: 13.3 %
SODIUM SERPL-SCNC: 139 MMOL/L
WBC # FLD AUTO: 8.9 K/UL

## 2024-08-16 ENCOUNTER — RX RENEWAL (OUTPATIENT)
Age: 79
End: 2024-08-16

## 2024-08-22 ENCOUNTER — APPOINTMENT (OUTPATIENT)
Dept: FAMILY MEDICINE | Facility: CLINIC | Age: 79
End: 2024-08-22

## 2024-09-06 ENCOUNTER — RX RENEWAL (OUTPATIENT)
Age: 79
End: 2024-09-06

## 2024-09-11 NOTE — ASU DISCHARGE PLAN (ADULT/PEDIATRIC). - C. MAKE IMPORTANT PERSONAL OR BUSINESS DECISIONS
Medication: sildenafil (Viagra) 100 MG tablet  passed protocol.   Last office visit date: 5/13/2024  Next appointment scheduled?: No   Number of refills given: 1  
Statement Selected
CBC/CMP/PT/PTT/INR/Type and Screen/EKG

## 2024-09-19 ENCOUNTER — APPOINTMENT (OUTPATIENT)
Dept: FAMILY MEDICINE | Facility: CLINIC | Age: 79
End: 2024-09-19
Payer: MEDICARE

## 2024-09-19 VITALS
HEIGHT: 64 IN | BODY MASS INDEX: 25.27 KG/M2 | HEART RATE: 76 BPM | RESPIRATION RATE: 20 BRPM | SYSTOLIC BLOOD PRESSURE: 115 MMHG | DIASTOLIC BLOOD PRESSURE: 80 MMHG | WEIGHT: 148 LBS

## 2024-09-19 DIAGNOSIS — G93.9 DISORDER OF BRAIN, UNSPECIFIED: ICD-10-CM

## 2024-09-19 DIAGNOSIS — I10 ESSENTIAL (PRIMARY) HYPERTENSION: ICD-10-CM

## 2024-09-19 DIAGNOSIS — Z23 ENCOUNTER FOR IMMUNIZATION: ICD-10-CM

## 2024-09-19 DIAGNOSIS — E78.5 HYPERLIPIDEMIA, UNSPECIFIED: ICD-10-CM

## 2024-09-19 PROCEDURE — 90662 IIV NO PRSV INCREASED AG IM: CPT

## 2024-09-19 PROCEDURE — 36415 COLL VENOUS BLD VENIPUNCTURE: CPT

## 2024-09-19 PROCEDURE — 99214 OFFICE O/P EST MOD 30 MIN: CPT

## 2024-09-19 PROCEDURE — G0008: CPT

## 2024-09-19 PROCEDURE — G2211 COMPLEX E/M VISIT ADD ON: CPT

## 2024-09-19 NOTE — PHYSICAL EXAM
[No Acute Distress] : no acute distress [Normal] : normal rate, regular rhythm, normal S1 and S2 and no murmur heard [No Edema] : there was no peripheral edema [Soft] : abdomen soft [Non Tender] : non-tender [Normal Posterior Cervical Nodes] : no posterior cervical lymphadenopathy [Normal Anterior Cervical Nodes] : no anterior cervical lymphadenopathy [Ataxic, Wide-Based] : wide-based and ataxic [Motor Strength Lower Extremities Bilaterally] : there was weakness in both lower extremities [No Focal Deficits] : no focal deficits [Alert and Oriented x3] : oriented to person, place, and time [de-identified] : using rollator for ambulation

## 2024-09-19 NOTE — REVIEW OF SYSTEMS
[Fatigue] : fatigue [Muscle Weakness] : muscle weakness [Unsteady Walking] : ataxia [Negative] : Genitourinary

## 2024-09-19 NOTE — ASSESSMENT
[FreeTextEntry1] : Hemodynamically stable with acceptable BP LE weakness but no focal neuro deficits Lab profiles drawn in office and sent High dose flu vaccine given L deltoid

## 2024-09-19 NOTE — HISTORY OF PRESENT ILLNESS
[de-identified] : Presents for BP check, labs, and general follow-up; also due for current flu vaccine - pt in agreement.  States feels "weak" but trying to maintain mobility.

## 2024-09-21 LAB
ALBUMIN SERPL ELPH-MCNC: 4.1 G/DL
ALP BLD-CCNC: 96 U/L
ALT SERPL-CCNC: 8 U/L
ANION GAP SERPL CALC-SCNC: 11 MMOL/L
AST SERPL-CCNC: 15 U/L
BILIRUB SERPL-MCNC: 0.3 MG/DL
BUN SERPL-MCNC: 21 MG/DL
CALCIUM SERPL-MCNC: 10.9 MG/DL
CHLORIDE SERPL-SCNC: 103 MMOL/L
CHOLEST SERPL-MCNC: 208 MG/DL
CO2 SERPL-SCNC: 26 MMOL/L
CREAT SERPL-MCNC: 1.5 MG/DL
EGFR: 36 ML/MIN/1.73M2
ESTIMATED AVERAGE GLUCOSE: 108 MG/DL
FOLATE SERPL-MCNC: 11.8 NG/ML
GLUCOSE SERPL-MCNC: 102 MG/DL
HBA1C MFR BLD HPLC: 5.4 %
HCT VFR BLD CALC: 38.9 %
HDLC SERPL-MCNC: 49 MG/DL
HGB BLD-MCNC: 12.3 G/DL
LDLC SERPL CALC-MCNC: 127 MG/DL
MCHC RBC-ENTMCNC: 28.2 PG
MCHC RBC-ENTMCNC: 31.6 GM/DL
MCV RBC AUTO: 89.2 FL
NONHDLC SERPL-MCNC: 159 MG/DL
PLATELET # BLD AUTO: 299 K/UL
POTASSIUM SERPL-SCNC: 3.4 MMOL/L
PROT SERPL-MCNC: 6.8 G/DL
RBC # BLD: 4.36 M/UL
RBC # FLD: 13.2 %
SODIUM SERPL-SCNC: 141 MMOL/L
T4 FREE SERPL-MCNC: 1.6 NG/DL
TRIGL SERPL-MCNC: 181 MG/DL
TSH SERPL-ACNC: 0.02 UIU/ML
VIT B12 SERPL-MCNC: 1141 PG/ML
WBC # FLD AUTO: 8.39 K/UL

## 2024-09-26 ENCOUNTER — APPOINTMENT (OUTPATIENT)
Dept: FAMILY MEDICINE | Facility: CLINIC | Age: 79
End: 2024-09-26

## 2024-10-01 ENCOUNTER — APPOINTMENT (OUTPATIENT)
Dept: ORTHOPEDIC SURGERY | Facility: CLINIC | Age: 79
End: 2024-10-01

## 2024-10-08 ENCOUNTER — APPOINTMENT (OUTPATIENT)
Dept: ORTHOPEDIC SURGERY | Facility: CLINIC | Age: 79
End: 2024-10-08

## 2024-10-15 ENCOUNTER — APPOINTMENT (OUTPATIENT)
Dept: ORTHOPEDIC SURGERY | Facility: CLINIC | Age: 79
End: 2024-10-15

## 2024-10-29 ENCOUNTER — APPOINTMENT (OUTPATIENT)
Dept: ORTHOPEDIC SURGERY | Facility: CLINIC | Age: 79
End: 2024-10-29

## 2024-10-30 ENCOUNTER — APPOINTMENT (OUTPATIENT)
Dept: ORTHOPEDIC SURGERY | Facility: CLINIC | Age: 79
End: 2024-10-30

## 2024-11-05 ENCOUNTER — APPOINTMENT (OUTPATIENT)
Dept: ORTHOPEDIC SURGERY | Facility: CLINIC | Age: 79
End: 2024-11-05
Payer: MEDICARE

## 2024-11-05 VITALS — HEIGHT: 64 IN | BODY MASS INDEX: 25.27 KG/M2 | WEIGHT: 148 LBS

## 2024-11-05 DIAGNOSIS — M25.572 PAIN IN LEFT ANKLE AND JOINTS OF LEFT FOOT: ICD-10-CM

## 2024-11-05 DIAGNOSIS — S82.832A OTHER FRACTURE OF UPPER AND LOWER END OF LEFT FIBULA, INITIAL ENCOUNTER FOR CLOSED FRACTURE: ICD-10-CM

## 2024-11-05 PROCEDURE — 99203 OFFICE O/P NEW LOW 30 MIN: CPT

## 2024-11-05 PROCEDURE — 73610 X-RAY EXAM OF ANKLE: CPT | Mod: LT

## 2024-12-02 ENCOUNTER — APPOINTMENT (OUTPATIENT)
Dept: FAMILY MEDICINE | Facility: CLINIC | Age: 79
End: 2024-12-02

## 2024-12-19 NOTE — ED PROVIDER NOTE - NS ED ATTENDING STATEMENT MOD
Problem: Discharge Planning  Goal: Discharge to home or other facility with appropriate resources  12/18/2024 2326 by Page Hinojosa RN  Outcome: Progressing  12/18/2024 1353 by Haleigh Dorantes RN  Outcome: Progressing     Problem: Pain  Goal: Verbalizes/displays adequate comfort level or baseline comfort level  12/18/2024 2326 by Page Hinojosa RN  Outcome: Progressing  12/18/2024 1353 by Haleigh Dorantes RN  Outcome: Progressing  Flowsheets (Taken 12/18/2024 0346 by Kathie Hawthorne RN)  Verbalizes/displays adequate comfort level or baseline comfort level: Encourage patient to monitor pain and request assistance     Problem: Safety - Adult  Goal: Free from fall injury  12/18/2024 2326 by Page Hinojosa RN  Outcome: Progressing  12/18/2024 1353 by Haleigh Dorantes RN  Outcome: Progressing     Problem: ABCDS Injury Assessment  Goal: Absence of physical injury  12/18/2024 2326 by Page Hinojosa RN  Outcome: Progressing  12/18/2024 1353 by Haleigh Dorantes RN  Outcome: Progressing     Problem: Cardiovascular - Adult  Goal: Maintains optimal cardiac output and hemodynamic stability  12/18/2024 2326 by Page Hinojosa RN  Outcome: Progressing  12/18/2024 1353 by Haleigh Dorantes RN  Outcome: Progressing     Problem: Genitourinary - Adult  Goal: Urinary catheter remains patent  12/18/2024 2326 by Page Hinojosa RN  Outcome: Progressing  12/18/2024 1353 by Haleigh Dorantes RN  Outcome: Progressing     Problem: Hematologic - Adult  Goal: Maintains hematologic stability  12/18/2024 2326 by Page Hinojosa RN  Outcome: Progressing  12/18/2024 1353 by Haleigh Dorantes RN  Outcome: Progressing     Problem: Musculoskeletal - Adult  Goal: Return mobility to safest level of function  12/18/2024 2326 by Page Hinojosa RN  Outcome: Progressing  12/18/2024 1353 by Haleigh Dorantes RN  Outcome: Progressing     Problem: Nutrition Deficit:  Goal: Optimize nutritional status  12/18/2024 2326 by  Attending Only

## 2025-01-06 ENCOUNTER — RX RENEWAL (OUTPATIENT)
Age: 80
End: 2025-01-06

## 2025-03-01 ENCOUNTER — RX RENEWAL (OUTPATIENT)
Age: 80
End: 2025-03-01

## 2025-03-01 RX ORDER — POTASSIUM CHLORIDE 750 MG/1
10 TABLET, EXTENDED RELEASE ORAL
Qty: 90 | Refills: 0 | Status: ACTIVE | COMMUNITY
Start: 2025-03-01 | End: 1900-01-01

## 2025-03-14 ENCOUNTER — RX RENEWAL (OUTPATIENT)
Age: 80
End: 2025-03-14

## 2025-04-25 ENCOUNTER — INPATIENT (INPATIENT)
Facility: HOSPITAL | Age: 80
LOS: 6 days | Discharge: HOME | DRG: 948 | End: 2025-05-02
Attending: INTERNAL MEDICINE | Admitting: INTERNAL MEDICINE
Payer: MEDICARE

## 2025-04-25 VITALS
WEIGHT: 126.1 LBS | OXYGEN SATURATION: 96 % | SYSTOLIC BLOOD PRESSURE: 193 MMHG | DIASTOLIC BLOOD PRESSURE: 107 MMHG | HEART RATE: 65 BPM | HEIGHT: 64 IN | TEMPERATURE: 97 F | RESPIRATION RATE: 18 BRPM

## 2025-04-25 DIAGNOSIS — R53.1 WEAKNESS: ICD-10-CM

## 2025-04-25 DIAGNOSIS — N81.10 CYSTOCELE, UNSPECIFIED: Chronic | ICD-10-CM

## 2025-04-25 LAB
ALBUMIN SERPL ELPH-MCNC: 3.9 G/DL — SIGNIFICANT CHANGE UP (ref 3.3–5)
ALP SERPL-CCNC: 150 U/L — HIGH (ref 40–120)
ALT FLD-CCNC: 14 U/L — SIGNIFICANT CHANGE UP (ref 10–45)
ANION GAP SERPL CALC-SCNC: 11 MMOL/L — SIGNIFICANT CHANGE UP (ref 5–17)
APPEARANCE UR: CLEAR — SIGNIFICANT CHANGE UP
APTT BLD: 31.8 SEC — SIGNIFICANT CHANGE UP (ref 26.1–36.8)
AST SERPL-CCNC: 25 U/L — SIGNIFICANT CHANGE UP (ref 10–40)
BACTERIA # UR AUTO: NEGATIVE /HPF — SIGNIFICANT CHANGE UP
BASOPHILS # BLD AUTO: 0.04 K/UL — SIGNIFICANT CHANGE UP (ref 0–0.2)
BASOPHILS NFR BLD AUTO: 0.3 % — SIGNIFICANT CHANGE UP (ref 0–2)
BILIRUB SERPL-MCNC: 0.5 MG/DL — SIGNIFICANT CHANGE UP (ref 0.2–1.2)
BILIRUB UR-MCNC: NEGATIVE — SIGNIFICANT CHANGE UP
BUN SERPL-MCNC: 22 MG/DL — SIGNIFICANT CHANGE UP (ref 7–23)
CALCIUM SERPL-MCNC: 10.7 MG/DL — HIGH (ref 8.4–10.5)
CHLORIDE SERPL-SCNC: 102 MMOL/L — SIGNIFICANT CHANGE UP (ref 96–108)
CO2 SERPL-SCNC: 24 MMOL/L — SIGNIFICANT CHANGE UP (ref 22–31)
COLOR SPEC: YELLOW — SIGNIFICANT CHANGE UP
CREAT SERPL-MCNC: 1.35 MG/DL — HIGH (ref 0.5–1.3)
DIFF PNL FLD: ABNORMAL
EGFR: 40 ML/MIN/1.73M2 — LOW
EGFR: 40 ML/MIN/1.73M2 — LOW
EOSINOPHIL # BLD AUTO: 0.03 K/UL — SIGNIFICANT CHANGE UP (ref 0–0.5)
EOSINOPHIL NFR BLD AUTO: 0.3 % — SIGNIFICANT CHANGE UP (ref 0–6)
EPI CELLS # UR: SIGNIFICANT CHANGE UP
FLUAV AG NPH QL: SIGNIFICANT CHANGE UP
FLUBV AG NPH QL: SIGNIFICANT CHANGE UP
GLUCOSE SERPL-MCNC: 173 MG/DL — HIGH (ref 70–99)
GLUCOSE UR QL: NEGATIVE MG/DL — SIGNIFICANT CHANGE UP
HCT VFR BLD CALC: 40.6 % — SIGNIFICANT CHANGE UP (ref 34.5–45)
HGB BLD-MCNC: 13.9 G/DL — SIGNIFICANT CHANGE UP (ref 11.5–15.5)
IMM GRANULOCYTES NFR BLD AUTO: 0.3 % — SIGNIFICANT CHANGE UP (ref 0–0.9)
INR BLD: 0.98 RATIO — SIGNIFICANT CHANGE UP (ref 0.85–1.16)
KETONES UR-MCNC: NEGATIVE MG/DL — SIGNIFICANT CHANGE UP
LACTATE SERPL-SCNC: 1.8 MMOL/L — SIGNIFICANT CHANGE UP (ref 0.7–2)
LEUKOCYTE ESTERASE UR-ACNC: NEGATIVE — SIGNIFICANT CHANGE UP
LYMPHOCYTES # BLD AUTO: 0.79 K/UL — LOW (ref 1–3.3)
LYMPHOCYTES # BLD AUTO: 6.7 % — LOW (ref 13–44)
MCHC RBC-ENTMCNC: 29.4 PG — SIGNIFICANT CHANGE UP (ref 27–34)
MCHC RBC-ENTMCNC: 34.2 G/DL — SIGNIFICANT CHANGE UP (ref 32–36)
MCV RBC AUTO: 85.8 FL — SIGNIFICANT CHANGE UP (ref 80–100)
MONOCYTES # BLD AUTO: 0.25 K/UL — SIGNIFICANT CHANGE UP (ref 0–0.9)
MONOCYTES NFR BLD AUTO: 2.1 % — SIGNIFICANT CHANGE UP (ref 2–14)
NEUTROPHILS # BLD AUTO: 10.58 K/UL — HIGH (ref 1.8–7.4)
NEUTROPHILS NFR BLD AUTO: 90.3 % — HIGH (ref 43–77)
NITRITE UR-MCNC: NEGATIVE — SIGNIFICANT CHANGE UP
NRBC BLD AUTO-RTO: 0 /100 WBCS — SIGNIFICANT CHANGE UP (ref 0–0)
PH UR: 7.5 — SIGNIFICANT CHANGE UP (ref 5–8)
PLATELET # BLD AUTO: 292 K/UL — SIGNIFICANT CHANGE UP (ref 150–400)
POTASSIUM SERPL-MCNC: 3.2 MMOL/L — LOW (ref 3.5–5.3)
POTASSIUM SERPL-SCNC: 3.2 MMOL/L — LOW (ref 3.5–5.3)
PROT SERPL-MCNC: 8.4 G/DL — HIGH (ref 6–8.3)
PROT UR-MCNC: 100 MG/DL
PROTHROM AB SERPL-ACNC: 11.6 SEC — SIGNIFICANT CHANGE UP (ref 9.9–13.4)
RBC # BLD: 4.73 M/UL — SIGNIFICANT CHANGE UP (ref 3.8–5.2)
RBC # FLD: 13.6 % — SIGNIFICANT CHANGE UP (ref 10.3–14.5)
RBC CASTS # UR COMP ASSIST: 5 /HPF — HIGH (ref 0–4)
RSV RNA NPH QL NAA+NON-PROBE: SIGNIFICANT CHANGE UP
SARS-COV-2 RNA SPEC QL NAA+PROBE: SIGNIFICANT CHANGE UP
SODIUM SERPL-SCNC: 137 MMOL/L — SIGNIFICANT CHANGE UP (ref 135–145)
SOURCE RESPIRATORY: SIGNIFICANT CHANGE UP
SP GR SPEC: 1.01 — SIGNIFICANT CHANGE UP (ref 1–1.03)
TROPONIN I, HIGH SENSITIVITY RESULT: 175.9 NG/L — HIGH
TROPONIN I, HIGH SENSITIVITY RESULT: 206.2 NG/L — HIGH
UROBILINOGEN FLD QL: 0.2 MG/DL — SIGNIFICANT CHANGE UP (ref 0.2–1)
WBC # BLD: 11.73 K/UL — HIGH (ref 3.8–10.5)
WBC # FLD AUTO: 11.73 K/UL — HIGH (ref 3.8–10.5)
WBC UR QL: 0 /HPF — SIGNIFICANT CHANGE UP (ref 0–5)

## 2025-04-25 PROCEDURE — 99223 1ST HOSP IP/OBS HIGH 75: CPT | Mod: GC

## 2025-04-25 PROCEDURE — 74177 CT ABD & PELVIS W/CONTRAST: CPT | Mod: 26

## 2025-04-25 PROCEDURE — 99285 EMERGENCY DEPT VISIT HI MDM: CPT

## 2025-04-25 PROCEDURE — 93010 ELECTROCARDIOGRAM REPORT: CPT

## 2025-04-25 PROCEDURE — 71275 CT ANGIOGRAPHY CHEST: CPT | Mod: 26

## 2025-04-25 RX ORDER — DILTIAZEM HYDROCHLORIDE 120 MG/1
30 CAPSULE, EXTENDED RELEASE ORAL ONCE
Refills: 0 | Status: COMPLETED | OUTPATIENT
Start: 2025-04-25 | End: 2025-04-25

## 2025-04-25 RX ORDER — DILTIAZEM HYDROCHLORIDE 120 MG/1
30 CAPSULE, EXTENDED RELEASE ORAL EVERY 6 HOURS
Refills: 0 | Status: DISCONTINUED | OUTPATIENT
Start: 2025-04-25 | End: 2025-04-26

## 2025-04-25 RX ORDER — HYDROXYZINE HYDROCHLORIDE 25 MG/1
1 TABLET, FILM COATED ORAL
Refills: 0 | DISCHARGE

## 2025-04-25 RX ORDER — ONDANSETRON HCL/PF 4 MG/2 ML
4 VIAL (ML) INJECTION ONCE
Refills: 0 | Status: COMPLETED | OUTPATIENT
Start: 2025-04-25 | End: 2025-04-25

## 2025-04-25 RX ORDER — ACETAMINOPHEN 500 MG/5ML
650 LIQUID (ML) ORAL EVERY 6 HOURS
Refills: 0 | Status: DISCONTINUED | OUTPATIENT
Start: 2025-04-25 | End: 2025-05-02

## 2025-04-25 RX ORDER — ESCITALOPRAM OXALATE 20 MG/1
10 TABLET ORAL DAILY
Refills: 0 | Status: DISCONTINUED | OUTPATIENT
Start: 2025-04-25 | End: 2025-05-02

## 2025-04-25 RX ORDER — ONDANSETRON HCL/PF 4 MG/2 ML
4 VIAL (ML) INJECTION EVERY 8 HOURS
Refills: 0 | Status: DISCONTINUED | OUTPATIENT
Start: 2025-04-25 | End: 2025-05-02

## 2025-04-25 RX ORDER — ACETAMINOPHEN 500 MG/5ML
1000 LIQUID (ML) ORAL ONCE
Refills: 0 | Status: COMPLETED | OUTPATIENT
Start: 2025-04-25 | End: 2025-04-25

## 2025-04-25 RX ORDER — IPRATROPIUM BROMIDE AND ALBUTEROL SULFATE .5; 2.5 MG/3ML; MG/3ML
3 SOLUTION RESPIRATORY (INHALATION)
Refills: 0 | Status: COMPLETED | OUTPATIENT
Start: 2025-04-25 | End: 2025-04-25

## 2025-04-25 RX ORDER — DILTIAZEM HYDROCHLORIDE 120 MG/1
5 CAPSULE, EXTENDED RELEASE ORAL
Qty: 125 | Refills: 0 | Status: DISCONTINUED | OUTPATIENT
Start: 2025-04-25 | End: 2025-04-25

## 2025-04-25 RX ORDER — HYDROXYZINE HYDROCHLORIDE 25 MG/1
50 TABLET, FILM COATED ORAL THREE TIMES A DAY
Refills: 0 | Status: DISCONTINUED | OUTPATIENT
Start: 2025-04-25 | End: 2025-05-02

## 2025-04-25 RX ORDER — LEVETIRACETAM 10 MG/ML
500 INJECTION, SOLUTION INTRAVENOUS
Refills: 0 | Status: DISCONTINUED | OUTPATIENT
Start: 2025-04-25 | End: 2025-05-02

## 2025-04-25 RX ORDER — MAGNESIUM, ALUMINUM HYDROXIDE 200-200 MG
30 TABLET,CHEWABLE ORAL EVERY 4 HOURS
Refills: 0 | Status: DISCONTINUED | OUTPATIENT
Start: 2025-04-25 | End: 2025-05-02

## 2025-04-25 RX ORDER — DILTIAZEM HYDROCHLORIDE 120 MG/1
14 CAPSULE, EXTENDED RELEASE ORAL ONCE
Refills: 0 | Status: COMPLETED | OUTPATIENT
Start: 2025-04-25 | End: 2025-04-25

## 2025-04-25 RX ORDER — POTASSIUM BICARBONATE/CIT AC 25 MEQ
1 TABLET, EFFERVESCENT ORAL
Refills: 0 | DISCHARGE

## 2025-04-25 RX ORDER — DILTIAZEM HYDROCHLORIDE 120 MG/1
19 CAPSULE, EXTENDED RELEASE ORAL ONCE
Refills: 0 | Status: COMPLETED | OUTPATIENT
Start: 2025-04-25 | End: 2025-04-25

## 2025-04-25 RX ORDER — METHYLPREDNISOLONE ACETATE 80 MG/ML
125 INJECTION, SUSPENSION INTRA-ARTICULAR; INTRALESIONAL; INTRAMUSCULAR; SOFT TISSUE ONCE
Refills: 0 | Status: COMPLETED | OUTPATIENT
Start: 2025-04-25 | End: 2025-04-25

## 2025-04-25 RX ADMIN — Medication 1750 MILLILITER(S): at 16:58

## 2025-04-25 RX ADMIN — Medication 4 MILLIGRAM(S): at 20:20

## 2025-04-25 RX ADMIN — Medication 4 MILLIGRAM(S): at 16:58

## 2025-04-25 RX ADMIN — METHYLPREDNISOLONE ACETATE 125 MILLIGRAM(S): 80 INJECTION, SUSPENSION INTRA-ARTICULAR; INTRALESIONAL; INTRAMUSCULAR; SOFT TISSUE at 19:54

## 2025-04-25 RX ADMIN — IPRATROPIUM BROMIDE AND ALBUTEROL SULFATE 3 MILLILITER(S): .5; 2.5 SOLUTION RESPIRATORY (INHALATION) at 19:54

## 2025-04-25 RX ADMIN — Medication 1000 MILLILITER(S): at 19:54

## 2025-04-25 RX ADMIN — DILTIAZEM HYDROCHLORIDE 14 MILLIGRAM(S): 120 CAPSULE, EXTENDED RELEASE ORAL at 20:20

## 2025-04-25 RX ADMIN — Medication 1000 MILLIGRAM(S): at 19:32

## 2025-04-25 RX ADMIN — Medication 400 MILLIGRAM(S): at 19:02

## 2025-04-25 RX ADMIN — DILTIAZEM HYDROCHLORIDE 30 MILLIGRAM(S): 120 CAPSULE, EXTENDED RELEASE ORAL at 20:57

## 2025-04-25 RX ADMIN — DILTIAZEM HYDROCHLORIDE 30 MILLIGRAM(S): 120 CAPSULE, EXTENDED RELEASE ORAL at 20:20

## 2025-04-25 RX ADMIN — DILTIAZEM HYDROCHLORIDE 19 MILLIGRAM(S): 120 CAPSULE, EXTENDED RELEASE ORAL at 20:41

## 2025-04-25 NOTE — ED PROVIDER NOTE - OBJECTIVE STATEMENT
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 Admits to gen weakness unable to ambulate on her own today, nbnb nausea/vomiting and sob at rest. Denies wheezing. C/o of chronic cough. Denies cp, abd pain, diarrhea, sick contacts, recent abx use, recent travel

## 2025-04-25 NOTE — ED PROVIDER NOTE - CLINICAL SUMMARY MEDICAL DECISION MAKING FREE TEXT BOX
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 Admits to gen weakness unable to ambulate on her own today, nbnb nausea/vomiting and sob at rest. Denies wheezing. C/o of chronic cough. 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 Admits to gen weakness unable to ambulate on her own today, nbnb nausea/vomiting and sob at rest. Denies wheezing. C/o of chronic cough. Denies cp, abd pain, diarrhea, sick contacts, recent abx use, recent travel  Exam as stated. Deferred rectal temp   Sepsis marsh inititated in setting of subjective fever, nausea, vomiting  CT PE no pe, no acute findings abd. Slight increased in AA but pt aware and said had repair in summer of 2024 at [facility].   Admits still feeling v weak, tachy sp ~ 2L and deferverscing. Plan to admit  Patient to be admitted to an inpatient floor. Pt/family notified and agreeable to plan. Case discussed with and care endorsed to hospitalist. 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 Admits to gen weakness unable to ambulate on her own today, nbnb nausea/vomiting and sob at rest. Denies wheezing. C/o of chronic cough. Denies cp, abd pain, diarrhea, sick contacts, recent abx use, recent travel  Exam as stated. Deferred rectal temp   Sepsis marsh inititated in setting of subjective fever, nausea, vomiting  CT PE no pe, no acute findings abd. Slight increased in AA but pt aware and said had repair in summer of 2024 at [facility].   Admits still feeling v weak, tachy sp ~ 2L and defervescing. Plan to admit  Patient to be admitted to an inpatient floor. Pt/family notified and agreeable to plan. Case discussed with and care endorsed to hospitalist. - will send trop, hospitalist will follow    Patient admitted. HR now in 140s, EKG ordered. 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 Admits to gen weakness unable to ambulate on her own today, nbnb nausea/vomiting and sob at rest. Denies wheezing. C/o of chronic cough. Denies cp, abd pain, diarrhea, sick contacts, recent abx use, recent travel  Exam as stated. Deferred rectal temp   Sepsis marsh initiated in setting of subjective fever, nausea, vomiting  CT PE no pe, no acute findings abd. Slight increased in AA but pt aware and said had repair in summer of 2024 at [facility].   Admits still feeling v weak, tachy sp ~ 2L and defervescing. Plan to admit  Patient to be admitted to an inpatient floor. Pt/family notified and agreeable to plan. Case discussed with and care endorsed to hospitalist. - will send trop, hospitalist will follow    Patient admitted. HR now in 140s, EKG ordered. Plan to give anti hypertensive, rate control. 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 Admits to gen weakness unable to ambulate on her own today, nbnb nausea/vomiting and sob at rest. Denies wheezing. C/o of chronic cough. Denies cp, abd pain, diarrhea, sick contacts, recent abx use, recent travel  Exam as stated. Deferred rectal temp   Sepsis marsh initiated in setting of subjective fever, nausea, vomiting  CT PE no pe, no acute findings abd. Slight increased in AA but pt aware and said had repair in summer of 2024 at [facility].   Admits still feeling v weak, tachy sp ~ 2L and defervescing. Plan to admit - upon admission pt found to have HR now in 140s, EKG ordered. Concern for afib vs svt. Plan to give rate control. Dr Salvador aware, signed out pending reassessment. 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 Admits to gen weakness unable to ambulate on her own today, nbnb nausea/vomiting and sob at rest. Denies wheezing. C/o of chronic cough. Denies cp, abd pain, diarrhea, sick contacts, recent abx use, recent travel  Exam as stated. Deferred rectal temp   Sepsis marsh initiated in setting of subjective fever, nausea, vomiting  CT PE no pe, no acute findings abd. Slight increased in AA but pt aware and said had repair in summer of 2024 at [facility].   Admits still feeling v weak, tachy sp ~ 2L and defervescing. Plan to admit - upon admission pt found to have HR now in 140s, EKG ordered. Concern for afib vs svt. Plan to give rate control. Dr Salvador aware, signed out pending reassessment.  Rapid A-fib 140s.  Rate controlled with Cardizem.  Repeat EKG sinus rhythm.  Troponin 175, second troponin 206.  Discussed with cardiology Dr. Alfaro who recommends okay to admit, consider heparin.  Will hold on heparin since patient has history of DVT with an IVC filter and it is unclear why she was not a candidate for anticoagulation.  Probably due to brain mass?  History of adverse bleeding?  Will discuss with hospitalist and admit

## 2025-04-25 NOTE — ED ADULT NURSE REASSESSMENT NOTE - NS ED NURSE REASSESS COMMENT FT1
Received pt. from previous RN, awake, alert, oriented x3 , breathing with ease on RA, no acute distress noted, continue cardiac monitor progress. tachycardiac on cardiac monitor. hypertensive. medicated as order. nursing care continue.

## 2025-04-25 NOTE — ED PROVIDER NOTE - CARE PLAN
1 Principal Discharge DX:	Acute weakness  Secondary Diagnosis:	Shortness of breath  Secondary Diagnosis:	Nausea and vomiting

## 2025-04-25 NOTE — ED ADULT NURSE NOTE - NSFALLHARMRISKINTERV_ED_ALL_ED
Assistance OOB with selected safe patient handling equipment if applicable/Assistance with ambulation/Communicate risk of Fall with Harm to all staff, patient, and family/Monitor gait and stability/Provide visual cue: red socks, yellow wristband, yellow gown, etc/Reinforce activity limits and safety measures with patient and family/Bed in lowest position, wheels locked, appropriate side rails in place/Call bell, personal items and telephone in reach/Instruct patient to call for assistance before getting out of bed/chair/stretcher/Non-slip footwear applied when patient is off stretcher/[place] to call system/Physically safe environment - no spills, clutter or unnecessary equipment/Purposeful Proactive Rounding/Room/bathroom lighting operational, light cord in reach

## 2025-04-25 NOTE — ED ADULT NURSE NOTE - OBJECTIVE STATEMENT
Patient axo2, brought in by EMS from home with complaint of weakness, nausea, vomit,. abdominal pain, dizziness since this morning. EMS gave patient IVF and Zofran 4mg. Patient denies any recent falls or hitting her head. safety maintained.

## 2025-04-25 NOTE — ED ADULT TRIAGE NOTE - CHIEF COMPLAINT QUOTE
Patient brought in by EMS from home with complaint of weakness, nausea, vomit,. abdominal pain, dizziness since this morning. EMS gave patient IVF and Zofran 4mg. Patient denies any recent falls or hitting her head.

## 2025-04-26 ENCOUNTER — RESULT REVIEW (OUTPATIENT)
Age: 80
End: 2025-04-26

## 2025-04-26 LAB
ALBUMIN SERPL ELPH-MCNC: 3.4 G/DL — SIGNIFICANT CHANGE UP (ref 3.3–5)
ALP SERPL-CCNC: 121 U/L — HIGH (ref 40–120)
ALT FLD-CCNC: 11 U/L — SIGNIFICANT CHANGE UP (ref 10–45)
ANION GAP SERPL CALC-SCNC: 12 MMOL/L — SIGNIFICANT CHANGE UP (ref 5–17)
AST SERPL-CCNC: 18 U/L — SIGNIFICANT CHANGE UP (ref 10–40)
BASOPHILS # BLD AUTO: 0 K/UL — SIGNIFICANT CHANGE UP (ref 0–0.2)
BASOPHILS NFR BLD AUTO: 0 % — SIGNIFICANT CHANGE UP (ref 0–2)
BILIRUB SERPL-MCNC: 0.5 MG/DL — SIGNIFICANT CHANGE UP (ref 0.2–1.2)
BUN SERPL-MCNC: 21 MG/DL — SIGNIFICANT CHANGE UP (ref 7–23)
CALCIUM SERPL-MCNC: 9.8 MG/DL — SIGNIFICANT CHANGE UP (ref 8.4–10.5)
CHLORIDE SERPL-SCNC: 102 MMOL/L — SIGNIFICANT CHANGE UP (ref 96–108)
CO2 SERPL-SCNC: 24 MMOL/L — SIGNIFICANT CHANGE UP (ref 22–31)
CREAT SERPL-MCNC: 1.48 MG/DL — HIGH (ref 0.5–1.3)
EGFR: 36 ML/MIN/1.73M2 — LOW
EGFR: 36 ML/MIN/1.73M2 — LOW
EOSINOPHIL # BLD AUTO: 0 K/UL — SIGNIFICANT CHANGE UP (ref 0–0.5)
EOSINOPHIL NFR BLD AUTO: 0 % — SIGNIFICANT CHANGE UP (ref 0–6)
GLUCOSE SERPL-MCNC: 168 MG/DL — HIGH (ref 70–99)
HCT VFR BLD CALC: 36.7 % — SIGNIFICANT CHANGE UP (ref 34.5–45)
HGB BLD-MCNC: 12.4 G/DL — SIGNIFICANT CHANGE UP (ref 11.5–15.5)
IMM GRANULOCYTES NFR BLD AUTO: 0.6 % — SIGNIFICANT CHANGE UP (ref 0–0.9)
LYMPHOCYTES # BLD AUTO: 0.73 K/UL — LOW (ref 1–3.3)
LYMPHOCYTES # BLD AUTO: 13.7 % — SIGNIFICANT CHANGE UP (ref 13–44)
MCHC RBC-ENTMCNC: 29.2 PG — SIGNIFICANT CHANGE UP (ref 27–34)
MCHC RBC-ENTMCNC: 33.8 G/DL — SIGNIFICANT CHANGE UP (ref 32–36)
MCV RBC AUTO: 86.4 FL — SIGNIFICANT CHANGE UP (ref 80–100)
MONOCYTES # BLD AUTO: 0.06 K/UL — SIGNIFICANT CHANGE UP (ref 0–0.9)
MONOCYTES NFR BLD AUTO: 1.1 % — LOW (ref 2–14)
NEUTROPHILS # BLD AUTO: 4.52 K/UL — SIGNIFICANT CHANGE UP (ref 1.8–7.4)
NEUTROPHILS NFR BLD AUTO: 84.6 % — HIGH (ref 43–77)
NRBC BLD AUTO-RTO: 0 /100 WBCS — SIGNIFICANT CHANGE UP (ref 0–0)
PLATELET # BLD AUTO: 292 K/UL — SIGNIFICANT CHANGE UP (ref 150–400)
POTASSIUM SERPL-MCNC: 2.9 MMOL/L — CRITICAL LOW (ref 3.5–5.3)
POTASSIUM SERPL-SCNC: 2.9 MMOL/L — CRITICAL LOW (ref 3.5–5.3)
PROT SERPL-MCNC: 7.6 G/DL — SIGNIFICANT CHANGE UP (ref 6–8.3)
RBC # BLD: 4.25 M/UL — SIGNIFICANT CHANGE UP (ref 3.8–5.2)
RBC # FLD: 14.1 % — SIGNIFICANT CHANGE UP (ref 10.3–14.5)
SODIUM SERPL-SCNC: 138 MMOL/L — SIGNIFICANT CHANGE UP (ref 135–145)
T4 AB SER-ACNC: 9.6 UG/DL — SIGNIFICANT CHANGE UP (ref 4.6–12)
TROPONIN I, HIGH SENSITIVITY RESULT: 326.5 NG/L — HIGH
TSH SERPL-MCNC: 0.03 UIU/ML — LOW (ref 0.36–3.74)
WBC # BLD: 5.34 K/UL — SIGNIFICANT CHANGE UP (ref 3.8–10.5)
WBC # FLD AUTO: 5.34 K/UL — SIGNIFICANT CHANGE UP (ref 3.8–10.5)

## 2025-04-26 PROCEDURE — 70450 CT HEAD/BRAIN W/O DYE: CPT | Mod: 26

## 2025-04-26 PROCEDURE — 93306 TTE W/DOPPLER COMPLETE: CPT | Mod: 26

## 2025-04-26 PROCEDURE — 99223 1ST HOSP IP/OBS HIGH 75: CPT

## 2025-04-26 PROCEDURE — 93010 ELECTROCARDIOGRAM REPORT: CPT

## 2025-04-26 PROCEDURE — 99233 SBSQ HOSP IP/OBS HIGH 50: CPT

## 2025-04-26 PROCEDURE — 99222 1ST HOSP IP/OBS MODERATE 55: CPT

## 2025-04-26 RX ORDER — NICOTINE POLACRILEX 4 MG/1
1 GUM, CHEWING ORAL DAILY
Refills: 0 | Status: DISCONTINUED | OUTPATIENT
Start: 2025-04-26 | End: 2025-05-02

## 2025-04-26 RX ORDER — METOPROLOL SUCCINATE 50 MG/1
25 TABLET, EXTENDED RELEASE ORAL
Refills: 0 | Status: DISCONTINUED | OUTPATIENT
Start: 2025-04-26 | End: 2025-05-02

## 2025-04-26 RX ORDER — ASPIRIN 325 MG
81 TABLET ORAL DAILY
Refills: 0 | Status: DISCONTINUED | OUTPATIENT
Start: 2025-04-26 | End: 2025-05-02

## 2025-04-26 RX ORDER — CLOPIDOGREL BISULFATE 75 MG/1
75 TABLET, FILM COATED ORAL DAILY
Refills: 0 | Status: DISCONTINUED | OUTPATIENT
Start: 2025-04-26 | End: 2025-05-02

## 2025-04-26 RX ADMIN — LEVETIRACETAM 500 MILLIGRAM(S): 10 INJECTION, SOLUTION INTRAVENOUS at 17:50

## 2025-04-26 RX ADMIN — METOPROLOL SUCCINATE 25 MILLIGRAM(S): 50 TABLET, EXTENDED RELEASE ORAL at 17:49

## 2025-04-26 RX ADMIN — LEVETIRACETAM 500 MILLIGRAM(S): 10 INJECTION, SOLUTION INTRAVENOUS at 01:50

## 2025-04-26 RX ADMIN — NICOTINE POLACRILEX 1 PATCH: 4 GUM, CHEWING ORAL at 12:42

## 2025-04-26 RX ADMIN — Medication 100 MILLIEQUIVALENT(S): at 09:40

## 2025-04-26 RX ADMIN — Medication 650 MILLIGRAM(S): at 22:26

## 2025-04-26 RX ADMIN — DILTIAZEM HYDROCHLORIDE 30 MILLIGRAM(S): 120 CAPSULE, EXTENDED RELEASE ORAL at 12:52

## 2025-04-26 RX ADMIN — LEVETIRACETAM 500 MILLIGRAM(S): 10 INJECTION, SOLUTION INTRAVENOUS at 06:36

## 2025-04-26 RX ADMIN — Medication 40 MILLIEQUIVALENT(S): at 14:41

## 2025-04-26 RX ADMIN — ESCITALOPRAM OXALATE 10 MILLIGRAM(S): 20 TABLET ORAL at 12:52

## 2025-04-26 RX ADMIN — Medication 100 MILLIEQUIVALENT(S): at 10:41

## 2025-04-26 RX ADMIN — Medication 100 MILLIEQUIVALENT(S): at 11:00

## 2025-04-26 RX ADMIN — NICOTINE POLACRILEX 1 PATCH: 4 GUM, CHEWING ORAL at 19:44

## 2025-04-26 RX ADMIN — Medication 40 MILLIEQUIVALENT(S): at 10:42

## 2025-04-26 RX ADMIN — Medication 650 MILLIGRAM(S): at 23:25

## 2025-04-26 RX ADMIN — DILTIAZEM HYDROCHLORIDE 30 MILLIGRAM(S): 120 CAPSULE, EXTENDED RELEASE ORAL at 06:37

## 2025-04-26 RX ADMIN — CLOPIDOGREL BISULFATE 75 MILLIGRAM(S): 75 TABLET, FILM COATED ORAL at 14:42

## 2025-04-26 RX ADMIN — DILTIAZEM HYDROCHLORIDE 30 MILLIGRAM(S): 120 CAPSULE, EXTENDED RELEASE ORAL at 01:01

## 2025-04-26 NOTE — PATIENT PROFILE ADULT - NSPROMEDSBROUGHTTOHOSP_GEN_A_NUR
The patient location is: Home  The chief complaint leading to consultation is:Upper rsp congestion   Visit type: Virtual visit with synchronous audio   Total time spent with patient: 15 minutes  Each patient to whom he or she provides medical services by telemedicine is:  (1) informed of the relationship between the physician and patient and the respective role of any other health care provider with respect to management of the patient; and (2) notified that he or she may decline to receive medical services by telemedicine and may withdraw from such care at any time.    Notes:   Angel Luis Mcelroy presents with moderate upper respiratory congestion,rhinnorhea,moderate cough past 2-3 days. No dyspnea Denies nausea,vomiting,diarrhea or significant fever. Dx covid 3 w ago had MAB    Past Medical History:   Diagnosis Date    Bilateral renal cysts 02/26/2021    Coronary artery disease involving native coronary artery of native heart without angina pectoris     Depression     Headache(784.0)     HEARING LOSS     Hyperlipidemia     Hypertension     Rash      Past Surgical History:   Procedure Laterality Date    ADENOIDECTOMY      CATARACT EXTRACTION, BILATERAL Bilateral 05/2018    COLONOSCOPY      COLONOSCOPY  10/8/2013         COLONOSCOPY N/A 8/28/2020    Procedure: COLONOSCOPY;  Surgeon: Sourav Cheung MD;  Location[facility];  Service: Endoscopy;  Laterality: N/A;    CORONARY STENT PLACEMENT      [place] 2004    EYE SURGERY      SINUS SURGERY      TONSILLECTOMY       Review of patient's allergies indicates:  No Known Allergies  Current Outpatient Medications on File Prior to Visit   Medication Sig Dispense Refill    amLODIPine (NORVASC) 10 MG tablet Take 10 mg by mouth once daily.      aspirin (ECOTRIN) 81 MG EC tablet Take 81 mg by mouth once daily.      atenoloL (TENORMIN) 100 MG tablet Take 1 tablet (100 mg total) by mouth once daily. 30 tablet 11    atorvastatin (LIPITOR) 40 MG tablet Take 1  tablet (40 mg total) by mouth once daily. 90 tablet 3    benazepriL (LOTENSIN) 40 MG tablet Take 1 tablet (40 mg total) by mouth once daily. 30 tablet 11    fluticasone propionate (FLONASE) 50 mcg/actuation nasal spray Use 2 sprays to each nostril daily 16 g 12    fluticasone-salmeterol diskus inhaler 250-50 mcg Inhale 1 puff into the lungs 2 (two) times daily. Controller. Wash out mouth after use 60 each 11    HUMIRA PEN 40 mg/0.8 mL PnKt       metFORMIN (GLUCOPHAGE) 500 MG tablet Take 1 tablet (500 mg total) by mouth 2 (two) times daily with meals. 180 tablet 3    montelukast (SINGULAIR) 10 mg tablet Take 1 tablet (10 mg total) by mouth every evening. 30 tablet 0    spironolactone (ALDACTONE) 50 MG tablet Take 1 tablet (50 mg total) by mouth once daily. 30 tablet 11    tamsulosin (FLOMAX) 0.4 mg Cap Take 1 capsule (0.4 mg total) by mouth once daily. 30 capsule 0    topiramate (TOPAMAX) 100 MG tablet Take 100 mg by mouth.      TRUEPLUS LANCETS 30 gauge Misc       venlafaxine (EFFEXOR-XR) 150 MG Cp24 Take 2 capsules (300 mg total) by mouth once daily. 60 capsule 11     Current Facility-Administered Medications on File Prior to Visit   Medication Dose Route Frequency Provider Last Rate Last Admin    acetaminophen tablet 650 mg  650 mg Oral Once PRN Dusty Dorsey MD        albuterol inhaler 2 puff  2 puff Inhalation Q20 Min PRN Dusty Dorsey MD        dexamethasone injection 4 mg  4 mg   Meño Hancock MD   4 mg at 03/04/16 1129    diphenhydrAMINE injection 25 mg  25 mg Intravenous Once PRN Dusty Dorsey MD        EPINEPHrine (EPIPEN) 0.3 mg/0.3 mL pen injection 0.3 mg  0.3 mg Intramuscular PRN Dusty Dorsey MD        methylPREDNISolone acetate injection 80 mg  80 mg Intra-articular  Meño Hancock MD   80 mg at 03/04/16 1129    methylPREDNISolone sodium succinate injection 40 mg  40 mg Intravenous Once PRN Dusty Dorsey MD        ondansetron disintegrating tablet 4 mg  4 mg Oral  Once PRN Dusty Dorsey MD        sodium chloride 0.9% 500 mL flush bag   Intravenous PRN Dusty Dorsey MD        sodium chloride 0.9% flush 10 mL  10 mL Intravenous PRN Dusty Dorsey MD        testosterone cypionate injection 300 mg  300 mg Intramuscular Q28 Days Sonny Pisano MD   300 mg at 12/10/21 1537     Social History     Socioeconomic History    Marital status:    Tobacco Use    Smoking status: Former Smoker     Packs/day: 1.00     Years: 24.00     Pack years: 24.00     Types: Cigarettes     Start date: 1966     Quit date:      Years since quittin.0    Smokeless tobacco: Never Used   Substance and Sexual Activity    Alcohol use: Yes     Comment: ocassionally    Drug use: No     Family History   Problem Relation Age of Onset    Heart disease Maternal Grandfather          ROS:  SKIN: No rashes, itching or changes in color or texture of skin.  EYES: Visual acuity fine. No photophobia, ocular pain or diplopia.EARS: Denies ear pain, discharge or vertigo.NOSE: No loss of smell, no epistaxis some postnasal drip.MOUTH & THROAT: No hoarseness or change in voice. No excessive gum bleeding.CHEST: Denies MESSINA, cyanosis, wheezing  CARDIOVASCULAR: Denies chest pain, PND, orthopnea or reduced exercise tolerance.  ABDOMEN:  No weight loss.No abdominal pain, no hematemesis or blood in stool.  URINARY: No flank pain, dysuria or hematuria.  PERIPHERAL VASCULAR: No claudication or cyanosis.  MUSCULOSKELETAL: Negative   NEUROLOGIC: No history of seizures, paralysis, alteration of gait or coordination.    PE: Heent:Normocephalic with no recent cranial trauma,PERRLA,EOMI,conjunctiva clear,Nasal mucosa slightly red and edematous.Posterior pharynx slightly red but without exudate.  Chest:No tachypnea. No wheezing, rhonchi on forced expiration  Abdomen:Soft, non tender to patient palpation  Impression: Upper Respiratory Infection. 465.9  Plan: Medrol dosepak   no

## 2025-04-26 NOTE — PATIENT PROFILE ADULT - NSTOBACCONEVERSMOKERY/N_GEN_A
diminished  amplitude, biphasic in pattern.  In addition, the posterior tibial and  dorsalis pedis Doppler waveforms appear dampened.  VPR waveforms appear  preserved.     LEFT LEG:    The thigh and right pressures are greater than 254 mmHg.  The left  ankle-brachial index is mildly diminished at 0.73 for the dorsalis pedis  artery and 0.72 for the posterior tibial artery.  Left great toe brachial  index is diminished at 0.34.        Amplitudes of the Doppler waveforms appear mildly diminished with some  broadening.  Femoral and popliteal Doppler waveforms appear triphasic.  VPR  waveforms appear dampened with an underlying disorganized pattern.     IMPRESSION:  1.  Limited by the presence of noncompressible vasculature.  Evidence of  diminished flow in the left lower extremity and possibly the right.   Further anatomic imaging can be obtained.  Decision for vascular  consultation should be based on clinical findings.     **The absence of findings should not deter follow-up or further evaluation  of concerning clinical findings.     FOR PHYSICIAN USE ONLY: Please note that this report was generated using  voice recognition software.  If you require clarification or feel that  there is an error in this report, please contact me.     Electronically Signed by: KHALIDA MUHAMMAD M.D.   Signed on: 12/17/2021 4:40 PM        Assessment         Gangrene left great toe   peripheral vasc  Dis  Diabetes mellitus   ARF        Treatment and plan   Wound assessment   Discussed dysvascular changes to left great toe  And US art dopplers are abnormal   Pt will need vascular work up however has ARF     Wound care:  Keep open to air  Apply betadine sol  Once a day   prevalon boot left      PS to Dr Saba who will do further evaluation  Awaiting recommendation to proceed with LE CTA or angiogram     Sujatha Cee DPM    No

## 2025-04-26 NOTE — ED ADULT NURSE REASSESSMENT NOTE - NS ED NURSE REASSESS COMMENT FT1
pt. resting on bed comfortably, medicated as order, VSS. no any complaint at this time. nursing care continue.

## 2025-04-26 NOTE — H&P ADULT - ATTENDING COMMENTS
I have personally seen and examined patient on the above date.  I discussed the case with Dr Latif and I agree with findings and plan as detailed per note above, which I have amended where appropriate.    Superseding the above.   80F hx of HTN, hx of lung cancer s/p chemo/RT, hx of brain tumor removal s/p R parietal hemicraniotomy 12/2023 still on keppra complicated with DVT s/p IVC filter placement pw fatigue, palps, dizziness, SOB, with some episdoes of nausea and vomiting started yesterday. +chills but no fever. No new cough, abd pain, dysuria or diarrhea. In ED s/p CT angio which was neg for PE and CTAP with +AAA increased from prior. Subsequently pt noted to have rapid afib to the 150s s/p IV Cardizem 14mg and 19 mg and po cardizem 30mg and now converted back to sinus rhythm.   PE;   NAD, lethargic but easily arousable and makes good attempts at answering questions.   CV:S1S2, RRR, no mgr  CL; CTA bl   Abd; soft NT, ND, +BS  Ext: neg CCE.     Labs:   WBC: 11.73 hgb: 13.9 90%N lact; 1.8 TB: 0.5 AP: 150 AST/ALT: 25/14   COVID/RSV/flu neg UA negative   trop: 175.9 repeat 206.2     EKG: personally rev.   initial ekg NSR at 97bpm no acute St changes  sec ekg with rapid afib 134bpm with rate related st depression/t wave inv on anterolateral leads  repeat EKG afterwards with resolution of rate related chagnes at NSR at 98bpm.     CT angio chest:   IMPRESSION:    No pulmonary embolus.    Emphysema. Stable 0.8 cm peribronchovascular nodule within right upper   lobe. The lungs are otherwise clear.    CTAP:   IMPRESSION:    No acute process within the abdomen or pelvis.    Interval increase in infrarenal abdominal aortic aneurysm, now measuring   4.4 x 4.0 cm.    AP; 80F hx of HTN, hx of lung cancer s/p chemo/RT, hx of brain tumor removal s/p R parietal hemicraniotomy 12/2023 still on keppra complicated with DVT s/p IVC filter placement pw fatigue, palps, dizziness, SOB with NV.    # Sxs probably related to rapid afib with elev trops likely sec to demand.   now back to NSR after IV Cardizem  start Cardizem 30mg po q6   hold amlodipine.   trend trops, serial EKGs, ECHO,  cardiology already consulted by ED and recommended A/C if no contraindications.   Hx of brain surgery with hx of DVT/IVC filter and still on keppra. Check CT head and d/w her outpt oncologist whether A/C is contraindicated.   CT angio negative for PE  UA/COVID/RSV/flu negative for any occult infection  check TSH    #AAA increased in size on CTAP  needs vascular followup     #hx of brain tumor  cont keppra  Check CT head as above     #GOC  Pt is FULL code    Elina CHENG rev  D/W Dr Salvador ED.

## 2025-04-26 NOTE — CONSULT NOTE ADULT - SUBJECTIVE AND OBJECTIVE BOX
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 Reports these symptoms started yesterday and today she was very weak and unable to ambulate. Denies wheezing, chest pain, recent colds, diarrhea, falls, sick contacts. Denies history of palpitations. Follows with [facility] oncologist Dr. Sloomon and had most recent appointment 1 week ago.     Upon arrival to ED, vitals /107, HR 65 bpm increased to 133 bmp, RR 18 96% O2, Temp 97.3*F  Received 2750 cc NaCl, IV tylenol, solumedrol 125mg, duoneb x3, 33mg diltiazem IVP, 60 diltiazem p  zofran 4mg x 2   CXR reviewed emphysema , no infiltrate  EKG NSR 97 bpm on arrival > 134 bpm afib rvr > NSR 98 bpm after conversion  HIIE reviewed       PMH:   Arthritis    HTN (hypertension)    Depression    Regurgitation    Bronchitis    Migraine    Kidney calculi    Spinal stenosis, unspecified spinal region    Osteoarthritis of multiple joints, unspecified osteoarthritis type    COPD (chronic obstructive pulmonary disease)    Asthma    Localized enlarged lymph nodes    Solitary pulmonary nodule    History of lung cancer      PSH:   S/P appendectomy    S/P breast biopsy    S/P D&C    Female bladder prolapse      Family History:  FAMILY HISTORY:  Family history of pancreatic cancer (Father)    Family history of COPD (chronic obstructive pulmonary disease) (Mother)    Family history of heart disease (Mother)    Family history of leukemia (Sibling)  brother    Family history of hepatitis C (Sibling)  brother    Family history of rheumatic fever (Sibling)        Social History:  Smoking:continues to smoke 1ppd  Alcohol:no  Drugs:no    Allergies:  penicillin (Short breath; Hives)      Medications:  acetaminophen     Tablet .. 650 milliGRAM(s) Oral every 6 hours PRN  aluminum hydroxide/magnesium hydroxide/simethicone Suspension 30 milliLiter(s) Oral every 4 hours PRN  diltiazem    Tablet 30 milliGRAM(s) Oral every 6 hours  escitalopram 10 milliGRAM(s) Oral daily  hydrOXYzine hydrochloride 50 milliGRAM(s) Oral three times a day PRN  levETIRAcetam 500 milliGRAM(s) Oral two times a day  nicotine -   7 mG/24Hr(s) Patch 1 Patch Transdermal daily  ondansetron Injectable 4 milliGRAM(s) IV Push every 8 hours PRN  potassium chloride   Solution 40 milliEquivalent(s) Oral every 4 hours  potassium chloride  10 mEq/100 mL IVPB 10 milliEquivalent(s) IV Intermittent every 1 hour          Physical Exam:  T(C): 36.9 (04-26-25 @ 08:06), Max: 37.7 (04-25-25 @ 20:36)  HR: 78 (04-26-25 @ 08:06) (65 - 133)  BP: 121/68 (04-26-25 @ 08:06) (121/68 - 193/107)  RR: 15 (04-26-25 @ 08:06) (15 - 22)  SpO2: 97% (04-26-25 @ 08:06) (94% - 100%)  Wt(kg): --    GENERAL: NAD, well-groomed, well-developed  HEAD:  Atraumatic, Normocephalic  EYES: EOMI, conjunctiva and sclera clear  ENT: Moist mucous membranes,  NECK: Supple, No JVD, no bruits  CHEST/LUNG: Clear to percussion bilaterally; No rales, rhonchi, wheezing, or rubs  HEART: Regular rate and rhythm; No murmurs, rubs, or gallops PMI non displaced.  ABDOMEN: Soft, Nontender, Nondistended; Bowel sounds present  EXTREMITIES:  2+ Peripheral Pulses, No clubbing, cyanosis, or edema  SKIN: No rashes or lesions  NERVOUS SYSTEM:  Alert & Oriented X3, Good concentration; Motor Strength 5/5 B/L upper and lower extremities; DTRs 2+ intact and symmetric    Cardiovascular Diagnostic Testing:  ECG:multiple ekgs done realing af with moderate to raspid response wsith st depressions best seen in v2.  ekg with sinus as well with mild st depressions inferiorly and laterally    Labs:                        12.4   5.34  )-----------( 292      ( 26 Apr 2025 05:28 )             36.7     04-26    138  |  102  |  21  ----------------------------<  168[H]  2.9[LL]   |  24  |  1.48[H]    Ca    9.8      26 Apr 2025 05:28    TPro  7.6  /  Alb  3.4  /  TBili  0.5  /  DBili  x   /  AST  18  /  ALT  11  /  AlkPhos  121[H]  04-26    PT/INR - ( 25 Apr 2025 16:40 )   PT: 11.6 sec;   INR: 0.98 ratio         PTT - ( 25 Apr 2025 16:40 )  PTT:31.8 sec            Thyroid Stimulating Hormone, Serum: 0.028 uIU/mL (04-26 @ 05:28)      Imaging:          
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 Reports these symptoms started yesterday and today she was very weak and unable to ambulate. Denies wheezing, chest pain, recent colds, diarrhea, falls, sick contacts. Denies history of palpitations. Follows with [facility] oncologist Dr. Solomon and had most recent appointment 1 week ago.     Upon arrival to ED, vitals /107, HR 65 bpm increased to 133 bmp, RR 18 96% O2, Temp 97.3*F  Received 2750 cc NaCl, IV tylenol, solumedrol 125mg, duoneb x3, 33mg diltiazem IVP, 60 diltiazem p  zofran 4mg x 2   CXR reviewed emphysema , no infiltrate  EKG NSR 97 bpm on arrival > 134 bpm afib rvr > NSR 98 bpm after conversion  HIIE reviewed    (2025 00:04)    Subjective: Patient reports multiple episodes of vomiting in the morning yesterday. She explains that her brain tumor was resected in  and said to be metastasis from her lung cancer. After her DVT w IVC filter she was told that she was meant to be discharged on a blood thinner but wasnt. She was on Lovenox during the hospitalization for DVT. Recently diagnosed with Hyperthyroidism, but not initiated on any medications yet    PAST MEDICAL & SURGICAL HISTORY:  Arthritis left knee recently, right knee arthritis for several years  HTN (hypertension) controlled with meds for several years  Depression  Regurgitation tricuspid valve  Osteoarthritis of multiple joints, unspecified osteoarthritis type  COPD (chronic obstructive pulmonary disease)  Solitary pulmonary nodule Received last dose of chemo 2019  History of lung cancer  S/P appendectomy in s  S/P breast biopsy bilateral  breast  - benign  S/P D&C for missed  about 25 years ago  Female bladder prolapse bladder sling -       FAMILY HISTORY:  Family history of pancreatic cancer (Father)  Family history of COPD (chronic obstructive pulmonary disease) (Mother)  Family history of heart disease (Mother)  Family history of leukemia (Sibling) brother  Family history of hepatitis C (Sibling) brother  Family history of rheumatic fever (Sibling)    MEDICATIONS  (STANDING):  diltiazem    Tablet 30 milliGRAM(s) Oral every 6 hours  escitalopram 10 milliGRAM(s) Oral daily  levETIRAcetam 500 milliGRAM(s) Oral two times a day  nicotine -   7 mG/24Hr(s) Patch 1 Patch Transdermal daily  potassium chloride   Solution 40 milliEquivalent(s) Oral every 4 hours  potassium chloride  10 mEq/100 mL IVPB 10 milliEquivalent(s) IV Intermittent every 1 hour       Allergies  penicillin (Short breath; Hives)    ROS: Pertinent positives in HPI, all other ROS were reviewed and are negative.      Vital Signs Last 24 Hrs  T(C): 36.9 (2025 08:06), Max: 37.7 (2025 20:36)  T(F): 98.4 (2025 08:06), Max: 99.8 (2025 20:36)  HR: 78 (2025 08:06) (65 - 133)  BP: 121/68 (2025 08:06) (121/68 - 193/107)  RR: 15 (2025 08:06) (15 - 22)  SpO2: 97% (2025 08:06) (94% - 100%)    Physical Exam:  Neurological exam:  HF: A x O x 3. Appropriately interactive, normal affect. Speech fluent, No Aphasia or paraphasic errors. Naming /repetition intact   CN: AL, EOMI, VFF, facial sensation normal, no NLFD, tongue midline, Palate moves equally,  Motor: No pronator drift, Strength 5/5 in all 4 ext, normal bulk and tone, no tremor, rigidity or bradykinesia.    Sens: Intact to light touch / PP/ VS/ JS    Reflexes: Symmetric and normal . BJ 2+, BR 2+, KJ 2+, AJ 2+, downgoing toes b/l  Coord:  No FNFA, dysmetria, CHAN intact     Labs:       138  |  102  |  21  ----------------------------<  168[H]  2.9[LL]   |  24  |  1.48[H]    Ca    9.8      2025 05:28    TPro  7.6  /  Alb  3.4  /  TBili  0.5  /  DBili  x   /  AST  18  /  ALT  11  /  AlkPhos  121[H]                                12.4   5.34  )-----------( 292      ( 2025 05:28 )             36.7       Radiology:  CT Head No Cont (24 @ 17:10)   1.  No significant change, without acute intracranial hemorrhage.  2.  Postsurgical and treatment related changes, with mild chronic blood   products.  3.  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

## 2025-04-26 NOTE — H&P ADULT - TIME BILLING
Time spent includes direct patient care (interview and examination of patient), discussion with other providers, support staff and/or patient's family members, review of medical records, ordering diagnostic tests and analyzing results, and documentation. This excludes teaching and separately reported services.

## 2025-04-26 NOTE — PATIENT PROFILE ADULT - FALL HARM RISK - RISK INTERVENTIONS

## 2025-04-26 NOTE — H&P ADULT - NSHPLABSRESULTS_GEN_ALL_CORE
13.9   11.73 )-----------( 292      ( 25 Apr 2025 16:40 )             40.6     04-25    137  |  102  |  22  ----------------------------<  173[H]  3.2[L]   |  24  |  1.35[H]    Ca    10.7[H]      25 Apr 2025 16:40    TPro  8.4[H]  /  Alb  3.9  /  TBili  0.5  /  DBili  x   /  AST  25  /  ALT  14  /  AlkPhos  150[H]  04-25      < from: CT Abdomen and Pelvis w/ IV Cont (04.25.25 @ 18:36) >      IMPRESSION:    No acute process within the abdomen or pelvis.    Interval increase in infrarenal abdominal aortic aneurysm, now measuring   4.4 x 4.0 cm.    < end of copied text >    < from: CT Angio Chest PE Protocol w/ IV Cont (04.25.25 @ 18:36) >    IMPRESSION:    No pulmonary embolus.  Emphysema. Stable 0.8 cm peribronchovascular nodule within right upper   lobe. The lungs are otherwise clear.    --- End of Report ---    < end of copied text >

## 2025-04-26 NOTE — H&P ADULT - NSHPREVIEWOFSYSTEMS_GEN_ALL_CORE
REVIEW OF SYSTEMS:    CONSTITUTIONAL: + weakness, no  fevers, +chills,  EYES/ENT: no  visual changes,  no vertigo,  no throat pain   NECK:  no pain, no stiffness  RESPIRATORY: +shortness of breath, +chronic cough, no sneezing, no wheezing, no hemoptysis   CARDIOVASCULAR: no chest pain, +palpitations  GASTROINTESTINAL:  +vomiting, +nausea, no abdominal or epigastric pain,  no diarrhea, no constipation, no melena, no hematemesis,  no  hematochezia  GENITOURINARY: no dysuria, no frequency, no hematuria  NEUROLOGICAL: no numbness  SKIN: no itching, no rashes, no  lesions

## 2025-04-26 NOTE — H&P ADULT - HISTORY OF PRESENT ILLNESS
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 Reports these symptoms started yesterday and today she was very weak and unable to ambulate. Denies wheezing, chest pain, recent colds, diarrhea, falls, sick contacts. Denies history of palpitations. Follows with [facility] oncologist Dr. LICEA and had most recent appointment 1 week ago.     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 Reports these symptoms started yesterday and today she was very weak and unable to ambulate. Denies wheezing, chest pain, recent colds, diarrhea, falls, sick contacts. Denies history of palpitations. Follows with [facility] oncologist Dr. Solomon and had most recent appointment 1 week ago.     Upon arrival to ED, vitals /107, HR 65 bpm increased to 133 bmp, RR 18 96% O2, Temp 97.3*F  Received 2750 cc NaCl, IV tylenol, solumedrol 125mg, duoneb x3, 33mg diltiazem IVP, 60 diltiazem p  zofran 4mg x 2   CXR reviewed emphysema , no infiltrate  EKG NSR 97 bpm on arrival > 134 bpm afib rvr > NSR 98 bpm after conversion  JEOVANY reviewed

## 2025-04-26 NOTE — PATIENT PROFILE ADULT - NSPROGENOTHERPROVIDER_GEN_A_NUR
[place] maintained on ongoing assault and SAFE precaution without incident on this shift.  Continuous Q 7 minutes rounding implemented.  Had interrrupted sleep pattern. He was awake at the onset of the shift and went to bed at around 0200. Then woke up around 0400, pacing on the unit quietly. Took a shower at 0500. Currently sitting in the dining room, watching TV.  Toke his morning meds with water without issues this morning. No s/s of hypo/hyper glycemia.  Behavior control none

## 2025-04-26 NOTE — PROGRESS NOTE ADULT - NS ATTEND AMEND GEN_ALL_CORE FT
81 y/o F with PMH COPD, current smoker, metastatic adenocarcinoma R lung to bone diagnosed 2018 in remission (last chemo about 6 years ago), with R parietal/temporal brain mass met s/p resection and radiation in R parietal-occipital region (2023) h/o perioperative bilat LE DVT sp IVC filters placed 2024, depression, anxiety, HTN, thyroid disease, CKD admitted for new onset Afib. cardio appreciated. rate controlled, dc cardizem, start metoprolol. neuro appreciated.

## 2025-04-26 NOTE — PROGRESS NOTE ADULT - SUBJECTIVE AND OBJECTIVE BOX
Patient is a 80y old Female who presents with a chief complaint of af/elevated troponins (26 Apr 2025 11:15)      Patient seen and examined at bedside. No overnight events reported. Patient states she is feeling better. Denies chest pain, sob, nausea, vomiting.     ALLERGIES:  penicillin (Short breath; Hives)    MEDICATIONS  (STANDING):  diltiazem    Tablet 30 milliGRAM(s) Oral every 6 hours  escitalopram 10 milliGRAM(s) Oral daily  levETIRAcetam 500 milliGRAM(s) Oral two times a day  nicotine -   7 mG/24Hr(s) Patch 1 Patch Transdermal daily  potassium chloride   Solution 40 milliEquivalent(s) Oral every 4 hours    MEDICATIONS  (PRN):  acetaminophen     Tablet .. 650 milliGRAM(s) Oral every 6 hours PRN Temp greater or equal to 38C (100.4F), Mild Pain (1 - 3)  aluminum hydroxide/magnesium hydroxide/simethicone Suspension 30 milliLiter(s) Oral every 4 hours PRN Dyspepsia  hydrOXYzine hydrochloride 50 milliGRAM(s) Oral three times a day PRN for anxiety  ondansetron Injectable 4 milliGRAM(s) IV Push every 8 hours PRN Nausea and/or Vomiting    Vital Signs Last 24 Hrs  T(F): 98.4 (26 Apr 2025 08:06), Max: 99.8 (25 Apr 2025 20:36)  HR: 78 (26 Apr 2025 08:06) (65 - 133)  BP: 121/68 (26 Apr 2025 08:06) (121/68 - 193/107)  RR: 15 (26 Apr 2025 08:06) (15 - 22)  SpO2: 97% (26 Apr 2025 08:06) (94% - 100%)  I&O's Summary    PHYSICAL EXAM:  General: NAD, A/O x 3  ENT: No gross hearing impairment, Moist mucous membranes, no thrush  Neck: Supple, No JVD  Lungs: Clear to auscultation bilaterally, good air entry, non-labored breathing  Cardio: RRR, S1/S2, No murmur  Abdomen: Soft, Nontender, Nondistended; Bowel sounds present  Extremities: No calf tenderness, No cyanosis, No pitting edema  Psych: Appropriate mood and affect    LABS:                        12.4   5.34  )-----------( 292      ( 26 Apr 2025 05:28 )             36.7     04-26    138  |  102  |  21  ----------------------------<  168  2.9   |  24  |  1.48    Ca    9.8      26 Apr 2025 05:28    TPro  7.6  /  Alb  3.4  /  TBili  0.5  /  DBili  x   /  AST  18  /  ALT  11  /  AlkPhos  121  04-26          PT/INR - ( 25 Apr 2025 16:40 )   PT: 11.6 sec;   INR: 0.98 ratio         PTT - ( 25 Apr 2025 16:40 )  PTT:31.8 sec  Lactate, Blood: 1.8 mmol/L (04-25 @ 16:40)      CARDIAC MARKERS ( 26 Apr 2025 05:28 )  x     / 326.5 ng/L / x     / x     / x      CARDIAC MARKERS ( 25 Apr 2025 21:00 )  x     / 206.2 ng/L / x     / x     / x      CARDIAC MARKERS ( 25 Apr 2025 20:02 )  x     / 175.9 ng/L / x     / x     / x            TSH 0.028   TSH with FT4 reflex --  Total T3 --                  Urinalysis Basic - ( 26 Apr 2025 05:28 )    Color: x / Appearance: x / SG: x / pH: x  Gluc: 168 mg/dL / Ketone: x  / Bili: x / Urobili: x   Blood: x / Protein: x / Nitrite: x   Leuk Esterase: x / RBC: x / WBC x   Sq Epi: x / Non Sq Epi: x / Bacteria: x          RADIOLOGY & ADDITIONAL TESTS: < from: TTE Echo Complete w/o Contrast w/ Doppler (04.26.25 @ 09:25) >  CONCLUSIONS:     1. 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 Left ventricular cavity is normal in size. Left ventricular wall   thickness is normal. Left ventricular systolic function is normal with an   ejection fraction visually estimated at 65 to 70 %.   3. There is mild (grade 1) left ventricular diastolic dysfunction.   4. Tricuspid annular plane systolic excursion (TAPSE) is 2.5 cm (normal   >=1.7 cm). Tricuspid annular tissue Doppler S' is 20.0 cm/s (normal >10   cm/s).   5. Moderate to severe mitral regurgitation.   6. Structurally normal pulmonic valve with normal leaflet excursion.   7. Trace tricuspid regurgitation.   8. The inferior vena cava is normal in size measuring 1.80 cm in   diameter, (normal <2.1cm) with normal inspiratory collapse (normal >50%)   consistent with normal right atrial pressure (  3, range 0-5mmHg).    < end of copied text >      Care Discussed with Consultants/Other Providers:

## 2025-04-26 NOTE — PATIENT PROFILE ADULT - FUNCTIONAL ASSESSMENT - DAILY ACTIVITY 3.
Social Work Note: Patient's psychosocial assessment, PHQ-9 and BALTA-7 were completed. The patient was cooperative with assessment questioning, fair eye contact and concentration.    Met with patient and completed a Safety Plan. Patient able to identify several coping skills and distracting activities to help keep self safe post discharge. Plan also includes supportive resources. See AVS for details of the plan.     ITP completed, reviewed and discussed with patient Verbal consent obtained in order to maintain social distancing recommendations during the COVID crisis.    Social Work Note: Patient's AODA assessment was completed. Patient admitted to alcohol use and scored a 7 on the Audit-C scale. Patient also admitted to use of marijuana. Conducted motivational interview and completed AODA Recovery Plan/AODA Placement Criteria with the patient. See AVS for details of the plan. Recommendation: 1:1 OP tx. Rosario Santiago, [facility]   4 = No assist / stand by assistance

## 2025-04-26 NOTE — PROGRESS NOTE ADULT - ASSESSMENT
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 Admitted for new onset afib    #new onset pAF with rvr   - monitor on tele, currently NSR since arrival to   - currently rate controlled  - transition from cardizem to metoprolol 25mg BID  - TTE reviewed, grade 1 DD, LVEF 65-70%, moderate to severe MR  - trop 175.9 > 206.2 > 326.5, likely demand   - Check repeat trop in am  - Repeat EKG in AM   - cardio consult appreciated   - TSH reviewed, follow up free T4  - CXR, UA, RVP negative for infection   - CT head reviewed, negative for hemorrhage   - Neuro consult appreciated   - AC is indicated for the patient, however she has an IVC filter for a past DVT so it is unclear if AC is still contraindicated. Had a hx of brain tumor resection 2023. Patient states she was never told to not take blood thinners, patient's son does not recall either.   - Reached out to patient's oncologist Dr. Delacruz [phone] regarding AC, however office is closed. Retry on Monday   - Per cardio recs, benefit likely outweighs risk, will start plavix 75mg daily and asa, patient's family in agreement   -Cardiology looking to get in touch with patient's neurosurgeon as well     #Hypokalemia  -Replete  -Monitor BMP and magnesium     #abdominal aortic aneurysm   - CTA  with infrarenal abdominal aortic aneurysm, now measuring 4.4 x 4.0 cm  - discussed with Dr. Lee, recs outpatient follow up in 6 months with vascular surgery     #Metastatic Lung Ca with brain met s/p resection   - continue keppra 500mg BID  - follows with [facility] oncology, will  discuss anticoagulation plan with oncologist Dr. Fredis Solomon, however office currently closed     #HTN  - discontinue home amlodipine 5, start metoprolol     #depression/anxiety  -lexapro 10 daily   -hydroxyzine 50mg TID prn    #COPD  #current smoker   - no wheezing on exam   - nicotine patch     Advanced directives: FULL CODE     VTE ppx: start asa, plavix,  holding further anticoagulation until cleared with oncology & neurology       Patient's son Krunal tomlinson

## 2025-04-26 NOTE — CONSULT NOTE ADULT - ASSESSMENT
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 Reports these symptoms started yesterday and today she was very weak and unable to ambulate. Noted to have Afib in RVR in the ER upon arrival- no history of Afib. Afib w RVR may be provoked in setting of Dehydration due to Nausea/vomiting, or recent onset Hyperthyroidism- untreated.     Plan:  Head CT scan without contrast to evaluate for ICH prior to initiating Anticoagulation if this is deemed medically necessary  No further Neurodiagnostics or med changes suggested at this time    I reviewed patient’s lab work listed above, CT of Brain Images and the reports that were performed on prior admission.  I spent 55 minutes reviewing labs, pertinent Neuroimaging, reviewing patient’s chart, examining patient and discussing clinical history and findings with the patient, and the Primary Physician Team caring for the patient 
, In summary, the patient is a 80-year-old woman with metastatic lung cancer including a resected metastatic lesion to her brain done approximately 16 months ago who presents with nausea and vomiting as well as admitting the chest pressure today that she states occurred yesterday she currently is symptom-free.  Of note is the fact that she did have ST depressions on her EKG.  Also of note is the fact that she had a troponin level initially of 175 going to 206 and then to 326.    For now would begin aspirin 81 mg/day as well as Plavix 75 mg/day.  It is unclear what her risk is with regards to her brain lesion but given the fact that the surgery was 16 months ago feel  potential benefit in this patient with probable non-ST elevation MI those outweigh the risk.  Given her clear lung fields would start her on beta-blocker in the form of metoprolol tartrate 25 mg twice per day.    Fortunately echocardiogram reveals normal left ventricular systolic function    Would do serial EKGs and  troponins including a repeat EKG tomorrow and a repeat troponin tomorrow    Depending on clinical course and patient's wishes we will consider stress testing versus angiography    Will attempt to contact patient's neurosurgeon Dr. Barcenas to discuss further anticoagulation

## 2025-04-26 NOTE — H&P ADULT - NSHPPHYSICALEXAM_GEN_ALL_CORE
Vital Signs Last 24 Hrs  T(C): 37.7 (25 Apr 2025 20:36), Max: 37.7 (25 Apr 2025 20:36)  T(F): 99.8 (25 Apr 2025 20:36), Max: 99.8 (25 Apr 2025 20:36)  HR: 98 (25 Apr 2025 21:36) (65 - 133)  BP: 164/99 (25 Apr 2025 21:36) (164/99 - 193/107)  BP(mean): --  RR: 18 (25 Apr 2025 21:36) (18 - 22)  SpO2: 100% (25 Apr 2025 21:36) (96% - 100%)    Parameters below as of 25 Apr 2025 21:36  Patient On (Oxygen Delivery Method): room air    GENERAL: A&Ox3, non-toxic appearing, no acute distress  HEENT: NCAT, EOMI, dry mucosa, 1cm lymph node on left sided lateral neck   RESP: CTABL  CV: RRR, no murmurs/rubs/gallops  ABDOMEN: soft, non-tender, non-distended, no guarding, no CVA tenderness  MSK: no visible deformities  NEURO: no focal sensory or motor deficits  SKIN: warm, normal color, well perfused, no rash  PSYCH: normal affect

## 2025-04-26 NOTE — H&P ADULT - ASSESSMENT
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 Admitted for new onset afib    #new onset pAF with rvr   - admit to medicine  - currently rate controlled in the 80s  s/p 60mg po Cardizem and 33 IVP cardizem in ED    - continue cardizem 30mg q6hr   - TTE in AM   - trop 175.9 > 206.2 > f/u am trop, likely demand   - EKG in AM   - cardio consult   - f/u TSH   - CXR, UA, RVP negative for infection   - f/u CT head  - neuro consult   - AC is indicated for the patient, however she has an IVC filter for a past DVT, unclear if AC is contraindicated. Had a hx of brain tumor resection 2023. Will need clearance from neuro and her oncologist if she can be started on AC     #abdominal aortic aneurysm   - CTA  with infrarenal abdominal aortic aneurysm, now measuring 4.4 x 4.0 cm  - consider vascular consult in AM     #Metastatic Lung Ca with brain met s/p resection   - continue keppra 500mg BID  - follows with [facility] oncology, will need to discuss anticoagulation plan with oncologist Dr. Fredis Solomon  in the AM     #HTN  - discontinue home amlodipine 5, continue cardizem     #depression/anxiety  -lexapro 10 daily   -hydroxyzine 50mg TID prn    #COPD  #current smoker   - no wheezing on exam   - nicotine patch     Advanced directives: FULL CODE     VTE ppx: holding anticoagulation until cleared with oncology & neurology     Discussed with Dr. 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 Admitted for new onset afib    #new onset pAF with rvr   - admit to medicine  - currently rate controlled in the 80s  s/p 60mg po Cardizem and 33 IVP cardizem in ED    - continue cardizem 30mg q6hr   - TTE in AM   - trop 175.9 > 206.2 > f/u am trop, likely demand   - EKG in AM   - cardio consult   - f/u TSH   - CXR, UA, RVP negative for infection   - f/u CT head  - AC is indicated for the patient, however she has an IVC filter for a past DVT so it is unclear if AC is still contraindicated. Had a hx of brain tumor resection 2023. Will need clearance from neuro and her oncologist if she can be started on AC     #abdominal aortic aneurysm   - CTA  with infrarenal abdominal aortic aneurysm, now measuring 4.4 x 4.0 cm  - consider vascular consult in AM     #Metastatic Lung Ca with brain met s/p resection   - continue keppra 500mg BID  - follows with [facility] oncology, will need to discuss anticoagulation plan with oncologist Dr. Fredis Solomon  in the AM     #HTN  - discontinue home amlodipine 5, continue cardizem     #depression/anxiety  -lexapro 10 daily   -hydroxyzine 50mg TID prn    #COPD  #current smoker   - no wheezing on exam   - nicotine patch     Advanced directives: FULL CODE     VTE ppx: holding anticoagulation until cleared with oncology & neurology     Discussed with Dr. Nelson   Unable to reach Jairo Hector at this time

## 2025-04-27 LAB
ANION GAP SERPL CALC-SCNC: 9 MMOL/L — SIGNIFICANT CHANGE UP (ref 5–17)
BUN SERPL-MCNC: 35 MG/DL — HIGH (ref 7–23)
CALCIUM SERPL-MCNC: 9.5 MG/DL — SIGNIFICANT CHANGE UP (ref 8.4–10.5)
CHLORIDE SERPL-SCNC: 101 MMOL/L — SIGNIFICANT CHANGE UP (ref 96–108)
CO2 SERPL-SCNC: 27 MMOL/L — SIGNIFICANT CHANGE UP (ref 22–31)
CREAT SERPL-MCNC: 1.55 MG/DL — HIGH (ref 0.5–1.3)
EGFR: 34 ML/MIN/1.73M2 — LOW
EGFR: 34 ML/MIN/1.73M2 — LOW
GLUCOSE SERPL-MCNC: 106 MG/DL — HIGH (ref 70–99)
MAGNESIUM SERPL-MCNC: 2 MG/DL — SIGNIFICANT CHANGE UP (ref 1.6–2.6)
POTASSIUM SERPL-MCNC: 3.3 MMOL/L — LOW (ref 3.5–5.3)
POTASSIUM SERPL-SCNC: 3.3 MMOL/L — LOW (ref 3.5–5.3)
SODIUM SERPL-SCNC: 137 MMOL/L — SIGNIFICANT CHANGE UP (ref 135–145)
T4 FREE SERPL-MCNC: 1.3 NG/DL — SIGNIFICANT CHANGE UP (ref 0.9–1.8)
TROPONIN I, HIGH SENSITIVITY RESULT: 87.6 NG/L — HIGH

## 2025-04-27 PROCEDURE — 93010 ELECTROCARDIOGRAM REPORT: CPT

## 2025-04-27 PROCEDURE — 99232 SBSQ HOSP IP/OBS MODERATE 35: CPT

## 2025-04-27 PROCEDURE — 99233 SBSQ HOSP IP/OBS HIGH 50: CPT

## 2025-04-27 RX ORDER — LIDOCAINE HYDROCHLORIDE 20 MG/ML
1 JELLY TOPICAL ONCE
Refills: 0 | Status: COMPLETED | OUTPATIENT
Start: 2025-04-27 | End: 2025-04-27

## 2025-04-27 RX ORDER — REGADENOSON 0.08 MG/ML
0.4 INJECTION, SOLUTION INTRAVENOUS ONCE
Refills: 0 | Status: DISCONTINUED | OUTPATIENT
Start: 2025-04-27 | End: 2025-04-27

## 2025-04-27 RX ADMIN — NICOTINE POLACRILEX 1 PATCH: 4 GUM, CHEWING ORAL at 19:00

## 2025-04-27 RX ADMIN — Medication 40 MILLIEQUIVALENT(S): at 11:50

## 2025-04-27 RX ADMIN — Medication 650 MILLIGRAM(S): at 21:34

## 2025-04-27 RX ADMIN — Medication 81 MILLIGRAM(S): at 11:49

## 2025-04-27 RX ADMIN — Medication 650 MILLIGRAM(S): at 11:00

## 2025-04-27 RX ADMIN — CLOPIDOGREL BISULFATE 75 MILLIGRAM(S): 75 TABLET, FILM COATED ORAL at 11:49

## 2025-04-27 RX ADMIN — NICOTINE POLACRILEX 1 PATCH: 4 GUM, CHEWING ORAL at 11:54

## 2025-04-27 RX ADMIN — Medication 650 MILLIGRAM(S): at 10:21

## 2025-04-27 RX ADMIN — NICOTINE POLACRILEX 1 PATCH: 4 GUM, CHEWING ORAL at 11:49

## 2025-04-27 RX ADMIN — LEVETIRACETAM 500 MILLIGRAM(S): 10 INJECTION, SOLUTION INTRAVENOUS at 17:48

## 2025-04-27 RX ADMIN — ESCITALOPRAM OXALATE 10 MILLIGRAM(S): 20 TABLET ORAL at 11:49

## 2025-04-27 RX ADMIN — NICOTINE POLACRILEX 1 PATCH: 4 GUM, CHEWING ORAL at 07:22

## 2025-04-27 RX ADMIN — LIDOCAINE HYDROCHLORIDE 1 PATCH: 20 JELLY TOPICAL at 21:34

## 2025-04-27 RX ADMIN — METOPROLOL SUCCINATE 25 MILLIGRAM(S): 50 TABLET, EXTENDED RELEASE ORAL at 05:11

## 2025-04-27 RX ADMIN — Medication 650 MILLIGRAM(S): at 02:23

## 2025-04-27 RX ADMIN — LEVETIRACETAM 500 MILLIGRAM(S): 10 INJECTION, SOLUTION INTRAVENOUS at 05:11

## 2025-04-27 NOTE — DISCHARGE NOTE PROVIDER - NSDCFUADDAPPT_GEN_ALL_CORE_FT
APPTS ARE READY TO BE MADE: [X] YES    Best Family or Patient Contact (if needed):    Additional Information about above appointments (if needed):    1: Vascular surgery within 6 months   2:   3:     Other comments or requests:    APPTS ARE READY TO BE MADE: [X] YES    Best Family or Patient Contact (if needed):    Additional Information about above appointments (if needed):    1: Vascular surgery within 6 months   2:   3:     Other comments or requests:       Prior to outreaching the patient, it was visible that the patient has secured a follow up appointment which was not scheduled by our team.SONIA Aguilar on 5/2 at 11:30am    Patient was outreached but did not answer nor could a voicemail be left. APPTS ARE READY TO BE MADE: [X] YES    Best Family or Patient Contact (if needed):    Additional Information about above appointments (if needed):    1: Vascular surgery within 6 months   2: Cardio   3:     Other comments or requests:       Prior to outreaching the patient, it was visible that the patient has secured a follow up appointment which was not scheduled by our team.SONIA Aguilar on 5/2 at 11:30am    Patient was outreached but did not answer nor could a voicemail be left. APPTS ARE READY TO BE MADE: [X] YES    Best Family or Patient Contact (if needed):    Additional Information about above appointments (if needed):    1: Vascular surgery within 6 months   2: Cardio   3:     Other comments or requests:       Prior to outreaching the patient, it was visible that the patient has secured a follow up appointment which was not scheduled by our team.SONIA Aguilar on 5/2 at 11:30am    Patient advises they do not want our assistance and prefer to coordinate the [facility] appointments on their own. No information was provided to the patient, however pending the referral to capture potential appointment data once scheduled by the patient.

## 2025-04-27 NOTE — DISCHARGE NOTE PROVIDER - NSDCMRMEDTOKEN_GEN_ALL_CORE_FT
amLODIPine 5 mg oral tablet: 1 tab(s) orally once a day  escitalopram 10 mg oral tablet: 1 tab(s) orally once a day  hydrOXYzine hydrochloride 50 mg oral tablet: 1 tab(s) orally 3 times a day as needed for  anxiety  Keppra 500 mg oral tablet: 1 tab(s) orally every 12 hours  potassium chloride 10 mEq oral capsule, extended release: 1 cap(s) orally once a day   amLODIPine 5 mg oral tablet: 1 tab(s) orally once a day  aspirin 81 mg oral delayed release tablet: 1 tab(s) orally once a day  atorvastatin 40 mg oral tablet: 1 tab(s) orally once a day (at bedtime)  clopidogrel 75 mg oral tablet: 1 tab(s) orally once a day  escitalopram 10 mg oral tablet: 1 tab(s) orally once a day  hydrOXYzine hydrochloride 50 mg oral tablet: 1 tab(s) orally 3 times a day as needed for  anxiety  Keppra 500 mg oral tablet: 1 tab(s) orally every 12 hours  metoprolol tartrate 25 mg oral tablet: 1 tab(s) orally 2 times a day  potassium chloride 10 mEq oral capsule, extended release: 1 cap(s) orally once a day

## 2025-04-27 NOTE — PROGRESS NOTE ADULT - SUBJECTIVE AND OBJECTIVE BOX
Follow up for nstemi/af  SUBJ:Offers no specific complaints just admits to fatigue.  Nausea and vomiting have resolved.  She denies chest pain.  PMH  Arthritis    HTN (hypertension)    Depression    Regurgitation    Bronchitis    Migraine    Kidney calculi    Spinal stenosis, unspecified spinal region    Osteoarthritis of multiple joints, unspecified osteoarthritis type    COPD (chronic obstructive pulmonary disease)    Asthma    Localized enlarged lymph nodes    Solitary pulmonary nodule    History of lung cancer        MEDICATIONS  (STANDING):  aspirin enteric coated 81 milliGRAM(s) Oral daily  clopidogrel Tablet 75 milliGRAM(s) Oral daily  escitalopram 10 milliGRAM(s) Oral daily  levETIRAcetam 500 milliGRAM(s) Oral two times a day  metoprolol tartrate 25 milliGRAM(s) Oral two times a day  nicotine -   7 mG/24Hr(s) Patch 1 Patch Transdermal daily    MEDICATIONS  (PRN):  acetaminophen     Tablet .. 650 milliGRAM(s) Oral every 6 hours PRN Temp greater or equal to 38C (100.4F), Mild Pain (1 - 3)  aluminum hydroxide/magnesium hydroxide/simethicone Suspension 30 milliLiter(s) Oral every 4 hours PRN Dyspepsia  hydrOXYzine hydrochloride 50 milliGRAM(s) Oral three times a day PRN for anxiety  ondansetron Injectable 4 milliGRAM(s) IV Push every 8 hours PRN Nausea and/or Vomiting        PHYSICAL EXAM:  Vital Signs Last 24 Hrs  T(C): 36.8 (27 Apr 2025 11:29), Max: 37.2 (26 Apr 2025 13:30)  T(F): 98.3 (27 Apr 2025 11:29), Max: 98.9 (26 Apr 2025 13:30)  HR: 76 (27 Apr 2025 11:29) (64 - 82)  BP: 146/76 (27 Apr 2025 11:29) (126/65 - 159/75)  BP(mean): --  RR: 16 (27 Apr 2025 11:29) (16 - 16)  SpO2: 96% (27 Apr 2025 11:29) (94% - 96%)    Parameters below as of 27 Apr 2025 11:29  Patient On (Oxygen Delivery Method): room air          NECK: Supple, No JVD, no bruits  CHEST/LUNG: Clear to percussion bilaterally; No rales, rhonchi, wheezing, or rubs  HEART: Regular rate and rhythm; No murmurs, rubs, or gallops PMI non displaced.        TELEMETRY:rsr    ECG:normal    LABS:                        12  .4   5.34  )-----------( 292      ( 26 Apr 2025 05:28 )             36.7     04-27    137  |  101  |  35[H]  ----------------------------<  106[H]  3.3[L]   |  27  |  1.55[H]    Ca    9.5      27 Apr 2025 06:25  Mg     2.0     04-27    TPro  7.6  /  Alb  3.4  /  TBili  0.5  /  DBili  x   /  AST  18  /  ALT  11  /  AlkPhos  121[H]  04-26        PT/INR - ( 25 Apr 2025 16:40 )   PT: 11.6 sec;   INR: 0.98 ratio         PTT - ( 25 Apr 2025 16:40 )  PTT:31.8 sec    I&O's Summary    BNP    RADIOLOGY & ADDITIONAL STUDIES:    ECHO:

## 2025-04-27 NOTE — DISCHARGE NOTE PROVIDER - CARE PROVIDERS DIRECT ADDRESSES
,[email or web address]

## 2025-04-27 NOTE — DISCHARGE NOTE PROVIDER - PROVIDER TOKENS
PROVIDER:[TOKEN:[[id number]]],PROVIDER:[TOKEN:[[id number]]] PROVIDER:[TOKEN:[[id number]]],PROVIDER:[TOKEN:[[id number]]],PROVIDER:[TOKEN:[[id number]]]

## 2025-04-27 NOTE — DISCHARGE NOTE PROVIDER - NSDCCPCAREPLAN_GEN_ALL_CORE_FT
PRINCIPAL DISCHARGE DIAGNOSIS  Diagnosis: New onset atrial fibrillation  Assessment and Plan of Treatment:       SECONDARY DISCHARGE DIAGNOSES  Diagnosis: Lung nodule  Assessment and Plan of Treatment: Your CT shows a 0.8 cm peribronchovascular nodule within right upper lobe.  Follow up with PCP and pulmonary outpatient for monitoring     PRINCIPAL DISCHARGE DIAGNOSIS  Diagnosis: New onset atrial fibrillation  Assessment and Plan of Treatment: You were diagnosed with an abnormal heart rhythm called atrial fibrillation. You have since converted back to a normal heart rhythm. Please continue to take all medications as directed. Follow up with your cardiologist and with your primary care doctor.      SECONDARY DISCHARGE DIAGNOSES  Diagnosis: Lung nodule  Assessment and Plan of Treatment: Your CT shows a 0.8 cm peribronchovascular nodule within right upper lobe. Follow up with PCP and pulmonary outpatient for monitoring    Diagnosis: Abnormal stress test  Assessment and Plan of Treatment: You had an abnormal stress test. Decision was made to defer cardiac catheterization at this time. Please follow up with your cardiologist for further management.    Diagnosis: Abdominal aorta finding  Assessment and Plan of Treatment: You had an abdominal aortic aneurysm. A vascular surgeon recommended that you follow up with vascular surgery in 6 months for repeat imaging. Please also follow up with Dr Carter     PRINCIPAL DISCHARGE DIAGNOSIS  Diagnosis: New onset atrial fibrillation  Assessment and Plan of Treatment: You were diagnosed with an abnormal heart rhythm called atrial fibrillation. You have since converted back to a normal heart rhythm. Please continue to take all medications as directed. Follow up with your cardiologist and with your primary care doctor. Remain off blood thinners - continue Aspirin and plavix      SECONDARY DISCHARGE DIAGNOSES  Diagnosis: Lung nodule  Assessment and Plan of Treatment: Your CT shows a 0.8 cm peribronchovascular nodule within right upper lobe. Follow up with PCP and pulmonary outpatient for monitoring    Diagnosis: Abnormal stress test  Assessment and Plan of Treatment: You had an abnormal stress test. Decision was made to defer cardiac catheterization at this time. Please follow up with your cardiologist for further management.    Diagnosis: Abdominal aorta finding  Assessment and Plan of Treatment: You had an abdominal aortic aneurysm. A vascular surgeon recommended that you follow up with vascular surgery in 6 months for repeat imaging. Please also follow up with Dr Carter

## 2025-04-27 NOTE — PROGRESS NOTE ADULT - ASSESSMENT
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 Admitted for new onset afib    #new onset pAF with rvr (resolved)  - monitor on tele, currently NSR since arrival to [facility]  - currently rate controlled  - Continue metoprolol 25mg BID  - TTE reviewed, grade 1 DD, LVEF 65-70%, moderate to severe MR  - trop 175.9 > 206.2 > 326.5 > 87.6, likely demand   - cardio consult appreciated   - TSH reviewed, follow up free T4  - CXR, UA, RVP negative for infection   - CT head reviewed, negative for hemorrhage   - Neuro consult appreciated   - AC is indicated for the patient, however she has an IVC filter for a past DVT so it is unclear if AC is still contraindicated. Had a hx of brain tumor resection 2023. Patient states she was never told to not take blood thinners, patient's son does not recall either.   - Reached out to patient's oncologist Dr. Delacruz [phone] regarding AC, however office is closed. Retry on Monday   - Per cardio recs, benefit likely outweighs risk, started plavix 75mg daily and asa, patient and patient's family in agreement   -Cardiology looking to get in touch with patient's neurosurgeon as well     #Hypokalemia  -Replete  -Monitor BMP and magnesium     #abdominal aortic aneurysm   - CTA  with infrarenal abdominal aortic aneurysm, now measuring 4.4 x 4.0 cm  - discussed with Dr. Lee, recs outpatient follow up in 6 months with vascular surgery, patient made aware     #Metastatic Lung Ca with brain met s/p resection   - continue keppra 500mg BID  - follows with [facility] oncology, will  discuss anticoagulation plan with oncologist Dr. Fredis Solomon, however office currently closed     #HTN  - discontinue home amlodipine 5, start metoprolol     #depression/anxiety  -lexapro 10 daily   -hydroxyzine 50mg TID prn    #COPD  #current smoker   - no wheezing on exam   - nicotine patch     Advanced directives: FULL CODE     VTE ppx:  asa, plavix,  holding further anticoagulation until cleared with oncology & neurology       Patient's son Krunal tomlinson

## 2025-04-27 NOTE — PROGRESS NOTE ADULT - NS ATTEND AMEND GEN_ALL_CORE FT
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 cardio appreciated. rate controlled, continue metoprolol. neuro appreciated. f/u onc in AM for AC reccs. currently toleratin ASA, plavix. follow repeat tfts

## 2025-04-27 NOTE — PROGRESS NOTE ADULT - SUBJECTIVE AND OBJECTIVE BOX
Patient is a 80y old  Female who presents with a chief complaint of palpitations (26 Apr 2025 13:44)      Patient seen and examined at bedside. No overnight events reported. Patient states she is feeling well. Denies chest pain, sob, nausea, vomiting.    ALLERGIES:  penicillin (Short breath; Hives)    MEDICATIONS  (STANDING):  aspirin enteric coated 81 milliGRAM(s) Oral daily  clopidogrel Tablet 75 milliGRAM(s) Oral daily  escitalopram 10 milliGRAM(s) Oral daily  levETIRAcetam 500 milliGRAM(s) Oral two times a day  metoprolol tartrate 25 milliGRAM(s) Oral two times a day  nicotine -   7 mG/24Hr(s) Patch 1 Patch Transdermal daily    MEDICATIONS  (PRN):  acetaminophen     Tablet .. 650 milliGRAM(s) Oral every 6 hours PRN Temp greater or equal to 38C (100.4F), Mild Pain (1 - 3)  aluminum hydroxide/magnesium hydroxide/simethicone Suspension 30 milliLiter(s) Oral every 4 hours PRN Dyspepsia  hydrOXYzine hydrochloride 50 milliGRAM(s) Oral three times a day PRN for anxiety  ondansetron Injectable 4 milliGRAM(s) IV Push every 8 hours PRN Nausea and/or Vomiting    Vital Signs Last 24 Hrs  T(F): 98.1 (27 Apr 2025 05:05), Max: 98.9 (26 Apr 2025 13:30)  HR: 64 (27 Apr 2025 05:05) (64 - 82)  BP: 145/77 (27 Apr 2025 05:05) (126/65 - 159/75)  RR: 16 (27 Apr 2025 05:05) (16 - 16)  SpO2: 96% (27 Apr 2025 05:05) (94% - 96%)  I&O's Summary    PHYSICAL EXAM:  General: NAD, A/O x 3  ENT: No gross hearing impairment, Moist mucous membranes, no thrush  Neck: Supple, No JVD  Lungs: Clear to auscultation bilaterally, good air entry, non-labored breathing  Cardio: RRR, S1/S2, No murmur  Abdomen: Soft, Nontender, Nondistended; Bowel sounds present  Extremities: No calf tenderness, No cyanosis, No pitting edema  Psych: Appropriate mood and affect    LABS:                        12.4   5.34  )-----------( 292      ( 26 Apr 2025 05:28 )             36.7     04-27    137  |  101  |  35  ----------------------------<  106  3.3   |  27  |  1.55    Ca    9.5      27 Apr 2025 06:25    TPro  7.6  /  Alb  3.4  /  TBili  0.5  /  DBili  x   /  AST  18  /  ALT  11  /  AlkPhos  121  04-26          PT/INR - ( 25 Apr 2025 16:40 )   PT: 11.6 sec;   INR: 0.98 ratio         PTT - ( 25 Apr 2025 16:40 )  PTT:31.8 sec  Lactate, Blood: 1.8 mmol/L (04-25 @ 16:40)      CARDIAC MARKERS ( 27 Apr 2025 06:25 )  x     / 87.6 ng/L / x     / x     / x      CARDIAC MARKERS ( 26 Apr 2025 05:28 )  x     / 326.5 ng/L / x     / x     / x      CARDIAC MARKERS ( 25 Apr 2025 21:00 )  x     / 206.2 ng/L / x     / x     / x      CARDIAC MARKERS ( 25 Apr 2025 20:02 )  x     / 175.9 ng/L / x     / x     / x            TSH 0.028   TSH with FT4 reflex --  Total T3 --                  Urinalysis Basic - ( 27 Apr 2025 06:25 )    Color: x / Appearance: x / SG: x / pH: x  Gluc: 106 mg/dL / Ketone: x  / Bili: x / Urobili: x   Blood: x / Protein: x / Nitrite: x   Leuk Esterase: x / RBC: x / WBC x   Sq Epi: x / Non Sq Epi: x / Bacteria: x        Culture - Blood (collected 25 Apr 2025 16:50)  Source: Blood Blood-Peripheral  Preliminary Report (26 Apr 2025 21:01):    No growth at 24 hours    Culture - Blood (collected 25 Apr 2025 16:40)  Source: Blood Blood-Peripheral  Preliminary Report (26 Apr 2025 21:01):    No growth at 24 hours        RADIOLOGY & ADDITIONAL TESTS:    Care Discussed with Consultants/Other Providers:

## 2025-04-27 NOTE — PROGRESS NOTE ADULT - ASSESSMENT
In summary, the patient is a woman with multiple medical problems who presented with vomiting that may have been a symptom of non-STEMI.  She had transient atrial fibrillation as well.  Her elevated troponins have come down to 87.  Her LV systolic function is normal and her EKG is normal.  We have discussed medical management versus medical management and risk stratification.  The patient is agreeable to undergo vasodilator myocardial perfusion imaging to assess her risk of future events.

## 2025-04-27 NOTE — DISCHARGE NOTE PROVIDER - CARE PROVIDER_API CALL
Jostin Carter.  [facility]  [address] [place] [id number]  Phone: [phone]  Fax: [phone]  Follow Up Time:     Gilda Lee  Vascular Surgery  [address] [place] [address]  Phone: [phone]  Fax: [phone]  Follow Up Time:    Jostin Carter.  [facility]  [address] [place] [address]  Phone: [phone]  Fax: [phone]  Follow Up Time:     Gilda Lee  Vascular Surgery  [address] [place] [address]  Phone: [phone]  Fax: [phone]  Follow Up Time:     Cooper Donnelly  Cardiovascular Disease  [address] [facility], [address]  [place] [address]  Phone: [phone]  Fax: [phone]  Follow Up Time:

## 2025-04-27 NOTE — DISCHARGE NOTE PROVIDER - HOSPITAL COURSE
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 Reports these symptoms started yesterday and today she was very weak and unable to ambulate. Denies wheezing, chest pain, recent colds, diarrhea, falls, sick contacts. Denies history of palpitations. Follows with [facility] oncologist Dr. Solomon and had most recent appointment 1 week ago.   Upon arrival to ED, vitals /107, HR 65 bpm increased to 133 bmp, RR 18 96% O2, Temp 97.3*F  Received 2750 cc NaCl, IV tylenol, solumedrol 125mg, duoneb x3, 33mg diltiazem IVP, 60 diltiazem p  zofran 4mg x 2   CXR reviewed emphysema , no infiltrate  EKG NSR 97 bpm on arrival > 134 bpm afib rvr > NSR 98 bpm after conversion  HIIE reviewed   (26 Apr 2025 00:04)    Patient admitted for new onset afib. Patient was treated with cardizem, then transitioned to metoprolol. Monitored on telemetry. Patient converted to normal sinus rhythm. Cardio consulted. Due to concern of NSTEMI, patient was started on aspirin and plavix. Discussed further anticoagulation with patient's oncologist____. TTE performed. Nuclear stress test performed and showed... Patient with hypokalemia, which resolved with repletion. CTA with infrarenal abdominal aortic aneurysm, now measuring 4.4 x 4.0 cm. Discussed with Dr. Lee from vascular, recs outpatient follow up in 6 months with vascular surgery, patient made aware. CTA also with stable 0.8 cm peribronchovascular nodule within right upper lobe. Patient should follow up outpatient for monitoring.      You were admitted for   You were diagnosed with   You were treated with   You were prescribed the following new medications:    You will need to follow up with your primary care physician.    Source of Infection:  Antibiotic / Last Day:    Palliative Care / Advanced Care Planning  Code Status:  Patient/Family agreeable to Hospice/Palliative (Y/N)?  Summary of Goals of Care Conversation:    Discharging Provider:    Contact Info: [phone] - Please call with any questions or concerns.    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 Reports these symptoms started yesterday and today she was very weak and unable to ambulate. Denies wheezing, chest pain, recent colds, diarrhea, falls, sick contacts. Denies history of palpitations. Follows with [facility] oncologist Dr. Solomon and had most recent appointment 1 week ago.   Upon arrival to ED, vitals /107, HR 65 bpm increased to 133 bmp, RR 18 96% O2, Temp 97.3*F  Received 2750 cc NaCl, IV tylenol, solumedrol 125mg, duoneb x3, 33mg diltiazem IVP, 60 diltiazem p  zofran 4mg x 2   CXR reviewed emphysema , no infiltrate  EKG NSR 97 bpm on arrival > 134 bpm afib rvr > NSR 98 bpm after conversion  HIIE reviewed   (26 Apr 2025 00:04)    Patient was admitted to tele for new onset afib with RVR. She converted back to NSR and HR improved. Continue with metoprolol 25 mg BID. TTE reviewed, grade 1 DD, LVEF 65-70%, moderate to severe MR. Troponin were elevated, later peaked and have downtrended. Cardiology was consulted, recommended nuclear stress test. Nuclear stress test with  fixed perfusion defect - no plan for coronary angiogram at this time due to risk of major bleeding with brain mets per cardiology. Spoke with patient's oncologist Dr Delacruz re: anticoagulation - recommended no AC, but patient was cleared to start aspirin and plavix. Patient's neurosurgeon was also contacted, recommended ______. Continue aspirin, plavix and statin.     Noted CT finding of infrarenal abdominal aortic aneurysm, measuring 4.4 x 4.0 cm. Discussed with Dr. Lee, recs outpatient follow up in 6 months with vascular surgery, patient made aware. CTA also with stable 0.8 cm peribronchovascular nodule within right upper lobe. Patient should follow up outpatient for monitoring.    Patient is stable for DC home with outpatient follow up.     Palliative Care / Advanced Care Planning  Code Status: Full Code     Discharging Provider:    Contact Info: [phone] - Please call with any questions or concerns.    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 Reports these symptoms started yesterday and today she was very weak and unable to ambulate. Denies wheezing, chest pain, recent colds, diarrhea, falls, sick contacts. Denies history of palpitations. Follows with [facility] oncologist Dr. Solomon and had most recent appointment 1 week ago. Found in afib with RVR.    Patient was admitted to [facility] for new onset afib with RVR. She converted back to NSR and HR improved. Continue with metoprolol 25 mg BID. TTE reviewed, grade 1 DD, LVEF 65-70%, moderate to severe MR. Troponin were elevated, later peaked and have downtrended. Cardiology was consulted, recommended nuclear stress test. Nuclear stress test with  fixed perfusion defect - no plan for coronary angiogram at this time due to risk of major bleeding with brain mets per cardiology. Spoke with patient's oncologist Dr Delacruz re: anticoagulation - recommended no AC, but patient was cleared to start aspirin and plavix. Patient's neurosurgeon was also contacted, recommended __________. Continue aspirin, plavix and statin.     Noted CT finding of infrarenal abdominal aortic aneurysm, measuring 4.4 x 4.0 cm. Discussed with millie Moctezuma outpatient follow up in 6 months with vascular surgery, patient made aware. CTA also with stable 0.8 cm peribronchovascular nodule within right upper lobe. Patient should follow up outpatient for monitoring.    Patient is stable for DC home with outpatient follow up.     Palliative Care / Advanced Care Planning  Code Status: Full Code     Discharging Provider:    Contact Info: [phone] - Please call with any questions or concerns.    Outpatient Provider: Dr. 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 Reports these symptoms started yesterday and today she was very weak and unable to ambulate. Denies wheezing, chest pain, recent colds, diarrhea, falls, sick contacts. Denies history of palpitations. Follows with [facility] oncologist Dr. Solomon and had most recent appointment 1 week ago. Found in afib with RVR.    Patient was admitted to [facility] for new onset afib with RVR. She converted back to NSR and HR improved. Continue with metoprolol 25 mg BID. TTE reviewed, grade 1 DD, LVEF 65-70%, moderate to severe MR. Troponin were elevated, later peaked and have downtrended. Cardiology was consulted, recommended nuclear stress test. Nuclear stress test with  fixed perfusion defect - no plan for coronary angiogram at this time due to risk of major bleeding with brain mets per cardiology. Spoke with patient's oncologist Dr Delacruz re: anticoagulation - recommended no AC, but patient was cleared to start aspirin and plavix. Patient's neurosurgeon was also contacted, recommended per cards to stay off AC. Will Continue aspirin, plavix and statin.     Noted CT finding of infrarenal abdominal aortic aneurysm, measuring 4.4 x 4.0 cm. Discussed with millie Moctezuma outpatient follow up in 6 months with vascular surgery, patient made aware. CTA also with stable 0.8 cm peribronchovascular nodule within right upper lobe. Patient should follow up outpatient for monitoring.    Patient is stable for DC home with outpatient follow up.     Palliative Care / Advanced Care Planning  Code Status: Full Code     Discharging Provider: Robi Wood NP   Contact Info: [phone] - Please call with any questions or concerns.    Outpatient Provider: Dr. Carter - notified

## 2025-04-27 NOTE — DISCHARGE NOTE PROVIDER - NSDCFUSCHEDAPPT_GEN_ALL_CORE_FT
[facility]  [address]  Scheduled Appointment: 06/10/2025     Manny Aguilar  [facility] [address]  Scheduled Appointment: 04/30/2025    Jostin Carter  [facility] [address]  Scheduled Appointment: 06/10/2025     Manny Aguilar  [facility] [address]  Scheduled Appointment: 05/02/2025    Jostin Carter  [address]  Scheduled Appointment: 06/10/2025     [facility]  [address]  Scheduled Appointment: 06/10/2025

## 2025-04-28 ENCOUNTER — TRANSCRIPTION ENCOUNTER (OUTPATIENT)
Age: 80
End: 2025-04-28

## 2025-04-28 LAB
ANION GAP SERPL CALC-SCNC: 10 MMOL/L — SIGNIFICANT CHANGE UP (ref 5–17)
BUN SERPL-MCNC: 37 MG/DL — HIGH (ref 7–23)
CALCIUM SERPL-MCNC: 9.3 MG/DL — SIGNIFICANT CHANGE UP (ref 8.4–10.5)
CHLORIDE SERPL-SCNC: 101 MMOL/L — SIGNIFICANT CHANGE UP (ref 96–108)
CO2 SERPL-SCNC: 24 MMOL/L — SIGNIFICANT CHANGE UP (ref 22–31)
CREAT SERPL-MCNC: 1.5 MG/DL — HIGH (ref 0.5–1.3)
EGFR: 36 ML/MIN/1.73M2 — LOW
EGFR: 36 ML/MIN/1.73M2 — LOW
GLUCOSE SERPL-MCNC: 74 MG/DL — SIGNIFICANT CHANGE UP (ref 70–99)
HCT VFR BLD CALC: 35.7 % — SIGNIFICANT CHANGE UP (ref 34.5–45)
HGB BLD-MCNC: 11.9 G/DL — SIGNIFICANT CHANGE UP (ref 11.5–15.5)
MAGNESIUM SERPL-MCNC: 2 MG/DL — SIGNIFICANT CHANGE UP (ref 1.6–2.6)
MCHC RBC-ENTMCNC: 29.3 PG — SIGNIFICANT CHANGE UP (ref 27–34)
MCHC RBC-ENTMCNC: 33.3 G/DL — SIGNIFICANT CHANGE UP (ref 32–36)
MCV RBC AUTO: 87.9 FL — SIGNIFICANT CHANGE UP (ref 80–100)
NRBC BLD AUTO-RTO: 0 /100 WBCS — SIGNIFICANT CHANGE UP (ref 0–0)
PLATELET # BLD AUTO: 258 K/UL — SIGNIFICANT CHANGE UP (ref 150–400)
POTASSIUM SERPL-MCNC: 3.5 MMOL/L — SIGNIFICANT CHANGE UP (ref 3.5–5.3)
POTASSIUM SERPL-SCNC: 3.5 MMOL/L — SIGNIFICANT CHANGE UP (ref 3.5–5.3)
RBC # BLD: 4.06 M/UL — SIGNIFICANT CHANGE UP (ref 3.8–5.2)
RBC # FLD: 14 % — SIGNIFICANT CHANGE UP (ref 10.3–14.5)
SODIUM SERPL-SCNC: 135 MMOL/L — SIGNIFICANT CHANGE UP (ref 135–145)
T3 SERPL-MCNC: 57 NG/DL — LOW (ref 80–200)
T4 AB SER-ACNC: 7 UG/DL — SIGNIFICANT CHANGE UP (ref 4.6–12)
TSH SERPL-MCNC: 0.22 UIU/ML — LOW (ref 0.36–3.74)
WBC # BLD: 12.38 K/UL — HIGH (ref 3.8–10.5)
WBC # FLD AUTO: 12.38 K/UL — HIGH (ref 3.8–10.5)

## 2025-04-28 PROCEDURE — 99232 SBSQ HOSP IP/OBS MODERATE 35: CPT

## 2025-04-28 PROCEDURE — 99233 SBSQ HOSP IP/OBS HIGH 50: CPT

## 2025-04-28 PROCEDURE — 93016 CV STRESS TEST SUPVJ ONLY: CPT

## 2025-04-28 PROCEDURE — 93018 CV STRESS TEST I&R ONLY: CPT

## 2025-04-28 PROCEDURE — 78452 HT MUSCLE IMAGE SPECT MULT: CPT | Mod: 26

## 2025-04-28 RX ORDER — REGADENOSON 0.08 MG/ML
0.4 INJECTION, SOLUTION INTRAVENOUS ONCE
Refills: 0 | Status: DISCONTINUED | OUTPATIENT
Start: 2025-04-28 | End: 2025-04-30

## 2025-04-28 RX ORDER — AMLODIPINE BESYLATE 10 MG/1
5 TABLET ORAL DAILY
Refills: 0 | Status: DISCONTINUED | OUTPATIENT
Start: 2025-04-28 | End: 2025-04-30

## 2025-04-28 RX ADMIN — Medication 5 MILLIGRAM(S): at 10:57

## 2025-04-28 RX ADMIN — Medication 650 MILLIGRAM(S): at 14:34

## 2025-04-28 RX ADMIN — NICOTINE POLACRILEX 1 PATCH: 4 GUM, CHEWING ORAL at 11:33

## 2025-04-28 RX ADMIN — Medication 81 MILLIGRAM(S): at 11:37

## 2025-04-28 RX ADMIN — AMLODIPINE BESYLATE 5 MILLIGRAM(S): 10 TABLET ORAL at 17:05

## 2025-04-28 RX ADMIN — NICOTINE POLACRILEX 1 PATCH: 4 GUM, CHEWING ORAL at 07:20

## 2025-04-28 RX ADMIN — LEVETIRACETAM 500 MILLIGRAM(S): 10 INJECTION, SOLUTION INTRAVENOUS at 17:05

## 2025-04-28 RX ADMIN — Medication 4 MILLIGRAM(S): at 15:39

## 2025-04-28 RX ADMIN — NICOTINE POLACRILEX 1 PATCH: 4 GUM, CHEWING ORAL at 11:46

## 2025-04-28 RX ADMIN — METOPROLOL SUCCINATE 25 MILLIGRAM(S): 50 TABLET, EXTENDED RELEASE ORAL at 17:06

## 2025-04-28 RX ADMIN — LIDOCAINE HYDROCHLORIDE 1 PATCH: 20 JELLY TOPICAL at 09:00

## 2025-04-28 RX ADMIN — ESCITALOPRAM OXALATE 10 MILLIGRAM(S): 20 TABLET ORAL at 11:33

## 2025-04-28 RX ADMIN — LIDOCAINE HYDROCHLORIDE 1 PATCH: 20 JELLY TOPICAL at 07:20

## 2025-04-28 RX ADMIN — Medication 650 MILLIGRAM(S): at 15:15

## 2025-04-28 RX ADMIN — CLOPIDOGREL BISULFATE 75 MILLIGRAM(S): 75 TABLET, FILM COATED ORAL at 11:37

## 2025-04-28 RX ADMIN — LEVETIRACETAM 500 MILLIGRAM(S): 10 INJECTION, SOLUTION INTRAVENOUS at 05:06

## 2025-04-28 NOTE — PROGRESS NOTE ADULT - ASSESSMENT
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 Admitted for new onset afib    # New onset afib with RVR (resolved)  # B/L L/E DVT s/p IVC filter in 2024  - Monitor on tele, currently NSR since arrival to [facility]  - Currently rate controlled  - Continue lopressor  - TTE reviewed, grade 1 DD, LVEF 65-70%, moderate to severe MR  - Trop 175.9 > 206.2 > 326.5 > 87.6, likely demand  - Cardio consult appreciated   - Pending stress test  - TSH reviewed, follow up free T4  - CXR, UA, RVP negative for infection   - CT head reviewed, negative for hemorrhage   - Neuro consult appreciated   - AC is indicated for the patient, however she has an IVC filter for a past DVT so it is unclear if AC is still contraindicated. Had a hx of brain tumor resection 2023. Patient states she was never told to not take blood thinners, patient's son does not recall either.   - Reached out to patient's oncologist Dr. Delacruz [phone] prefer not to start AC, ok with ASA and plavix  - Per cardio recs, benefit likely outweighs risk, started plavix 75mg daily and asa, patient and patient's family in agreement   - Cardiology looking to get in touch with patient's neurosurgeon as well     # Hypokalemia; resolved  - Monitor BMP     # Abdominal aortic aneurysm   - CTA with infrarenal abdominal aortic aneurysm, measuring 4.4 x 4.0 cm  - Discussed with Dr. Lee, recs outpatient follow up in 6 months with vascular surgery, patient made aware     # Metastatic Lung Ca with brain met s/p resection   - Continue keppra 500mg BID for seizure ppx    # HTN  - c/w norvasc and lopressor    # Depression/anxiety  -c/w lexapro   - c/w hydroxyzine PRN    # COPD without acute exacerbation  # Nicotine dependence without withdrawal  - Not on home inhalers per med rec  - c/w nicotine TD    GOC/DISPO  - Full code  - Pending stress test    VTE ppx:  ASA, plavix, IVC filter, ambulation  Patient's son Krunal updated 4/28

## 2025-04-28 NOTE — PROGRESS NOTE ADULT - NS ATTEND AMEND GEN_ALL_CORE FT
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 continue metoprolol. neuro appreciated. primary onc reccs appreciated. anticipate discharge 4/29

## 2025-04-28 NOTE — PROGRESS NOTE ADULT - SUBJECTIVE AND OBJECTIVE BOX
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 Admitted for new onset afib    #new onset pAF with rvr (resolved)  - monitor on tele, currently NSR since arrival to [facility]  - currently rate controlled  - Continue metoprolol 25mg BID  - TTE reviewed, grade 1 DD, LVEF 65-70%, moderate to severe MR  - trop 175.9 > 206.2 > 326.5 > 87.6, likely demand   - cardio consult appreciated   - TSH reviewed, follow up free T4  - CXR, UA, RVP negative for infection   - CT head reviewed, negative for hemorrhage   - Neuro consult appreciated   - AC is indicated for the patient, however she has an IVC filter for a past DVT so it is unclear if AC is still contraindicated. Had a hx of brain tumor resection 2023. Patient states she was never told to not take blood thinners, patient's son does not recall either.   - Reached out to patient's oncologist Dr. Delacruz [phone] regarding AC, however office is closed. Retry on Monday   - Per cardio recs, benefit likely outweighs risk, started plavix 75mg daily and asa, patient and patient's family in agreement   -Cardiology looking to get in touch with patient's neurosurgeon as well     #Hypokalemia  -Replete  -Monitor BMP and magnesium     #abdominal aortic aneurysm   - CTA  with infrarenal abdominal aortic aneurysm, now measuring 4.4 x 4.0 cm  - discussed with Dr. Lee, recs outpatient follow up in 6 months with vascular surgery, patient made aware     #Metastatic Lung Ca with brain met s/p resection   - continue keppra 500mg BID  - follows with [facility] oncology, will  discuss anticoagulation plan with oncologist Dr. Fredis Solomon, however office currently closed     #HTN  - discontinue home amlodipine 5, start metoprolol     #depression/anxiety  -lexapro 10 daily   -hydroxyzine 50mg TID prn    #COPD  #current smoker   - no wheezing on exam   - nicotine patch     Advanced directives: FULL CODE     VTE ppx:  asa, plavix,  holding further anticoagulation until cleared with oncology & neurology   Patient's son Krunal tomlinson  Patient is a 80y old  Female who presents with a chief complaint of palpitations (28 Apr 2025 09:44)    Patient seen and examined at bedside. No overnight events reported.     ALLERGIES:  penicillin (Short breath; Hives)    MEDICATIONS  (STANDING):  aspirin enteric coated 81 milliGRAM(s) Oral daily  clopidogrel Tablet 75 milliGRAM(s) Oral daily  escitalopram 10 milliGRAM(s) Oral daily  levETIRAcetam 500 milliGRAM(s) Oral two times a day  metoprolol tartrate 25 milliGRAM(s) Oral two times a day  nicotine -   7 mG/24Hr(s) Patch 1 Patch Transdermal daily  regadenoson Injectable 0.4 milliGRAM(s) IV Push once    MEDICATIONS  (PRN):  acetaminophen     Tablet .. 650 milliGRAM(s) Oral every 6 hours PRN Temp greater or equal to 38C (100.4F), Mild Pain (1 - 3)  aluminum hydroxide/magnesium hydroxide/simethicone Suspension 30 milliLiter(s) Oral every 4 hours PRN Dyspepsia  hydrOXYzine hydrochloride 50 milliGRAM(s) Oral three times a day PRN for anxiety  ondansetron Injectable 4 milliGRAM(s) IV Push every 8 hours PRN Nausea and/or Vomiting    Vital Signs Last 24 Hrs  T(F): 97.8 (28 Apr 2025 04:55), Max: 98.1 (27 Apr 2025 19:03)  HR: 52 (28 Apr 2025 04:55) (52 - 57)  BP: 145/82 (28 Apr 2025 04:55) (145/82 - 149/85)  RR: 18 (28 Apr 2025 04:55) (18 - 20)  SpO2: 97% (28 Apr 2025 04:55) (97% - 99%)  I&O's Summary    PHYSICAL EXAM:  General: NAD, A/O x 3  ENT: No gross hearing impairment, Moist mucous membranes, no thrush  Neck: Supple, No JVD  Lungs: Clear to auscultation bilaterally, good air entry, non-labored breathing  Cardio: RRR, S1/S2, No murmur  Abdomen: Soft, Nontender, Nondistended; Bowel sounds present  Extremities: No calf tenderness, No cyanosis, No pitting edema  Psych: Appropriate mood and affect    LABS:                        11.9   12.38 )-----------( 258      ( 28 Apr 2025 07:15 )             35.7     04-28    135  |  101  |  37  ----------------------------<  74  3.5   |  24  |  1.50    Ca    9.3      28 Apr 2025 07:15  Mg     2.0     04-28    TPro  7.6  /  Alb  3.4  /  TBili  0.5  /  DBili  x   /  AST  18  /  ALT  11  /  AlkPhos  121  04-26    PT/INR - ( 25 Apr 2025 16:40 )   PT: 11.6 sec;   INR: 0.98 ratio    PTT - ( 25 Apr 2025 16:40 )  PTT:31.8 sec    Lactate, Blood: 1.8 mmol/L (04-25 @ 16:40)    CARDIAC MARKERS ( 27 Apr 2025 06:25 )  x     / 87.6 ng/L / x     / x     / x      CARDIAC MARKERS ( 26 Apr 2025 05:28 )  x     / 326.5 ng/L / x     / x     / x      CARDIAC MARKERS ( 25 Apr 2025 21:00 )  x     / 206.2 ng/L / x     / x     / x      CARDIAC MARKERS ( 25 Apr 2025 20:02 )  x     / 175.9 ng/L / x     / x     / x        TSH 0.223   TSH with FT4 reflex --  Total T3 57    Urinalysis Basic - ( 28 Apr 2025 07:15 )    Color: x / Appearance: x / SG: x / pH: x  Gluc: 74 mg/dL / Ketone: x  / Bili: x / Urobili: x   Blood: x / Protein: x / Nitrite: x   Leuk Esterase: x / RBC: x / WBC x   Sq Epi: x / Non Sq Epi: x / Bacteria: x    Culture - Blood (collected 25 Apr 2025 16:50)  Source: Blood Blood-Peripheral  Preliminary Report (27 Apr 2025 21:01):    No growth at 48 Hours    Culture - Blood (collected 25 Apr 2025 16:40)  Source: Blood Blood-Peripheral  Preliminary Report (27 Apr 2025 21:01):    No growth at 48 Hours

## 2025-04-28 NOTE — PROGRESS NOTE ADULT - ASSESSMENT
Assessment:  Haily Neely is a 80 year old woman with past medical history of COPD, Metastatic lung cancer with brain metastases (s/p chemotherapy, resection and radiation), history of LE DVT (s/p IVC filter), Hypertension, Chronic kidney disease and Thyroid disease presented with generalized weakness, nausea and vomiting, found to have elevated troponins and atrial fibrillation.    ECG on admission consistent with atrial fibrillation with RVR and telemetry reviewed during ER course and confirmed to have atrial fibrillation at the time. Troponins elevated and have peaked, likely demand ischemia from rapid atrial fibrillation. Echo report with normal LVEF 65-70%, moderate-severe mitral regurgitation. CTPA negative for pulmonary embolism. CT head with right parietal craniotomy defect with underlying calcified dura, no hemorrhage or mass.    Recommendations:  [] Elevated troponins: Peaked, plan today for pharmacologic nuclear stress test to evaluate for coronary ischemia. Patient currently on Aspirin and Plavix, confirm with Neurology/Oncology if safe to continue this and then will need to likely discontinue Plavix in order to avoid triple therapy anticoagulation. Resume home Amlodipine 5 mg daily.   [] Paroxysmal atrial fibrillation: Currently in sinus rhythm. Continue Metoprolol tartrate 25 mg BID. Continue to monitor on telemetry. Follow up with Neurology/Oncology if safe for patient to be on therapeutic anticoagulation such as Eliquis given elevated [id number] score.  [] Moderate-severe mitral regurgitation: Recommend outpatient cardiology follow up for surveillance imaging, unclear if patient would be optimal candidate for MitraClip given comorbidities.  [] Abdominal aortic aneurysm: Measures 4.4 x 4.0 cm, follow up with Vascular  [] Keep electrolytes repleted K ~ 4 and Mg ~ 2    Discussed with Dr. Houser. We will continue to follow along.    Cooper Donnelly MD  Cardiology

## 2025-04-28 NOTE — DISCHARGE NOTE NURSING/CASE MANAGEMENT/SOCIAL WORK - FINANCIAL ASSISTANCE
[facility] provides services at a reduced cost to those who are determined to be eligible through [facility]’s financial assistance program. Information regarding [facility]’s financial assistance program can be found by going to [email or web address] or by calling [phone].

## 2025-04-28 NOTE — PROGRESS NOTE ADULT - SUBJECTIVE AND OBJECTIVE BOX
JORGITO HARRISS  [id number]      Chief Complaint: Nausea/Vomiting/Elevated troponins/Atrial fibrillation/Metastatic lung cancer    Interval History: The patient recalls vomiting prior to admission. Denies chest pain or shortness of breath.     Tele: sinus 50s BPM      Current meds:   acetaminophen     Tablet .. 650 milliGRAM(s) Oral every 6 hours PRN  aluminum hydroxide/magnesium hydroxide/simethicone Suspension 30 milliLiter(s) Oral every 4 hours PRN  aspirin enteric coated 81 milliGRAM(s) Oral daily  clopidogrel Tablet 75 milliGRAM(s) Oral daily  escitalopram 10 milliGRAM(s) Oral daily  hydrOXYzine hydrochloride 50 milliGRAM(s) Oral three times a day PRN  levETIRAcetam 500 milliGRAM(s) Oral two times a day  metoprolol tartrate 25 milliGRAM(s) Oral two times a day  nicotine -   7 mG/24Hr(s) Patch 1 Patch Transdermal daily  ondansetron Injectable 4 milliGRAM(s) IV Push every 8 hours PRN  regadenoson Injectable 0.4 milliGRAM(s) IV Push once      Objective:     Vital Signs:   T(C): 36.6 (04-28-25 @ 04:55), Max: 36.8 (04-27-25 @ 11:29)  HR: 52 (04-28-25 @ 04:55) (52 - 76)  BP: 145/82 (04-28-25 @ 04:55) (145/82 - 149/85)  RR: 18 (04-28-25 @ 04:55) (16 - 20)  SpO2: 97% (04-28-25 @ 04:55) (96% - 99%)  Wt(kg): --    Physical Exam:   General: no distress  Neck: supple   CVS: JVP ~ 7 cm H20, RRR, s1, s2  Pulm: unlabored respirations, CTAB  Ext: no lower extremity edema b/l   Neuro: awake, alert and oriented  Psych: Normal affect      Labs:   27 Apr 2025 06:25    137    |  101    |  35     ----------------------------<  106    3.3     |  27     |  1.55     Ca    9.5        27 Apr 2025 06:25  Mg     2.0       27 Apr 2025 06:25                            11.9   12.38 )-----------( 258      ( 28 Apr 2025 07:15 )             35.7           ECG (4/25/25): atrial fibrillation with RVR  Repeat ECG: sinus rhythm    TTE (4/2025):  LVEF 65-70%  Moderate-severe MR

## 2025-04-28 NOTE — DISCHARGE NOTE NURSING/CASE MANAGEMENT/SOCIAL WORK - PATIENT PORTAL LINK FT
You can access the FollowMyHealth Patient Portal offered by [facility] by registering at the following website: [email or web address]. By joining [facility]’s FollowMyHealth portal, you will also be able to view your health information using other applications (apps) compatible with our system.

## 2025-04-28 NOTE — DISCHARGE NOTE NURSING/CASE MANAGEMENT/SOCIAL WORK - NSDCFUADDAPPT_GEN_ALL_CORE_FT
Follow up appointment with pcp Dr Carter (OUMAR Aguilar) on 4/30/25 at 11:30am Follow up appointment with pcp Dr Carter (OUMAR Aguilar) on 5/2/25 at 11:30am

## 2025-04-29 LAB
ANION GAP SERPL CALC-SCNC: 12 MMOL/L — SIGNIFICANT CHANGE UP (ref 5–17)
BUN SERPL-MCNC: 29 MG/DL — HIGH (ref 7–23)
CALCIUM SERPL-MCNC: 9.7 MG/DL — SIGNIFICANT CHANGE UP (ref 8.4–10.5)
CHLORIDE SERPL-SCNC: 102 MMOL/L — SIGNIFICANT CHANGE UP (ref 96–108)
CO2 SERPL-SCNC: 26 MMOL/L — SIGNIFICANT CHANGE UP (ref 22–31)
CREAT SERPL-MCNC: 1.56 MG/DL — HIGH (ref 0.5–1.3)
EGFR: 34 ML/MIN/1.73M2 — LOW
EGFR: 34 ML/MIN/1.73M2 — LOW
GLUCOSE SERPL-MCNC: 139 MG/DL — HIGH (ref 70–99)
HCT VFR BLD CALC: 39.8 % — SIGNIFICANT CHANGE UP (ref 34.5–45)
HGB BLD-MCNC: 13.2 G/DL — SIGNIFICANT CHANGE UP (ref 11.5–15.5)
MCHC RBC-ENTMCNC: 28.8 PG — SIGNIFICANT CHANGE UP (ref 27–34)
MCHC RBC-ENTMCNC: 33.2 G/DL — SIGNIFICANT CHANGE UP (ref 32–36)
MCV RBC AUTO: 86.7 FL — SIGNIFICANT CHANGE UP (ref 80–100)
NRBC BLD AUTO-RTO: 0 /100 WBCS — SIGNIFICANT CHANGE UP (ref 0–0)
PLATELET # BLD AUTO: 272 K/UL — SIGNIFICANT CHANGE UP (ref 150–400)
POTASSIUM SERPL-MCNC: 3 MMOL/L — LOW (ref 3.5–5.3)
POTASSIUM SERPL-SCNC: 3 MMOL/L — LOW (ref 3.5–5.3)
RBC # BLD: 4.59 M/UL — SIGNIFICANT CHANGE UP (ref 3.8–5.2)
RBC # FLD: 13.9 % — SIGNIFICANT CHANGE UP (ref 10.3–14.5)
SODIUM SERPL-SCNC: 140 MMOL/L — SIGNIFICANT CHANGE UP (ref 135–145)
WBC # BLD: 10.64 K/UL — HIGH (ref 3.8–10.5)
WBC # FLD AUTO: 10.64 K/UL — HIGH (ref 3.8–10.5)

## 2025-04-29 PROCEDURE — 99233 SBSQ HOSP IP/OBS HIGH 50: CPT

## 2025-04-29 PROCEDURE — 99232 SBSQ HOSP IP/OBS MODERATE 35: CPT

## 2025-04-29 RX ORDER — SENNA 187 MG
1 TABLET ORAL ONCE
Refills: 0 | Status: COMPLETED | OUTPATIENT
Start: 2025-04-29 | End: 2025-04-29

## 2025-04-29 RX ORDER — POLYETHYLENE GLYCOL 3350 17 G/17G
17 POWDER, FOR SOLUTION ORAL DAILY
Refills: 0 | Status: DISCONTINUED | OUTPATIENT
Start: 2025-04-29 | End: 2025-05-02

## 2025-04-29 RX ORDER — ATORVASTATIN CALCIUM 80 MG/1
40 TABLET, FILM COATED ORAL AT BEDTIME
Refills: 0 | Status: DISCONTINUED | OUTPATIENT
Start: 2025-04-29 | End: 2025-05-02

## 2025-04-29 RX ADMIN — NICOTINE POLACRILEX 1 PATCH: 4 GUM, CHEWING ORAL at 11:33

## 2025-04-29 RX ADMIN — Medication 650 MILLIGRAM(S): at 11:51

## 2025-04-29 RX ADMIN — LEVETIRACETAM 500 MILLIGRAM(S): 10 INJECTION, SOLUTION INTRAVENOUS at 05:56

## 2025-04-29 RX ADMIN — METOPROLOL SUCCINATE 25 MILLIGRAM(S): 50 TABLET, EXTENDED RELEASE ORAL at 05:56

## 2025-04-29 RX ADMIN — ESCITALOPRAM OXALATE 10 MILLIGRAM(S): 20 TABLET ORAL at 11:51

## 2025-04-29 RX ADMIN — LEVETIRACETAM 500 MILLIGRAM(S): 10 INJECTION, SOLUTION INTRAVENOUS at 18:09

## 2025-04-29 RX ADMIN — Medication 40 MILLIEQUIVALENT(S): at 22:31

## 2025-04-29 RX ADMIN — NICOTINE POLACRILEX 1 PATCH: 4 GUM, CHEWING ORAL at 11:51

## 2025-04-29 RX ADMIN — Medication 81 MILLIGRAM(S): at 11:51

## 2025-04-29 RX ADMIN — NICOTINE POLACRILEX 1 PATCH: 4 GUM, CHEWING ORAL at 19:32

## 2025-04-29 RX ADMIN — NICOTINE POLACRILEX 1 PATCH: 4 GUM, CHEWING ORAL at 07:00

## 2025-04-29 RX ADMIN — AMLODIPINE BESYLATE 5 MILLIGRAM(S): 10 TABLET ORAL at 05:58

## 2025-04-29 RX ADMIN — POLYETHYLENE GLYCOL 3350 17 GRAM(S): 17 POWDER, FOR SOLUTION ORAL at 11:50

## 2025-04-29 RX ADMIN — METOPROLOL SUCCINATE 25 MILLIGRAM(S): 50 TABLET, EXTENDED RELEASE ORAL at 18:09

## 2025-04-29 RX ADMIN — Medication 650 MILLIGRAM(S): at 12:21

## 2025-04-29 RX ADMIN — CLOPIDOGREL BISULFATE 75 MILLIGRAM(S): 75 TABLET, FILM COATED ORAL at 11:51

## 2025-04-29 NOTE — PROGRESS NOTE ADULT - ASSESSMENT
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 Admitted for new onset afib    # New onset afib with RVR (resolved)  # B/L L/E DVT s/p IVC filter in 2024  - Monitor on tele, currently NSR since arrival to [facility]  - Currently rate controlled  - Continue lopressor  - TTE reviewed, grade 1 DD, LVEF 65-70%, moderate to severe MR  - Trop 175.9 > 206.2 > 326.5 > 87.6, likely demand  - Cardio consult appreciated   - Pending stress test  - TSH reviewed, follow up free T4  - CXR, UA, RVP negative for infection   - CT head reviewed, negative for hemorrhage   - Neuro consult appreciated   - AC is indicated for the patient, however she has an IVC filter for a past DVT so it is unclear if AC is still contraindicated. Had a hx of brain tumor resection 2023. Patient states she was never told to not take blood thinners, patient's son does not recall either.   - Reached out to patient's oncologist Dr. Delacruz [phone] prefer not to start AC, ok with ASA and plavix  - Per cardio recs, benefit likely outweighs risk, started plavix 75mg daily and asa, patient and patient's family in agreement   - Cardiology looking to get in touch with patient's neurosurgeon as well     # Hypokalemia; resolved  - Monitor BMP     # Abdominal aortic aneurysm   - CTA with infrarenal abdominal aortic aneurysm, measuring 4.4 x 4.0 cm  - Discussed with Dr. Lee, recs outpatient follow up in 6 months with vascular surgery, patient made aware     # Metastatic Lung Ca with brain met s/p resection   - Continue keppra 500mg BID for seizure ppx    # HTN  - c/w norvasc and lopressor    # Depression/anxiety  -c/w lexapro   - c/w hydroxyzine PRN    # COPD without acute exacerbation  # Nicotine dependence without withdrawal  - Not on home inhalers per med rec  - c/w nicotine TD    GOC/DISPO  - Full code  - Pending stress test    VTE ppx:  ASA, plavix, IVC filter, ambulation  Patient's son Krunal updated ______ 80 y/ female w/ PMH: COPD, current smoker, metastatic adenocarcinoma right lung to bone diagnosed 2018 in remission (last chemo about 6 years ago), with right parietal/temporal brain mass met s/p resection and radiation in right parietal-occipital region (2023), B/L L/E DVT s/p IVC filters placed 2024 , depression anxiety, HTN, thyroid disease, CKD  presents with generalized weakness, nausea/NBNB vomiting, chills, SOB and palpitations for 1 day. Admitted for new onset afib    # New onset afib with RVR (resolved)  # B/L L/E DVT s/p IVC filter in 2024  - Monitor on tele, currently NSR since arrival to [address] [place] [address]  - Currently rate controlled  - Continue lopressor  - TTE reviewed, grade 1 DD, LVEF 65-70%, moderate to severe MR  - Trop 175.9 > 206.2 > 326.5 > 87.6, likely demand  - TSH reviewed, T4 normal  - 4/28 Stress test with ischemic changes  - CT head reviewed, negative for hemorrhage   - Neuro consult appreciated   - Cardio consult appreciated   - AC is indicated for the patient, however she has an IVC filter for a past DVT so it is unclear if AC is still contraindicated. Had a hx of brain tumor resection 2023. Patient states she was never told to not take blood thinners, patient's son does not recall either.   - Reached out to patient's oncologist Dr. Delacruz [phone] prefer not to start AC, ok with ASA and plavix  - Per cardio recs, benefit likely outweighs risk, started plavix 75mg daily and ASA, patient and patient's family in agreement   - Cardiology looking to get in touch with patient's neurosurgeon as well     # Hypokalemia; resolved  - Monitor BMP     # Abdominal aortic aneurysm   - CTA with infrarenal abdominal aortic aneurysm, measuring 4.4 x 4.0 cm  - Discussed with Dr. Lee, recs outpatient follow up in 6 months with vascular surgery, patient made aware     # Metastatic Lung CA with brain met s/p resection   - Continue keppra 500mg BID for seizure ppx  - Neurosurgeon Dr. Kecia Barcenas    # HTN  - c/w norvasc and lopressor    # Depression/anxiety  - c/w lexapro   - c/w hydroxyzine PRN    # COPD without acute exacerbation  # Nicotine dependence without withdrawal  - Not on home inhalers per med rec  - c/w nicotine TD    GOC/DISPO  - Full code  - Pending input from outpt. neurosurgeon regarding AC  - Patient's son Krunal (HCP)    VTE ppx:  ASA, plavix, IVC filter, ambulation

## 2025-04-29 NOTE — PROGRESS NOTE ADULT - NS ATTEND AMEND GEN_ALL_CORE FT
81 y/o F with PMH chronic COPD, current smoker, metastatic adenocarcinoma R lung to bone diagnosed 2018 in remission (last chemo about 6 years ago), with R parietal/temporal brain mass met s/p resection and radiation in R parietal-occipital region (2023) h/o perioperative bilat LE DVT sp IVC filters placed 2024, depression, anxiety, HTN, thyroid disease, CKD admitted for new onset Afib, now rate controlled cardio appreciated - stress test reviewed. continue metoprolol. neuro appreciated. primary onc reccs appreciated. discharge pending f/u with pt's neurosurgery team and cardio regarding further AC.

## 2025-04-29 NOTE — PROGRESS NOTE ADULT - SUBJECTIVE AND OBJECTIVE BOX
Patient is a 80y old female who presents with a chief complaint of palpitations (29 Apr 2025 09:54)    Patient seen and examined at bedside. No overnight events reported.     ALLERGIES:  penicillin (Short breath; Hives)    MEDICATIONS  (STANDING):  amLODIPine   Tablet 5 milliGRAM(s) Oral daily  aspirin enteric coated 81 milliGRAM(s) Oral daily  atorvastatin 40 milliGRAM(s) Oral at bedtime  clopidogrel Tablet 75 milliGRAM(s) Oral daily  escitalopram 10 milliGRAM(s) Oral daily  levETIRAcetam 500 milliGRAM(s) Oral two times a day  metoprolol tartrate 25 milliGRAM(s) Oral two times a day  nicotine -   7 mG/24Hr(s) Patch 1 Patch Transdermal daily  polyethylene glycol 3350 17 Gram(s) Oral daily  potassium chloride   Solution 40 milliEquivalent(s) Oral once  regadenoson Injectable 0.4 milliGRAM(s) IV Push once  senna 1 Tablet(s) Oral once    MEDICATIONS  (PRN):  acetaminophen     Tablet .. 650 milliGRAM(s) Oral every 6 hours PRN Temp greater or equal to 38C (100.4F), Mild Pain (1 - 3)  aluminum hydroxide/magnesium hydroxide/simethicone Suspension 30 milliLiter(s) Oral every 4 hours PRN Dyspepsia  hydrOXYzine hydrochloride 50 milliGRAM(s) Oral three times a day PRN for anxiety  ondansetron Injectable 4 milliGRAM(s) IV Push every 8 hours PRN Nausea and/or Vomiting    Vital Signs Last 24 Hrs  T(F): 98.1 (29 Apr 2025 12:37), Max: 98.3 (28 Apr 2025 19:07)  HR: 59 (29 Apr 2025 12:37) (59 - 80)  BP: 133/80 (29 Apr 2025 12:37) (133/80 - 146/83)  RR: 18 (29 Apr 2025 12:37) (16 - 18)  SpO2: 98% (29 Apr 2025 12:37) (97% - 98%)  I&O's Summary    28 Apr 2025 07:01  -  29 Apr 2025 07:00  --------------------------------------------------------  IN: 300 mL / OUT: 0 mL / NET: 300 mL    PHYSICAL EXAM:  General: NAD, A/O x 3  ENT: No gross hearing impairment, Moist mucous membranes, no thrush  Neck: Supple, No JVD  Lungs: Clear to auscultation bilaterally, good air entry, non-labored breathing  Cardio: RRR, S1/S2  Abdomen: Soft, Nontender, Nondistended; Bowel sounds present  Extremities: No calf tenderness, No cyanosis, No pitting edema  Psych: Appropriate mood and affect    LABS:                        13.2   10.64 )-----------( 272      ( 29 Apr 2025 06:28 )             39.8     04-29    140  |  102  |  29  ----------------------------<  139  3.0   |  26  |  1.56    Ca    9.7      29 Apr 2025 06:28  Mg     2.0     04-28    CARDIAC MARKERS ( 27 Apr 2025 06:25 )  x     / 87.6 ng/L / x     / x     / x        TSH 0.223   TSH with FT4 reflex --  Total T3 57    Urinalysis Basic - ( 29 Apr 2025 06:28 )    Color: x / Appearance: x / SG: x / pH: x  Gluc: 139 mg/dL / Ketone: x  / Bili: x / Urobili: x   Blood: x / Protein: x / Nitrite: x   Leuk Esterase: x / RBC: x / WBC x   Sq Epi: x / Non Sq Epi: x / Bacteria: x    Culture - Blood (collected 25 Apr 2025 16:50)  Source: Blood Blood-Peripheral  Preliminary Report (28 Apr 2025 21:01):    No growth at 72 Hours    Culture - Blood (collected 25 Apr 2025 16:40)  Source: Blood Blood-Peripheral  Preliminary Report (28 Apr 2025 21:01):    No growth at 72 Hours

## 2025-04-29 NOTE — PROGRESS NOTE ADULT - ASSESSMENT
Assessment:  Haily Neely is a 80 year old woman with past medical history of COPD, Metastatic lung cancer with brain metastases (s/p chemotherapy, resection and radiation), history of LE DVT (s/p IVC filter), Hypertension, Chronic kidney disease and Thyroid disease presented with generalized weakness, nausea and vomiting, found to have elevated troponins and atrial fibrillation.    ECG on admission consistent with atrial fibrillation with RVR and telemetry reviewed during ER course and confirmed to have atrial fibrillation at the time. Troponins elevated and have peaked. Echo report with normal LVEF 65-70%, moderate-severe mitral regurgitation. CTPA negative for pulmonary embolism. CT head with right parietal craniotomy defect with underlying calcified dura, no hemorrhage or mass.    Recommendations:  [] NSTEMI, abnormal nuclear stress test: Stress test reveals a small-sized, moderate intensity, mostly fixed perfusion   defect with minimal-to-mild reversibility of the basal inferolateral and mid inferolateral walls suggestive of infarct with minimal-to-mild tu-infarct ischemia, normal post stress LVEF 65%. Discussed option of coronary angiogram with possible PCI which would require uninterrupted dual antiplatelets vs medical management. Discussed risks of antiplatelet/anticoagulation with brain metastases and major bleeding, patient prefers to avoid coronary angiogram at this time. Message also sent to her neurosurgeon, Dr. Karri Barcenas to determine if patient is a candidate for antiplatelets or anticoagulant for the PAF. Patient has been receiving Aspirin and Plavix. Continue medical management with Metoprolol tartrate 25 mg BID. Start Atorvastatin 40 mg daily for the coronary calcifications.   [] Paroxysmal atrial fibrillation: Currently in sinus rhythm. Continue Metoprolol tartrate 25 mg BID. Continue to monitor on telemetry. Follow up with neurosurgeon if safe for patient to be on therapeutic anticoagulation such as Eliquis given elevated [id number] score with stroke risk vs risks for brain bleed.  [] Moderate-severe mitral regurgitation: Recommend outpatient cardiology follow up for surveillance imaging, unclear if patient would be optimal candidate for MitraClip given comorbidities.  [] Abdominal aortic aneurysm: Measures 4.4 x 4.0 cm, follow up with Vascular  [] Keep electrolytes repleted, K ~ 4    Will sign out this case to cardiologist to follow along tomorrow.    Cooper Donnelly MD  Cardiology

## 2025-04-29 NOTE — PROGRESS NOTE ADULT - SUBJECTIVE AND OBJECTIVE BOX
JORGITO HARRISS  [id number]      Chief Complaint: Nausea/Vomiting/NSTEMI/Atrial fibrillation/Metastatic lung cancer    Interval History: The patient denies chest pain or shortness of breath. Reports back pain.     Tele: sinus, PACs, 50-60s BPM    Current meds:   acetaminophen     Tablet .. 650 milliGRAM(s) Oral every 6 hours PRN  aluminum hydroxide/magnesium hydroxide/simethicone Suspension 30 milliLiter(s) Oral every 4 hours PRN  amLODIPine   Tablet 5 milliGRAM(s) Oral daily  aspirin enteric coated 81 milliGRAM(s) Oral daily  clopidogrel Tablet 75 milliGRAM(s) Oral daily  escitalopram 10 milliGRAM(s) Oral daily  hydrOXYzine hydrochloride 50 milliGRAM(s) Oral three times a day PRN  levETIRAcetam 500 milliGRAM(s) Oral two times a day  metoprolol tartrate 25 milliGRAM(s) Oral two times a day  nicotine -   7 mG/24Hr(s) Patch 1 Patch Transdermal daily  ondansetron Injectable 4 milliGRAM(s) IV Push every 8 hours PRN  polyethylene glycol 3350 17 Gram(s) Oral daily  potassium chloride   Solution 40 milliEquivalent(s) Oral once  regadenoson Injectable 0.4 milliGRAM(s) IV Push once  senna 1 Tablet(s) Oral once      Objective:     Vital Signs:   T(C): 36.7 (04-29-25 @ 05:28), Max: 36.8 (04-28-25 @ 12:00)  HR: 69 (04-29-25 @ 05:28) (63 - 80)  BP: 137/85 (04-29-25 @ 05:28) (135/76 - 146/83)  RR: 18 (04-29-25 @ 05:28) (15 - 18)  SpO2: 97% (04-29-25 @ 05:28) (95% - 98%)  Wt(kg): --      Physical Exam:   General: no distress  Neck: supple   CVS: JVP ~ 7 cm H20, RRR, s1, s2  Pulm: unlabored respirations, CTAB  Ext: no lower extremity edema b/l   Neuro: awake, alert and oriented  Psych: Normal affect        Labs:   29 Apr 2025 06:28    140    |  102    |  29     ----------------------------<  139    3.0     |  26     |  1.56     Ca    9.7        29 Apr 2025 06:28  Mg     2.0       28 Apr 2025 07:15                            13.2   10.64 )-----------( 272      ( 29 Apr 2025 06:28 )             39.8           ECG (4/25/25): atrial fibrillation with RVR  Repeat ECG: sinus rhythm    TTE (4/2025):  LVEF 65-70%  Moderate-severe MR      Nuclear stress test (4/28/25):  IMPRESSION:  Abnormal SPECT Myocardial Perfusion Imaging post rest and post vasodilator.  There is a small-sized, moderate intensity, mostly fixed perfusion   defect with minimal-to-mild reversibility of the basal inferolateral and mid inferolateral walls suggestive of infarct with minimal-to-mild tu-infarct ischemia.Bowel tracer uptake reduces sensitivity of analysis of the basal inferior wall.  Normal left ventricular wall motion with ejection fraction of 65 %   (normal: 50% or greater).  No regional wall motion abnormalities.

## 2025-04-30 LAB
ANION GAP SERPL CALC-SCNC: 13 MMOL/L — SIGNIFICANT CHANGE UP (ref 5–17)
APPEARANCE UR: CLEAR — SIGNIFICANT CHANGE UP
BACTERIA # UR AUTO: NEGATIVE /HPF — SIGNIFICANT CHANGE UP
BILIRUB UR-MCNC: NEGATIVE — SIGNIFICANT CHANGE UP
BUN SERPL-MCNC: 33 MG/DL — HIGH (ref 7–23)
CALCIUM SERPL-MCNC: 9.7 MG/DL — SIGNIFICANT CHANGE UP (ref 8.4–10.5)
CHLORIDE SERPL-SCNC: 99 MMOL/L — SIGNIFICANT CHANGE UP (ref 96–108)
CO2 SERPL-SCNC: 24 MMOL/L — SIGNIFICANT CHANGE UP (ref 22–31)
COLOR SPEC: YELLOW — SIGNIFICANT CHANGE UP
CREAT SERPL-MCNC: 1.56 MG/DL — HIGH (ref 0.5–1.3)
CULTURE RESULTS: SIGNIFICANT CHANGE UP
CULTURE RESULTS: SIGNIFICANT CHANGE UP
DIFF PNL FLD: NEGATIVE — SIGNIFICANT CHANGE UP
EGFR: 34 ML/MIN/1.73M2 — LOW
EGFR: 34 ML/MIN/1.73M2 — LOW
FLUAV AG NPH QL: SIGNIFICANT CHANGE UP
FLUBV AG NPH QL: SIGNIFICANT CHANGE UP
GLUCOSE SERPL-MCNC: 191 MG/DL — HIGH (ref 70–99)
GLUCOSE UR QL: NEGATIVE MG/DL — SIGNIFICANT CHANGE UP
HCT VFR BLD CALC: 40.6 % — SIGNIFICANT CHANGE UP (ref 34.5–45)
HGB BLD-MCNC: 13.7 G/DL — SIGNIFICANT CHANGE UP (ref 11.5–15.5)
KETONES UR-MCNC: NEGATIVE MG/DL — SIGNIFICANT CHANGE UP
LEUKOCYTE ESTERASE UR-ACNC: NEGATIVE — SIGNIFICANT CHANGE UP
MCHC RBC-ENTMCNC: 28.8 PG — SIGNIFICANT CHANGE UP (ref 27–34)
MCHC RBC-ENTMCNC: 33.7 G/DL — SIGNIFICANT CHANGE UP (ref 32–36)
MCV RBC AUTO: 85.3 FL — SIGNIFICANT CHANGE UP (ref 80–100)
NITRITE UR-MCNC: NEGATIVE — SIGNIFICANT CHANGE UP
NRBC BLD AUTO-RTO: 0 /100 WBCS — SIGNIFICANT CHANGE UP (ref 0–0)
PH UR: 7.5 — SIGNIFICANT CHANGE UP (ref 5–8)
PLATELET # BLD AUTO: 295 K/UL — SIGNIFICANT CHANGE UP (ref 150–400)
POTASSIUM SERPL-MCNC: 3.5 MMOL/L — SIGNIFICANT CHANGE UP (ref 3.5–5.3)
POTASSIUM SERPL-SCNC: 3.5 MMOL/L — SIGNIFICANT CHANGE UP (ref 3.5–5.3)
PROT UR-MCNC: 100 MG/DL
RBC # BLD: 4.76 M/UL — SIGNIFICANT CHANGE UP (ref 3.8–5.2)
RBC # FLD: 13.3 % — SIGNIFICANT CHANGE UP (ref 10.3–14.5)
RBC CASTS # UR COMP ASSIST: 0 /HPF — SIGNIFICANT CHANGE UP (ref 0–4)
RSV RNA NPH QL NAA+NON-PROBE: SIGNIFICANT CHANGE UP
SARS-COV-2 RNA SPEC QL NAA+PROBE: SIGNIFICANT CHANGE UP
SODIUM SERPL-SCNC: 136 MMOL/L — SIGNIFICANT CHANGE UP (ref 135–145)
SOURCE RESPIRATORY: SIGNIFICANT CHANGE UP
SP GR SPEC: 1.01 — SIGNIFICANT CHANGE UP (ref 1–1.03)
SPECIMEN SOURCE: SIGNIFICANT CHANGE UP
SPECIMEN SOURCE: SIGNIFICANT CHANGE UP
UROBILINOGEN FLD QL: 0.2 MG/DL — SIGNIFICANT CHANGE UP (ref 0.2–1)
WBC # BLD: 18.27 K/UL — HIGH (ref 3.8–10.5)
WBC # FLD AUTO: 18.27 K/UL — HIGH (ref 3.8–10.5)
WBC UR QL: 0 /HPF — SIGNIFICANT CHANGE UP (ref 0–5)

## 2025-04-30 PROCEDURE — 99232 SBSQ HOSP IP/OBS MODERATE 35: CPT

## 2025-04-30 PROCEDURE — 70450 CT HEAD/BRAIN W/O DYE: CPT | Mod: 26

## 2025-04-30 PROCEDURE — 99233 SBSQ HOSP IP/OBS HIGH 50: CPT

## 2025-04-30 PROCEDURE — 71045 X-RAY EXAM CHEST 1 VIEW: CPT | Mod: 26

## 2025-04-30 PROCEDURE — 93010 ELECTROCARDIOGRAM REPORT: CPT

## 2025-04-30 RX ORDER — AMLODIPINE BESYLATE 10 MG/1
5 TABLET ORAL ONCE
Refills: 0 | Status: COMPLETED | OUTPATIENT
Start: 2025-04-30 | End: 2025-04-30

## 2025-04-30 RX ORDER — ONDANSETRON HCL/PF 4 MG/2 ML
4 VIAL (ML) INJECTION ONCE
Refills: 0 | Status: COMPLETED | OUTPATIENT
Start: 2025-04-30 | End: 2025-04-30

## 2025-04-30 RX ORDER — LIDOCAINE HYDROCHLORIDE 20 MG/ML
1 JELLY TOPICAL ONCE
Refills: 0 | Status: COMPLETED | OUTPATIENT
Start: 2025-04-30 | End: 2025-04-30

## 2025-04-30 RX ORDER — AMLODIPINE BESYLATE 10 MG/1
10 TABLET ORAL DAILY
Refills: 0 | Status: DISCONTINUED | OUTPATIENT
Start: 2025-05-01 | End: 2025-05-02

## 2025-04-30 RX ADMIN — LEVETIRACETAM 500 MILLIGRAM(S): 10 INJECTION, SOLUTION INTRAVENOUS at 16:58

## 2025-04-30 RX ADMIN — Medication 30 MILLILITER(S): at 00:19

## 2025-04-30 RX ADMIN — METOPROLOL SUCCINATE 25 MILLIGRAM(S): 50 TABLET, EXTENDED RELEASE ORAL at 05:27

## 2025-04-30 RX ADMIN — NICOTINE POLACRILEX 1 PATCH: 4 GUM, CHEWING ORAL at 20:16

## 2025-04-30 RX ADMIN — AMLODIPINE BESYLATE 5 MILLIGRAM(S): 10 TABLET ORAL at 05:27

## 2025-04-30 RX ADMIN — HYDROXYZINE HYDROCHLORIDE 50 MILLIGRAM(S): 25 TABLET, FILM COATED ORAL at 06:04

## 2025-04-30 RX ADMIN — Medication 4 MILLIGRAM(S): at 02:01

## 2025-04-30 RX ADMIN — Medication 81 MILLIGRAM(S): at 11:28

## 2025-04-30 RX ADMIN — Medication 250 MILLILITER(S): at 18:00

## 2025-04-30 RX ADMIN — NICOTINE POLACRILEX 1 PATCH: 4 GUM, CHEWING ORAL at 07:00

## 2025-04-30 RX ADMIN — LIDOCAINE HYDROCHLORIDE 1 PATCH: 20 JELLY TOPICAL at 07:00

## 2025-04-30 RX ADMIN — POLYETHYLENE GLYCOL 3350 17 GRAM(S): 17 POWDER, FOR SOLUTION ORAL at 11:27

## 2025-04-30 RX ADMIN — AMLODIPINE BESYLATE 5 MILLIGRAM(S): 10 TABLET ORAL at 11:27

## 2025-04-30 RX ADMIN — NICOTINE POLACRILEX 1 PATCH: 4 GUM, CHEWING ORAL at 11:27

## 2025-04-30 RX ADMIN — ATORVASTATIN CALCIUM 40 MILLIGRAM(S): 80 TABLET, FILM COATED ORAL at 21:48

## 2025-04-30 RX ADMIN — LIDOCAINE HYDROCHLORIDE 1 PATCH: 20 JELLY TOPICAL at 14:53

## 2025-04-30 RX ADMIN — ESCITALOPRAM OXALATE 10 MILLIGRAM(S): 20 TABLET ORAL at 11:28

## 2025-04-30 RX ADMIN — LEVETIRACETAM 500 MILLIGRAM(S): 10 INJECTION, SOLUTION INTRAVENOUS at 05:27

## 2025-04-30 RX ADMIN — METOPROLOL SUCCINATE 25 MILLIGRAM(S): 50 TABLET, EXTENDED RELEASE ORAL at 16:58

## 2025-04-30 RX ADMIN — LIDOCAINE HYDROCHLORIDE 1 PATCH: 20 JELLY TOPICAL at 02:53

## 2025-04-30 RX ADMIN — Medication 4 MILLIGRAM(S): at 06:03

## 2025-04-30 RX ADMIN — NICOTINE POLACRILEX 1 PATCH: 4 GUM, CHEWING ORAL at 11:51

## 2025-04-30 RX ADMIN — CLOPIDOGREL BISULFATE 75 MILLIGRAM(S): 75 TABLET, FILM COATED ORAL at 11:28

## 2025-04-30 NOTE — PROGRESS NOTE ADULT - SUBJECTIVE AND OBJECTIVE BOX
JORGITO HARRISS  [id number]      Chief Complaint: Nausea/Vomiting/NSTEMI/Atrial fibrillation/Metastatic lung cancer    Interval History: CP overnight    Tele: sinus 80s BPM    Current meds:   acetaminophen     Tablet .. 650 milliGRAM(s) Oral every 6 hours PRN  aluminum hydroxide/magnesium hydroxide/simethicone Suspension 30 milliLiter(s) Oral every 4 hours PRN  amLODIPine   Tablet 5 milliGRAM(s) Oral daily  aspirin enteric coated 81 milliGRAM(s) Oral daily  clopidogrel Tablet 75 milliGRAM(s) Oral daily  escitalopram 10 milliGRAM(s) Oral daily  hydrOXYzine hydrochloride 50 milliGRAM(s) Oral three times a day PRN  levETIRAcetam 500 milliGRAM(s) Oral two times a day  metoprolol tartrate 25 milliGRAM(s) Oral two times a day  nicotine -   7 mG/24Hr(s) Patch 1 Patch Transdermal daily  ondansetron Injectable 4 milliGRAM(s) IV Push every 8 hours PRN  polyethylene glycol 3350 17 Gram(s) Oral daily  potassium chloride   Solution 40 milliEquivalent(s) Oral once  regadenoson Injectable 0.4 milliGRAM(s) IV Push once  senna 1 Tablet(s) Oral once      Objective:     Vital Signs:   T(C): 36.7 (04-29-25 @ 05:28), Max: 36.8 (04-28-25 @ 12:00)  HR: 69 (04-29-25 @ 05:28) (63 - 80)  BP: 137/85 (04-29-25 @ 05:28) (135/76 - 146/83)  RR: 18 (04-29-25 @ 05:28) (15 - 18)  SpO2: 97% (04-29-25 @ 05:28) (95% - 98%)  Wt(kg): --      Physical Exam:   General: no distress  Neck: supple   CVS: JVP ~ 7 cm H20, RRR, s1, s2  Pulm: unlabored respirations, CTAB  Ext: no lower extremity edema b/l   Neuro: awake, alert and oriented  Psych: Normal affect        Labs:   29 Apr 2025 06:28    140    |  102    |  29     ----------------------------<  139    3.0     |  26     |  1.56     Ca    9.7        29 Apr 2025 06:28  Mg     2.0       28 Apr 2025 07:15                            13.2   10.64 )-----------( 272      ( 29 Apr 2025 06:28 )             39.8           ECG (4/25/25): atrial fibrillation with RVR  Repeat ECG: sinus rhythm    TTE (4/2025):  LVEF 65-70%  Moderate-severe MR      Nuclear stress test (4/28/25):  IMPRESSION:  Abnormal SPECT Myocardial Perfusion Imaging post rest and post vasodilator.  There is a small-sized, moderate intensity, mostly fixed perfusion   defect with minimal-to-mild reversibility of the basal inferolateral and mid inferolateral walls suggestive of infarct with minimal-to-mild tu-infarct ischemia.Bowel tracer uptake reduces sensitivity of analysis of the basal inferior wall.  Normal left ventricular wall motion with ejection fraction of 65 %   (normal: 50% or greater).  No regional wall motion abnormalities.

## 2025-04-30 NOTE — CHART NOTE - NSCHARTNOTEFT_GEN_A_CORE
Informed by CHERELLE Rosen that pt was ordered potassium for this morning but was not taken and is now requesting tablet form instead of solution. Placed order for potassium tablet 40 mEq.
Informed by RN pt complaining of chest pressure. Assessed pt at bedside. Pt endorses chest pain (pointing all across her chest), back pain and nausea, pain similar to the symptoms she had on admission and intermittently throughout her admission.     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 Stress test 4/28 with ischemic changes. Reviewed most recent cardiology note. Continuing medical management.  -EKG obtained - rate 66, NSR, no ST elevation or depression  -Ordered additional dose of IV Zofran 4 mg, continue with Zofran prn  -Lidocaine patch for back pain  -Give hydroxyzine prn

## 2025-04-30 NOTE — PROGRESS NOTE ADULT - NS ATTEND AMEND GEN_ALL_CORE FT
Patient seen and examined. Chart reviewed. Medically managed from cardiac perspective as she has limitations from advanced cancer.   Monitor tele  Optimize medical therapy.

## 2025-04-30 NOTE — PHARMACOTHERAPY INTERVENTION NOTE - COMMENTS
Pt not c/o anticoagulation due to history of brain resection. Recommended coagulapathy work up c/f clotting. 
Consider increasing to amlodipine 10 mg daily as patient remains hypertensive.

## 2025-04-30 NOTE — PROGRESS NOTE ADULT - ASSESSMENT
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 Admitted for new onset afib    # New onset afib with RVR (resolved)  # B/L L/E DVT s/p IVC filter in 2024  # Metastatic Lung CA with brain met s/p resection   - Monitor on tele, currently NSR since arrival to [address] [place] [address]  - Currently rate controlled  - Continue lopressor  - TTE reviewed, grade 1 DD, LVEF 65-70%, moderate to severe MR  - Trop 175.9 > 206.2 > 326.5 > 87.6, likely demand  - TSH reviewed, T4 normal  - 4/28 Stress test w/ perfusion defect   - Cardio consult appreciated   - CTH unremarkable  - AC is indicated for the patient, however she has an IVC filter for a past DVT so it is unclear if AC is still contraindicated. Had a hx of brain tumor resection 2023. Patient states she was never told to not take blood thinners, patient's son does not recall either.   - Reached out to patient's oncologist Dr. Delacruz [phone] prefer not to start AC, ok with ASA and plavix  - Per cardio recs, benefit likely outweighs risk, started plavix 75mg daily and ASA, patient and patient's family in agreement   - Neurology consulted; CTH rec and done, no further recs  - Cardiology looking to get in touch with patient's neurosurgeon as well     # Leukocytosis w/o fever  - WBC 10 > 18  - Recheck this afternoon    # CKD  # Hypokalemia  - Baseline SCr: around 1.5, currently at baseline  - Monitor BMP     # Abdominal aortic aneurysm   - CTA with infrarenal abdominal aortic aneurysm, measuring 4.4 x 4.0 cm  - Discussed with Dr. Lee, recs outpatient follow up in 6 months with vascular surgery, patient made aware     # Metastatic Lung CA with brain met s/p resection   - Continue keppra 500mg BID for seizure ppx  - Neurosurgeon Dr. Kecia Barcenas    # HTN  - c/w norvasc and lopressor    # Depression/anxiety  - c/w lexapro   - c/w hydroxyzine PRN    # COPD without acute exacerbation  # Nicotine dependence without withdrawal  - Not on home inhalers per med rec  - c/w nicotine TD    # VTE ppx:  ASA, plavix, IVC filter, ambulation    GOC/DISPO  - Full code  - Pending input from outpt. 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 Admitted for new onset afib    # New onset afib with RVR (resolved)  # B/L L/E DVT s/p IVC filter in 2024  # Metastatic Lung CA with brain met s/p resection   - Monitor on tele, currently NSR since arrival to [address] [place] [address]  - Currently rate controlled  - Continue lopressor  - TTE reviewed, grade 1 DD, LVEF 65-70%, moderate to severe MR  - Trop 175.9 > 206.2 > 326.5 > 87.6, likely demand  - TSH reviewed, T4 normal  - 4/28 Stress test w/ perfusion defect   - Cardio consult appreciated   - CTH unremarkable  - AC is indicated for the patient, however she has an IVC filter for a past DVT so it is unclear if AC is still contraindicated. Had a hx of brain tumor resection 2023. Patient states she was never told to not take blood thinners, patient's son does not recall either.   - Reached out to patient's oncologist Dr. Delacruz [phone] prefer not to start AC, ok with ASA and plavix  - Per cardio recs, benefit likely outweighs risk, started plavix 75mg daily and ASA, patient and patient's family in agreement   - Neurology consulted; CTH rec and done, no further recs  - Cardiology looking to get in touch with patient's neurosurgeon as well     # Leukocytosis w/o fever  - WBC 10 > 18  - Recheck this afternoon    # CKD  # Hypokalemia  - Baseline SCr: around 1.5, currently at baseline  - Monitor BMP     # Abdominal aortic aneurysm   - CTA with infrarenal abdominal aortic aneurysm, measuring 4.4 x 4.0 cm  - Discussed with Dr. Lee, recs outpatient follow up in 6 months with vascular surgery, patient made aware     # Metastatic Lung CA with brain met s/p resection   - Continue keppra 500mg BID for seizure ppx  - Neurosurgeon Dr. Kecia Barcenas    # HTN  - c/w norvasc and lopressor    # Depression/anxiety  - c/w lexapro   - c/w hydroxyzine PRN    # COPD without acute exacerbation  # Nicotine dependence without withdrawal  - Not on home inhalers per med rec  - c/w nicotine TD    # VTE ppx:  ASA, plavix, IVC filter, ambulation    GOC/DISPO  - Full code  - Pending input from outpt. 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 Admitted for new onset afib    # New onset afib with RVR (resolved)  # B/L L/E DVT s/p IVC filter in 2024  # Metastatic Lung CA with brain met s/p resection   - Monitor on tele, currently NSR since arrival to [address] [place] [address]  - Currently rate controlled  - Continue lopressor  - TTE reviewed, grade 1 DD, LVEF 65-70%, moderate to severe MR  - Trop 175.9 > 206.2 > 326.5 > 87.6, likely demand  - TSH reviewed, T4 normal  - 4/28 Stress test w/ perfusion defect   - Cardio consult appreciated   - CTH unremarkable  - AC is indicated for the patient, however she has an IVC filter for a past DVT so it is unclear if AC is still contraindicated. Had a hx of brain tumor resection 2023. Patient states she was never told to not take blood thinners, patient's son does not recall either.   - Reached out to patient's oncologist Dr. Delacruz [phone] prefer not to start AC, ok with ASA and plavix  - Per cardio recs, benefit likely outweighs risk, started plavix 75mg daily and ASA, patient and patient's family in agreement   - Neurology consulted; CTH rec and done, no further recs  - Cardiology looking to get in touch with patient's neurosurgeon as well     # Leukocytosis w/o fever  - WBC 10 > 18  - Recheck this afternoon    # CKD  # Hypokalemia  - Baseline SCr: around 1.5, currently at baseline  - Monitor BMP     # Abdominal aortic aneurysm   - CTA with infrarenal abdominal aortic aneurysm, measuring 4.4 x 4.0 cm  - Discussed with Dr. Lee, recs outpatient follow up in 6 months with vascular surgery, patient made aware     # Metastatic Lung CA with brain met s/p resection   - Continue keppra 500mg BID for seizure ppx  - Neurosurgeon Dr. Kecia Barcenas    # HTN  - c/w norvasc and lopressor    # Depression/anxiety  - c/w lexapro   - c/w hydroxyzine PRN    # COPD without acute exacerbation  # Nicotine dependence without withdrawal  - Not on home inhalers per med rec  - c/w nicotine TD    # VTE ppx:  ASA, plavix, IVC filter, ambulation    GOC/DISPO  - Full code  - Pending input from outpt. neurosurgeon regarding AC  - Jairo Hector (HCP) called for update - no answer

## 2025-04-30 NOTE — PROGRESS NOTE ADULT - SUBJECTIVE AND OBJECTIVE BOX
Patient is a 80y old female who presents with a chief complaint of Palpitations (30 Apr 2025 07:44)    Patient seen and examined at bedside. No overnight events reported.     ALLERGIES:  penicillin (Short breath; Hives)    MEDICATIONS  (STANDING):  amLODIPine   Tablet 5 milliGRAM(s) Oral once  aspirin enteric coated 81 milliGRAM(s) Oral daily  atorvastatin 40 milliGRAM(s) Oral at bedtime  clopidogrel Tablet 75 milliGRAM(s) Oral daily  escitalopram 10 milliGRAM(s) Oral daily  levETIRAcetam 500 milliGRAM(s) Oral two times a day  metoprolol tartrate 25 milliGRAM(s) Oral two times a day  nicotine -   7 mG/24Hr(s) Patch 1 Patch Transdermal daily  polyethylene glycol 3350 17 Gram(s) Oral daily  regadenoson Injectable 0.4 milliGRAM(s) IV Push once    MEDICATIONS  (PRN):  acetaminophen     Tablet .. 650 milliGRAM(s) Oral every 6 hours PRN Temp greater or equal to 38C (100.4F), Mild Pain (1 - 3)  aluminum hydroxide/magnesium hydroxide/simethicone Suspension 30 milliLiter(s) Oral every 4 hours PRN Dyspepsia  hydrOXYzine hydrochloride 50 milliGRAM(s) Oral three times a day PRN for anxiety  ondansetron Injectable 4 milliGRAM(s) IV Push every 8 hours PRN Nausea and/or Vomiting    Vital Signs Last 24 Hrs  T(F): 98.5 (30 Apr 2025 09:26), Max: 99 (29 Apr 2025 19:31)  HR: 80 (30 Apr 2025 09:26) (59 - 80)  BP: 171/88 (30 Apr 2025 09:26) (133/80 - 179/96)  RR: 16 (30 Apr 2025 09:26) (16 - 18)  SpO2: 97% (30 Apr 2025 09:26) (95% - 98%)  I&O's Summary    29 Apr 2025 07:01  -  30 Apr 2025 07:00  --------------------------------------------------------  IN: 780 mL / OUT: 0 mL / NET: 780 mL    PHYSICAL EXAM:  General: NAD, A/O x 3  ENT: No gross hearing impairment, Moist mucous membranes, no thrush  Neck: Supple, No JVD  Lungs: Clear to auscultation bilaterally, good air entry, non-labored breathing  Cardio: RRR, S1/S2, No murmur  Abdomen: Soft, Nontender, Nondistended; Bowel sounds present  Extremities: No calf tenderness, No cyanosis, No pitting edema  Psych: Appropriate mood and affect    LABS:                        13.7   18.27 )-----------( 295      ( 30 Apr 2025 06:40 )             40.6     04-30    136  |  99  |  33  ----------------------------<  191  3.5   |  24  |  1.56    Ca    9.7      30 Apr 2025 06:40  Mg     2.0     04-28    TSH 0.223   TSH with FT4 reflex --  Total T3 57    Urinalysis Basic - ( 30 Apr 2025 06:40 )    Color: x / Appearance: x / SG: x / pH: x  Gluc: 191 mg/dL / Ketone: x  / Bili: x / Urobili: x   Blood: x / Protein: x / Nitrite: x   Leuk Esterase: x / RBC: x / WBC x   Sq Epi: x / Non Sq Epi: x / Bacteria: x    Culture - Blood (collected 25 Apr 2025 16:50)  Source: Blood Blood-Peripheral  Preliminary Report (29 Apr 2025 21:00):    No growth at 4 days    Culture - Blood (collected 25 Apr 2025 16:40)  Source: Blood Blood-Peripheral  Preliminary Report (29 Apr 2025 21:00):    No growth at 4 days

## 2025-04-30 NOTE — PROGRESS NOTE ADULT - ASSESSMENT
80 year old woman with past medical history of COPD, Metastatic lung cancer with brain metastases (s/p chemotherapy, resection and radiation), history of LE DVT (s/p IVC filter), Hypertension, Chronic kidney disease and Thyroid disease presented with generalized weakness, nausea and vomiting, found to have elevated troponins and atrial fibrillation.    ECG on admission consistent with atrial fibrillation with RVR and telemetry reviewed during ER course and confirmed to have atrial fibrillation at the time. Troponins elevated and have peaked. Echo report with normal LVEF 65-70%, moderate-severe mitral regurgitation. CTPA negative for pulmonary embolism. CT head with right parietal craniotomy defect with underlying calcified dura, no hemorrhage or mass.    Recommendations:  Stress test reveals a small-sized, moderate intensity, mostly fixed perfusion defect with minimal-to-mild reversibility of the basal inferolateral and mid inferolateral walls suggestive of infarct with minimal-to-mild tu-infarct ischemia, normal post stress LVEF 65%. Discussed option of coronary angiogram with possible PCI which would require uninterrupted dual antiplatelets vs medical management. Discussed risks of antiplatelet/anticoagulation with brain metastases and major bleeding, patient prefers to avoid coronary angiogram at this time.   Message also sent to her neurosurgeon, Dr. Karri Barcenas to determine if patient is a candidate for antiplatelets or anticoagulant for the PAF. Patient has been receiving Aspirin and Plavix. Continue medical management with Metoprolol tartrate 25 mg BID. Start Atorvastatin 40 mg daily for the coronary calcifications.   Adm ekg with AF, Currently in sinus rhythm. Continue Metoprolol tartrate 25 mg BID. Continue to monitor on telemetry. Follow up with neurosurgeon if safe for patient to be on therapeutic anticoagulation such as Eliquis given elevated [id number] score with stroke risk vs risks for brain bleed.

## 2025-04-30 NOTE — PROGRESS NOTE ADULT - NS ATTEND AMEND GEN_ALL_CORE FT
79 y/o F with PMH chronic COPD, current smoker, metastatic adenocarcinoma R lung to bone diagnosed 2018 in remission (last chemo about 6 years ago), with R parietal/temporal brain mass met s/p resection and radiation in R parietal-occipital region (2023) h/o perioperative bilat LE DVT sp IVC filters placed 2024, depression, anxiety, HTN, thyroid disease, CKD admitted for new onset Afib, now rate controlled cardio appreciated - stress test reviewed. continue metoprolol. neuro appreciated. primary onc reccs appreciated. awaiting f/u with pt's neurosurgery team and cardio regarding further AC. noted leukocytosis, afebrile, check UA, CXR, RVP panel. 79 y/o F with PMH chronic COPD, current smoker, metastatic adenocarcinoma R lung to bone diagnosed 2018 in remission (last chemo about 6 years ago), with R parietal/temporal brain mass met s/p resection and radiation in R parietal-occipital region (2023) h/o perioperative bilat LE DVT sp IVC filters placed 2024, depression, anxiety, HTN, thyroid disease, CKD admitted for new onset Afib, now rate controlled cardio appreciated - stress test reviewed. continue metoprolol. neuro appreciated. primary onc reccs appreciated. awaiting f/u with pt's neurosurgery team and cardio regarding further AC. noted leukocytosis, afebrile, check UA, CXR, RVP panel. increase norvasc to 10mg daily

## 2025-05-01 LAB
ANION GAP SERPL CALC-SCNC: 10 MMOL/L — SIGNIFICANT CHANGE UP (ref 5–17)
BUN SERPL-MCNC: 40 MG/DL — HIGH (ref 7–23)
CALCIUM SERPL-MCNC: 9.4 MG/DL — SIGNIFICANT CHANGE UP (ref 8.4–10.5)
CHLORIDE SERPL-SCNC: 103 MMOL/L — SIGNIFICANT CHANGE UP (ref 96–108)
CO2 SERPL-SCNC: 25 MMOL/L — SIGNIFICANT CHANGE UP (ref 22–31)
CREAT SERPL-MCNC: 1.56 MG/DL — HIGH (ref 0.5–1.3)
EGFR: 34 ML/MIN/1.73M2 — LOW
EGFR: 34 ML/MIN/1.73M2 — LOW
GLUCOSE SERPL-MCNC: 90 MG/DL — SIGNIFICANT CHANGE UP (ref 70–99)
HCT VFR BLD CALC: 36.5 % — SIGNIFICANT CHANGE UP (ref 34.5–45)
HGB BLD-MCNC: 12.4 G/DL — SIGNIFICANT CHANGE UP (ref 11.5–15.5)
MCHC RBC-ENTMCNC: 29.1 PG — SIGNIFICANT CHANGE UP (ref 27–34)
MCHC RBC-ENTMCNC: 34 G/DL — SIGNIFICANT CHANGE UP (ref 32–36)
MCV RBC AUTO: 85.7 FL — SIGNIFICANT CHANGE UP (ref 80–100)
NRBC BLD AUTO-RTO: 0 /100 WBCS — SIGNIFICANT CHANGE UP (ref 0–0)
PLATELET # BLD AUTO: 252 K/UL — SIGNIFICANT CHANGE UP (ref 150–400)
POTASSIUM SERPL-MCNC: 3.9 MMOL/L — SIGNIFICANT CHANGE UP (ref 3.5–5.3)
POTASSIUM SERPL-SCNC: 3.9 MMOL/L — SIGNIFICANT CHANGE UP (ref 3.5–5.3)
RBC # BLD: 4.26 M/UL — SIGNIFICANT CHANGE UP (ref 3.8–5.2)
RBC # FLD: 13.5 % — SIGNIFICANT CHANGE UP (ref 10.3–14.5)
SODIUM SERPL-SCNC: 138 MMOL/L — SIGNIFICANT CHANGE UP (ref 135–145)
WBC # BLD: 13.21 K/UL — HIGH (ref 3.8–10.5)
WBC # FLD AUTO: 13.21 K/UL — HIGH (ref 3.8–10.5)

## 2025-05-01 PROCEDURE — 99239 HOSP IP/OBS DSCHRG MGMT >30: CPT

## 2025-05-01 RX ORDER — BISACODYL 5 MG
10 TABLET, DELAYED RELEASE (ENTERIC COATED) ORAL AT BEDTIME
Refills: 0 | Status: DISCONTINUED | OUTPATIENT
Start: 2025-05-01 | End: 2025-05-02

## 2025-05-01 RX ORDER — SALINE 7; 19 G/118ML; G/118ML
1 ENEMA RECTAL ONCE
Refills: 0 | Status: COMPLETED | OUTPATIENT
Start: 2025-05-01 | End: 2025-05-01

## 2025-05-01 RX ORDER — CLOPIDOGREL BISULFATE 75 MG/1
1 TABLET, FILM COATED ORAL
Qty: 30 | Refills: 0
Start: 2025-05-01 | End: 2025-05-30

## 2025-05-01 RX ORDER — ATORVASTATIN CALCIUM 80 MG/1
1 TABLET, FILM COATED ORAL
Qty: 30 | Refills: 0
Start: 2025-05-01 | End: 2025-05-30

## 2025-05-01 RX ORDER — METOPROLOL SUCCINATE 50 MG/1
1 TABLET, EXTENDED RELEASE ORAL
Qty: 60 | Refills: 0
Start: 2025-05-01 | End: 2025-05-30

## 2025-05-01 RX ORDER — SENNA 187 MG
2 TABLET ORAL AT BEDTIME
Refills: 0 | Status: DISCONTINUED | OUTPATIENT
Start: 2025-05-01 | End: 2025-05-02

## 2025-05-01 RX ORDER — ASPIRIN 325 MG
1 TABLET ORAL
Qty: 30 | Refills: 0
Start: 2025-05-01 | End: 2025-05-30

## 2025-05-01 RX ADMIN — LEVETIRACETAM 500 MILLIGRAM(S): 10 INJECTION, SOLUTION INTRAVENOUS at 05:42

## 2025-05-01 RX ADMIN — ESCITALOPRAM OXALATE 10 MILLIGRAM(S): 20 TABLET ORAL at 12:13

## 2025-05-01 RX ADMIN — Medication 81 MILLIGRAM(S): at 12:14

## 2025-05-01 RX ADMIN — POLYETHYLENE GLYCOL 3350 17 GRAM(S): 17 POWDER, FOR SOLUTION ORAL at 12:14

## 2025-05-01 RX ADMIN — ATORVASTATIN CALCIUM 40 MILLIGRAM(S): 80 TABLET, FILM COATED ORAL at 21:33

## 2025-05-01 RX ADMIN — Medication 30 MILLILITER(S): at 13:57

## 2025-05-01 RX ADMIN — AMLODIPINE BESYLATE 10 MILLIGRAM(S): 10 TABLET ORAL at 05:42

## 2025-05-01 RX ADMIN — NICOTINE POLACRILEX 1 PATCH: 4 GUM, CHEWING ORAL at 19:37

## 2025-05-01 RX ADMIN — NICOTINE POLACRILEX 1 PATCH: 4 GUM, CHEWING ORAL at 12:12

## 2025-05-01 RX ADMIN — NICOTINE POLACRILEX 1 PATCH: 4 GUM, CHEWING ORAL at 07:37

## 2025-05-01 RX ADMIN — Medication 10 MILLIGRAM(S): at 21:33

## 2025-05-01 RX ADMIN — SALINE 1 ENEMA: 7; 19 ENEMA RECTAL at 15:46

## 2025-05-01 RX ADMIN — Medication 4 MILLIGRAM(S): at 13:58

## 2025-05-01 RX ADMIN — Medication 2 TABLET(S): at 21:33

## 2025-05-01 RX ADMIN — CLOPIDOGREL BISULFATE 75 MILLIGRAM(S): 75 TABLET, FILM COATED ORAL at 12:14

## 2025-05-01 RX ADMIN — NICOTINE POLACRILEX 1 PATCH: 4 GUM, CHEWING ORAL at 12:15

## 2025-05-01 NOTE — PROGRESS NOTE ADULT - NS ATTEND AMEND GEN_ALL_CORE FT
Pt seen and examined at bedside.  No acute events overnight  Pt denies cp, palpitations, sob, abd pain, N/V, fever, chills    Pt w/ new onset AFIB, w/ hx of metastatic AdenoCA of R lung to the brain s/p resection and RT in R parietal-occipital region (2023).  Concern for AC and the risk for bleeding associated w/ it. Per team, NSGY recommendations not to move forward w/ AC. Pt herself states no AC.   Discussed case w/ Cardiology.   Pt medically optimized for discharge

## 2025-05-01 NOTE — PROGRESS NOTE ADULT - SUBJECTIVE AND OBJECTIVE BOX
Patient is a 80y old  Female who presents with a chief complaint of palpitations (01 May 2025 07:24)      Patient seen and examined at bedside. No overnight events reported.     ALLERGIES:  penicillin (Short breath; Hives)    MEDICATIONS  (STANDING):  amLODIPine   Tablet 10 milliGRAM(s) Oral daily  aspirin enteric coated 81 milliGRAM(s) Oral daily  atorvastatin 40 milliGRAM(s) Oral at bedtime  clopidogrel Tablet 75 milliGRAM(s) Oral daily  escitalopram 10 milliGRAM(s) Oral daily  levETIRAcetam 500 milliGRAM(s) Oral two times a day  metoprolol tartrate 25 milliGRAM(s) Oral two times a day  nicotine -   7 mG/24Hr(s) Patch 1 Patch Transdermal daily  polyethylene glycol 3350 17 Gram(s) Oral daily    MEDICATIONS  (PRN):  acetaminophen     Tablet .. 650 milliGRAM(s) Oral every 6 hours PRN Temp greater or equal to 38C (100.4F), Mild Pain (1 - 3)  aluminum hydroxide/magnesium hydroxide/simethicone Suspension 30 milliLiter(s) Oral every 4 hours PRN Dyspepsia  hydrOXYzine hydrochloride 50 milliGRAM(s) Oral three times a day PRN for anxiety  ondansetron Injectable 4 milliGRAM(s) IV Push every 8 hours PRN Nausea and/or Vomiting    Vital Signs Last 24 Hrs  T(F): 98.2 (01 May 2025 05:59), Max: 98.6 (30 Apr 2025 13:09)  HR: 47 (01 May 2025 05:59) (47 - 60)  BP: 115/70 (01 May 2025 05:59) (111/69 - 136/79)  RR: 16 (01 May 2025 05:59) (16 - 16)  SpO2: 100% (01 May 2025 05:59) (96% - 100%)  I&O's Summary    30 Apr 2025 07:01  -  01 May 2025 07:00  --------------------------------------------------------  IN: 240 mL / OUT: 0 mL / NET: 240 mL      PHYSICAL EXAM:  General: NAD, A/O x 3  ENT: No gross hearing impairment, Moist mucous membranes, no thrush  Neck: Supple, No JVD  Lungs: Clear to auscultation bilaterally, good air entry, non-labored breathing  Cardio: RRR, S1/S2, No murmur  Abdomen: Soft, Nontender, Nondistended; Bowel sounds present  Extremities: No calf tenderness, No cyanosis, No pitting edema  Psych: Appropriate mood and affect    LABS:                        12.4   13.21 )-----------( 252      ( 01 May 2025 07:20 )             36.5     05-01    138  |  103  |  40  ----------------------------<  90  3.9   |  25  |  1.56    Ca    9.4      01 May 2025 07:20                                        Urinalysis Basic - ( 01 May 2025 07:20 )    Color: x / Appearance: x / SG: x / pH: x  Gluc: 90 mg/dL / Ketone: x  / Bili: x / Urobili: x   Blood: x / Protein: x / Nitrite: x   Leuk Esterase: x / RBC: x / WBC x   Sq Epi: x / Non Sq Epi: x / Bacteria: x        Culture - Blood (collected 25 Apr 2025 16:50)  Source: Blood Blood-Peripheral  Final Report (30 Apr 2025 21:01):    No growth at 5 days    Culture - Blood (collected 25 Apr 2025 16:40)  Source: Blood Blood-Peripheral  Final Report (30 Apr 2025 21:01):    No growth at 5 days        RADIOLOGY & ADDITIONAL TESTS:    Care Discussed with Consultants/Other Providers:

## 2025-05-01 NOTE — PROGRESS NOTE ADULT - ASSESSMENT
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 Admitted for new onset afib    #New onset afib with RVR (resolved)  #B/L L/E DVT s/p IVC filter in 2024  #Metastatic Lung CA with brain met s/p resection   - Monitor on tele, currently NSR since arrival to [address] [place] [address]  - Currently rate controlled, continue lopressor   - TTE reviewed, grade 1 DD, LVEF 65-70%, moderate to severe MR  - trop peaked and downtrended   - TSH reviewed, T4 normal  - 4/28 Stress test w/ perfusion defect   - patient refusing coronary angio   - AC is indicated for the patient, however she has an IVC filter for a past DVT so it is unclear if AC is still contraindicated. Had a hx of brain tumor resection 2023. Patient states she was never told to not take blood thinners, patient's son does not recall either   - Per patient's oncologist Dr. Delacruz [phone] prefer not to start AC, ok with ASA and plavix  - Per cardio recs, benefit likely outweighs risk, started plavix 75mg daily and ASA, patient and patient's family in agreement   - Neurology consulted; CTH rec and done, no further recs  - Cardiology following, Follow up with neurosurgeon if safe for patient to be on therapeutic anticoagulation such as Eliquis    #Leukocytosis w/o fever  - follow f/wbc   - CXR ordered  - UA negative, RVP negative   - no infectious signs/symptoms     #CKD  #Hypokalemia; resolved   - Baseline SCr: around 1.5, currently at baseline  - Monitor BMP     #Abdominal aortic aneurysm   - CTA with infrarenal abdominal aortic aneurysm, measuring 4.4 x 4.0 cm  - Discussed with Dr. Lee, recs outpatient follow up in 6 months with vascular surgery, patient made aware     #Metastatic Lung CA with brain met s/p resection   - Continue keppra 500mg BID for seizure ppx  - Neurosurgeon Dr. Kecia Barcenas    #HTN  - c/w norvasc and lopressor    #Depression/anxiety  - c/w lexapro   - c/w hydroxyzine PRN    #COPD without acute exacerbation  #Nicotine dependence without withdrawal  - Not on home inhalers per med rec  - c/w nicotine TD    #VTE ppx:  ASA, plavix, IVC filter, ambulation    GOC Full code    Dispo: Pending input from outpt. 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 Admitted for new onset afib    #New onset afib with RVR (resolved)  #B/L L/E DVT s/p IVC filter in 2024  #Metastatic Lung CA with brain met s/p resection   - Monitor on tele, currently NSR since arrival to [address] [place] [address]  - Currently rate controlled, continue lopressor   - TTE reviewed, grade 1 DD, LVEF 65-70%, moderate to severe MR  - trop peaked and downtrended   - TSH reviewed, T4 normal  - 4/28 Stress test w/ perfusion defect   - patient refusing coronary angio   - Neurology consulted; CTH rec and done, no further recs  - Per patient's oncologist Dr. Delacruz [phone] prefer not to start AC, ok with ASA and plavix  - per cards, started plavix 75mg daily and ASA, patient and patient's family in agreement   - spoke to cards, no AC is indicated at this time based on patient's history of brain mets/surgery in 2023.     #Leukocytosis w/o fever; improved  #Constipation; resolved   - follow f/wbc   - CXR ordered  - UA negative, RVP negative   - no infectious signs/symptoms   - patient had BM today, no enema required     #CKD  #Hypokalemia; resolved   - Baseline SCr: around 1.5, currently at baseline  - Monitor BMP     #Abdominal aortic aneurysm   - CTA with infrarenal abdominal aortic aneurysm, measuring 4.4 x 4.0 cm  - Discussed with Dr. Lee, recs outpatient follow up in 6 months with vascular surgery, patient made aware     #Metastatic Lung CA with brain met s/p resection   - Continue keppra 500mg BID for seizure ppx  - Neurosurgeon Dr. Kecia Barcenas    #HTN  - c/w norvasc and lopressor    #Depression/anxiety  - c/w lexapro   - c/w hydroxyzine PRN    #COPD without acute exacerbation  #Nicotine dependence without withdrawal  - Not on home inhalers per med rec  - c/w nicotine TD    #VTE ppx:  ASA, plavix, IVC filter, ambulation    GOC Full code    Dispo: d/c planning, no AC per Cardio     Jairo Hector (HCP)

## 2025-05-02 ENCOUNTER — APPOINTMENT (OUTPATIENT)
Dept: FAMILY MEDICINE | Facility: CLINIC | Age: 80
End: 2025-05-02

## 2025-05-02 VITALS
HEART RATE: 68 BPM | SYSTOLIC BLOOD PRESSURE: 111 MMHG | TEMPERATURE: 99 F | DIASTOLIC BLOOD PRESSURE: 73 MMHG | OXYGEN SATURATION: 96 % | RESPIRATION RATE: 17 BRPM

## 2025-05-02 PROCEDURE — 83605 ASSAY OF LACTIC ACID: CPT

## 2025-05-02 PROCEDURE — 84480 ASSAY TRIIODOTHYRONINE (T3): CPT

## 2025-05-02 PROCEDURE — 96375 TX/PRO/DX INJ NEW DRUG ADDON: CPT

## 2025-05-02 PROCEDURE — 93017 CV STRESS TEST TRACING ONLY: CPT

## 2025-05-02 PROCEDURE — 93306 TTE W/DOPPLER COMPLETE: CPT

## 2025-05-02 PROCEDURE — 85027 COMPLETE CBC AUTOMATED: CPT

## 2025-05-02 PROCEDURE — 93005 ELECTROCARDIOGRAM TRACING: CPT

## 2025-05-02 PROCEDURE — 71275 CT ANGIOGRAPHY CHEST: CPT | Mod: MC

## 2025-05-02 PROCEDURE — 36415 COLL VENOUS BLD VENIPUNCTURE: CPT

## 2025-05-02 PROCEDURE — 80053 COMPREHEN METABOLIC PANEL: CPT

## 2025-05-02 PROCEDURE — 78452 HT MUSCLE IMAGE SPECT MULT: CPT | Mod: MC

## 2025-05-02 PROCEDURE — 80048 BASIC METABOLIC PNL TOTAL CA: CPT

## 2025-05-02 PROCEDURE — 99285 EMERGENCY DEPT VISIT HI MDM: CPT

## 2025-05-02 PROCEDURE — 84439 ASSAY OF FREE THYROXINE: CPT

## 2025-05-02 PROCEDURE — A9500: CPT

## 2025-05-02 PROCEDURE — 94640 AIRWAY INHALATION TREATMENT: CPT

## 2025-05-02 PROCEDURE — 71045 X-RAY EXAM CHEST 1 VIEW: CPT

## 2025-05-02 PROCEDURE — 99232 SBSQ HOSP IP/OBS MODERATE 35: CPT

## 2025-05-02 PROCEDURE — 85730 THROMBOPLASTIN TIME PARTIAL: CPT

## 2025-05-02 PROCEDURE — 87040 BLOOD CULTURE FOR BACTERIA: CPT

## 2025-05-02 PROCEDURE — 81001 URINALYSIS AUTO W/SCOPE: CPT

## 2025-05-02 PROCEDURE — 74177 CT ABD & PELVIS W/CONTRAST: CPT | Mod: MC

## 2025-05-02 PROCEDURE — 85025 COMPLETE CBC W/AUTO DIFF WBC: CPT

## 2025-05-02 PROCEDURE — 87637 SARSCOV2&INF A&B&RSV AMP PRB: CPT

## 2025-05-02 PROCEDURE — 84436 ASSAY OF TOTAL THYROXINE: CPT

## 2025-05-02 PROCEDURE — 84484 ASSAY OF TROPONIN QUANT: CPT

## 2025-05-02 PROCEDURE — 96374 THER/PROPH/DIAG INJ IV PUSH: CPT

## 2025-05-02 PROCEDURE — 70450 CT HEAD/BRAIN W/O DYE: CPT | Mod: MC

## 2025-05-02 PROCEDURE — 85610 PROTHROMBIN TIME: CPT

## 2025-05-02 PROCEDURE — 83735 ASSAY OF MAGNESIUM: CPT

## 2025-05-02 PROCEDURE — 84443 ASSAY THYROID STIM HORMONE: CPT

## 2025-05-02 RX ADMIN — NICOTINE POLACRILEX 1 PATCH: 4 GUM, CHEWING ORAL at 07:29

## 2025-05-02 RX ADMIN — NICOTINE POLACRILEX 1 PATCH: 4 GUM, CHEWING ORAL at 11:47

## 2025-05-02 RX ADMIN — LEVETIRACETAM 500 MILLIGRAM(S): 10 INJECTION, SOLUTION INTRAVENOUS at 06:02

## 2025-05-02 RX ADMIN — CLOPIDOGREL BISULFATE 75 MILLIGRAM(S): 75 TABLET, FILM COATED ORAL at 11:23

## 2025-05-02 RX ADMIN — AMLODIPINE BESYLATE 10 MILLIGRAM(S): 10 TABLET ORAL at 06:02

## 2025-05-02 RX ADMIN — METOPROLOL SUCCINATE 25 MILLIGRAM(S): 50 TABLET, EXTENDED RELEASE ORAL at 06:02

## 2025-05-02 RX ADMIN — NICOTINE POLACRILEX 1 PATCH: 4 GUM, CHEWING ORAL at 11:23

## 2025-05-02 RX ADMIN — POLYETHYLENE GLYCOL 3350 17 GRAM(S): 17 POWDER, FOR SOLUTION ORAL at 11:23

## 2025-05-02 RX ADMIN — ESCITALOPRAM OXALATE 10 MILLIGRAM(S): 20 TABLET ORAL at 11:23

## 2025-05-02 RX ADMIN — Medication 81 MILLIGRAM(S): at 11:23

## 2025-05-02 NOTE — PROGRESS NOTE ADULT - ASSESSMENT
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 Admitted for new onset afib    #New onset afib with RVR (resolved)  #B/L L/E DVT s/p IVC filter in 2024  #Metastatic Lung CA with brain met s/p resection   - Monitor on tele, currently NSR since arrival to [address] [place] [address]  - rate controlled, continue lopressor   - TTE reviewed, grade 1 DD, LVEF 65-70%, moderate to severe MR  - trop peaked and downtrended   - TSH reviewed, T4 normal  - 4/28 Stress test w/ perfusion defect   - patient refusing coronary angio   - Neurology consulted; CTH rec and done, no further recs  - Per patient's oncologist Dr. Delacruz [phone] prefer not to start AC, ok with ASA and plavix  - per cards, started plavix 75mg daily and ASA, patient and patient's family in agreement   - team spoke to [facility], no AC is indicated at this time based on patient's history of brain mets/surgery in 2023.     #Leukocytosis w/o fever; improved  #Constipation; resolved   - follow f/wbc   - CXR ordered  - UA negative, RVP negative   - no infectious signs/symptoms   - patient had BM today, no enema required     #CKD  #Hypokalemia; resolved   - Baseline SCr: around 1.5, currently at baseline  - Monitor BMP     #Abdominal aortic aneurysm   - CTA with infrarenal abdominal aortic aneurysm, measuring 4.4 x 4.0 cm  - Discussed with Dr. Lee, recs outpatient follow up in 6 months with vascular surgery, patient made aware     #Metastatic Lung CA with brain met s/p resection   - Continue keppra 500mg BID for seizure ppx  - Neurosurgeon Dr. Kecia Barcenas    #HTN  - c/w norvasc and lopressor    #Depression/anxiety  - c/w lexapro   - c/w hydroxyzine PRN    #COPD without acute exacerbation  #Nicotine dependence without withdrawal  - Not on home inhalers per med rec  - c/w nicotine TD    #VTE ppx:  ASA, plavix, IVC filter, ambulation    GOC Full code    Dispo: DC home today    Jairo Hector (HCP)

## 2025-05-02 NOTE — PROGRESS NOTE ADULT - NUTRITIONAL ASSESSMENT
Diet, Regular (04-28-25 @ 15:11) [Active]
Diet, NPO:   Except Medications (04-28-25 @ 10:22) [Active]  Diet, NPO after Midnight:      NPO Start Date: 27-Apr-2025,   NPO Start Time: 23:59 (04-27-25 @ 12:53) [Active]
Diet, Regular (04-28-25 @ 15:11) [Active]

## 2025-05-02 NOTE — PROGRESS NOTE ADULT - REASON FOR ADMISSION
palpitations

## 2025-05-02 NOTE — PROGRESS NOTE ADULT - SUBJECTIVE AND OBJECTIVE BOX
Patient is a 80y old  Female who presents with a chief complaint of palpitations (01 May 2025 07:24)      Subjective and overnight events:  Patient seen and examined at bedside. no fever, chills, sob, cp. reports had a large BM last night and abdominal pain much improved after.     ALLERGIES:  penicillin (Short breath; Hives)    MEDICATIONS  (STANDING):  amLODIPine   Tablet 10 milliGRAM(s) Oral daily  aspirin enteric coated 81 milliGRAM(s) Oral daily  atorvastatin 40 milliGRAM(s) Oral at bedtime  bisacodyl Suppository 10 milliGRAM(s) Rectal at bedtime  clopidogrel Tablet 75 milliGRAM(s) Oral daily  escitalopram 10 milliGRAM(s) Oral daily  levETIRAcetam 500 milliGRAM(s) Oral two times a day  metoprolol tartrate 25 milliGRAM(s) Oral two times a day  nicotine -   7 mG/24Hr(s) Patch 1 Patch Transdermal daily  polyethylene glycol 3350 17 Gram(s) Oral daily  senna 2 Tablet(s) Oral at bedtime    MEDICATIONS  (PRN):  acetaminophen     Tablet .. 650 milliGRAM(s) Oral every 6 hours PRN Temp greater or equal to 38C (100.4F), Mild Pain (1 - 3)  aluminum hydroxide/magnesium hydroxide/simethicone Suspension 30 milliLiter(s) Oral every 4 hours PRN Dyspepsia  hydrOXYzine hydrochloride 50 milliGRAM(s) Oral three times a day PRN for anxiety  ondansetron Injectable 4 milliGRAM(s) IV Push every 8 hours PRN Nausea and/or Vomiting    Vital Signs Last 24 Hrs  T(F): 98.1 (02 May 2025 05:58), Max: 98.9 (01 May 2025 17:34)  HR: 79 (02 May 2025 05:58) (60 - 85)  BP: 143/86 (02 May 2025 05:58) (122/78 - 148/75)  RR: 17 (02 May 2025 05:58) (16 - 17)  SpO2: 95% (02 May 2025 05:58) (93% - 96%)  I&O's Summary    01 May 2025 07:01  -  02 May 2025 07:00  --------------------------------------------------------  IN: 0 mL / OUT: 1 mL / NET: -1 mL      PHYSICAL EXAM:  General: NAD, A/O x 3  ENT: MMM  Neck: Supple, No JVD  Lungs: Clear to auscultation bilaterally  Cardio: RRR, S1/S2, No murmurs  Abdomen: Soft, Nontender, Nondistended; Bowel sounds present  Extremities: No calf tenderness, No pitting edema    LABS:                        12.4   13.21 )-----------( 252      ( 01 May 2025 07:20 )             36.5     05-01    138  |  103  |  40  ----------------------------<  90  3.9   |  25  |  1.56    Ca    9.4      01 May 2025 07:20      Urinalysis Basic - ( 01 May 2025 07:20 )    Color: x / Appearance: x / SG: x / pH: x  Gluc: 90 mg/dL / Ketone: x  / Bili: x / Urobili: x   Blood: x / Protein: x / Nitrite: x   Leuk Esterase: x / RBC: x / WBC x   Sq Epi: x / Non Sq Epi: x / Bacteria: x        Culture - Blood (collected 25 Apr 2025 16:50)  Source: Blood Blood-Peripheral  Final Report (30 Apr 2025 21:01):    No growth at 5 days    Culture - Blood (collected 25 Apr 2025 16:40)  Source: Blood Blood-Peripheral  Final Report (30 Apr 2025 21:01):    No growth at 5 days          RADIOLOGY & ADDITIONAL TESTS:    Care Discussed with Consultants/Other Providers:

## 2025-05-02 NOTE — PROGRESS NOTE ADULT - PROVIDER SPECIALTY LIST ADULT
Hospitalist
Hospitalist
Cardiology
Hospitalist
Cardiology
Hospitalist

## 2025-05-09 ENCOUNTER — RX RENEWAL (OUTPATIENT)
Age: 80
End: 2025-05-09

## 2025-05-09 RX ORDER — ATORVASTATIN CALCIUM 40 MG/1
40 TABLET, FILM COATED ORAL
Qty: 90 | Refills: 3 | Status: ACTIVE | COMMUNITY
Start: 2025-05-09 | End: 1900-01-01

## 2025-05-09 RX ORDER — CLOPIDOGREL BISULFATE 75 MG/1
75 TABLET, FILM COATED ORAL
Qty: 90 | Refills: 3 | Status: ACTIVE | COMMUNITY
Start: 2025-05-09 | End: 1900-01-01

## 2025-05-09 RX ORDER — METOPROLOL TARTRATE 25 MG/1
25 TABLET ORAL
Qty: 180 | Refills: 3 | Status: ACTIVE | COMMUNITY
Start: 2025-05-09 | End: 1900-01-01

## 2025-05-12 ENCOUNTER — APPOINTMENT (OUTPATIENT)
Dept: FAMILY MEDICINE | Facility: CLINIC | Age: 80
End: 2025-05-12

## 2025-05-13 ENCOUNTER — APPOINTMENT (OUTPATIENT)
Dept: FAMILY MEDICINE | Facility: CLINIC | Age: 80
End: 2025-05-13
Payer: MEDICARE

## 2025-05-13 VITALS
DIASTOLIC BLOOD PRESSURE: 70 MMHG | HEART RATE: 64 BPM | SYSTOLIC BLOOD PRESSURE: 122 MMHG | WEIGHT: 145 LBS | RESPIRATION RATE: 20 BRPM | BODY MASS INDEX: 24.75 KG/M2 | HEIGHT: 64 IN

## 2025-05-13 DIAGNOSIS — I48.91 UNSPECIFIED ATRIAL FIBRILLATION: ICD-10-CM

## 2025-05-13 PROCEDURE — 99495 TRANSJ CARE MGMT MOD F2F 14D: CPT

## 2025-05-13 RX ORDER — TRAMADOL HYDROCHLORIDE 50 MG/1
50 TABLET, COATED ORAL 3 TIMES DAILY
Qty: 21 | Refills: 0 | Status: ACTIVE | COMMUNITY
Start: 2025-05-13 | End: 1900-01-01

## 2025-06-03 ENCOUNTER — NON-APPOINTMENT (OUTPATIENT)
Age: 80
End: 2025-06-03

## 2025-06-06 ENCOUNTER — RX RENEWAL (OUTPATIENT)
Age: 80
End: 2025-06-06

## 2025-06-10 ENCOUNTER — NON-APPOINTMENT (OUTPATIENT)
Age: 80
End: 2025-06-10

## 2025-06-30 ENCOUNTER — RX RENEWAL (OUTPATIENT)
Age: 80
End: 2025-06-30

## 2025-07-11 ENCOUNTER — NON-APPOINTMENT (OUTPATIENT)
Age: 80
End: 2025-07-11

## 2025-07-14 ENCOUNTER — APPOINTMENT (OUTPATIENT)
Dept: CARDIOLOGY | Facility: CLINIC | Age: 80
End: 2025-07-14

## 2025-07-31 ENCOUNTER — APPOINTMENT (OUTPATIENT)
Dept: FAMILY MEDICINE | Facility: CLINIC | Age: 80
End: 2025-07-31

## 2025-08-01 ENCOUNTER — NON-APPOINTMENT (OUTPATIENT)
Age: 80
End: 2025-08-01

## 2025-08-04 ENCOUNTER — RX RENEWAL (OUTPATIENT)
Age: 80
End: 2025-08-04

## 2025-08-21 ENCOUNTER — NON-APPOINTMENT (OUTPATIENT)
Age: 80
End: 2025-08-21

## 2025-09-16 ENCOUNTER — NON-APPOINTMENT (OUTPATIENT)
Age: 80
End: 2025-09-16

## (undated) DEVICE — TRAP SPECIMEN SPUTUM 40CC

## (undated) DEVICE — GOWN SLEEVES

## (undated) DEVICE — NDL COUNTER FOAM AND MAGNET 40-70

## (undated) DEVICE — MIDAS REX LEGEND BALL FLUTED MEDNEXT SM BORE 3.0MM X 10CM

## (undated) DEVICE — ELCTR SUBDERMAL NDL CLASSIC 1.5M X 59" (6 COLOR)

## (undated) DEVICE — LAP PAD 18 X 18"

## (undated) DEVICE — Device

## (undated) DEVICE — BIOMET BATTERY DISP

## (undated) DEVICE — CANISTER SUCTION 2000CC

## (undated) DEVICE — WOUND IRR SURGIPHOR

## (undated) DEVICE — GLV 8 PROTEXIS (WHITE)

## (undated) DEVICE — DRAIN RESERVOIR FOR JACKSON PRATT 100CC CARDINAL

## (undated) DEVICE — POSITIONER FOAM EGG CRATE ULNAR 2PCS (PINK)

## (undated) DEVICE — DRSG TELFA 3 X 8

## (undated) DEVICE — SNAP ON SPHERZ 5 PACK

## (undated) DEVICE — SUT NUROLON 4-0 8-18" TF (POP-OFF)

## (undated) DEVICE — GLV 6.5 PROTEXIS (WHITE)

## (undated) DEVICE — ELCTR SUBDERMAL NDL 27G X 1/2" WITH TWISTED PAIR

## (undated) DEVICE — MIDAS REX MR7 LUBRICANT DIFFUSER CARTRIDGE

## (undated) DEVICE — PREP DURAPREP 26CC

## (undated) DEVICE — BLADE SCALPEL SAFETYLOCK #10

## (undated) DEVICE — DRAIN JACKSON PRATT 7MM FLAT FULL NO TROCAR

## (undated) DEVICE — SUCTION YANKAUER NO CONTROL VENT

## (undated) DEVICE — ELCTR BOVIE TIP BLADE INSULATED 2.75" EDGE

## (undated) DEVICE — SYR LUER LOK 20CC

## (undated) DEVICE — SOL IRR POUR H2O 250ML

## (undated) DEVICE — BIPOLAR FORCEP SYMMETRY BAYONET 7" X 1.5MM SMOOTH (SILVER)

## (undated) DEVICE — SUT VICRYL 2-0 18" CP-2 UNDYED (POP-OFF)

## (undated) DEVICE — GLV 7.5 PROTEXIS (WHITE)

## (undated) DEVICE — MEDICATION LABELS W MARKER

## (undated) DEVICE — VISITEC 4X4

## (undated) DEVICE — SOL IRR POUR NS 0.9% 500ML

## (undated) DEVICE — DRAPE MAYO STAND 30"

## (undated) DEVICE — DRAIN JACKSON PRATT 7FR ROUND END NO TROCAR

## (undated) DEVICE — DRAPE 3/4 SHEET W REINFORCEMENT 56X77"

## (undated) DEVICE — TUBING CODMAN INTEGRATED BIPOLAR CORD & TUBING SET FLYING LEADS

## (undated) DEVICE — STAPLER SKIN VISI-STAT 35 WIDE

## (undated) DEVICE — DRSG CURITY GAUZE SPONGE 4 X 4" 12-PLY

## (undated) DEVICE — SUT VICRYL 3-0 18" X-1 (POP-OFF)

## (undated) DEVICE — GLV 7 DERMAPRENE ULTRA

## (undated) DEVICE — ELCTR MONOPOLAR STIMULATOR PROBE FLUSH-TIP

## (undated) DEVICE — FOLEY TRAY 16FR LF URINE METER SURESTEP

## (undated) DEVICE — SUT NUROLON 2-0 18" CP-2 (POP-OFF)

## (undated) DEVICE — MARKING PEN W RULER

## (undated) DEVICE — ELCTR BIPOLAR PROBE

## (undated) DEVICE — MIDAS REX LEGEND BALL FLUTED SM BORE 6.0MM X 10CM

## (undated) DEVICE — AESCULAP SCALPFIX 10 CLIPS

## (undated) DEVICE — DRAPE LIGHT HANDLE COVER (BLUE)

## (undated) DEVICE — MIDAS REX LEGEND TAPERED SM BORE 2.3MM X 8CM

## (undated) DEVICE — TUBING BIPOLAR IRRIGATOR AND CORD SET

## (undated) DEVICE — GUIDE KIT TRAJECTORY BX EXTERNAL

## (undated) DEVICE — DRSG XEROFORM 1 X 8"

## (undated) DEVICE — GLV 7 PROTEXIS (WHITE)

## (undated) DEVICE — TUBING SUCTION 20FT

## (undated) DEVICE — SUT ETHILON 3-0 18" FS-1

## (undated) DEVICE — CODMAN PERFORATOR 14MM (BLUE)

## (undated) DEVICE — SPECIMEN CONTAINER 100ML

## (undated) DEVICE — ELCTR 4-DISC 20MM 49" (RED, BLUE, GREEN, BLACK)

## (undated) DEVICE — DRAPE MICROSCOPE OPMI VISIONGUARD 48X118"

## (undated) DEVICE — DRAIN JACKSON PRATT 10MM FLAT FULL NO TROCAR

## (undated) DEVICE — SYR ASEPTO

## (undated) DEVICE — DRAPE CAMERA VIDEO 7"X96"

## (undated) DEVICE — MIDAS REX LEGEND TAPERED SM BORE 1.1MM X 8CM

## (undated) DEVICE — BLADE SCALPEL SAFETYLOCK #15

## (undated) DEVICE — BLADE SCALPEL SAFETYLOCK #11

## (undated) DEVICE — DRAIN JACKSON PRATT 3 SPRING RESERVOIR W 10FR PVC DRAIN

## (undated) DEVICE — GOWN TRIMAX LG

## (undated) DEVICE — VENODYNE/SCD SLEEVE CALF LARGE

## (undated) DEVICE — DRAPE TOWEL BLUE 17" X 24"

## (undated) DEVICE — GLV 8.5 PROTEXIS (WHITE)

## (undated) DEVICE — ELCTR PEDICLE SCREW PROBE 3MM BALL 1.8MM X 100MM

## (undated) DEVICE — PREP CHLORAPREP HI-LITE ORANGE 26ML

## (undated) DEVICE — DRAPE INSTRUMENT POUCH 6.75" X 11"

## (undated) DEVICE — TAPE SILK 3"

## (undated) DEVICE — DRAIN LIMITORR DRAIN SYSTEM 30ML

## (undated) DEVICE — DRAPE 1/2 SHEET 40X57"

## (undated) DEVICE — ELCTR SUBDERMAL CORKSCREW NDL 1.2MM